# Patient Record
Sex: FEMALE | Race: WHITE | NOT HISPANIC OR LATINO | Employment: FULL TIME | URBAN - METROPOLITAN AREA
[De-identification: names, ages, dates, MRNs, and addresses within clinical notes are randomized per-mention and may not be internally consistent; named-entity substitution may affect disease eponyms.]

---

## 2022-01-06 ENCOUNTER — OFFICE VISIT (OUTPATIENT)
Dept: OBGYN CLINIC | Facility: MEDICAL CENTER | Age: 27
End: 2022-01-06
Payer: COMMERCIAL

## 2022-01-06 VITALS
HEIGHT: 65 IN | DIASTOLIC BLOOD PRESSURE: 62 MMHG | SYSTOLIC BLOOD PRESSURE: 108 MMHG | BODY MASS INDEX: 29.82 KG/M2 | WEIGHT: 179 LBS

## 2022-01-06 DIAGNOSIS — Z78.9 ATTEMPTING TO CONCEIVE: ICD-10-CM

## 2022-01-06 DIAGNOSIS — Z80.3 FAMILY HISTORY OF BREAST CANCER IN MOTHER: ICD-10-CM

## 2022-01-06 DIAGNOSIS — Z01.419 WELL WOMAN EXAM WITH ROUTINE GYNECOLOGICAL EXAM: Primary | ICD-10-CM

## 2022-01-06 PROBLEM — S03.00XA DISLOCATION OF TEMPOROMANDIBULAR JOINT: Status: ACTIVE | Noted: 2022-01-06

## 2022-01-06 PROBLEM — J45.909 ASTHMA: Status: ACTIVE | Noted: 2022-01-06

## 2022-01-06 PROBLEM — F98.8 ATTENTION DEFICIT DISORDER: Status: ACTIVE | Noted: 2022-01-06

## 2022-01-06 PROCEDURE — S0610 ANNUAL GYNECOLOGICAL EXAMINA: HCPCS | Performed by: OBSTETRICS & GYNECOLOGY

## 2022-01-06 RX ORDER — ALBUTEROL SULFATE 90 UG/1
1 AEROSOL, METERED RESPIRATORY (INHALATION)
COMMUNITY
Start: 2021-12-15 | End: 2022-02-16

## 2022-01-06 RX ORDER — IPRATROPIUM BROMIDE 42 UG/1
2 SPRAY, METERED NASAL
COMMUNITY
Start: 2021-12-24 | End: 2022-02-16

## 2022-01-06 RX ORDER — DEXAMETHASONE 4 MG/1
2 TABLET ORAL 2 TIMES DAILY
COMMUNITY
Start: 2021-12-24 | End: 2022-02-16

## 2022-01-06 RX ORDER — LEVOCETIRIZINE DIHYDROCHLORIDE 5 MG/1
TABLET, FILM COATED ORAL
COMMUNITY
Start: 2022-01-04 | End: 2022-04-22

## 2022-01-06 NOTE — PROGRESS NOTES
Assessment   31 y o  G0 with regular menses who is sexually active and currently not using contraception (TTC) presenting for annual exam      Plan   Diagnoses and all orders for this visit:    Well woman exam with routine gynecological exam  - Pap up to date  - s/p gardasil  - Clinicians breast exam done  - Return in 1yr for yearly    Family history of breast cancer in mother  - Discussed family hx and neg genetic testing in family  - Discussed dense breast tissue and how that can change risk  May benefit from adjunctive testing, which will be determined after baseline mammo  - Recommend starting mammograms at age 35 and yearly thereafter    Attempting to conceive  - Anticipatory guidance given  - Will follow next cycle with OPKs  Discussed expected vs abnormal results  - Start PNV  - Medical hx and medication review done      __________________________________________________________________      Subjective     Sisi Lopez is a 31 y o  G0 with regular menses who is sexually active and currently not using contraception (TTC) presenting for annual exam  She is without complaint  Recently stopped OCP due to desire to conceive  Hx ovarian cysts that resolved easily with birth control  GYN  Complaints: denies  Denies dysmenorrhea, dyspareunia, genital discharge, genital ulcers, irregular/heavy menses, pelvic pain and vulvar/vaginal symptoms  Periods are regular every 30 days, lasting 4-5 days  Dysmenorrhea:none     Cyclic symptoms include none  Sexually active: Yes - single partner - male  Hx STI: denies   Hx Abnormal pap: denies  Last pap: 2020 - NILM  S/p gardasil    OB  G0  TTC      Complaints: denies  Denies urinary frequency, hematuria, urinary incontinence and dysuria    BREAST  Complaints: denies  Denies: breast lump, breast tenderness, changed mole, dryness, nipple discharge, pruritus, rash, skin color change and skin lesion(s)  Last mammogram: n/a  Personal hx: denies  Family hx: denies fhx of uterine, ovarian, or colon cancers  Mom with breast ca (62) (BRCA neg)  Maternal grandmother and maternal great aunt with breast ca (later in life)  Patient does do regular self-exams    GENERAL  PMH reviewed/updated and is as below  Patient does not follow with a PCP  Works as a CT tech at [employer]  Denies domestic violence  Exercise: nothing specific, currently moving  Diet: nothing specific    SCREENING  Cervical Ca: pap up to date, s/p gardasil  Breast Ca: clinicians breast exam done, family hx discussed  Colon Ca: not indicated by age      No past medical history on file  Past Surgical History:   Procedure Laterality Date    SHOULDER SURGERY Right     2019         Current Outpatient Medications:     albuterol (PROVENTIL HFA,VENTOLIN HFA) 90 mcg/act inhaler, Inhale 1 puff, Disp: , Rfl:     fluticasone (Flovent HFA) 110 MCG/ACT inhaler, Inhale 2 puffs 2 (two) times a day, Disp: , Rfl:     ipratropium (ATROVENT) 0 06 % nasal spray, 2 sprays, Disp: , Rfl:     levocetirizine (XYZAL) 5 MG tablet, , Disp: , Rfl:     Advair Diskus 100-50 MCG/DOSE inhaler, , Disp: , Rfl:     Allergies   Allergen Reactions    Cephalosporins Hives    Lactose Intolerance (Gi) - Food Allergy Nausea Only    Clindamycin Rash     headache  headache      Tramadol GI Intolerance, Hives, Itching and Rash       Social History     Tobacco Use    Smoking status: Never Smoker    Smokeless tobacco: Never Used   Vaping Use    Vaping Use: Never used   Substance Use Topics    Alcohol use: Yes     Comment: social    Drug use: Never           Objective  /62   Ht 5' 5" (1 651 m)   Wt 81 2 kg (179 lb)   LMP 01/03/2022   Breastfeeding No   BMI 29 79 kg/m²      Physical Exam:  Physical Exam  Constitutional:       General: She is not in acute distress  Appearance: Normal appearance  She is not ill-appearing, toxic-appearing or diaphoretic  Eyes:      General: No scleral icterus  Right eye: No discharge           Left eye: No discharge  Conjunctiva/sclera: Conjunctivae normal    Cardiovascular:      Rate and Rhythm: Normal rate  Pulmonary:      Effort: Pulmonary effort is normal  No respiratory distress  Chest:   Breasts: Breasts are symmetrical       Right: No inverted nipple, mass, nipple discharge, skin change, tenderness or axillary adenopathy  Left: No inverted nipple, mass, nipple discharge, skin change, tenderness or axillary adenopathy  Genitourinary:     Comments: Declines pelvic secondary to menses  No reported concerns  Musculoskeletal:         General: No swelling  Lymphadenopathy:      Upper Body:      Right upper body: No axillary or pectoral adenopathy  Left upper body: No axillary or pectoral adenopathy  Skin:     General: Skin is warm and dry  Coloration: Skin is not jaundiced or pale  Findings: No bruising or erythema  Neurological:      Mental Status: She is alert  Psychiatric:         Mood and Affect: Mood normal          Behavior: Behavior normal          Thought Content:  Thought content normal          Judgment: Judgment normal

## 2022-02-15 ENCOUNTER — OFFICE VISIT (OUTPATIENT)
Dept: DERMATOLOGY | Facility: CLINIC | Age: 27
End: 2022-02-15
Payer: COMMERCIAL

## 2022-02-15 VITALS — TEMPERATURE: 98.2 F | HEIGHT: 65 IN | WEIGHT: 183.3 LBS | BODY MASS INDEX: 30.54 KG/M2

## 2022-02-15 DIAGNOSIS — D22.9 MULTIPLE BENIGN MELANOCYTIC NEVI: Primary | ICD-10-CM

## 2022-02-15 DIAGNOSIS — L91.8 ACROCHORDON: ICD-10-CM

## 2022-02-15 DIAGNOSIS — L70.0 ACNE VULGARIS: ICD-10-CM

## 2022-02-15 DIAGNOSIS — D48.5 NEOPLASM OF UNCERTAIN BEHAVIOR OF SKIN: ICD-10-CM

## 2022-02-15 PROCEDURE — 99214 OFFICE O/P EST MOD 30 MIN: CPT | Performed by: STUDENT IN AN ORGANIZED HEALTH CARE EDUCATION/TRAINING PROGRAM

## 2022-02-15 PROCEDURE — 11103 TANGNTL BX SKIN EA SEP/ADDL: CPT | Performed by: STUDENT IN AN ORGANIZED HEALTH CARE EDUCATION/TRAINING PROGRAM

## 2022-02-15 PROCEDURE — 88305 TISSUE EXAM BY PATHOLOGIST: CPT | Performed by: STUDENT IN AN ORGANIZED HEALTH CARE EDUCATION/TRAINING PROGRAM

## 2022-02-15 PROCEDURE — 11102 TANGNTL BX SKIN SINGLE LES: CPT | Performed by: STUDENT IN AN ORGANIZED HEALTH CARE EDUCATION/TRAINING PROGRAM

## 2022-02-15 NOTE — PATIENT INSTRUCTIONS
1  MELANOCYTIC NEVI ("Moles")    Assessment and Plan:  Based on a thorough discussion of this condition and the management approach to it (including a comprehensive discussion of the known risks, side effects and potential benefits of treatment), the patient (family) agrees to implement the following specific plan:   Reassured benign      Melanocytic Nevi  Melanocytic nevi ("moles") are caused by collections of the color producing skin cells, or melanocytes, in 1 area in the skin  They can range in color from pink to dark brown and be either raised or flat  Some moles are present at birth (I e , "congenital nevi"), while others come up later in life (i e , "acquired nevi")  Jenetta Reed exposure also stimulates the body to make more moles, ie the more sun you get the more moles you'll grow  Clinically distinguishing a healthy mole from melanoma may be difficult  The "ABCDE's" of moles have been suggested as a means of helping to alert a person to a suspicious mole and the possible increased risk of melanoma  Asymmetry: Healthy moles tend to be symmetric, while melanomas are often asymmetric  Asymmetry means if you draw a line through the mole, the two halves do not match in color, size, shape, or surface texture Any mole that starts to demonstrate "asymmetry" should be examined promptly by a board certified dermatologist      Border: Healthy moles tend to have discrete, even borders  The border of a melanoma often blends into the normal skin and does not sharply delineate the mole from normal skin  Any mole that starts to demonstrate "uneven borders" should be examined promptly     Color: Healthy moles tend to be one color throughout  Melanomas tend to be made up of different colors ranging from dark black, blue, white, or red    Any mole that demonstrates a color change should be examined promptly    Diameter: Healthy moles tend to be smaller than 0 6 cm in size; an exception are "congenital nevi" that can be larger  Melanomas tend to grow and can often be greater than 0 6 cm (1/4 of an inch, or the size of a pencil eraser)  This is only a guideline, and many normal moles may be larger than 0 6 cm without being unhealthy  Any mole that starts to change in size (small to bigger or bigger to smaller) should be examined promptly    Evolving: Healthy moles tend to "stay the same "  Melanomas may often show signs of change or evolution such as a change in size, shape, color, or elevation  Any mole that starts to itch, bleed, crust, burn, hurt, or ulcerate or demonstrate a change or evolution should be examined promptly by a board certified dermatologist       What are atypical moles or dysplastic nevi? Dysplastic moles are moles that have some of the ABCDE  changes listed above but  are not cancerous  Sometimes a biopsy and microscopic examination are needed to determine the difference  They may indicate an increased risk of melanoma in that person, especially if there is a family history of melanoma  What is a Melanoma? The main concern when looking at a new or changing mole it to evaluate whether it may be a melanoma  The appearance of a "new mole" remains one of the most reliable methods for identifying a malignant melanoma  A melanoma is a type of skin cancer that can be deadly if it spreads throughout the body  The prognosis of a Melanoma depends on how deep it has penetrated in the skin  If caught early, they generally will not have had time to grow into the deeper layers of the skin and they cure rate is then very high  Once the melanoma grows deeper into the skin, the cure rate drops dramatically  Therefore, early detection and removal of a malignant melanoma results in a much better chance of complete cure         2  ACROCHORDON ("SKIN TAG")    Assessment and Plan:  Based on a thorough discussion of this condition and the management approach to it (including a comprehensive discussion of the known risks, side effects and potential benefits of treatment), the patient (family) agrees to implement the following specific plan:   Reassured benign     Skin tags are common, soft, harmless skin lesions that are also called, in the appropriate settings, papillomas, fibroepithelial polyps, and soft fibromas  They are made up of loosely arranged collagen fibers and blood vessels surrounded by a thickened or thinned-out epidermis  Skin tags tend to develop in both men and women as we grow older  They are usually found on the skin folds (neck, armpits, groin)  It is not known what specifically causes skin tags  Certain factors, though, do appear to play a role:   Chaffing and irritation from skin rubbing together   High levels of growth factors (as seen, for example, in pregnancy or in acromegaly/gigantism)   Insulin resistance   Human papillomavirus (wart virus)    We discussed that most skin tags do not need to be treated unless they are specifically causing the patient physical distress or limitation or pose a risk for a larger problem such as an infection that forms secondary to excoriation or chronic irritation  We had a thorough discussion of treatment options and specific risks (including that any procedural treatment may not be covered by insurance and would then be the patient's responsibility) and benefits/alternatives including but not limited to the following:   Cryotherapy (freezing)   Shave removal   Surgical excision (snip excision with scissors)   Electrosurgery   Ligation (we do not do this procedure and counseled against it due to risk of tissue necrosis and infection)      3  NEOPLASM OF UNCERTAIN BEHAVIOR OF SKIN    Assessment and Plan:   I have discussed with the patient that a sample of skin via a "skin biopsy would be potentially helpful to further make a specific diagnosis under the microscope     Based on a thorough discussion of this condition and the management approach to it (including a comprehensive discussion of the known risks, side effects and potential benefits of treatment), the patient (family) agrees to implement the following specific plan:    o Procedure:  Skin Biopsy  After a thorough discussion of treatment options and risk/benefits/alternatives (including but not limited to local pain, scarring, dyspigmentation, blistering, possible superinfection, and inability to confirm a diagnosis via histopathology), verbal and written consent were obtained and portion of the rash was biopsied for tissue sample  See below for consent that was obtained from patient and subsequent Procedure Note  INFORMED CONSENT DISCUSSION AND POST-OPERATIVE INSTRUCTIONS FOR PATIENT    I   RATIONALE FOR PROCEDURE  I understand that a skin biopsy allows the Dermatologist to test a lesion or rash under the microscope to obtain a diagnosis  It usually involves numbing the area with numbing medication and removing a small piece of skin; sometimes the area will be closed with sutures  In this specific procedure, sutures are not usually needed  If any sutures are placed, then they are usually need to be removed in 2 weeks or less  I understand that my Dermatologist recommends that a skin "shave" biopsy be performed today  A local anesthetic, similar to the kind that a dentist uses when filling a cavity, will be injected with a very small needle into the skin area to be sampled  The injected skin and tissue underneath "will go to sleep and become numb so no pain should be felt afterwards  An instrument shaped like a tiny "razor blade" (shave biopsy instrument) will be used to cut a small piece of tissue and skin from the area so that a sample of tissue can be taken and examined more closely under the microscope  A slight amount of bleeding will occur, but it will be stopped with direct pressure and a pressure bandage and any other appropriate methods    I understands that a scar will form where the wound was created  Surgical ointment will be applied to help protect the wound  Sutures are not usually needed  II   RISKS AND POTENTIAL COMPLICATIONS   I understand the risks and potential complications of a skin biopsy include but are not limited to the following:   Bleeding   Infection   Pain   Scar/keloid   Skin discoloration   Incomplete Removal   Recurrence   Nerve Damage/Numbness/Loss of Function   Allergic Reaction to Anesthesia   Biopsies are diagnostic procedures and based on findings additional treatment or evaluation may be required   Loss or destruction of specimen resulting in no additional findings    My Dermatologist has explained to me the nature of the condition, the nature of the procedure, and the benefits to be reasonably expected compared with alternative approaches  My Dermatologist has discussed the likelihood of major risks or complications of this procedure including the specific risks listed above, such as bleeding, infection, and scarring/keloid  I understand that a scar is expected after this procedure  I understand that my physician cannot predict if the scar will form a "keloid," which extends beyond the borders of the wound that is created  A keloid is a thick, painful, and bumpy scar  A keloid can be difficult to treat, as it does not always respond well to therapy, which includes injecting cortisone directly into the keloid every few weeks  While this usually reduces the pain and size of the scar, it does not eliminate it  I understand that photographs may be taken before and after the procedure  These will be maintained as part of the medical providers confidential records and may not be made available to me  I further authorize the medical provider to use the photographs for teaching purposes or to illustrate scientific papers, books, or lectures if in his/her judgment, medical research, education, or science may benefit from its use      I have had an opportunity to fully inquire about the risks and benefits of this procedure and its alternatives  I have been given ample time and opportunity to ask questions and to seek a second opinion if I wished to do so  I acknowledge that there have specifically been no guarantees as to the cosmetic results from the procedure  I am aware that with any procedure there is always the possibility of an unexpected complication  III  POST-PROCEDURAL CARE (WHAT YOU WILL NEED TO DO "AFTER THE BIOPSY" TO OPTIMIZE HEALING)     Keep the area clean and dry  Try NOT to remove the bandage or get it wet for the first 24 hours   Gently clean the area and apply surgical ointment (such as Vaseline petrolatum ointment, which is available "over the counter" and not a prescription) to the biopsy site for up to 2 weeks straight  This acts to protect the wound from the outside world   Sutures are not usually placed in this procedure  If any sutures were placed, return for suture removal as instructed (generally 1 week for the face, 2 weeks for the body)   Take Acetaminophen (Tylenol) for discomfort, if no contraindications  Ibuprofen or aspirin could make bleeding worse   Call our office immediately for signs of infection: fever, chills, increased redness, warmth, tenderness, discomfort/pain, or pus or foul smell coming from the wound  WHAT TO DO IF THERE IS ANY BLEEDING? If a small amount of bleeding is noticed, place a clean cloth over the area and apply firm pressure for ten minutes  Check the wound after 10 minutes of direct pressure  If bleeding persists, try one more time for an additional 10 minutes of direct pressure on the area  If the bleeding becomes heavier or does not stop after the second attempt, or if you have any other questions about this procedure, then please call your [place]  [facility] by calling [phone] (SKIN)       I hereby acknowledge that I have reviewed and verified the site with my Dermatologist and have requested and authorized my Dermatologist to proceed with the procedure  4  ACNE VULGARIS ("COMMON ACNE")    Assessment and Plan:   We reviewed the causes of acne, the kinds of acne, and the expected clinical course   We discussed treatment options ranging from over-the-counter products, topical retinoids, antibiotics, BP, hormonal therapies (OCPs/spironolactone), and isotretinoin (Accutane)   We reviewed specific over-the-counter interventions and medications  Recommended typical hygiene measures washing regularly with mild cleanser, and refraining from picking and popping any pimples  Recommended non-comedogenic sunscreen use daily   Expectations of therapy discussed  Side effects, risks and benefits of medications discussed  A comprehensive handout with treatment plan provided  The phone number to call in case of questions or concerns (and instructions to stop medications in such a scenario) was provided   After lengthy discussion of etiology and treatment options, we decided to implement the following personalized treatment plan:      Mornin  Wash with over the counter Neutrogena Clear Pore Face Wash on face and back wash off after 30 seconds  Night:    1  Wash your face with a gentle face wash - like Cetaphil wash, Cerave Hydrating , LaRoche Hydrating Cleanser   -Differin 0 1% gel- Over the counter apply topically one hour before bedtime a pea sized amount  Start with every other night and then if tolerable please increase to every night  Make sure all products you use on face are labeled as "non-comedongenic" or "oil-free" or " does not clog pores"  Acne can be frustrating and difficult to treat  Most acne regimens take 2-3 months to see an improvement, so stick with them  Don't give up! As always, call your doctor if you have any concerns about your medications

## 2022-02-15 NOTE — PROGRESS NOTES
[facility] Dermatology Clinic Note     Patient Name: Roxanna Holliday  Encounter Date: 02/15/2022     Have you been cared for by a [facility] Dermatologist in the last 3 years and, if so, which one? No    · Have you traveled outside of the [address] [place] [address] in the past 3 months or outside of the [facility] area in the last 2 weeks? No     May we call your Preferred Phone number to discuss your specific medical information? Yes     May we leave a detailed message that includes your specific medical information? Yes      Today's Chief Concerns:   Concern #1:  Full Body Check       Past Medical History:  Have you personally ever had or currently have any of the following? · Skin cancer (such as Melanoma, Basal Cell Carcinoma, Squamous Cell Carcinoma? (If Yes, please provide more detail)- No  · Eczema: YES  · Psoriasis: No  · HIV/AIDS: No  · Hepatitis B or C: No  · Tuberculosis: No  · Systemic Immunosuppression such as Diabetes, Biologic or Immunotherapy, Chemotherapy, Organ Transplantation, Bone Marrow Transplantation (If YES, please provide more detail): No  · Radiation Treatment (If YES, please provide more detail): No  · Any other major medical conditions/concerns? 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 Be outdoors     Family History:  Have any of your "first degree relatives" (parent, brother, sister, or child) had any of the following       · Skin cancer such as Melanoma or Merkel Cell Carcinoma or Pancreatic Cancer? No  · Eczema, Asthma, Hay Fever or Seasonal Allergies: No  · Psoriasis or Psoriatic Arthritis: No  · Do any other medical conditions seem to run in your family? If Yes, what condition and which relatives?   No    Current Medications:         Current Outpatient Medications:     Advair Diskus 100-50 MCG/DOSE inhaler, , Disp: , Rfl:     albuterol (PROVENTIL HFA,VENTOLIN HFA) 90 mcg/act inhaler, Inhale 1 puff, Disp: , Rfl:     fluticasone (Flovent HFA) 110 MCG/ACT inhaler, Inhale 2 puffs 2 (two) times a day, Disp: , Rfl:     levocetirizine (XYZAL) 5 MG tablet, , Disp: , Rfl:     ipratropium (ATROVENT) 0 06 % nasal spray, 2 sprays (Patient not taking: Reported on 2/15/2022 ), Disp: , Rfl:       Review of Systems:  Have you recently had or currently have any of the following? If YES, what are you doing for the problem? · Fever, chills or unintended weight loss: No  · Sudden loss or change in your vision: No  · Nausea, vomiting or blood in your stool: No  · Painful or swollen joints: No  · Wheezing or cough: No  · Changing mole or non-healing wound: YES,   · Nosebleeds: No  · Excessive sweating: No  · Easy or prolonged bleeding? No  · Over the last 2 weeks, how often have you been bothered by the following problems? · Taking little interest or pleasure in doing things: 1 - Not at All  · Feeling down, depressed, or hopeless: 1 - Not at All  · Rapid heartbeat with epinephrine:  No    · FEMALES ONLY:    · Are you pregnant or planning to become pregnant? YES  · Are you currently or planning to be nursing or breast feeding? No    · Any known allergies? Allergies   Allergen Reactions    Cephalosporins Hives    Lactose Intolerance (Gi) - Food Allergy Nausea Only    Clindamycin Rash     headache  headache      Tramadol GI Intolerance, Hives, Itching and Rash   ·       Physical Exam:     Was a chaperone (Derm Clinical Assistant) present throughout the entire Physical Exam? Yes     Did the Dermatology Team specifically  the patient on the importance of a Full Skin Exam to be sure that nothing is missed clinically?  Yes}  o Did the patient ultimately request or accept a Full Skin Exam?  Yes  o Did the patient specifically refuse to have the areas "under-the-bra" examined by the Dermatologist? No  o Did the patient specifically refuse to have the areas "under-the-underwear" examined by the Dermatologist? No    CONSTITUTIONAL:   Vitals:    02/15/22 0935   Temp: 98 2 °F (36 8 °C)   TempSrc: Temporal   Weight: 83 1 kg (183 lb 4 8 oz)   Height: 5' 5" (1 651 m)       PSYCH: Normal mood and affect  EYES: Normal conjunctiva  ENT: Normal lips and oral mucosa  CARDIOVASCULAR: No edema  RESPIRATORY: Normal respirations  HEME/LYMPH/IMMUNO:  No ostensible subQ swelling except as noted below in "ASSESSMENT AND PLAN BY DIAGNOSIS"    SKIN:  FULL ORGAN SYSTEM EXAM   Hair, Scalp, Ears, Face Normal except as noted below in Assessment   Neck Normal except as noted below in Assessment   Right Arm/Hand/Fingers Normal except as noted below in Assessment   Left Arm/Hand/Fingers Normal except as noted below in Assessment   Axillae Viewed areas Normal except as noted below in Assessment (pt deferred breast, chest, abdomen exam)       Back/Spine Normal except as noted below in Assessment   Groin/Genitalia/Buttocks Pt deferred   Right Leg, Foot, Toes Normal except as noted below in Assessment   Left Leg, Foot, Toes Normal except as noted below in Assessment        Assessment and Plan by Diagnosis:        1  MELANOCYTIC NEVI ("Moles")    Physical Exam:   Anatomic Location Affected:   Mostly on sun-exposed areas of the Extremities and back    Morphological Description:  Scattered, 1-4mm round to ovoid, symmetrical-appearing, even bordered, skin colored to dark brown macules/papules, mostly in sun-exposed areas   Pertinent Positives:   Pertinent Negatives:     Additional History of Present Condition:  N/A    Assessment and Plan:  Based on a thorough discussion of this condition and the management approach to it (including a comprehensive discussion of the known risks, side effects and potential benefits of treatment), the patient (family) agrees to implement the following specific plan:   Monitor for changes     Melanocytic Nevi  Melanocytic nevi ("moles") are caused by collections of the color producing skin cells, or melanocytes, in 1 area in the skin  They can range in color from pink to dark brown and be either raised or flat  Some moles are present at birth (I e , "congenital nevi"), while others come up later in life (i e , "acquired nevi")  Quanah exposure also stimulates the body to make more moles, ie the more sun you get the more moles you'll grow  Clinically distinguishing a healthy mole from melanoma may be difficult  The "ABCDE's" of moles have been suggested as a means of helping to alert a person to a suspicious mole and the possible increased risk of melanoma  Asymmetry: Healthy moles tend to be symmetric, while melanomas are often asymmetric  Asymmetry means if you draw a line through the mole, the two halves do not match in color, size, shape, or surface texture Any mole that starts to demonstrate "asymmetry" should be examined promptly by a board certified dermatologist      Border: Healthy moles tend to have discrete, even borders  The border of a melanoma often blends into the normal skin and does not sharply delineate the mole from normal skin  Any mole that starts to demonstrate "uneven borders" should be examined promptly     Color: Healthy moles tend to be one color throughout  Melanomas tend to be made up of different colors ranging from dark black, blue, white, or red  Any mole that demonstrates a color change should be examined promptly    Diameter: Healthy moles tend to be smaller than 0 6 cm in size; an exception are "congenital nevi" that can be larger  Melanomas tend to grow and can often be greater than 0 6 cm (1/4 of an inch, or the size of a pencil eraser)  This is only a guideline, and many normal moles may be larger than 0 6 cm without being unhealthy    Any mole that starts to change in size (small to bigger or bigger to smaller) should be examined promptly    Evolving: Healthy moles tend to "stay the same "  Melanomas may often show signs of change or evolution such as a change in size, shape, color, or elevation  Any mole that starts to itch, bleed, crust, burn, hurt, or ulcerate or demonstrate a change or evolution should be examined promptly by a board certified dermatologist       What are atypical moles or dysplastic nevi? Dysplastic moles are moles that have some of the ABCDE  changes listed above but  are not cancerous  Sometimes a biopsy and microscopic examination are needed to determine the difference  They may indicate an increased risk of melanoma in that person, especially if there is a family history of melanoma  What is a Melanoma? The main concern when looking at a new or changing mole it to evaluate whether it may be a melanoma  The appearance of a "new mole" remains one of the most reliable methods for identifying a malignant melanoma  A melanoma is a type of skin cancer that can be deadly if it spreads throughout the body  The prognosis of a Melanoma depends on how deep it has penetrated in the skin  If caught early, they generally will not have had time to grow into the deeper layers of the skin and they cure rate is then very high  Once the melanoma grows deeper into the skin, the cure rate drops dramatically  Therefore, early detection and removal of a malignant melanoma results in a much better chance of complete cure  2  ACROCHORDON ("SKIN TAG")    Physical Exam:   Anatomic Location Affected:  Scattered    Morphological Description:  brown and skin colored papules   Pertinent Positives:   Pertinent Negatives:     Additional History of Present Condition:  N/A    Assessment and Plan:  Based on a thorough discussion of this condition and the management approach to it (including a comprehensive discussion of the known risks, side effects and potential benefits of treatment), the patient (family) agrees to implement the following specific plan:   Reassured benign     Skin tags are common, soft, harmless skin lesions that are also called, in the appropriate settings, papillomas, fibroepithelial polyps, and soft fibromas  They are made up of loosely arranged collagen fibers and blood vessels surrounded by a thickened or thinned-out epidermis  Skin tags tend to develop in both men and women as we grow older  They are usually found on the skin folds (neck, armpits, groin)  It is not known what specifically causes skin tags  Certain factors, though, do appear to play a role:   Chaffing and irritation from skin rubbing together   High levels of growth factors (as seen, for example, in pregnancy or in acromegaly/gigantism)   Insulin resistance   Human papillomavirus (wart virus)    We discussed that most skin tags do not need to be treated unless they are specifically causing the patient physical distress or limitation or pose a risk for a larger problem such as an infection that forms secondary to excoriation or chronic irritation  We had a thorough discussion of treatment options and specific risks (including that any procedural treatment may not be covered by insurance and would then be the patient's responsibility) and benefits/alternatives including but not limited to the following:   Cryotherapy (freezing)   Shave removal   Surgical excision (snip excision with scissors)   Electrosurgery   Ligation (we do not do this procedure and counseled against it due to risk of tissue necrosis and infection)      3  NEOPLASM OF UNCERTAIN BEHAVIOR OF SKIN    Physical Exam:   (Anatomic Location); (Size and Morphological Description); (Differential Diagnosis):  o Specimen A: Left Axilla; 0 2 x 0 2  cm; skin colored papule ;differential diagnosis; skin tag   o   o   o   o Specimen B; Right neck; 0 3 x 0 3 cm; brown papule; differential diagnosis; nevus vs skin tag   o   o   o    Pertinent Positives:   Pertinent Negatives:     Additional History of Present Condition:  N/A    Assessment and Plan:   I have discussed with the patient that a sample of skin via a "skin biopsy would be potentially helpful to further make a specific diagnosis under the microscope   Based on a thorough discussion of this condition and the management approach to it (including a comprehensive discussion of the known risks, side effects and potential benefits of treatment), the patient (family) agrees to implement the following specific plan:    o Procedure:  Skin Biopsy  After a thorough discussion of treatment options and risk/benefits/alternatives (including but not limited to local pain, scarring, dyspigmentation, blistering, possible superinfection, and inability to confirm a diagnosis via histopathology), verbal and written consent were obtained and portion of the rash was biopsied for tissue sample  See below for consent that was obtained from patient and subsequent Procedure Note  PROCEDURE SHAVE BIOPSY NOTE:     Performing Physician: Leroy Sood Anatomic Location; Clinical Description with size (cm); Pre-Op Diagnosis:   o Specimen A: Left Axilla; 0 2 x 0 2  cm; skin colored papule ;differential diagnosis; skin tag   o Specimen B; Right neck; 0 3 x 0 3 cm; brown papule; differential diagnosis; nevus vs skin tag    Post-op diagnosis: Same      Local anesthesia: 1% xylocaine with epi       Topical anesthesia: None     Hemostasis: Aluminum chloride       After obtaining informed consent  at which time there was a discussion about the purpose of biopsy  and low risks of infection and bleeding  The area was prepped and draped in the usual fashion  Anesthesia was obtained with 1% lidocaine with epinephrine  A shave biopsy to an appropriate sampling depth was obtained with a sterile blade (such as a 15-blade or DermaBlade)  The resulting wound was covered with surgical ointment and bandaged appropriately  The patient tolerated the procedure well without complications and was without signs of functional compromise        Specimen has been sent for review by Dermatopathology  Standard post-procedure care has been explained and has been included in written form within the patient's copy of Informed Consent  INFORMED CONSENT DISCUSSION AND POST-OPERATIVE INSTRUCTIONS FOR PATIENT    I   RATIONALE FOR PROCEDURE  I understand that a skin biopsy allows the Dermatologist to test a lesion or rash under the microscope to obtain a diagnosis  It usually involves numbing the area with numbing medication and removing a small piece of skin; sometimes the area will be closed with sutures  In this specific procedure, sutures are not usually needed  If any sutures are placed, then they are usually need to be removed in 2 weeks or less  I understand that my Dermatologist recommends that a skin "shave" biopsy be performed today  A local anesthetic, similar to the kind that a dentist uses when filling a cavity, will be injected with a very small needle into the skin area to be sampled  The injected skin and tissue underneath "will go to sleep and become numb so no pain should be felt afterwards  An instrument shaped like a tiny "razor blade" (shave biopsy instrument) will be used to cut a small piece of tissue and skin from the area so that a sample of tissue can be taken and examined more closely under the microscope  A slight amount of bleeding will occur, but it will be stopped with direct pressure and a pressure bandage and any other appropriate methods  I understands that a scar will form where the wound was created  Surgical ointment will be applied to help protect the wound  Sutures are not usually needed      II   RISKS AND POTENTIAL COMPLICATIONS   I understand the risks and potential complications of a skin biopsy include but are not limited to the following:   Bleeding   Infection   Pain   Scar/keloid   Skin discoloration   Incomplete Removal   Recurrence   Nerve Damage/Numbness/Loss of Function   Allergic Reaction to Anesthesia   Biopsies are diagnostic procedures and based on findings additional treatment or evaluation may be required   Loss or destruction of specimen resulting in no additional findings    My Dermatologist has explained to me the nature of the condition, the nature of the procedure, and the benefits to be reasonably expected compared with alternative approaches  My Dermatologist has discussed the likelihood of major risks or complications of this procedure including the specific risks listed above, such as bleeding, infection, and scarring/keloid  I understand that a scar is expected after this procedure  I understand that my physician cannot predict if the scar will form a "keloid," which extends beyond the borders of the wound that is created  A keloid is a thick, painful, and bumpy scar  A keloid can be difficult to treat, as it does not always respond well to therapy, which includes injecting cortisone directly into the keloid every few weeks  While this usually reduces the pain and size of the scar, it does not eliminate it  I understand that photographs may be taken before and after the procedure  These will be maintained as part of the medical providers confidential records and may not be made available to me  I further authorize the medical provider to use the photographs for teaching purposes or to illustrate scientific papers, books, or lectures if in his/her judgment, medical research, education, or science may benefit from its use  I have had an opportunity to fully inquire about the risks and benefits of this procedure and its alternatives  I have been given ample time and opportunity to ask questions and to seek a second opinion if I wished to do so  I acknowledge that there have specifically been no guarantees as to the cosmetic results from the procedure  I am aware that with any procedure there is always the possibility of an unexpected complication  III  POST-PROCEDURAL CARE (WHAT YOU WILL NEED TO DO "AFTER THE BIOPSY" TO OPTIMIZE HEALING)     Keep the area clean and dry  Try NOT to remove the bandage or get it wet for the first 24 hours   Gently clean the area and apply surgical ointment (such as Vaseline petrolatum ointment, which is available "over the counter" and not a prescription) to the biopsy site for up to 2 weeks straight  This acts to protect the wound from the outside world   Sutures are not usually placed in this procedure  If any sutures were placed, return for suture removal as instructed (generally 1 week for the face, 2 weeks for the body)   Take Acetaminophen (Tylenol) for discomfort, if no contraindications  Ibuprofen or aspirin could make bleeding worse   Call our office immediately for signs of infection: fever, chills, increased redness, warmth, tenderness, discomfort/pain, or pus or foul smell coming from the wound  WHAT TO DO IF THERE IS ANY BLEEDING? If a small amount of bleeding is noticed, place a clean cloth over the area and apply firm pressure for ten minutes  Check the wound after 10 minutes of direct pressure  If bleeding persists, try one more time for an additional 10 minutes of direct pressure on the area  If the bleeding becomes heavier or does not stop after the second attempt, or if you have any other questions about this procedure, then please call your [facility]'s Dermatologist by calling [phone] (SKIN)  I hereby acknowledge that I have reviewed and verified the site with my Dermatologist and have requested and authorized my Dermatologist to proceed with the procedure  4  ACNE VULGARIS ("COMMON ACNE")    Physical Exam:   Anatomic Location Affected: face  Morphological Description:clear now  Additional History of Present Condition:  N/A    Assessment and Plan:   We reviewed the causes of acne, the kinds of acne, and the expected clinical course     We discussed treatment options ranging from over-the-counter products, topical retinoids, antibiotics, BP, hormonal therapies (OCPs/spironolactone), and isotretinoin (Accutane)   We reviewed specific over-the-counter interventions and medications  Recommended typical hygiene measures washing regularly with mild cleanser, and refraining from picking and popping any pimples  Recommended non-comedogenic sunscreen use daily   Expectations of therapy discussed  Side effects, risks and benefits of medications discussed  A comprehensive handout with treatment plan provided  The phone number to call in case of questions or concerns (and instructions to stop medications in such a scenario) was provided   After lengthy discussion of etiology and treatment options, we decided to implement the following personalized treatment plan:      Mornin  Wash with over the counter Neutrogena Clear Pore Face Wash on face and back wash off after 30 seconds  Night:    1  Wash your face with a gentle face wash - like Cetaphil wash, Cerave Hydrating , LaRoche Hydrating Cleanser   -Differin 0 1% gel- Over the counter apply topically one hour before bedtime a pea sized amount  Start with every other night and then if tolerable please increase to every night  Make sure all products you use on face are labeled as "non-comedongenic" or "oil-free" or " does not clog pores"  Acne can be frustrating and difficult to treat  Most acne regimens take 2-3 months to see an improvement, so stick with them  Don't give up! As always, call your doctor if you have any concerns about your medications      Scribe Attestation    I,:  Grace Saldivar am acting as a scribe while in the presence of the attending physician :       I,:  Doron Sellers MD personally performed the services described in this documentation    as scribed in my presence :

## 2022-02-16 ENCOUNTER — OFFICE VISIT (OUTPATIENT)
Dept: OBGYN CLINIC | Facility: CLINIC | Age: 27
End: 2022-02-16
Payer: COMMERCIAL

## 2022-02-16 VITALS
HEIGHT: 65 IN | DIASTOLIC BLOOD PRESSURE: 76 MMHG | SYSTOLIC BLOOD PRESSURE: 114 MMHG | BODY MASS INDEX: 30.92 KG/M2 | WEIGHT: 185.6 LBS

## 2022-02-16 DIAGNOSIS — Z31.9 INFERTILITY MANAGEMENT: Primary | ICD-10-CM

## 2022-02-16 PROBLEM — N83.299 FUNCTIONAL OVARIAN CYSTS: Status: ACTIVE | Noted: 2022-02-16

## 2022-02-16 PROBLEM — S03.00XA DISLOCATION OF TEMPOROMANDIBULAR JOINT: Status: RESOLVED | Noted: 2022-01-06 | Resolved: 2022-02-16

## 2022-02-16 PROBLEM — F98.8 ATTENTION DEFICIT DISORDER: Status: RESOLVED | Noted: 2022-01-06 | Resolved: 2022-02-16

## 2022-02-16 PROCEDURE — 99215 OFFICE O/P EST HI 40 MIN: CPT | Performed by: PHYSICIAN ASSISTANT

## 2022-02-16 NOTE — PROGRESS NOTES
Assessment/Plan:    No problem-specific Assessment & Plan notes found for this encounter  Problem List Items Addressed This Visit     None      Visit Diagnoses     Infertility management    -  Primary          Slip written for partner, Shukri Paris, to have semen count and analysis  Pending results will further plan work up for pt at that point  We did discuss day 3 and day 21 testing as well as HSG if SA normal    Pt will also likely plan to make appt at [facility] locally to be seen for a consult as well  Pt and partner live in [facility]  We did also briefly discuss possible tx options including 3 cycles of oral medication here in the office if she desires prior to MASTER tx  Subjective:      Patient ID: Roxanna Holliday is a 31 y o  female  Pt presents for discussion of pregnancy  She does have appt to see MASTER at [facility] in March but it is relatively far for them and she is considering cancelling and making appt closer  Menarche 12/ no return x 12 mths then periods became more regular  Periods have been normal since  She was placed on OCPs age 14ish for control of ovarian cysts  Pt does have h/o menstrual migraines, would often skip placebo days and menses without issues  Pt has now been off pills since last January 2021  Periods have been normal off pills  Cycles typically q 31-34 last 4 days  Periods heavy and crampy the first 3 days-changes pad q 3-4 hours  Pt has been trying for pregnancy for: since stopping pills January  Over the last few mths has been using OPKs and apps  Found to have surge in cycle 12-13 days prior to menses based on OPKs  Has UPIC: at least 2-4 times per week, always around fertile days  Pregnancy history: none   Partner's pregnancy history: none   PMHX pt: none   PMHX partner: none   STD history: denies   Pelvic trauma: denies   H/o previous MASTER tx: never     Reviewed need for PNV     We reviewed pts current medication list      Pt states that she bought an at home test kit for her partner for sperm  She states that he did the kit which was found to be abnormal and indicated possible low or absent sperm count  Last pap: 1/23/20 WNL       The following portions of the patient's history were reviewed and updated as appropriate:   She  has a past medical history of Acne, ADHD, and Allergic asthma  She   Patient Active Problem List    Diagnosis Date Noted    Functional ovarian cysts 02/16/2022    Asthma 01/06/2022     She  has a past surgical history that includes Shoulder surgery (Right); Skin biopsy; and [place] tooth extraction  Her family history includes Breast cancer (age of onset: 62) in her mother; Breast cancer (age of onset: 76) in her maternal grandmother; Heart disease in her paternal grandmother  She  reports that she has never smoked  She has never used smokeless tobacco  She reports current alcohol use of about 1 0 standard drink of alcohol per week  She reports that she does not use drugs  Current Outpatient Medications   Medication Sig Dispense Refill    levocetirizine (XYZAL) 5 MG tablet       Prenat-Fe Bisgly-FA-w/o Vit A (COMPLETE PRENATAL PO) Take by mouth       No current facility-administered medications for this visit       Current Outpatient Medications on File Prior to Visit   Medication Sig    levocetirizine (XYZAL) 5 MG tablet     Prenat-Fe Bisgly-FA-w/o Vit A (COMPLETE PRENATAL PO) Take by mouth    [DISCONTINUED] Advair Diskus 100-50 MCG/DOSE inhaler  (Patient not taking: Reported on 2/16/2022 )    [DISCONTINUED] albuterol (PROVENTIL HFA,VENTOLIN HFA) 90 mcg/act inhaler Inhale 1 puff (Patient not taking: Reported on 2/16/2022 )    [DISCONTINUED] fluticasone (Flovent HFA) 110 MCG/ACT inhaler Inhale 2 puffs 2 (two) times a day (Patient not taking: Reported on 2/16/2022 )    [DISCONTINUED] ipratropium (ATROVENT) 0 06 % nasal spray 2 sprays (Patient not taking: Reported on 2/15/2022 )     No current facility-administered medications on file prior to visit  She is allergic to cephalosporins, lactose intolerance (gi) - food allergy, clindamycin, and tramadol       Review of Systems   Constitutional: Negative  Respiratory: Negative  Gastrointestinal: Negative  Endocrine: Negative  Genitourinary: Negative  Objective:      /76 (BP Location: Left arm, Patient Position: Sitting, Cuff Size: Large)   Ht 5' 5" (1 651 m)   Wt 84 2 kg (185 lb 9 6 oz)   LMP 02/06/2022 (Exact Date)   BMI 30 89 kg/m²          Physical Exam  Constitutional:       Appearance: She is normal weight  HENT:      Head: Normocephalic and atraumatic  Musculoskeletal:         General: Normal range of motion  Neurological:      Mental Status: She is alert  Psychiatric:         Mood and Affect: Mood normal          Behavior: Behavior normal          Thought Content:  Thought content normal          Judgment: Judgment normal

## 2022-02-25 ENCOUNTER — TELEPHONE (OUTPATIENT)
Dept: OBGYN CLINIC | Facility: CLINIC | Age: 27
End: 2022-02-25

## 2022-02-25 NOTE — TELEPHONE ENCOUNTER
Wants to discuss results semen analysis  Does have apt at [facility] Tuesday   Looking for a referral as well to take to her apt

## 2022-02-28 ENCOUNTER — TELEPHONE (OUTPATIENT)
Dept: DERMATOLOGY | Facility: CLINIC | Age: 27
End: 2022-02-28

## 2022-02-28 NOTE — TELEPHONE ENCOUNTER
----- Message from Miky Liriano MD sent at 2/25/2022  3:26 PM EST -----  [place]: Please inform patients that biopsies show dermal nevi (moles)  Thank you

## 2022-02-28 NOTE — TELEPHONE ENCOUNTER
Spoke with the patient and she is aware of her pathology results per Dr Marco Patino and the patient had no further questions or concerns at this time

## 2022-04-08 ENCOUNTER — TELEPHONE (OUTPATIENT)
Dept: OBGYN CLINIC | Facility: CLINIC | Age: 27
End: 2022-04-08

## 2022-04-08 NOTE — TELEPHONE ENCOUNTER
Blood work was done 4 weeks ago  I would recommend considering repeat labs due to being 4 weeks ago  If still over 2 5 then can start Synthroid 25mcg  Transposition Flap Text: The defect edges were debeveled with a #15 scalpel blade.  Given the location of the defect and the proximity to free margins a transposition flap was deemed most appropriate.  Using a sterile surgical marker, an appropriate transposition flap was drawn incorporating the defect.    The area thus outlined was incised deep to adipose tissue with a #15 scalpel blade.  The skin margins were undermined to an appropriate distance in all directions utilizing iris scissors.

## 2022-04-08 NOTE — TELEPHONE ENCOUNTER
Pt called and said she had blood work recently and has a concern regarding her thyroid level if it can affect her pregnancy  She would like a call back  If she does not answer it's okay to leave a detailed message

## 2022-04-11 ENCOUNTER — TELEPHONE (OUTPATIENT)
Dept: OBGYN CLINIC | Facility: CLINIC | Age: 27
End: 2022-04-11

## 2022-04-15 ENCOUNTER — PATIENT MESSAGE (OUTPATIENT)
Dept: OBGYN CLINIC | Facility: CLINIC | Age: 27
End: 2022-04-15

## 2022-04-20 ENCOUNTER — TELEPHONE (OUTPATIENT)
Dept: OBGYN CLINIC | Facility: CLINIC | Age: 27
End: 2022-04-20
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
## 2022-04-21 NOTE — TELEPHONE ENCOUNTER
Spoke to DR Whitman about patient as HCG dropped further  Cancelled NOB and schedule patient for ultrasound on 4/22/2022 based on Dr Shirley Lorenzo recommendation

## 2022-04-22 ENCOUNTER — ULTRASOUND (OUTPATIENT)
Dept: OBGYN CLINIC | Facility: CLINIC | Age: 27
End: 2022-04-22
Payer: COMMERCIAL

## 2022-04-22 VITALS
DIASTOLIC BLOOD PRESSURE: 80 MMHG | BODY MASS INDEX: 30.49 KG/M2 | HEIGHT: 65 IN | SYSTOLIC BLOOD PRESSURE: 118 MMHG | WEIGHT: 183 LBS

## 2022-04-22 DIAGNOSIS — O02.1 MISSED ABORTION: ICD-10-CM

## 2022-04-22 DIAGNOSIS — O36.80X0 PREGNANCY WITH INCONCLUSIVE FETAL VIABILITY, SINGLE OR UNSPECIFIED FETUS: Primary | ICD-10-CM

## 2022-04-22 PROCEDURE — 99213 OFFICE O/P EST LOW 20 MIN: CPT | Performed by: PHYSICIAN ASSISTANT

## 2022-04-22 RX ORDER — IPRATROPIUM BROMIDE 42 UG/1
SPRAY, METERED NASAL
COMMUNITY
Start: 2022-03-02 | End: 2022-08-03 | Stop reason: ALTCHOICE

## 2022-04-22 NOTE — PROGRESS NOTES
Assessment/Plan:    Pregnancy with inconclusive fetal viability  Reviewed with patient decreasing quants and dating off when compared to last menstrual period and positive pregnancy test which are both concerning for early pregnancy loss  In order to confirm pregnancy loss an ultrasound must be done 2 weeks after ultrasound with gestational sac but no yolk sac or fetal pole confirming no fetal pole with a heart beat  Patient has ultrasound scheduled next week closer to her job for follow up  Offered support, dispelled guilt  Reviewed what to expect as far as bleeding  Reviewed only pads no tampon, reviewed Tylenol for pain relief  If confirmed loss and not starting to bleed on own we can offer Cytotec to start the process  Reviewed Cytotec in length  All questions answered  Will plan to repeat quant on Monday and call if bleeding starts so we can follow down to nonpregnant values  Problem List Items Addressed This Visit        Other    Pregnancy with inconclusive fetal viability - Primary     Reviewed with patient decreasing quants and dating off when compared to last menstrual period and positive pregnancy test which are both concerning for early pregnancy loss  In order to confirm pregnancy loss an ultrasound must be done 2 weeks after ultrasound with gestational sac but no yolk sac or fetal pole confirming no fetal pole with a heart beat  Patient has ultrasound scheduled next week closer to her job for follow up  Offered support, dispelled guilt  Reviewed what to expect as far as bleeding  Reviewed only pads no tampon, reviewed Tylenol for pain relief  If confirmed loss and not starting to bleed on own we can offer Cytotec to start the process  Reviewed Cytotec in length  All questions answered  Will plan to repeat quant on Monday and call if bleeding starts so we can follow down to nonpregnant values              Other Visit Diagnoses     Missed         Relevant Orders    hCG, quantitative            Subjective:      Patient ID: Kelsey Dotson is a 31 y o  female  HPI  32 yo seen for suspected early pregnancy loss  Reports LMP: 3/6/2022  Positive pregnancy test 4/6/2022  Quants:   4/8/2022: 131  4/14/2022: 1008  4/19/2022: 921  4/21/2022: 678  Pelvic ultrasound done 4/19/2022 showed small intrauterine sac corresponding for 4w5d, no yolk sac or embryo  Patient denies any bleeding  Does reports some cramping  The following portions of the patient's history were reviewed and updated as appropriate:   She  has a past medical history of Acne, ADHD, and Allergic asthma  She   Patient Active Problem List    Diagnosis Date Noted    Pregnancy with inconclusive fetal viability 04/22/2022    Pregnancy of unknown anatomic location 04/19/2022    Functional ovarian cysts 02/16/2022    Asthma 01/06/2022     She  has a past surgical history that includes Shoulder surgery (Right); Skin biopsy; and [place] tooth extraction  Her family history includes Breast cancer (age of onset: 62) in her mother; Breast cancer (age of onset: 76) in her maternal grandmother; Heart disease in her paternal grandmother  She  reports that she has never smoked  She has never used smokeless tobacco  She reports current alcohol use of about 1 0 standard drink of alcohol per week  She reports that she does not use drugs  Current Outpatient Medications   Medication Sig Dispense Refill    Advair Diskus 100-50 MCG/DOSE inhaler       ipratropium (ATROVENT) 0 06 % nasal spray ADMINISTER 2 SPRAYS INTO BOTH NOSTRILS 2 TIMES A DAY AS NEEDED (POST NASAL DRIP)   levothyroxine (Synthroid) 25 mcg tablet Take 1 tablet (25 mcg total) by mouth daily 30 tablet 1    Loratadine (CLARITIN PO) Take by mouth      Prenat-Fe Bisgly-FA-w/o Vit A (COMPLETE PRENATAL PO) Take by mouth       No current facility-administered medications for this visit       She is allergic to cephalosporins, lactose intolerance (gi) - food allergy, clindamycin, and tramadol       Review of Systems   Constitutional: Negative for fatigue, fever and unexpected weight change  HENT: Negative for dental problem and sinus pressure  Eyes: Negative for visual disturbance  Respiratory: Negative for cough, shortness of breath and wheezing  Cardiovascular: Negative for chest pain  Gastrointestinal: Negative for abdominal pain, blood in stool, constipation, diarrhea, nausea and vomiting  Endocrine: Negative for polydipsia  Genitourinary: Negative for difficulty urinating, dyspareunia, dysuria, frequency, hematuria, pelvic pain and urgency  Musculoskeletal: Negative for arthralgias and back pain  Neurological: Negative for dizziness, seizures, light-headedness and headaches  Psychiatric/Behavioral: Negative for suicidal ideas  The patient is not nervous/anxious  Objective:      /80 (BP Location: Left arm, Cuff Size: Standard)   Ht 5' 5" (1 651 m)   Wt 83 kg (183 lb)   LMP 03/06/2022   BMI 30 45 kg/m²          Physical Exam  Constitutional:       Appearance: Normal appearance  She is well-developed  HENT:      Head: Normocephalic and atraumatic  Neck:      Thyroid: No thyromegaly  Pulmonary:      Effort: Pulmonary effort is normal    Skin:     General: Skin is warm and dry  Neurological:      Mental Status: She is alert and oriented to person, place, and time  Psychiatric:         Behavior: Behavior normal        TVUS: Intrauterine gestational sac measuring 0 36 x 0 47 x 0 43 cm  Too small to date  No fetal pole or yolk sac visualized

## 2022-04-22 NOTE — ASSESSMENT & PLAN NOTE
Reviewed with patient decreasing quants and dating off when compared to last menstrual period and positive pregnancy test which are both concerning for early pregnancy loss  In order to confirm pregnancy loss an ultrasound must be done 2 weeks after ultrasound with gestational sac but no yolk sac or fetal pole confirming no fetal pole with a heart beat  Patient has ultrasound scheduled next week closer to her job for follow up  Offered support, dispelled guilt  Reviewed what to expect as far as bleeding  Reviewed only pads no tampon, reviewed Tylenol for pain relief  If confirmed loss and not starting to bleed on own we can offer Cytotec to start the process  Reviewed Cytotec in length  All questions answered  Will plan to repeat quant on Monday and call if bleeding starts so we can follow down to nonpregnant values

## 2022-05-02 ENCOUNTER — TELEPHONE (OUTPATIENT)
Dept: OBGYN CLINIC | Facility: CLINIC | Age: 27
End: 2022-05-02

## 2022-05-02 NOTE — TELEPHONE ENCOUNTER
Can you confirm how long she has been on the 25 mcg? Her levels are mildly elevated but also just coming off a pregnancy

## 2022-05-02 NOTE — TELEPHONE ENCOUNTER
Pt lmom - using thyroid medication and is running out of pills  Needs to use mail order [facility]  Needs refill and mail order

## 2022-05-03 DIAGNOSIS — E07.9 THYROID DISEASE: ICD-10-CM

## 2022-05-03 RX ORDER — LEVOTHYROXINE SODIUM 0.05 MG/1
50 TABLET ORAL DAILY
Qty: 30 TABLET | Refills: 0 | Status: SHIPPED | OUTPATIENT
Start: 2022-05-03 | End: 2022-06-06

## 2022-05-03 RX ORDER — LEVOTHYROXINE SODIUM 0.05 MG/1
50 TABLET ORAL DAILY
Qty: 90 TABLET | Refills: 0 | Status: SHIPPED | OUTPATIENT
Start: 2022-05-03 | End: 2022-06-06

## 2022-05-03 RX ORDER — LEVOTHYROXINE SODIUM 0.05 MG/1
50 TABLET ORAL DAILY
Qty: 90 TABLET | Refills: 0 | Status: SHIPPED | OUTPATIENT
Start: 2022-05-03 | End: 2022-05-03 | Stop reason: SDUPTHER

## 2022-05-09 ENCOUNTER — TELEPHONE (OUTPATIENT)
Dept: OBGYN CLINIC | Facility: CLINIC | Age: 27
End: 2022-05-09

## 2022-05-09 ENCOUNTER — PATIENT MESSAGE (OUTPATIENT)
Dept: OBGYN CLINIC | Facility: CLINIC | Age: 27
End: 2022-05-09

## 2022-05-09 NOTE — TELEPHONE ENCOUNTER
Sent patient mychart message with provider recommendations, as pt stated could send instructions via [employer]  Orders for labs noted in chart  Instructed patient to confirm receipt of message and to inquire if any other questions or concerns

## 2022-05-09 NOTE — TELEPHONE ENCOUNTER
Pt lmom - pt states she is taking low dose thyroid medication as prescribed by bryon and she read on the bottle she should not take iron supplement at same time, should be 4 hours after, and pt wants to know if it's okay if she takes her prenatal vitamin at the same time as her thyroid medicine in the morning like she usually does  Pt states we can call her back or send a [employer] message with instructions

## 2022-05-09 NOTE — TELEPHONE ENCOUNTER
No  Thyroid medication should be taken on an empty stomach without any other meds or supplements x 4 hours  We had recently increased her dose to 50 mcg  If she was taking w PNV, she should go back to 25 mcg and recheck TFTs after 4-6 weeks  She may not need the higher dosage  Thanks!

## 2022-05-31 DIAGNOSIS — E07.9 THYROID DISORDER: Primary | ICD-10-CM

## 2022-05-31 LAB
EXTERNAL HEPATITIS B SURFACE ANTIGEN: NON REACTIVE
EXTERNAL HEPATITIS B SURFACE ANTIGEN: NONREACTIVE
EXTERNAL HIV-1/2 AB-AG: NORMAL
EXTERNAL HIV-1/2 AB-AG: NORMAL
EXTERNAL RUBELLA IGG QUANTITATION: NORMAL

## 2022-06-07 DIAGNOSIS — E07.9 THYROID DISORDER: Primary | ICD-10-CM

## 2022-06-07 DIAGNOSIS — E07.9 THYROID DISEASE: ICD-10-CM

## 2022-06-07 RX ORDER — LEVOTHYROXINE SODIUM 0.03 MG/1
25 TABLET ORAL DAILY
Qty: 90 TABLET | Refills: 1 | Status: SHIPPED | OUTPATIENT
Start: 2022-06-07 | End: 2022-08-03 | Stop reason: SDUPTHER

## 2022-06-08 RX ORDER — LEVOTHYROXINE SODIUM 0.03 MG/1
25 TABLET ORAL DAILY
Qty: 90 TABLET | Refills: 1 | Status: SHIPPED | OUTPATIENT
Start: 2022-06-08

## 2022-06-08 RX ORDER — LEVOTHYROXINE SODIUM 0.03 MG/1
25 TABLET ORAL DAILY
Qty: 28 TABLET | Refills: 0 | Status: SHIPPED | OUTPATIENT
Start: 2022-06-08 | End: 2022-08-03 | Stop reason: SDUPTHER

## 2022-06-27 DIAGNOSIS — Z3A.01 LESS THAN 8 WEEKS GESTATION OF PREGNANCY: Primary | ICD-10-CM

## 2022-06-27 NOTE — PROGRESS NOTES
Pt called in with positive upt requests labs   Labs ordered and aware to call once receives results to schedule apt

## 2022-06-28 ENCOUNTER — TELEPHONE (OUTPATIENT)
Dept: OBGYN CLINIC | Facility: CLINIC | Age: 27
End: 2022-06-28

## 2022-06-28 NOTE — TELEPHONE ENCOUNTER
Pt aware recommend repeating second hcg level 48 hours after initial hcg level obtained  Pt verbalizes understanding and has no further questions at this time

## 2022-06-29 ENCOUNTER — APPOINTMENT (OUTPATIENT)
Dept: LAB | Facility: CLINIC | Age: 27
End: 2022-06-29
Payer: COMMERCIAL

## 2022-06-29 DIAGNOSIS — Z3A.01 LESS THAN 8 WEEKS GESTATION OF PREGNANCY: ICD-10-CM

## 2022-06-29 LAB — B-HCG SERPL-ACNC: 77 MIU/ML

## 2022-06-29 PROCEDURE — 36415 COLL VENOUS BLD VENIPUNCTURE: CPT

## 2022-06-29 PROCEDURE — 84702 CHORIONIC GONADOTROPIN TEST: CPT

## 2022-06-30 ENCOUNTER — TELEPHONE (OUTPATIENT)
Dept: OBGYN CLINIC | Facility: CLINIC | Age: 27
End: 2022-06-30

## 2022-06-30 DIAGNOSIS — Z32.00 POSSIBLE PREGNANCY, NOT YET CONFIRMED: Primary | ICD-10-CM

## 2022-06-30 NOTE — TELEPHONE ENCOUNTER
Pt calling states yesterday noticed some blood spotting reports was very minimal and then became more brown and reports now only noticed a little bit when wiped  Pt denies any clots, pain, reports minimal occasional cramping  Pt aware if continued or increased bleeding or pain to call  Pt requests repeat hcg level per provider result notes and states will complete 48 hours from last at same lab as previous confirmed [facility] lab, order placed  Pt states may desire future labs moving forward through [place] as closer to her work but will make aware as needed  Pt aware to call with any questions or concerns

## 2022-07-01 ENCOUNTER — APPOINTMENT (OUTPATIENT)
Dept: LAB | Facility: CLINIC | Age: 27
End: 2022-07-01
Payer: COMMERCIAL

## 2022-07-01 DIAGNOSIS — Z32.00 POSSIBLE PREGNANCY, NOT YET CONFIRMED: ICD-10-CM

## 2022-07-01 DIAGNOSIS — Z32.00 POSSIBLE PREGNANCY, NOT CONFIRMED: Primary | ICD-10-CM

## 2022-07-01 LAB — B-HCG SERPL-ACNC: 187 MIU/ML

## 2022-07-01 PROCEDURE — 84702 CHORIONIC GONADOTROPIN TEST: CPT

## 2022-07-01 PROCEDURE — 36415 COLL VENOUS BLD VENIPUNCTURE: CPT

## 2022-07-06 ENCOUNTER — APPOINTMENT (OUTPATIENT)
Dept: LAB | Facility: CLINIC | Age: 27
End: 2022-07-06
Payer: COMMERCIAL

## 2022-07-06 DIAGNOSIS — Z32.00 POSSIBLE PREGNANCY, NOT CONFIRMED: ICD-10-CM

## 2022-07-06 LAB — B-HCG SERPL-ACNC: 1149 MIU/ML

## 2022-07-06 PROCEDURE — 36415 COLL VENOUS BLD VENIPUNCTURE: CPT

## 2022-07-06 PROCEDURE — 84702 CHORIONIC GONADOTROPIN TEST: CPT

## 2022-07-08 DIAGNOSIS — E34.9 ELEVATED SERUM HCG: Primary | ICD-10-CM

## 2022-07-13 ENCOUNTER — APPOINTMENT (OUTPATIENT)
Dept: LAB | Facility: CLINIC | Age: 27
End: 2022-07-13
Payer: COMMERCIAL

## 2022-07-13 DIAGNOSIS — E34.9 ELEVATED SERUM HCG: ICD-10-CM

## 2022-07-13 LAB — B-HCG SERPL-ACNC: ABNORMAL MIU/ML

## 2022-07-13 PROCEDURE — 36415 COLL VENOUS BLD VENIPUNCTURE: CPT

## 2022-07-13 PROCEDURE — 84702 CHORIONIC GONADOTROPIN TEST: CPT

## 2022-07-14 DIAGNOSIS — Z3A.01 LESS THAN 8 WEEKS GESTATION OF PREGNANCY: Primary | ICD-10-CM

## 2022-07-14 DIAGNOSIS — Z87.59 HISTORY OF MISCARRIAGE: ICD-10-CM

## 2022-07-16 ENCOUNTER — HOSPITAL ENCOUNTER (OUTPATIENT)
Dept: ULTRASOUND IMAGING | Facility: HOSPITAL | Age: 27
Discharge: HOME | End: 2022-07-16
Payer: COMMERCIAL

## 2022-07-16 DIAGNOSIS — Z3A.01 LESS THAN 8 WEEKS GESTATION OF PREGNANCY: ICD-10-CM

## 2022-07-16 DIAGNOSIS — Z87.59 HISTORY OF MISCARRIAGE: ICD-10-CM

## 2022-07-16 PROCEDURE — 76801 OB US < 14 WKS SINGLE FETUS: CPT

## 2022-07-18 ENCOUNTER — APPOINTMENT (OUTPATIENT)
Dept: LAB | Facility: MEDICAL CENTER | Age: 27
End: 2022-07-18
Payer: COMMERCIAL

## 2022-07-18 ENCOUNTER — TELEPHONE (OUTPATIENT)
Dept: OBGYN CLINIC | Facility: CLINIC | Age: 27
End: 2022-07-18

## 2022-07-18 DIAGNOSIS — R11.0 NAUSEA: Primary | ICD-10-CM

## 2022-07-18 DIAGNOSIS — Z34.90 EARLY STAGE OF PREGNANCY: ICD-10-CM

## 2022-07-18 DIAGNOSIS — Z34.90 EARLY STAGE OF PREGNANCY: Primary | ICD-10-CM

## 2022-07-18 LAB — B-HCG SERPL-ACNC: ABNORMAL MIU/ML

## 2022-07-18 PROCEDURE — 84702 CHORIONIC GONADOTROPIN TEST: CPT

## 2022-07-18 PROCEDURE — 36415 COLL VENOUS BLD VENIPUNCTURE: CPT

## 2022-07-18 RX ORDER — METOCLOPRAMIDE 5 MG/1
5 TABLET ORAL EVERY 8 HOURS
Qty: 90 TABLET | Refills: 0 | Status: SHIPPED | OUTPATIENT
Start: 2022-07-18 | End: 2022-07-26

## 2022-07-18 NOTE — TELEPHONE ENCOUNTER
Pt lmom - calling in regards to mychart message about reglan   Remembers she has taken it in the past from anesthesia and it didn't really help so just wanted to review

## 2022-07-18 NOTE — TELEPHONE ENCOUNTER
Pt states will try reglan and if no relief will contact office for zofran as previously recommended by provider

## 2022-07-19 DIAGNOSIS — O21.9 NAUSEA AND VOMITING IN PREGNANCY: Primary | ICD-10-CM

## 2022-07-19 RX ORDER — ONDANSETRON 4 MG/1
4 TABLET, ORALLY DISINTEGRATING ORAL EVERY 8 HOURS PRN
Qty: 30 TABLET | Refills: 1 | Status: SHIPPED | OUTPATIENT
Start: 2022-07-19 | End: 2022-07-26 | Stop reason: SDUPTHER

## 2022-07-20 ENCOUNTER — HOSPITAL ENCOUNTER (OUTPATIENT)
Dept: ULTRASOUND IMAGING | Facility: MEDICAL CENTER | Age: 27
Discharge: HOME | End: 2022-07-20
Payer: COMMERCIAL

## 2022-07-20 DIAGNOSIS — Z34.90 EARLY STAGE OF PREGNANCY: ICD-10-CM

## 2022-07-20 PROCEDURE — 76801 OB US < 14 WKS SINGLE FETUS: CPT

## 2022-07-26 ENCOUNTER — TELEPHONE (OUTPATIENT)
Dept: OBGYN CLINIC | Facility: CLINIC | Age: 27
End: 2022-07-26

## 2022-07-26 DIAGNOSIS — R11.0 NAUSEA: ICD-10-CM

## 2022-07-26 DIAGNOSIS — O21.9 NAUSEA AND VOMITING IN PREGNANCY: ICD-10-CM

## 2022-07-26 RX ORDER — METOCLOPRAMIDE 5 MG/1
TABLET ORAL
Qty: 90 TABLET | Refills: 0 | Status: SHIPPED | OUTPATIENT
Start: 2022-07-26 | End: 2022-08-03 | Stop reason: ALTCHOICE

## 2022-07-26 NOTE — TELEPHONE ENCOUNTER
Pt lmom - got rx for Zofran, been taking for a week, working well  Is having constipation  Taking miralax for 3 days and stopped after bowel movement  Still having stomach cramping from slowing of bowels    Ok to respond via MyChart as she is on her way to work

## 2022-07-27 RX ORDER — ONDANSETRON 4 MG/1
4 TABLET, ORALLY DISINTEGRATING ORAL EVERY 8 HOURS PRN
Qty: 90 TABLET | Refills: 0 | Status: SHIPPED | OUTPATIENT
Start: 2022-07-27 | End: 2022-08-22 | Stop reason: SDUPTHER

## 2022-08-03 ENCOUNTER — INITIAL PRENATAL (OUTPATIENT)
Dept: OBGYN CLINIC | Facility: CLINIC | Age: 27
End: 2022-08-03

## 2022-08-03 VITALS
BODY MASS INDEX: 31.09 KG/M2 | HEIGHT: 65 IN | WEIGHT: 186.6 LBS | SYSTOLIC BLOOD PRESSURE: 118 MMHG | DIASTOLIC BLOOD PRESSURE: 78 MMHG

## 2022-08-03 DIAGNOSIS — Z3A.08 8 WEEKS GESTATION OF PREGNANCY: ICD-10-CM

## 2022-08-03 DIAGNOSIS — Z13.71 TESTING OF FEMALE FOR GENETIC DISEASE CARRIER STATUS: ICD-10-CM

## 2022-08-03 DIAGNOSIS — Z34.01 ENCOUNTER FOR SUPERVISION OF NORMAL FIRST PREGNANCY IN FIRST TRIMESTER: Primary | ICD-10-CM

## 2022-08-03 PROBLEM — O36.80X0 PREGNANCY WITH INCONCLUSIVE FETAL VIABILITY: Status: RESOLVED | Noted: 2022-04-22 | Resolved: 2022-08-03

## 2022-08-03 PROBLEM — O36.80X0 PREGNANCY OF UNKNOWN ANATOMIC LOCATION: Status: RESOLVED | Noted: 2022-04-19 | Resolved: 2022-08-03

## 2022-08-03 PROCEDURE — PNV: Performed by: PHYSICIAN ASSISTANT

## 2022-08-03 RX ORDER — CETIRIZINE HYDROCHLORIDE 10 MG/1
10 TABLET ORAL DAILY
COMMUNITY

## 2022-08-03 RX ORDER — DOCUSATE SODIUM 100 MG/1
100 CAPSULE, LIQUID FILLED ORAL 2 TIMES DAILY
COMMUNITY

## 2022-08-03 NOTE — PATIENT INSTRUCTIONS
Vaccine Decision Making  Im pregnant  Should I get a COVID vaccine? The information here is about the [place] and Moderna COVID-19 vaccines  These are also called mRNA vaccines  Reference numbers written like this (#) correspond to the footnotes at the end of this section  For most people, getting the COVID vaccine as soon as possible is the safest choice  However, these vaccines have not been tested in pregnant and breastfeeding women yet  The information below will help you make an informed choice about whether to get an mRNA COVID vaccine while you are pregnant or trying to get pregnant  Your options:  Get a COVID vaccine as soon as it is available   Wait for more information about the vaccines in pregnancy    What are the benefits of getting an mRNA COVID Vaccine? 1  COVID is dangerous  It is more dangerous for pregnant women  COVID patients who are pregnant are 5 times more likely to end up in the intensive care unit (ICU) or on a ventilator than COVID patients who are not pregnant  (#1)    birth may be more common for pregnant women with severe COVID  (#2)   Pregnant women are more likely to die of COVID than non-pregnant women with COVID who are the same age  (#3,4)    2  The mRNA COVID vaccines prevent about 95% of COVID infections  As COVID infections go up in our communities, your risk of getting COVID goes up too  Getting a vaccine will prevent you from getting COVID and may help keep you from giving COVID to people around you, like your family  3  The mRNA COVID vaccines cannot give you COVID  These vaccines have no live virus  (#5)   These vaccines do NOT contain ingredients that are known to be harmful to pregnant women or to the fetus  Many vaccines are routinely given in pregnancy and are safe (for example: tetanus, diphtheria, and flu)  More details about how these vaccines work can be found below in the section "More information about mRNA COVID vaccines"      What are the risks of getting an mRNA COVID vaccine? 1  These COVID vaccines have not yet been tested in pregnant people  These vaccines were tested in over 40,000 people, and there were no serious side effects related to the vaccine  We do not know if the vaccines work as well in pregnant people as they did in non-pregnant people  We do not know whether there are unique downsides in pregnancy, like different side effects or an increased risk of miscarriage or fetal abnormalities  The Moderna vaccine was tested in female rats to look at its effects on pregnancy  No significant negative effects were found on female fertility or fetal development  Some women became pregnant during the vaccine studies  Eighteen of these women were in the vaccine group, and two months later none had miscarried  There were [de-identified] women in the placebo group who became pregnant, and two months later two of them had had miscarriages  (In general, 10-20% of pregnancies end in miscarriage)  Because these studies are still ongoing, we dont know how the rest of the pregnancy went for these women  2  People getting the vaccine will probably have some side effects  Many people had symptoms caused by their immune systems normal response to the vaccine  The most common side effects were:(#6)   injection site reactions like sore arm (~84%)   fatigue (~62%)   headache (~55%)   muscle pain (~38%)   chills (~32%)   joint pain (~24%)   fever (~14%)   Of 100 people who get the vaccine, 1 will get a high fever (over 102°F)  A persistent high fever during the first trimester might increase the risk of fetal abnormalities or miscarriage  The CDC recommends using Tylenol (acetaminophen) during pregnancy if you have a high fever  Another option is to delay your COVID vaccine until after the first trimester  What do the experts recommend?   Because COVID is dangerous and easily spread, the CDC says that the mRNA vaccines for COVID-19 are recommended for adults  (#7)  However, because there are no studies of pregnant women yet, there are no clear recommendations for pregnant women  This is standard for a new drug and is not due to any particular concern with this vaccine  The Society for Maternal-Fetal Medicine strongly recommends that pregnant individuals have access to COVID vaccines  They recommend that each person talk to their doctor or midwife about their own personal choice  (#8)    The [address] and Gynecologists recommends that the COVID vaccine should not be withheld from pregnant individuals  (#9)    What else should I think about to help me decide? Make sure you understand as much as you can about COVID and about the vaccine  Ask a trusted source, like your midwife or doctor  Continue reading below for more information about the vaccine  Think about your own personal risk  Look at the columns below and think about your risk of getting COVID (left)  Think about your safety - are you able to stay safe (right)? The risks of getting sick from [facility]  are higher if If you are not at higher risk for COVID  and   ?? You have contact with people  outside your home ? ? You are always able to wear a mask   ? ? You are 28years old or older ? ? You and the people you live with  can socially distance from others for  your whole pregnancy   ? ? You are overweight ? ? Your community does NOT have  high or increasing COVID cases   ? ? You have other medical problems  like diabetes, high blood pressure,  or heart disease ? ? You think the vaccine itself will make  you very nervous (you are more  worried about the unknown risks  than about getting COVID)   ? ? You are a smoker ? ? You have had a severe allergic  reaction to a vaccine   ? ? You are a racial or ethnic minority, or your community has a high rate of COVID infections    ? ?  You are a healthcare worker(#10)    If you are at a higher risk of getting  COVID, it probably makes sense to get  the vaccine   it might make sense for you to wait  for more information  What about breastfeeding? The Society for [address] [place] [address] of Breastfeeding Medicine report that there is no reason to believe that the vaccine affects the safety of breastmilk  8,11 The vaccine does not contain the virus, so there is no risk of infecting your baby  Because mRNA is fragile, it is very unlikely that any part of the vaccine gets into breastmilk  When we have an infection or get a vaccine, our bodies make antibodies to fight the infection  Antibodies can pass into the breastmilk and then to the baby - and may help prevent infections  Summary  1  COVID seems to cause more harm in pregnant women than in women of the same age who are not pregnant  2  The risks of getting an mRNA COVID vaccine during pregnancy are thought to be small but are not totally known  3  You should consider your own personal risk of getting COVID  If your personal risk is high, or there are many cases of COVID in your community, it probably makes sense for you to get a vaccine while pregnant  4  Whether to get a COVID vaccine during pregnancy is your choice  What do pregnant doctors think? 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, so for me the choice is clear, I intend to be first in line as soon as they will let me have one " (Pregnant Emergency Department Doctor)    "I am a little nervous about getting something that hasnt been tested in pregnant patients  Early pregnancy is a nerve-wracking time, even without the unknown of a new vaccine  So, I went over the risks and benefits of getting or not getting it as a front- - with myself, my partner, and my doctors   We ended up deciding I should get the vaccine " (Pregnant Emergency Department Doctor)    "Im 34 weeks and I'm going to try to get vaccinated after delivery, but during pregnancy I'm holding out  Pregnant women were excluded from the studies and, in the meantime, I don't see [place] patients at work so I feel like my exposure will be low during this second wave " (Pregnant physician)    "I am still breastfeeding my baby, and I think the risk of exposing my infant and other children and partner to [place] is far greater than any theoretical risk this novel vaccine may have  Ive decided to get vaccinated whenever it becomes available " (Breastfeeding OB/GYN Doctor)      Do you have more questions? Call your doctor or midwife to talk about your own personal decision  Thoughts about this tool? Was this decision aid helpful? Please take a moment to give us feedback about this decision aid at [email or web address] or by scanning the QR code below  We need your help! Tell the [facility] about your experience with the vaccine  If you decide to get the vaccine, you will get a V-safe information sheet with instructions about the V-safe website and caryn  Consider registering so we can better  women in the future  More information about mRNA COVID Vaccines  How do mRNA COVID vaccines work? The Pfizer and Moderna COVID vaccines are mRNA vaccines (messenger RNA)  mRNA is not new - our bodies are full of it  mRNA vaccines have been studied for the past two decades  mRNA vaccines mimic how viruses work  The mRNA is like a recipe card that goes into your body and makes one recipe for a brief time  The recipe is for a small part of the virus (the spike protein)  When this spike protein is released from cells, the body recognizes it as foreign and the immune system responds  This immune response causes the side effect symptoms (like aches and fever) but leads to improved immunity  mRNA breaks down quickly, so it only lasts a brief time     This is also how the other viruses like a cold virus work - viruses use our body and cells to make their proteins  Then our immune system attacks those proteins to keep us healthy  There is no way for the vaccine to give people COVID  (#5)    What did the research show? The Pfizer and Moderna mRNA vaccine trials each had over 30,000 people (including those who got placebo) and showed that the vaccine lowers a persons chance of getting COVID and severe COVID  In each study, over 15,000 people got the vaccine and over 15,000 people got a saline injection (placebo)  After one dose, the vaccine appears to be 50% effective  After 2 doses, both vaccines are about 95% effective  In other words, for every 100 people who got COVID in the placebo group, only 5 people got COVID in the mRNA vaccine groups  Severe cases of COVID were also reduced in both mRNA vaccine groups  There were no serious safety concerns  Intended Use   This decision aid is intended for use by pregnant women (and women planning on becoming pregnant) who are considering getting the COVID-19 vaccine, as well as their  healthcare providers, and their friends and family  It was created by the Shared Decision-Making: COVID Vaccination in Pregnancy working group at the [facility]  This group consists of experts in the fields of OB/GYN, Maternal-Fetal Medicine, Shared Decision-Making and risk communication, Emergency Medicine, and current COVID-19 research  Questions should be directed to Dr [email or web address]  Feedback regarding the utility of this decision aid can be directed through the survey (see link above)  This decision aid can be reproduced and distributed without additional permission  [place] and [place] versions available at [email or web address] org/COVIDvacPregnancy  Updated December 22, 2020  1   Debbi SMALL, et al  Pregnant women with severe or critical COVID-19 have increased composite morbidity compared to  non-pregnant matched controls  Am J Obstet 2020 doi: 10 1016/j ajog  11 022  2  Argenis GARCIA, et al  Pregnancy outcomes among women with and without severe acute respiratory syndrome coronavirus  2 infection  [facility] 2020 3(11):d2811189  3  rFancisco PLEITEZ [facility] working group on COVID-19  Maternal and  Outcomes of Pregnancy Women with SARScoV-  2 infection  Ultrasound Obstet Gynecol  2020  doi: 10 1002/uog  97238  4  Centers for Disease Control and Prevention  Update: Characteristics of Symptomatic Women of Reproductive Age with  Laboratory-Confirmed SARS-CoV-2 Infection by Pregnancy Status -- [place], -October 3, 2020  2020:1-7   5  Carlee BENSON  COVID-19 and mRNA Vaccines--First Large Test for a New Approach  BENJAMÍN  2020;324(12):1244-7117  doi:10 1001/benjamín  6800 97028  6  [employer]   7  [employer] com br ([address], )  8  [facility] statement on COVID vaccination in pregnancy: https://www  [facility] org/publications/339-society-for-maternal-fetalmedicine-  smfm-statement-sars-cov-2-vaccination-in-pregnancy  9  RelayThis com au-  Lactating-Patients-Against-COVID-19 (Accessed 2020)  10  Luther Herring et al  Occupation and risk of severe COVID-19: prospective cohort study of  [facility] participants  Occupational and Environmental Medicine Published Online First: 2020   doi: 10 1136/sosbe-4511-945882  11  [email or web address] memberclicks [email or web address]-[email or web address]-coyynbfflcbdek-for-covid-19-vaccination-in-lactation

## 2022-08-03 NOTE — PROGRESS NOTES
VISIT: (+) n - significant - failed unisom and vit B6, reglan; taking zofran 4 mg every 8 hours which helps minimize nausea - trying to take the lowest effective dose for shortest duration of time - reviewed possible risk of birth defects - weigh risk vs benefit - cannot function without at this time; suffering from constipation - taking colace as needed; (+) HA - tension like - tolerates with tylenol if needed; about 3-4 times woke up with HA and blurry vision of her right eye that lasted most of the day - this has been new in the pregnancy - encourage follow-up with PCP vs neuro; (+) cramping - menstrual like last week; Denies v/vb/lof/edema/dv/smoking; urine neg/neg  6/27/22 - prog 12; 7/20/22 - single live IUP 6w1d with an LUCIO 3/14/23 (+)  bpm  PNVs + DHA - tolerating daily; r/luc 1 mg folic acid and DHA daily  No FM yet  Infection History: Denies any history of MRSA; Denies any history of STIs, including genital herpes; varicella - vaccinated; cats - no  Travel history - no recent travel outside the country    TV us done - single viable IUP measuring 20 6 mm by CRL at [address]; (+) FHM of 175 bpm; LUCIO will be 3/14/23 based on earliest first trimester scan to measure a fetal pole and heart rate     Most Initial BW completed 5/31; slip given to complete CBC, TSH with reflex fT4, and UA/urine culture; Ordered CF/SMA (advised patient to check with insurance for coverage first and provided CPT codes)  Reviewed COVID and pregnancy - considered high risk for severe infection - encourage adequate social distancing and masking; [place] x 2 in 2021 and has had natural COVID infection 3x - most recent 6/29/22; Encourage COVID booster 3 months after infection - info provided  RTO in 4 weeks for routine ob check with physical exam (30 min) or sooner if needed    Ultrasound Probe Disinfection    A transvaginal ultrasound was performed     Probe identification: probe serial number [facility]: [id number]  Disinfection process: Disinfection was performed with High Level Disinfection Process (Trophon)    Madeline Woodard PA-C  08/03/22  11:12 AM

## 2022-08-12 ENCOUNTER — APPOINTMENT (OUTPATIENT)
Dept: LAB | Facility: CLINIC | Age: 27
End: 2022-08-12
Payer: COMMERCIAL

## 2022-08-12 ENCOUNTER — TELEPHONE (OUTPATIENT)
Dept: PERINATAL CARE | Facility: CLINIC | Age: 27
End: 2022-08-12

## 2022-08-12 DIAGNOSIS — Z3A.08 8 WEEKS GESTATION OF PREGNANCY: ICD-10-CM

## 2022-08-12 DIAGNOSIS — Z34.01 ENCOUNTER FOR SUPERVISION OF NORMAL FIRST PREGNANCY IN FIRST TRIMESTER: ICD-10-CM

## 2022-08-12 DIAGNOSIS — Z13.71 TESTING OF FEMALE FOR GENETIC DISEASE CARRIER STATUS: ICD-10-CM

## 2022-08-12 LAB
BASOPHILS # BLD AUTO: 0.03 THOUSANDS/ΜL (ref 0–0.1)
BASOPHILS NFR BLD AUTO: 0 % (ref 0–1)
BILIRUB UR QL STRIP: NEGATIVE
CLARITY UR: CLEAR
COLOR UR: NORMAL
EOSINOPHIL # BLD AUTO: 0.07 THOUSAND/ΜL (ref 0–0.61)
EOSINOPHIL NFR BLD AUTO: 1 % (ref 0–6)
ERYTHROCYTE [DISTWIDTH] IN BLOOD BY AUTOMATED COUNT: 12.3 % (ref 11.6–15.1)
GLUCOSE UR STRIP-MCNC: NEGATIVE MG/DL
HCT VFR BLD AUTO: 39.8 % (ref 34.8–46.1)
HGB BLD-MCNC: 13.1 G/DL (ref 11.5–15.4)
HGB UR QL STRIP.AUTO: NEGATIVE
IMM GRANULOCYTES # BLD AUTO: 0.02 THOUSAND/UL (ref 0–0.2)
IMM GRANULOCYTES NFR BLD AUTO: 0 % (ref 0–2)
KETONES UR STRIP-MCNC: NEGATIVE MG/DL
LEUKOCYTE ESTERASE UR QL STRIP: NEGATIVE
LYMPHOCYTES # BLD AUTO: 1.31 THOUSANDS/ΜL (ref 0.6–4.47)
LYMPHOCYTES NFR BLD AUTO: 16 % (ref 14–44)
MCH RBC QN AUTO: 29.8 PG (ref 26.8–34.3)
MCHC RBC AUTO-ENTMCNC: 32.9 G/DL (ref 31.4–37.4)
MCV RBC AUTO: 91 FL (ref 82–98)
MONOCYTES # BLD AUTO: 0.72 THOUSAND/ΜL (ref 0.17–1.22)
MONOCYTES NFR BLD AUTO: 9 % (ref 4–12)
NEUTROPHILS # BLD AUTO: 6.22 THOUSANDS/ΜL (ref 1.85–7.62)
NEUTS SEG NFR BLD AUTO: 74 % (ref 43–75)
NITRITE UR QL STRIP: NEGATIVE
NRBC BLD AUTO-RTO: 0 /100 WBCS
PH UR STRIP.AUTO: 6.5 [PH]
PLATELET # BLD AUTO: 218 THOUSANDS/UL (ref 149–390)
PMV BLD AUTO: 10.7 FL (ref 8.9–12.7)
PROT UR STRIP-MCNC: NEGATIVE MG/DL
RBC # BLD AUTO: 4.4 MILLION/UL (ref 3.81–5.12)
SP GR UR STRIP.AUTO: 1.01 (ref 1–1.03)
TSH SERPL DL<=0.05 MIU/L-ACNC: 2.8 UIU/ML (ref 0.45–4.5)
UROBILINOGEN UR STRIP-ACNC: <2 MG/DL
WBC # BLD AUTO: 8.37 THOUSAND/UL (ref 4.31–10.16)

## 2022-08-12 PROCEDURE — 84443 ASSAY THYROID STIM HORMONE: CPT

## 2022-08-12 PROCEDURE — 87086 URINE CULTURE/COLONY COUNT: CPT

## 2022-08-12 PROCEDURE — 36415 COLL VENOUS BLD VENIPUNCTURE: CPT

## 2022-08-12 PROCEDURE — 83020 HEMOGLOBIN ELECTROPHORESIS: CPT

## 2022-08-12 PROCEDURE — 85025 COMPLETE CBC W/AUTO DIFF WBC: CPT

## 2022-08-12 PROCEDURE — 81003 URINALYSIS AUTO W/O SCOPE: CPT | Performed by: PHYSICIAN ASSISTANT

## 2022-08-12 PROCEDURE — 81329 SMN1 GENE DOS/DELETION ALYS: CPT

## 2022-08-12 PROCEDURE — 81220 CFTR GENE COM VARIANTS: CPT

## 2022-08-12 NOTE — TELEPHONE ENCOUNTER
Mara called the nurse line with questions regarding her upcoming appt in our office  She questioned if the cell free DNA testing included SMA and CF testing  I explained to her that it does not and reviewed what is included in the cfDNA  She also inquired about her insurance coverage for this testing  I explained the [facility] process for billing and the "patient pay" option if her insurance has an out of pocket of greater than $100  She verbalized understanding and denies any further questions

## 2022-08-13 LAB — BACTERIA UR CULT: NORMAL

## 2022-08-16 LAB
HGB A MFR BLD: 2.5 % (ref 1.8–3.2)
HGB A MFR BLD: 97.5 % (ref 96.4–98.8)
HGB F MFR BLD: 0 % (ref 0–2)
HGB FRACT BLD-IMP: NORMAL
HGB S MFR BLD: 0 %

## 2022-08-17 DIAGNOSIS — Z34.91 ENCOUNTER FOR SUPERVISION OF NORMAL PREGNANCY IN FIRST TRIMESTER, UNSPECIFIED GRAVIDITY: Primary | ICD-10-CM

## 2022-08-19 LAB
CLINICAL INFO: NORMAL
ETHNIC BACKGROUND STATED: NORMAL
GENE MUT TESTED BLD/T: NORMAL
GENERAL COMMENTS:: NORMAL
LAB DIRECTOR NAME PROVIDER: NORMAL
REASON FOR REFERRAL (NARRATIVE): NORMAL
REF LAB TEST METHOD: NORMAL
SL AMB DISCLAIMER: NORMAL
SL AMB GENETIC COUNSELOR: NORMAL
SMN1 GENE MUT ANL BLD/T: NORMAL
SPECIMEN SOURCE: NORMAL

## 2022-08-22 DIAGNOSIS — O21.9 NAUSEA AND VOMITING IN PREGNANCY: ICD-10-CM

## 2022-08-22 RX ORDER — ONDANSETRON 4 MG/1
4 TABLET, ORALLY DISINTEGRATING ORAL EVERY 8 HOURS PRN
Qty: 90 TABLET | Refills: 1 | Status: SHIPPED | OUTPATIENT
Start: 2022-08-22 | End: 2022-09-21

## 2022-08-23 LAB
CF COMMENT: NORMAL
CFTR MUT ANL BLD/T: NORMAL

## 2022-08-24 ENCOUNTER — TELEPHONE (OUTPATIENT)
Dept: OBGYN CLINIC | Facility: CLINIC | Age: 27
End: 2022-08-24

## 2022-08-24 NOTE — TELEPHONE ENCOUNTER
Pt calling states has been having issues with n/v throughout pregnancy and has been able to control it with taking the zofran as prescribed 2-3 times a day but she tried not taking it today and has been vomiting since this morning  Pt states vomited 3 times today, tried taking the zofran and then vomited 15 min after and pt states brought the zofran back up  Pt states she has not been able to keep food/fluids down  Pt states she has been urinating throughout the morning  Pt aware if unable to keep food/fluids down would recommend to ED for eval, pt verbalizes understanding and states she is going to try and take another zofran and if unable to keep that one down and unable to keep any food/fluids down after taking medication will go to ED for evaluation  Pt aware if continues or any other questions/concerns to call back

## 2022-08-25 ENCOUNTER — TELEPHONE (OUTPATIENT)
Dept: DERMATOLOGY | Facility: CLINIC | Age: 27
End: 2022-08-25

## 2022-08-25 ENCOUNTER — TELEPHONE (OUTPATIENT)
Dept: OBGYN CLINIC | Facility: CLINIC | Age: 27
End: 2022-08-25

## 2022-08-25 NOTE — TELEPHONE ENCOUNTER
Pt lmom - was able to keep the zofran down yesterday and was able to keep food and fluids down and is feeling better  Did not take multivitamin yesterday because when she did take it she vomited and has been so nauseas it's been difficult so wondering if could take a pnv gummy instead of the regular vitamin

## 2022-08-26 DIAGNOSIS — O21.9 NAUSEA/VOMITING IN PREGNANCY: Primary | ICD-10-CM

## 2022-08-26 RX ORDER — DOXYLAMINE SUCCINATE AND PYRIDOXINE HYDROCHLORIDE, DELAYED RELEASE TABLETS 10 MG/10 MG 10; 10 MG/1; MG/1
TABLET, DELAYED RELEASE ORAL
Qty: 60 TABLET | Refills: 1 | Status: SHIPPED | OUTPATIENT
Start: 2022-08-26 | End: 2022-10-21 | Stop reason: SDUPTHER

## 2022-08-31 ENCOUNTER — INITIAL PRENATAL (OUTPATIENT)
Dept: OBGYN CLINIC | Facility: CLINIC | Age: 27
End: 2022-08-31

## 2022-08-31 VITALS — WEIGHT: 186 LBS | DIASTOLIC BLOOD PRESSURE: 80 MMHG | SYSTOLIC BLOOD PRESSURE: 122 MMHG | BODY MASS INDEX: 30.95 KG/M2

## 2022-08-31 DIAGNOSIS — Z34.91 FIRST TRIMESTER PREGNANCY: Primary | ICD-10-CM

## 2022-08-31 PROCEDURE — 87591 N.GONORRHOEAE DNA AMP PROB: CPT | Performed by: PHYSICIAN ASSISTANT

## 2022-08-31 PROCEDURE — G0145 SCR C/V CYTO,THINLAYER,RESCR: HCPCS | Performed by: PHYSICIAN ASSISTANT

## 2022-08-31 PROCEDURE — 87491 CHLMYD TRACH DNA AMP PROBE: CPT | Performed by: PHYSICIAN ASSISTANT

## 2022-08-31 PROCEDURE — PNV: Performed by: PHYSICIAN ASSISTANT

## 2022-08-31 NOTE — PROGRESS NOTES
Pt still dealing with nausea  She did have 2 days of significant n/v, but that has lessened a bit  She is using the Unisom and B6 as well as the Zofran typically BID-the Zofran does increase her constipation so she will counteract it with Colace  Pt has MFM appt planned on Friday  We briefly discussed AFP, will order at next visit and plan to repeat TFTs with those labs as well  No FM yet  TAUS CRL c/w 12w5d +  bpm, + FM noted  Urine neg/neg  Pap/PE and cx's done today

## 2022-09-01 PROBLEM — O99.210 OBESITY IN PREGNANCY, ANTEPARTUM: Status: ACTIVE | Noted: 2022-09-01

## 2022-09-02 ENCOUNTER — PATIENT MESSAGE (OUTPATIENT)
Dept: PERINATAL CARE | Facility: CLINIC | Age: 27
End: 2022-09-02

## 2022-09-02 ENCOUNTER — ROUTINE PRENATAL (OUTPATIENT)
Dept: PERINATAL CARE | Facility: CLINIC | Age: 27
End: 2022-09-02
Payer: COMMERCIAL

## 2022-09-02 VITALS
BODY MASS INDEX: 31.22 KG/M2 | HEIGHT: 65 IN | HEART RATE: 94 BPM | WEIGHT: 187.4 LBS | DIASTOLIC BLOOD PRESSURE: 75 MMHG | SYSTOLIC BLOOD PRESSURE: 130 MMHG

## 2022-09-02 DIAGNOSIS — Z36.82 ENCOUNTER FOR (NT) NUCHAL TRANSLUCENCY SCAN: ICD-10-CM

## 2022-09-02 DIAGNOSIS — Z34.01 ENCOUNTER FOR SUPERVISION OF NORMAL FIRST PREGNANCY IN FIRST TRIMESTER: ICD-10-CM

## 2022-09-02 DIAGNOSIS — Z3A.12 12 WEEKS GESTATION OF PREGNANCY: ICD-10-CM

## 2022-09-02 DIAGNOSIS — O99.210 OBESITY IN PREGNANCY, ANTEPARTUM: Primary | ICD-10-CM

## 2022-09-02 DIAGNOSIS — Z3A.08 8 WEEKS GESTATION OF PREGNANCY: ICD-10-CM

## 2022-09-02 LAB
C TRACH DNA SPEC QL NAA+PROBE: NEGATIVE
N GONORRHOEA DNA SPEC QL NAA+PROBE: NEGATIVE

## 2022-09-02 PROCEDURE — 99203 OFFICE O/P NEW LOW 30 MIN: CPT | Performed by: NURSE PRACTITIONER

## 2022-09-02 PROCEDURE — 36415 COLL VENOUS BLD VENIPUNCTURE: CPT | Performed by: NURSE PRACTITIONER

## 2022-09-02 PROCEDURE — 76813 OB US NUCHAL MEAS 1 GEST: CPT | Performed by: OBSTETRICS & GYNECOLOGY

## 2022-09-02 PROCEDURE — 76813 OB US NUCHAL MEAS 1 GEST: CPT | Performed by: NURSE PRACTITIONER

## 2022-09-02 RX ORDER — ASPIRIN 81 MG/1
162 TABLET, CHEWABLE ORAL DAILY
Qty: 180 TABLET | Refills: 3 | Status: SHIPPED | OUTPATIENT
Start: 2022-09-02

## 2022-09-02 NOTE — PROGRESS NOTES
[address]: Ms Yue Nair was seen today @ 12w3d for nuchal translucency ultrasound  See ultrasound report under "OB Procedures" tab  Review of Systems   Constitutional: Negative for chills, fever and unexpected weight change  HENT: Negative for congestion, dental problem, facial swelling and sore throat  Eyes: Negative for visual disturbance  Respiratory: Negative for cough and shortness of breath  Cardiovascular: Negative for chest pain and palpitations  Gastrointestinal: Negative for diarrhea and vomiting  Endocrine: Negative for polydipsia  Genitourinary: Negative for dysuria and vaginal bleeding  Musculoskeletal: Negative for back pain and joint swelling  Skin: Negative for rash and wound  Allergic/Immunologic: Negative for immunocompromised state  Neurological: Negative for seizures and headaches  Hematological: Does not bruise/bleed easily  Psychiatric/Behavioral: Negative for hallucinations and suicidal ideas  Physical Exam  Constitutional:       General: She is not in acute distress  Appearance: Normal appearance  She is not ill-appearing, toxic-appearing or diaphoretic  HENT:      Head: Normocephalic and atraumatic  Nose: No congestion or rhinorrhea  Eyes:      General: No scleral icterus  Right eye: No discharge  Left eye: No discharge  Conjunctiva/sclera: Conjunctivae normal    Pulmonary:      Effort: Pulmonary effort is normal  No respiratory distress  Abdominal:      Palpations: Abdomen is soft  Tenderness: There is no abdominal tenderness  There is no right CVA tenderness, left CVA tenderness, guarding or rebound  Hernia: No hernia is present  Musculoskeletal:         General: No tenderness or signs of injury  Cervical back: Normal range of motion  Right lower leg: No edema  Left lower leg: No edema  Skin:     Coloration: Skin is not jaundiced or pale        Findings: No erythema, lesion or rash  Neurological:      General: No focal deficit present  Mental Status: She is alert and oriented to person, place, and time     Psychiatric:         Mood and Affect: Mood normal          Behavior: Behavior normal          Please don't hesitate to contact our office with any concerns or questions   -EVA Scanlon    Medical decision making low (diagnosis low, complexity low and risk low)

## 2022-09-02 NOTE — LETTER
2022    Shoshana Lo PA-C  [address] [facility] [address]    Patient: Richard Carballo   YOB: 1995   Date of Visit: 2022   Gestational age Lisa Diamond of this communication: Routine       Dear Ms Kee,    This patient was seen recently in our  office  The content of my evaluation today is in the ultrasound report under "OB Procedures" tab  Please don't hesitate to contact our office with any concerns or questions       Sincerely,      Shakira Mcginnis MD  Attending Physician, Carlos

## 2022-09-02 NOTE — PROGRESS NOTES
Patient chose to have [facility] Non-invasive Prenatal Screen  Patient given brochure and is aware [facility] will contact patients insurance and coordinate coverage  Patient made aware she will need to respond to text message or e-mail from [employer] within 2 business days or testing will be run through insurance  Patient informed text message will come from area code  "415"  Provided [employer] # [phone] and web site : [email or web address]     2 vials of blood drawn from Right arm, patient tolerated blood draw without difficulty  Specimens labeled with patient identifiers (name, date of birth, specimen collection date), order and specimen was verified with patient, packed and sent via garbs 122  Copy of lab order scanned to Epic media  Maternal Fetal Medicine will have results in approximately 7-10 business days and will call patient or notify via [address]  Patient aware viewing lab result online will reveal fetal sex If ordered  Patient verbalized understanding of all instructions and no questions at this time

## 2022-09-06 DIAGNOSIS — O99.282 THYROID DISEASE DURING PREGNANCY IN SECOND TRIMESTER: Primary | ICD-10-CM

## 2022-09-06 DIAGNOSIS — E07.9 THYROID DISEASE DURING PREGNANCY IN SECOND TRIMESTER: Primary | ICD-10-CM

## 2022-09-06 LAB
LAB AP GYN PRIMARY INTERPRETATION: NORMAL
Lab: NORMAL

## 2022-09-06 RX ORDER — LEVOTHYROXINE SODIUM 0.05 MG/1
50 TABLET ORAL DAILY
Qty: 30 TABLET | Refills: 3 | Status: SHIPPED | OUTPATIENT
Start: 2022-09-06 | End: 2022-10-21 | Stop reason: SDUPTHER

## 2022-09-06 RX ORDER — LEVOTHYROXINE SODIUM 0.05 MG/1
50 TABLET ORAL DAILY
COMMUNITY
End: 2022-09-06 | Stop reason: SDUPTHER

## 2022-09-06 NOTE — TELEPHONE ENCOUNTER
Pt called and states she is running low on her synthroid 25mcg as she is taking double since being advised to now take 50mcg  Wanted to know if we can send in for the 50mcg as the insurance won't allow a refill of the 25mcg as it would be to soon    Order pended

## 2022-09-19 ENCOUNTER — TELEPHONE (OUTPATIENT)
Dept: PERINATAL CARE | Facility: CLINIC | Age: 27
End: 2022-09-19

## 2022-09-19 NOTE — TELEPHONE ENCOUNTER
Mara called the nurse line inquiring about her ASA prescription  Patient stated she is taking chewable ASA and nausea medications that is bothering her stomach   She wanted to know if she can take ASA that she can swallow or take another medication that doesn't her make her nauseated    This message will be route to Flor

## 2022-09-20 ENCOUNTER — TELEPHONE (OUTPATIENT)
Dept: PERINATAL CARE | Facility: CLINIC | Age: 27
End: 2022-09-20

## 2022-09-23 ENCOUNTER — PATIENT MESSAGE (OUTPATIENT)
Dept: OBGYN CLINIC | Facility: CLINIC | Age: 27
End: 2022-09-23

## 2022-09-23 ENCOUNTER — TELEPHONE (OUTPATIENT)
Dept: OBGYN CLINIC | Facility: CLINIC | Age: 27
End: 2022-09-23

## 2022-09-23 NOTE — TELEPHONE ENCOUNTER
Reviewed with pt and pt verbalizes symptoms as previously documented and stated on vm  Pt states she has tried "almost all medications for n/v and it's not getting worse but just feels like survivable" and pt states she is exhausted from dealing with feeling like this and feels weak  Pt states she is at work today and is able to keep fluids down and tolerating some food  Pt aware if vomiting and unable to keep food/fluids down, persistent dizzy/lightheadedness, would recommend to ED for evaluation  Pt verbalizes understanding and reviewed on call provider after office hours as pt can always call to review if any changes with weekend approaching  Pt aware will review with on call provider if any other recommendations for pt prior to scheduled apt in office next week  Pt aware can send office FMLA forms but may need upcoming appointment assessment and notes to accurately complete forms as appropriate  Pt verbalizes understanding and to call if any other questions or concerns prior to apt

## 2022-09-23 NOTE — TELEPHONE ENCOUNTER
Pt lmom - sent a message as well but wanted to let us know that she is still feeling really sick with the nausea and vomiting  Taking zofran and the other pill the unisom and vitamin b6 at night but walking around feeling like going to vomit all day, every day  Has been getting dizzy spells and feeling light headed  Took bp at work 120/60 and has been staying hydrated  Not eating a lot but forcing what can tolerate  Lost 6 pounds in 1 month and is concerned about that  Also has had to call out of work a couple days when feeling really sick, the other day was constipated so took a laxative then had uncontrollable diarrhea so took imodium to make it stop now back to constipated  Wanted to get our opinion on all of this  Also job is requesting her to fill out FMLA because has been calling out of work for being sick and wanted to speak about that and send over the paperwork

## 2022-09-23 NOTE — TELEPHONE ENCOUNTER
Pt called and stated she called earlier and left triage a message  She is very sick "vomiting and lots of nausea" and would like someone to call her back

## 2022-09-26 ENCOUNTER — TELEPHONE (OUTPATIENT)
Dept: OBGYN CLINIC | Facility: CLINIC | Age: 27
End: 2022-09-26

## 2022-09-26 NOTE — TELEPHONE ENCOUNTER
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

## 2022-09-26 NOTE — TELEPHONE ENCOUNTER
Reviewed precautions with pt and pt verbalizes understanding and states will further discuss with provider at upcoming apt as scheduled this week

## 2022-09-26 NOTE — TELEPHONE ENCOUNTER
TT sent to on call provider, pt to practice pelvic rest and not to lift greater than 25 lbs without assistance per provider

## 2022-09-28 ENCOUNTER — ROUTINE PRENATAL (OUTPATIENT)
Dept: OBGYN CLINIC | Facility: CLINIC | Age: 27
End: 2022-09-28

## 2022-09-28 VITALS — SYSTOLIC BLOOD PRESSURE: 112 MMHG | DIASTOLIC BLOOD PRESSURE: 78 MMHG | BODY MASS INDEX: 30.45 KG/M2 | WEIGHT: 183 LBS

## 2022-09-28 DIAGNOSIS — O21.9 NAUSEA AND VOMITING IN PREGNANCY: ICD-10-CM

## 2022-09-28 DIAGNOSIS — Z34.02 ENCOUNTER FOR SUPERVISION OF NORMAL FIRST PREGNANCY IN SECOND TRIMESTER: Primary | ICD-10-CM

## 2022-09-28 PROCEDURE — PNV: Performed by: NURSE PRACTITIONER

## 2022-09-28 NOTE — PROGRESS NOTES
OFFICE VISIT: Denies any HA, Cramping, VB, LOF, Edema, Domestic Violence, Smoking  + FM  Tolerating PNV  Pt continuing to have nausea and vomiting  Taking Zofran 2-3 x a day, taking Diclegis at night  All day nausea, but vomiting is becoming less often  She is staying hydrated  Seems to be better with managing  Having difficulty with working due to severe nausea  Has had to call out of work on several occasions and is now getting in trouble for missing so much work  Was advised to look into FMLA due to nausea and vomiting  She has paperwork from employer  Recommend she look into benefits with her company and speak with HR on leave policy and disability  Discussed FMLA leave  If would like leave for nausea and vomiting will start with 4 weeks of leave and reevaluate at next appt  Her and  are also concerned about placenta previa  I asked where did they get diagnosed with placenta previa this early in pregnancy  She explained that she was feeling lightheaded one day as she was headed into work  She made it to work fine  [employer] over 1 5hr to [employer] where she works in [employer]  She was seen in ED at [facility] and had ultrasound where she was told Grade 1 placenta Previa by radiologist   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  She was advised light duty, but her job does not allow light duty  I discussed with Saul Tee and partner that commonly if placenta is low lying in early pregnancy it can move and resolve as pregnancy progresses  I would not be concerned at this point unless any vaginal bleeding or other concerns arise  She has anatomy scan scheduled for end of October  Rx for referral to [facility] as she is very concerned about risk with placenta previa and continuing her normal work duties  Reviewed often if low lying in early pregnancy evaluation will occur in third trimester to check for resolution   I tried to reassure her and partner as she can continue working at this point, if would like can give note for light duty  RTO 4 weeks or sooner as needed

## 2022-10-05 ENCOUNTER — ROUTINE PRENATAL (OUTPATIENT)
Dept: PERINATAL CARE | Facility: CLINIC | Age: 27
End: 2022-10-05
Payer: COMMERCIAL

## 2022-10-05 VITALS
HEIGHT: 65 IN | BODY MASS INDEX: 30.92 KG/M2 | HEART RATE: 89 BPM | WEIGHT: 185.6 LBS | SYSTOLIC BLOOD PRESSURE: 112 MMHG | DIASTOLIC BLOOD PRESSURE: 68 MMHG

## 2022-10-05 DIAGNOSIS — Z36.3 ENCOUNTER FOR ANTENATAL SCREENING FOR MALFORMATION USING ULTRASOUND: Primary | ICD-10-CM

## 2022-10-05 DIAGNOSIS — Z3A.17 17 WEEKS GESTATION OF PREGNANCY: ICD-10-CM

## 2022-10-05 DIAGNOSIS — Z34.02 ENCOUNTER FOR SUPERVISION OF NORMAL FIRST PREGNANCY IN SECOND TRIMESTER: ICD-10-CM

## 2022-10-05 PROCEDURE — 76805 OB US >/= 14 WKS SNGL FETUS: CPT | Performed by: OBSTETRICS & GYNECOLOGY

## 2022-10-05 PROCEDURE — 99212 OFFICE O/P EST SF 10 MIN: CPT | Performed by: OBSTETRICS & GYNECOLOGY

## 2022-10-05 NOTE — PROGRESS NOTES
The patient was seen today for an ultrasound  Please see ultrasound report (located under Ob Procedures) for additional details  Thank you very much for allowing us to participate in the care of this very nice patient  Should you have any questions, please do not hesitate to contact me  Jermain Mccain MD [id number] [facility]  Attending Physician, Carlos

## 2022-10-21 DIAGNOSIS — O99.282 THYROID DISEASE DURING PREGNANCY IN SECOND TRIMESTER: ICD-10-CM

## 2022-10-21 DIAGNOSIS — E07.9 THYROID DISEASE DURING PREGNANCY IN SECOND TRIMESTER: ICD-10-CM

## 2022-10-21 DIAGNOSIS — O21.9 NAUSEA/VOMITING IN PREGNANCY: ICD-10-CM

## 2022-10-21 RX ORDER — LEVOTHYROXINE SODIUM 0.05 MG/1
50 TABLET ORAL DAILY
Qty: 30 TABLET | Refills: 0 | Status: SHIPPED | OUTPATIENT
Start: 2022-10-21 | End: 2022-10-24 | Stop reason: SDUPTHER

## 2022-10-24 DIAGNOSIS — O99.282 THYROID DISEASE DURING PREGNANCY IN SECOND TRIMESTER: ICD-10-CM

## 2022-10-24 DIAGNOSIS — E07.9 THYROID DISEASE DURING PREGNANCY IN SECOND TRIMESTER: ICD-10-CM

## 2022-10-24 RX ORDER — LEVOTHYROXINE SODIUM 0.05 MG/1
50 TABLET ORAL DAILY
Qty: 30 TABLET | Refills: 0 | Status: SHIPPED | OUTPATIENT
Start: 2022-10-24

## 2022-10-24 RX ORDER — DOXYLAMINE SUCCINATE AND PYRIDOXINE HYDROCHLORIDE, DELAYED RELEASE TABLETS 10 MG/10 MG 10; 10 MG/1; MG/1
TABLET, DELAYED RELEASE ORAL
Qty: 60 TABLET | Refills: 0 | Status: SHIPPED | OUTPATIENT
Start: 2022-10-24

## 2022-10-24 NOTE — TELEPHONE ENCOUNTER
Pt called and states her thyroid medication needs to be sent to mail pharmacy and not local   Order pended for medication to go to [facility]

## 2022-10-25 ENCOUNTER — ROUTINE PRENATAL (OUTPATIENT)
Dept: OBGYN CLINIC | Facility: CLINIC | Age: 27
End: 2022-10-25

## 2022-10-25 VITALS — WEIGHT: 190 LBS | BODY MASS INDEX: 31.62 KG/M2 | SYSTOLIC BLOOD PRESSURE: 110 MMHG | DIASTOLIC BLOOD PRESSURE: 74 MMHG

## 2022-10-25 DIAGNOSIS — E03.8 OTHER SPECIFIED HYPOTHYROIDISM: ICD-10-CM

## 2022-10-25 DIAGNOSIS — Z3A.20 PREGNANCY WITH 20 COMPLETED WEEKS GESTATION: Primary | ICD-10-CM

## 2022-10-25 PROCEDURE — PNV: Performed by: OBSTETRICS & GYNECOLOGY

## 2022-10-25 NOTE — PROGRESS NOTES
Patient reports good fm, no  cramping, bleeding, loss of fluid, edema, dom violence, or smoking  johann pnv still requires zofran for n/v occasional reflux and constipation, reviewed, has pnc tomorrow, given slip for afp with tsh and free t4, aware to get done within a week if able, return in 4 weeks or sooner as needed

## 2022-10-26 ENCOUNTER — ROUTINE PRENATAL (OUTPATIENT)
Dept: PERINATAL CARE | Facility: OTHER | Age: 27
End: 2022-10-26
Payer: COMMERCIAL

## 2022-10-26 ENCOUNTER — APPOINTMENT (OUTPATIENT)
Dept: LAB | Facility: CLINIC | Age: 27
End: 2022-10-26

## 2022-10-26 ENCOUNTER — TELEPHONE (OUTPATIENT)
Dept: PERINATAL CARE | Facility: OTHER | Age: 27
End: 2022-10-26

## 2022-10-26 VITALS
WEIGHT: 185.85 LBS | DIASTOLIC BLOOD PRESSURE: 62 MMHG | HEIGHT: 65 IN | HEART RATE: 88 BPM | SYSTOLIC BLOOD PRESSURE: 120 MMHG | BODY MASS INDEX: 30.96 KG/M2

## 2022-10-26 DIAGNOSIS — Z3A.20 PREGNANCY WITH 20 COMPLETED WEEKS GESTATION: ICD-10-CM

## 2022-10-26 DIAGNOSIS — Z36.86 ENCOUNTER FOR ANTENATAL SCREENING FOR CERVICAL LENGTH: ICD-10-CM

## 2022-10-26 DIAGNOSIS — Z3A.20 20 WEEKS GESTATION OF PREGNANCY: ICD-10-CM

## 2022-10-26 DIAGNOSIS — E03.8 OTHER SPECIFIED HYPOTHYROIDISM: ICD-10-CM

## 2022-10-26 DIAGNOSIS — O99.210 OBESITY IN PREGNANCY, ANTEPARTUM: Primary | ICD-10-CM

## 2022-10-26 LAB
T4 FREE SERPL-MCNC: 1.27 NG/DL (ref 0.76–1.46)
TSH SERPL DL<=0.05 MIU/L-ACNC: 1.55 UIU/ML (ref 0.45–4.5)

## 2022-10-26 PROCEDURE — 76817 TRANSVAGINAL US OBSTETRIC: CPT | Performed by: OBSTETRICS & GYNECOLOGY

## 2022-10-26 PROCEDURE — 76811 OB US DETAILED SNGL FETUS: CPT | Performed by: OBSTETRICS & GYNECOLOGY

## 2022-10-26 NOTE — PROGRESS NOTES
Ultrasound Probe Disinfection    A transvaginal ultrasound was performed  Prior to use, disinfection was performed with High Level Disinfection Process (Trophon)  Probe serial number A2: [id number] was used        Linh Neville  10/26/22  9:28 AM

## 2022-10-26 NOTE — TELEPHONE ENCOUNTER
Tried calling patient to schedule a 12 week follow up ultrasound  Unable to leave voicemail due to mailbox being full

## 2022-10-26 NOTE — PROGRESS NOTES
The patient was seen today for an ultrasound  Please see ultrasound report (located under Ob Procedures) for additional details  Thank you very much for allowing us to participate in the care of this very nice patient  Should you have any questions, please do not hesitate to contact me  Jermain Llamas MD [address] [facility]  Attending Physician, Carlos

## 2022-10-28 LAB
2ND TRIMESTER 4 SCREEN SERPL-IMP: NORMAL
AFP ADJ MOM SERPL: 1.28
AFP INTERP AMN-IMP: NORMAL
AFP INTERP SERPL-IMP: NORMAL
AFP INTERP SERPL-IMP: NORMAL
AFP SERPL-MCNC: 66.1 NG/ML
AGE AT DELIVERY: 28.6 YR
GA METHOD: NORMAL
GA: 20.1 WEEKS
IDDM PATIENT QL: NO
MULTIPLE PREGNANCY: NO
NEURAL TUBE DEFECT RISK FETUS: 5096 %

## 2022-11-07 ENCOUNTER — TELEPHONE (OUTPATIENT)
Dept: OBGYN CLINIC | Facility: CLINIC | Age: 27
End: 2022-11-07

## 2022-11-07 NOTE — TELEPHONE ENCOUNTER
Pt calling states has been experiencing pain right lower side, sharp with walking, since last Thursday  Pt states pain is intermittent and improves with sitting  Pt denies any bleeding, spotting, and reports FM at this time normal  Reviewed with pt tylenol, 1000mg every 8 hours as needed not to exceed 3000mg in 24 hours, heating pad and belly support band  Pt verbalizes understanding and if no improvement or worsening or any other symptoms to call back

## 2022-11-08 DIAGNOSIS — O21.9 NAUSEA AND VOMITING IN PREGNANCY: ICD-10-CM

## 2022-11-08 RX ORDER — ONDANSETRON 4 MG/1
4 TABLET, ORALLY DISINTEGRATING ORAL EVERY 8 HOURS PRN
Qty: 90 TABLET | Refills: 0 | Status: SHIPPED | OUTPATIENT
Start: 2022-11-08 | End: 2022-12-08

## 2022-11-21 DIAGNOSIS — O21.9 NAUSEA/VOMITING IN PREGNANCY: ICD-10-CM

## 2022-11-21 DIAGNOSIS — O99.282 THYROID DISEASE DURING PREGNANCY IN SECOND TRIMESTER: ICD-10-CM

## 2022-11-21 DIAGNOSIS — E07.9 THYROID DISEASE DURING PREGNANCY IN SECOND TRIMESTER: ICD-10-CM

## 2022-11-21 RX ORDER — LEVOTHYROXINE SODIUM 0.05 MG/1
50 TABLET ORAL DAILY
Qty: 30 TABLET | Refills: 0 | Status: SHIPPED | OUTPATIENT
Start: 2022-11-21

## 2022-11-21 RX ORDER — DOXYLAMINE SUCCINATE AND PYRIDOXINE HYDROCHLORIDE, DELAYED RELEASE TABLETS 10 MG/10 MG 10; 10 MG/1; MG/1
TABLET, DELAYED RELEASE ORAL
Qty: 60 TABLET | Refills: 0 | Status: SHIPPED | OUTPATIENT
Start: 2022-11-21 | End: 2022-11-22 | Stop reason: SDUPTHER

## 2022-11-22 DIAGNOSIS — O21.9 NAUSEA/VOMITING IN PREGNANCY: ICD-10-CM

## 2022-11-22 DIAGNOSIS — O21.9 NAUSEA AND VOMITING IN PREGNANCY: ICD-10-CM

## 2022-11-22 RX ORDER — DOXYLAMINE SUCCINATE AND PYRIDOXINE HYDROCHLORIDE, DELAYED RELEASE TABLETS 10 MG/10 MG 10; 10 MG/1; MG/1
TABLET, DELAYED RELEASE ORAL
Qty: 60 TABLET | Refills: 0 | Status: SHIPPED | OUTPATIENT
Start: 2022-11-22

## 2022-11-23 ENCOUNTER — ROUTINE PRENATAL (OUTPATIENT)
Dept: OBGYN CLINIC | Facility: CLINIC | Age: 27
End: 2022-11-23

## 2022-11-23 VITALS
BODY MASS INDEX: 33.05 KG/M2 | DIASTOLIC BLOOD PRESSURE: 80 MMHG | SYSTOLIC BLOOD PRESSURE: 130 MMHG | WEIGHT: 198.4 LBS | HEIGHT: 65 IN

## 2022-11-23 DIAGNOSIS — Z3A.24 24 WEEKS GESTATION OF PREGNANCY: ICD-10-CM

## 2022-11-23 DIAGNOSIS — J45.20 MILD INTERMITTENT ASTHMA WITHOUT COMPLICATION: ICD-10-CM

## 2022-11-23 DIAGNOSIS — O21.9 NAUSEA AND VOMITING OF PREGNANCY, ANTEPARTUM: Primary | ICD-10-CM

## 2022-11-23 DIAGNOSIS — E03.8 SUBCLINICAL HYPOTHYROIDISM: ICD-10-CM

## 2022-11-23 RX ORDER — PROMETHAZINE HYDROCHLORIDE 12.5 MG/1
12.5 TABLET ORAL EVERY 6 HOURS PRN
Qty: 30 TABLET | Refills: 0 | Status: SHIPPED | OUTPATIENT
Start: 2022-11-23

## 2022-11-23 NOTE — ASSESSMENT & PLAN NOTE
- Continue PNV & ASA  - Labs: UTD  - Genetics: MSAFP wnl; NIPT neg  - Ultrasounds: Most recent US wnl on 10/26/22  - Tdap:  Will offer after 27 weeks gestation  - Flu Shot: Already recieved  - COVID: Pfizer x2; COVID infection 2022  - Delivery:  with epidural  - Contraception: TBD  - Breastfeeding: TBD  - RTO in 4 weeks

## 2022-11-23 NOTE — PROGRESS NOTES
OB/GYN  PN Visit  Chelly Xavier  [id number]  2022  4:27 PM  Dr Luz Akbar MD    S: 28 y o  [id number] 24w1d here for PN visit  She denies contractions  She denies leakage of fluid and vaginal bleeding  She reports good fetal movement  She reports continued nausea and vomiting despite Zofran and Diclegis  She also reports occasional headaches  She denies cramping, edema, domestic violence, and smoking  Her pregnancy is complicated by hypothyroidism and history of asthma  O:  Vitals:    22 1300   BP: 130/80     Physical Exam  Vitals reviewed  Constitutional:       General: She is not in acute distress  Appearance: Normal appearance  She is well-developed  She is not ill-appearing, toxic-appearing or diaphoretic  Cardiovascular:      Rate and Rhythm: Normal rate  Pulmonary:      Effort: Pulmonary effort is normal  No respiratory distress  Abdominal:      General: There is no distension  Palpations: Abdomen is soft  There is no mass  Tenderness: There is no abdominal tenderness  There is no guarding or rebound  Genitourinary:     Comments: Gravid, nontender  Skin:     General: Skin is warm and dry  Neurological:      Mental Status: She is alert and oriented to person, place, and time  Psychiatric:         Mood and Affect: Mood normal          Behavior: Behavior normal        FHT: 132bpm by ultrasound  Transverse by ultrasound     A/P:    Problem List        Unprioritized    Asthma    Current Assessment & Plan     Continue Advair  Have not needed it   Allergy related           Functional ovarian cysts    24 weeks gestation of pregnancy    Current Assessment & Plan     - Continue PNV & ASA  - Labs: UTD  - Genetics: MSAFP wnl; NIPT neg  - Ultrasounds: Most recent US wnl on 10/26/22  - Tdap:  Will offer after 27 weeks gestation  - Flu Shot: Already recieved  - COVID: Pfizer x2; COVID infection 2022  - Delivery:  with epidural  - Contraception: TBD  - Breastfeeding: TBD  - RTO in 4 weeks             Obesity in pregnancy, antepartum    Subclinical hypothyroidism    Current Assessment & Plan     Continue Levothyroxine  Most recent testing 10/26/22 wnl        Other Visit Diagnoses     Nausea and vomiting of pregnancy, antepartum    -  Primary            Future Appointments   Date Time Provider Alexander Jackson   12/22/2022  9:00 AM Rafael Suero MD [address]   1/4/2023  1:30 PM Deb Washburn PA-C [facility]   1/18/2023  1:15 PM Mireille Cherry MD [facility]   2/1/2023  1:15 PM Kiko Martin MD [facility]   2/15/2023  1:30 PM Mireille Cherry MD [facility]   2/22/2023  1:30 PM Scott Meneses MD [address]   3/1/2023  1:00 PM Kiko Martin MD [address]   3/8/2023  1:00 PM Rafael Suero, [address] [facility], MD  11/23/2022  4:27 PM

## 2022-12-14 ENCOUNTER — TELEPHONE (OUTPATIENT)
Dept: OBGYN CLINIC | Facility: CLINIC | Age: 27
End: 2022-12-14

## 2022-12-14 NOTE — TELEPHONE ENCOUNTER
Pt tested Covid + 27 wks pregnant  Has concerns symptoms are headache and nasal congestion  She would like a call back is nervous has asthma and wants to make sure she is of what she can take

## 2022-12-14 NOTE — TELEPHONE ENCOUNTER
Called pt and states she started to experience symptoms over a week ago, which started with a sore throat and just thought it was her allergies  She tested + for Covid today  She is fully vaccinated and advised as long as feeling well and asymptomatic can keep her appt for 12/22/2022  Advised can take tylenol for headaches or fevers and simply saline for nasal congestion  She does have an inhaler that she uses as needed  Asked if she is able to check her pulse ox and is able to  Advised pt to keep an eye on her pulse ox levels and monitor symptoms  If symptoms worsens or having shortness of breath then she needs to go to the ER for further evaluation and management  Advised to call the office if any further questions or concerns

## 2022-12-20 ENCOUNTER — ROUTINE PRENATAL (OUTPATIENT)
Dept: OBGYN CLINIC | Facility: CLINIC | Age: 27
End: 2022-12-20

## 2022-12-20 VITALS
OXYGEN SATURATION: 97 % | DIASTOLIC BLOOD PRESSURE: 78 MMHG | HEIGHT: 65 IN | HEART RATE: 104 BPM | WEIGHT: 205.2 LBS | BODY MASS INDEX: 34.19 KG/M2 | SYSTOLIC BLOOD PRESSURE: 120 MMHG

## 2022-12-20 DIAGNOSIS — Z34.92 SECOND TRIMESTER PREGNANCY: Primary | ICD-10-CM

## 2022-12-20 DIAGNOSIS — O21.9 NAUSEA AND VOMITING OF PREGNANCY, ANTEPARTUM: ICD-10-CM

## 2022-12-20 DIAGNOSIS — E03.8 SUBCLINICAL HYPOTHYROIDISM: ICD-10-CM

## 2022-12-20 DIAGNOSIS — O21.9 NAUSEA/VOMITING IN PREGNANCY: ICD-10-CM

## 2022-12-20 RX ORDER — PROMETHAZINE HYDROCHLORIDE 12.5 MG/1
12.5 TABLET ORAL EVERY 6 HOURS PRN
Qty: 30 TABLET | Refills: 0 | Status: SHIPPED | OUTPATIENT
Start: 2022-12-20

## 2022-12-20 RX ORDER — DOXYLAMINE SUCCINATE AND PYRIDOXINE HYDROCHLORIDE, DELAYED RELEASE TABLETS 10 MG/10 MG 10; 10 MG/1; MG/1
TABLET, DELAYED RELEASE ORAL
Qty: 60 TABLET | Refills: 0 | Status: SHIPPED | OUTPATIENT
Start: 2022-12-20

## 2022-12-20 NOTE — PROGRESS NOTES
Pt recovering from Covid  This is the second time she has had it in this pregnancy  It was a mild case and she is asx currently  She was experiencing some tachycardia and SOB, which has improved  We reviewed acute s/x to watch for and when to call  We reviewed FKCs  Pt feels like baby was moving less last week during her illness but has noted an increase in FM since  30 week packet given  Will plan tdap at a future appt, likely next visit  Slip written for glucola, TFTs, CBC and RPR  She is still dealing with nausea most days, no vb/lof, no edema, no smoking  Will plan MFM f/u growth at 32 weeks  F/u appt here in 2 weeks

## 2022-12-21 ENCOUNTER — APPOINTMENT (OUTPATIENT)
Dept: LAB | Facility: CLINIC | Age: 27
End: 2022-12-21

## 2022-12-21 DIAGNOSIS — Z34.92 SECOND TRIMESTER PREGNANCY: ICD-10-CM

## 2022-12-21 LAB
BASOPHILS # BLD AUTO: 0.04 THOUSANDS/ÂΜL (ref 0–0.1)
BASOPHILS NFR BLD AUTO: 1 % (ref 0–1)
EOSINOPHIL # BLD AUTO: 0.09 THOUSAND/ÂΜL (ref 0–0.61)
EOSINOPHIL NFR BLD AUTO: 1 % (ref 0–6)
ERYTHROCYTE [DISTWIDTH] IN BLOOD BY AUTOMATED COUNT: 12.5 % (ref 11.6–15.1)
GLUCOSE 1H P 50 G GLC PO SERPL-MCNC: 92 MG/DL (ref 40–134)
HCT VFR BLD AUTO: 34.9 % (ref 34.8–46.1)
HGB BLD-MCNC: 11.3 G/DL (ref 11.5–15.4)
IMM GRANULOCYTES # BLD AUTO: 0.05 THOUSAND/UL (ref 0–0.2)
IMM GRANULOCYTES NFR BLD AUTO: 1 % (ref 0–2)
LYMPHOCYTES # BLD AUTO: 1.16 THOUSANDS/ÂΜL (ref 0.6–4.47)
LYMPHOCYTES NFR BLD AUTO: 14 % (ref 14–44)
MCH RBC QN AUTO: 30.2 PG (ref 26.8–34.3)
MCHC RBC AUTO-ENTMCNC: 32.4 G/DL (ref 31.4–37.4)
MCV RBC AUTO: 93 FL (ref 82–98)
MONOCYTES # BLD AUTO: 0.54 THOUSAND/ÂΜL (ref 0.17–1.22)
MONOCYTES NFR BLD AUTO: 7 % (ref 4–12)
NEUTROPHILS # BLD AUTO: 6.28 THOUSANDS/ÂΜL (ref 1.85–7.62)
NEUTS SEG NFR BLD AUTO: 76 % (ref 43–75)
NRBC BLD AUTO-RTO: 0 /100 WBCS
PLATELET # BLD AUTO: 199 THOUSANDS/UL (ref 149–390)
PMV BLD AUTO: 10.4 FL (ref 8.9–12.7)
RBC # BLD AUTO: 3.74 MILLION/UL (ref 3.81–5.12)
T4 FREE SERPL-MCNC: 1.13 NG/DL (ref 0.76–1.46)
TSH SERPL DL<=0.05 MIU/L-ACNC: 1.78 UIU/ML (ref 0.45–4.5)
WBC # BLD AUTO: 8.16 THOUSAND/UL (ref 4.31–10.16)

## 2022-12-22 LAB — RPR SER QL: NORMAL

## 2023-01-03 DIAGNOSIS — O21.9 NAUSEA AND VOMITING IN PREGNANCY: ICD-10-CM

## 2023-01-03 RX ORDER — ONDANSETRON 4 MG/1
4 TABLET, ORALLY DISINTEGRATING ORAL EVERY 8 HOURS PRN
Qty: 90 TABLET | Refills: 0 | Status: SHIPPED | OUTPATIENT
Start: 2023-01-03 | End: 2023-02-02

## 2023-01-04 ENCOUNTER — ROUTINE PRENATAL (OUTPATIENT)
Dept: OBGYN CLINIC | Facility: CLINIC | Age: 28
End: 2023-01-04

## 2023-01-04 VITALS
HEIGHT: 65 IN | SYSTOLIC BLOOD PRESSURE: 120 MMHG | BODY MASS INDEX: 34.49 KG/M2 | DIASTOLIC BLOOD PRESSURE: 78 MMHG | WEIGHT: 207 LBS

## 2023-01-04 DIAGNOSIS — O99.282 THYROID DISEASE DURING PREGNANCY IN SECOND TRIMESTER: ICD-10-CM

## 2023-01-04 DIAGNOSIS — E07.9 THYROID DISEASE DURING PREGNANCY IN SECOND TRIMESTER: ICD-10-CM

## 2023-01-04 DIAGNOSIS — Z3A.30 30 WEEKS GESTATION OF PREGNANCY: ICD-10-CM

## 2023-01-04 DIAGNOSIS — Z3A.24 24 WEEKS GESTATION OF PREGNANCY: Primary | ICD-10-CM

## 2023-01-04 DIAGNOSIS — E03.8 SUBCLINICAL HYPOTHYROIDISM: ICD-10-CM

## 2023-01-04 LAB
DME PARACHUTE DELIVERY DATE REQUESTED: NORMAL
DME PARACHUTE ITEM DESCRIPTION: NORMAL
DME PARACHUTE ORDER STATUS: NORMAL
DME PARACHUTE SUPPLIER NAME: NORMAL
DME PARACHUTE SUPPLIER PHONE: NORMAL

## 2023-01-04 RX ORDER — LEVOTHYROXINE SODIUM 0.05 MG/1
50 TABLET ORAL DAILY
Qty: 30 TABLET | Refills: 2 | Status: SHIPPED | OUTPATIENT
Start: 2023-01-04

## 2023-01-04 NOTE — PROGRESS NOTES
Pt feeling well overall  Having GERD sx, advised OTC meds to trial as well as ACV  Good FM, doing FKCs  No vb/lof  Breast pump ordered  Pt plans to exclusively pump  Tdap given today     Urine neg/neg

## 2023-01-16 ENCOUNTER — ULTRASOUND (OUTPATIENT)
Dept: PERINATAL CARE | Facility: CLINIC | Age: 28
End: 2023-01-16

## 2023-01-16 VITALS
HEART RATE: 103 BPM | WEIGHT: 209.22 LBS | BODY MASS INDEX: 34.86 KG/M2 | SYSTOLIC BLOOD PRESSURE: 104 MMHG | DIASTOLIC BLOOD PRESSURE: 68 MMHG | HEIGHT: 65 IN

## 2023-01-16 DIAGNOSIS — U07.1 COVID-19 AFFECTING PREGNANCY IN FIRST TRIMESTER: ICD-10-CM

## 2023-01-16 DIAGNOSIS — O99.210 OBESITY IN PREGNANCY, ANTEPARTUM: ICD-10-CM

## 2023-01-16 DIAGNOSIS — Z3A.31 31 WEEKS GESTATION OF PREGNANCY: Primary | ICD-10-CM

## 2023-01-16 DIAGNOSIS — Z36.89 ENCOUNTER FOR ULTRASOUND TO CHECK FETAL GROWTH: ICD-10-CM

## 2023-01-16 DIAGNOSIS — O98.511 COVID-19 AFFECTING PREGNANCY IN FIRST TRIMESTER: ICD-10-CM

## 2023-01-16 NOTE — PATIENT INSTRUCTIONS
Thank you for choosing us for your  care today  If you have any questions about your ultrasound or care, please do not hesitate to contact us or your primary obstetrician  Some general instructions for your pregnancy are:    Protect against coronavirus: get vaccinated - pregnant women are increased risk of severe COVID  Notify your primary care doctor if you have any symptoms  Exercise: Aim for 22 minutes per day (150 minutes per week) of regular exercise  Walking is great! Nutrition: aim for calcium-rich and iron-rich foods as well as healthy sources of protein  Learn about Preeclampsia: preeclampsia is a common, serious high blood pressure complication in pregnancy  A blood pressure of 750PMQF (systolic or top number) or 71YKMG (diastolic or bottom number) is not normal and needs evaluation by your doctor  Aspirin is sometimes prescribed in early pregnancy to prevent preeclampsia in women with risk factors - ask your obstetrician if you should be on this medication  If you smoke, try to reduce how many cigarettes you smoke or try to quit completely  Do not vape  Other warning signs to watch out for in pregnancy or postpartum: chest pain, obstructed breathing or shortness of breath, seizures, thoughts of hurting yourself or your baby, bleeding, a painful or swollen leg, fever, or headache (see AWHONN POST-BIRTH Warning Signs campaign)  If these happen call 911  Itching is also not normal in pregnancy and if you experience this, especially over your hands and feet, potentially worse at night, notify your doctors

## 2023-01-18 ENCOUNTER — ROUTINE PRENATAL (OUTPATIENT)
Dept: OBGYN CLINIC | Facility: CLINIC | Age: 28
End: 2023-01-18

## 2023-01-18 VITALS — DIASTOLIC BLOOD PRESSURE: 72 MMHG | BODY MASS INDEX: 34.78 KG/M2 | SYSTOLIC BLOOD PRESSURE: 108 MMHG | WEIGHT: 209 LBS

## 2023-01-18 DIAGNOSIS — U07.1 COVID-19 AFFECTING PREGNANCY IN FIRST TRIMESTER: ICD-10-CM

## 2023-01-18 DIAGNOSIS — Z3A.32 32 WEEKS GESTATION OF PREGNANCY: ICD-10-CM

## 2023-01-18 DIAGNOSIS — E03.8 SUBCLINICAL HYPOTHYROIDISM: Primary | ICD-10-CM

## 2023-01-18 DIAGNOSIS — Z34.93 PRENATAL CARE IN THIRD TRIMESTER: ICD-10-CM

## 2023-01-18 DIAGNOSIS — O98.511 COVID-19 AFFECTING PREGNANCY IN FIRST TRIMESTER: ICD-10-CM

## 2023-01-18 NOTE — PATIENT INSTRUCTIONS
Pregnancy at 31 to 34 Weeks   AMBULATORY CARE:   Changes happening with your body: You may continue to have symptoms such as shortness of breath, heartburn, contractions, or swelling of your ankles and feet  You may be gaining about 1 pound a week now  Seek care immediately if:   You develop a severe headache that does not go away  You have new or increased vision changes, such as blurred or spotted vision  You have new or increased swelling in your face or hands  You have vaginal spotting or bleeding  Your water broke or you feel warm water gushing or trickling from your vagina  Call your obstetrician if:   You have more than 5 contractions in 1 hour  You notice any changes in your baby's movements  You have abdominal cramps, pressure, or tightening  You have a change in vaginal discharge  You have chills or a fever  You have vaginal itching, burning, or pain  You have yellow, green, white, or foul-smelling vaginal discharge  You have pain or burning when you urinate, less urine than usual, or pink or bloody urine  You have questions or concerns about your condition or care  How to care for yourself at this stage of your pregnancy:       Eat a variety of healthy foods  Healthy foods include fruits, vegetables, whole-grain breads, low-fat dairy foods, beans, lean meats, and fish  Drink liquids as directed  Ask how much liquid to drink each day and which liquids are best for you  Limit caffeine to less than 200 milligrams each day  Limit your intake of fish to 2 servings each week  Choose fish low in mercury such as canned light tuna, shrimp, salmon, cod, or tilapia  Do not  eat fish high in mercury such as swordfish, tilefish, sundar mackerel, and shark  Manage heartburn  by eating 4 or 5 small meals each day instead of large meals  Avoid spicy food  Manage swelling  by lying down and putting your feet up  Take prenatal vitamins as directed    Your need for certain vitamins and minerals, such as folic acid, increases during pregnancy  Prenatal vitamins provide some of the extra vitamins and minerals you need  Prenatal vitamins may also help to decrease the risk of certain birth defects  Talk to your healthcare provider about exercise  Moderate exercise can help you stay fit  Your healthcare provider will help you plan an exercise program that is safe for you during pregnancy  Do not smoke  Smoking increases your risk of a miscarriage and other health problems during your pregnancy  Smoking can cause your baby to be born too early or weigh less at birth  Ask your healthcare provider for information if you need help quitting  Do not drink alcohol  Alcohol passes from your body to your baby through the placenta  It can affect your baby's brain development and cause fetal alcohol syndrome (FAS)  FAS is a group of conditions that causes mental, behavior, and growth problems  Talk to your healthcare provider before you take any medicines  Many medicines may harm your baby if you take them when you are pregnant  Do not take any medicines, vitamins, herbs, or supplements without first talking to your healthcare provider  Never use illegal or street drugs (such as marijuana or cocaine) while you are pregnant  Safety tips during pregnancy:   Avoid hot tubs and saunas  Do not use a hot tub or sauna while you are pregnant, especially during your first trimester  Hot tubs and saunas may raise your baby's temperature and increase the risk of birth defects  Avoid toxoplasmosis  This is an infection caused by eating raw meat or being around infected cat feces  It can cause birth defects, miscarriages, and other problems  Wash your hands after you touch raw meat  Make sure any meat is well-cooked before you eat it  Avoid raw eggs and unpasteurized milk  Use gloves or ask someone else to clean your cat's litter box while you are pregnant  Changes happening with your baby:  By 34 weeks, your baby may weigh more than 5 pounds  Your baby will be about 12 ½ inches long from the top of the head to the rump (baby's bottom)  Your baby is gaining about ½ pound a week  Your baby's eyes open and close now  Your baby's kicks and movements are more forceful at this time  What you need to know about prenatal care: Your healthcare provider will check your blood pressure and weight  You may also need the following:  A urine test  may also be done to check for sugar and protein  These can be signs of gestational diabetes or infection  Protein in your urine may also be a sign of preeclampsia  Preeclampsia is a condition that can develop during week 20 or later of your pregnancy  It causes high blood pressure, and it can cause problems with your kidneys and other organs  A gestational diabetes screen  may be done  Your healthcare provider may order either a 1-step or 2-step oral glucose tolerance test (OGTT)  1-step OGTT:  Your blood sugar level will be tested after you have not eaten for 8 hours (fasting)  You will then be given a glucose drink  Your level will be tested again 1 hour and 2 hours after you finish the drink  2-step OGTT:  You do not have to fast for the first part of the test  You will have the glucose drink at any time of day  Your blood sugar level will be checked 1 hour later  If your blood sugar is higher than a certain level, another test will be ordered  You will fast and your blood sugar level will be tested  You will have the glucose drink  Your blood will be tested again 1 hour, 2 hours, and 3 hours after you finish the glucose drink  A Tdap vaccine  may be recommended by your healthcare provider  Fundal height  is a measurement of your uterus to check your baby's growth  This number is usually the same as the number of weeks that you have been pregnant   Your healthcare provider may also check your baby's position  Your baby's heart rate  will be checked  Follow up with your obstetrician as directed:  Write down your questions so you remember to ask them during your visits  © Copyright [address] 2022 Information is for End User's use only and may not be sold, redistributed or otherwise used for commercial purposes  All illustrations and images included in CareNotes® are the copyrighted property of A D A M , Inc  or [facility] Rachael Lovett   The above information is an  only  It is not intended as medical advice for individual conditions or treatments  Talk to your doctor, nurse or pharmacist before following any medical regimen to see if it is safe and effective for you

## 2023-01-18 NOTE — PROGRESS NOTES
Harriet Wills is a 27 yo  at [address] [place] [address]  Presenting for routine PNC- denies any CTX, LOF or VB  Endorses regular FM  Urine neg/neg  Has had occasional HA- denies any preeclamptic sx- BP within normal limits and no proteinuria  Has follow up growth scheduled for HC of > 97%ile- will follow up  RTO in 2 weeks or sooner if needed

## 2023-01-19 ENCOUNTER — APPOINTMENT (EMERGENCY)
Dept: VASCULAR ULTRASOUND | Facility: HOSPITAL | Age: 28
End: 2023-01-19

## 2023-01-19 ENCOUNTER — HOSPITAL ENCOUNTER (EMERGENCY)
Facility: HOSPITAL | Age: 28
Discharge: HOME | End: 2023-01-19
Attending: EMERGENCY MEDICINE | Admitting: STUDENT IN AN ORGANIZED HEALTH CARE EDUCATION/TRAINING PROGRAM

## 2023-01-19 DIAGNOSIS — O21.9 NAUSEA/VOMITING IN PREGNANCY: ICD-10-CM

## 2023-01-19 DIAGNOSIS — R42 LIGHTHEADEDNESS: ICD-10-CM

## 2023-01-19 DIAGNOSIS — R06.09 EXERTIONAL DYSPNEA: Primary | ICD-10-CM

## 2023-01-19 DIAGNOSIS — R42 DIZZY SPELLS: Primary | ICD-10-CM

## 2023-01-19 DIAGNOSIS — R00.2 PALPITATIONS: ICD-10-CM

## 2023-01-19 DIAGNOSIS — O21.9 NAUSEA AND VOMITING IN PREGNANCY: ICD-10-CM

## 2023-01-19 DIAGNOSIS — O26.813 PREGNANCY RELATED FATIGUE IN THIRD TRIMESTER: ICD-10-CM

## 2023-01-19 LAB
ALBUMIN SERPL BCP-MCNC: 3.3 G/DL (ref 3.5–5)
ALP SERPL-CCNC: 105 U/L (ref 34–104)
ALT SERPL W P-5'-P-CCNC: 9 U/L (ref 7–52)
ANION GAP SERPL CALCULATED.3IONS-SCNC: 6 MMOL/L (ref 4–13)
AST SERPL W P-5'-P-CCNC: 13 U/L (ref 13–39)
BACTERIA UR QL AUTO: ABNORMAL /HPF
BASOPHILS # BLD AUTO: 0.04 THOUSANDS/ÂΜL (ref 0–0.1)
BASOPHILS NFR BLD AUTO: 0 % (ref 0–1)
BILIRUB SERPL-MCNC: 0.37 MG/DL (ref 0.2–1)
BILIRUB UR QL STRIP: NEGATIVE
BNP SERPL-MCNC: 30 PG/ML (ref 0–100)
BUN SERPL-MCNC: 7 MG/DL (ref 5–25)
CALCIUM ALBUM COR SERPL-MCNC: 9.3 MG/DL (ref 8.3–10.1)
CALCIUM SERPL-MCNC: 8.7 MG/DL (ref 8.4–10.2)
CARDIAC TROPONIN I PNL SERPL HS: <2 NG/L
CHLORIDE SERPL-SCNC: 106 MMOL/L (ref 96–108)
CLARITY UR: ABNORMAL
CO2 SERPL-SCNC: 24 MMOL/L (ref 21–32)
COLOR UR: ABNORMAL
CREAT SERPL-MCNC: 1.02 MG/DL (ref 0.6–1.3)
D DIMER PPP FEU-MCNC: 0.84 UG/ML FEU
EOSINOPHIL # BLD AUTO: 0.07 THOUSAND/ÂΜL (ref 0–0.61)
EOSINOPHIL NFR BLD AUTO: 1 % (ref 0–6)
ERYTHROCYTE [DISTWIDTH] IN BLOOD BY AUTOMATED COUNT: 12.5 % (ref 11.6–15.1)
GFR SERPL CREATININE-BSD FRML MDRD: 75 ML/MIN/1.73SQ M
GLUCOSE SERPL-MCNC: 74 MG/DL (ref 65–140)
GLUCOSE UR STRIP-MCNC: NEGATIVE MG/DL
HCT VFR BLD AUTO: 36.3 % (ref 34.8–46.1)
HGB BLD-MCNC: 12.2 G/DL (ref 11.5–15.4)
HGB UR QL STRIP.AUTO: NEGATIVE
HYALINE CASTS #/AREA URNS LPF: ABNORMAL /LPF
IMM GRANULOCYTES # BLD AUTO: 0.04 THOUSAND/UL (ref 0–0.2)
IMM GRANULOCYTES NFR BLD AUTO: 0 % (ref 0–2)
KETONES UR STRIP-MCNC: NEGATIVE MG/DL
LEUKOCYTE ESTERASE UR QL STRIP: ABNORMAL
LYMPHOCYTES # BLD AUTO: 1.49 THOUSANDS/ÂΜL (ref 0.6–4.47)
LYMPHOCYTES NFR BLD AUTO: 14 % (ref 14–44)
MAGNESIUM SERPL-MCNC: 1.8 MG/DL (ref 1.9–2.7)
MCH RBC QN AUTO: 30.7 PG (ref 26.8–34.3)
MCHC RBC AUTO-ENTMCNC: 33.6 G/DL (ref 31.4–37.4)
MCV RBC AUTO: 91 FL (ref 82–98)
MONOCYTES # BLD AUTO: 0.82 THOUSAND/ÂΜL (ref 0.17–1.22)
MONOCYTES NFR BLD AUTO: 8 % (ref 4–12)
MUCOUS THREADS UR QL AUTO: ABNORMAL
NEUTROPHILS # BLD AUTO: 7.92 THOUSANDS/ÂΜL (ref 1.85–7.62)
NEUTS SEG NFR BLD AUTO: 77 % (ref 43–75)
NITRITE UR QL STRIP: NEGATIVE
NON-SQ EPI CELLS URNS QL MICRO: ABNORMAL /HPF
NRBC BLD AUTO-RTO: 0 /100 WBCS
PH UR STRIP.AUTO: 7 [PH]
PHOSPHATE SERPL-MCNC: 3.6 MG/DL (ref 2.7–4.5)
PLATELET # BLD AUTO: 178 THOUSANDS/UL (ref 149–390)
PMV BLD AUTO: 10.3 FL (ref 8.9–12.7)
POTASSIUM SERPL-SCNC: 3.8 MMOL/L (ref 3.5–5.3)
PROT SERPL-MCNC: 6.2 G/DL (ref 6.4–8.4)
PROT UR STRIP-MCNC: ABNORMAL MG/DL
RBC # BLD AUTO: 3.98 MILLION/UL (ref 3.81–5.12)
RBC #/AREA URNS AUTO: ABNORMAL /HPF
SODIUM SERPL-SCNC: 136 MMOL/L (ref 135–147)
SP GR UR STRIP.AUTO: 1.01 (ref 1–1.03)
TSH SERPL DL<=0.05 MIU/L-ACNC: 1.22 UIU/ML (ref 0.45–4.5)
UROBILINOGEN UR STRIP-ACNC: 2 MG/DL
WBC # BLD AUTO: 10.38 THOUSAND/UL (ref 4.31–10.16)
WBC #/AREA URNS AUTO: ABNORMAL /HPF

## 2023-01-19 RX ORDER — ONDANSETRON 4 MG/1
4 TABLET, ORALLY DISINTEGRATING ORAL EVERY 8 HOURS PRN
Qty: 90 TABLET | Refills: 0 | Status: SHIPPED | OUTPATIENT
Start: 2023-01-19 | End: 2023-02-18

## 2023-01-19 NOTE — TELEPHONE ENCOUNTER
Pt called and states since yesterday she has still been feeling dizzy, light headed, and very fatigued  Also states she feels as though her heart has been racing  TT with Dr Cardenas Board and pt advised to go to [facility] ER for further evaluation  Pt aware and will head there

## 2023-01-19 NOTE — DISCHARGE INSTRUCTIONS
Please follow up with both your OB and your family doctor for the symptoms that you are experiencing  If your condition worsens, please return to the ER

## 2023-01-19 NOTE — ED PROVIDER NOTES
History  Chief Complaint   Patient presents with   • Dizziness     Pt arrives c/o feeling lightheaded and dizzy since yesterday evening as well as feeling palpitations  32 wks pregnant  Denies fever, cough, abd pain, N/V/D  Denies vaginal discharge     Mrs Angela Bethea is a 29 yo G1, currently 32 weeks pregnant, who presents with intermittent lightheadedness, shortness of breath, and palpitations  First noticed symptoms while watching TV last evening  Symptoms come and go without warning, however they are also provoked by prolonged ambulation or climbing stairs  Also notes bilateral calf muscle cramping since 1/14/2023, however attributes this to wearing high heels for her baby shower on that date  Right calf is no longer cramping, however left calf continues to be mildly sore, worse when the area is touched  Denies peripheral edema, skin color changes, or calf swelling  Has hypothyroidism with this pregnancy, however is taking synthroid as directed  Denies headache, blurred vision, numbness, tingling, URI symptoms, cough, chest pain, abdominal pain, nausea, vomiting, diarrhea, constipation, dysuria, hematuria, vaginal bleeding, loss of fluids, decreased fetal movement, contractions, recent illness or sick contacts, or any other symptoms  Denies personal or family history of clotting disorders or cardiovascular disease  Is not currently symptomatic  Prior to Admission Medications   Prescriptions Last Dose Informant Patient Reported? Taking? Advair Diskus 100-50 MCG/DOSE inhaler   Yes No   Patient not taking: Reported on 8/3/2022   Doxylamine-Pyridoxine 10-10 MG TBEC 1/18/2023  No Yes   Sig: Start with two tablets at night and add one in the morning and one at midday as needed     Prenat w/o A-FeCbGl-DSS-FA-DHA (PNV OB+DHA PO) 1/18/2023  Yes Yes   Sig: Take by mouth   aspirin 81 mg chewable tablet 1/18/2023  No Yes   Sig: Chew 2 tablets (162 mg total) daily   cetirizine (ZyrTEC) 10 mg tablet Past Month  Yes Yes   Sig: Take 10 mg by mouth daily   docusate sodium (COLACE) 100 mg capsule Past Month  Yes Yes   Sig: Take 100 mg by mouth 2 (two) times a day   levothyroxine 50 mcg tablet 1/19/2023  No Yes   Sig: Take 1 tablet (50 mcg total) by mouth daily   ondansetron (Zofran ODT) 4 mg disintegrating tablet 1/19/2023  No Yes   Sig: Take 1 tablet (4 mg total) by mouth every 8 (eight) hours as needed for nausea or vomiting   promethazine (PHENERGAN) 12 5 MG tablet Past Month  No Yes   Sig: Take 1 tablet (12 5 mg total) by mouth every 6 (six) hours as needed for nausea or vomiting      Facility-Administered Medications: None       Past Medical History:   Diagnosis Date   • Acne    • ADHD    • Allergic asthma    • Subclinical hypothyroidism        Past Surgical History:   Procedure Laterality Date   • FINGER SURGERY      pin placed   • SHOULDER SURGERY Right     2019   • SKIN BIOPSY     • WISDOM TOOTH EXTRACTION         Family History   Problem Relation Age of Onset   • Breast cancer Mother 62        negative genetic testing   • No Known Problems Father    • Breast cancer Maternal Grandmother 76   • Heart disease Paternal Grandmother      I have reviewed and agree with the history as documented  E-Cigarette/Vaping   • E-Cigarette Use Never User      E-Cigarette/Vaping Substances   • Nicotine No    • THC No    • CBD No    • Flavoring No    • Other No    • Unknown No      Social History     Tobacco Use   • Smoking status: Never   • Smokeless tobacco: Never   Vaping Use   • Vaping Use: Never used   Substance Use Topics   • Alcohol use: Not Currently     Alcohol/week: 1 0 standard drink     Types: 1 Glasses of wine per week     Comment: Socially- before pregnancy   • Drug use: Never        Review of Systems   Constitutional: Negative  Negative for activity change, appetite change, chills, diaphoresis, fatigue, fever and unexpected weight change  HENT: Negative    Negative for congestion, ear pain, facial swelling, postnasal drip, rhinorrhea, sinus pressure, sinus pain, sneezing, sore throat, trouble swallowing and voice change  Eyes: Negative  Negative for photophobia and visual disturbance  Respiratory: Positive for shortness of breath  Negative for cough, chest tightness and wheezing  Cardiovascular: Positive for palpitations  Negative for chest pain and leg swelling  Gastrointestinal: Negative  Negative for abdominal pain, blood in stool, constipation, diarrhea, nausea and vomiting  Endocrine: Negative  Negative for cold intolerance, heat intolerance, polydipsia, polyphagia and polyuria  Genitourinary: Negative  Negative for decreased urine volume, difficulty urinating, dysuria, flank pain, frequency, hematuria, pelvic pain, urgency, vaginal bleeding, vaginal discharge and vaginal pain  Musculoskeletal: Positive for myalgias  Negative for arthralgias, back pain, gait problem, joint swelling, neck pain and neck stiffness  Bilateral calf cramping  Skin: Negative for color change, pallor, rash and wound  Allergic/Immunologic: Negative  Negative for immunocompromised state  Neurological: Positive for light-headedness  Negative for dizziness, syncope, facial asymmetry, speech difficulty, weakness, numbness and headaches  Hematological: Negative  Psychiatric/Behavioral: Negative  Negative for confusion  The patient is not nervous/anxious  All other systems reviewed and are negative        Physical Exam  ED Triage Vitals [01/19/23 1444]   Temperature Pulse Respirations Blood Pressure SpO2   98 1 °F (36 7 °C) (!) 106 20 135/77 97 %      Temp Source Heart Rate Source Patient Position - Orthostatic VS BP Location FiO2 (%)   Oral Monitor Sitting Right arm --      Pain Score       --             Orthostatic Vital Signs  Vitals:    01/19/23 1839 01/19/23 1903 01/19/23 2000 01/19/23 2005   BP: 127/78 119/74     Pulse: 81 (!) 108 92 92   Patient Position - Orthostatic VS: Sitting Physical Exam  Vitals and nursing note reviewed  Exam conducted with a chaperone present  Constitutional:       General: She is not in acute distress  Appearance: Normal appearance  She is not ill-appearing or diaphoretic  HENT:      Head: Normocephalic and atraumatic  Right Ear: External ear normal       Left Ear: External ear normal       Nose: Nose normal  No congestion or rhinorrhea  Mouth/Throat:      Mouth: Mucous membranes are moist       Pharynx: Oropharynx is clear  No oropharyngeal exudate or posterior oropharyngeal erythema  Eyes:      General: No scleral icterus  Right eye: No discharge  Left eye: No discharge  Extraocular Movements: Extraocular movements intact  Conjunctiva/sclera: Conjunctivae normal       Pupils: Pupils are equal, round, and reactive to light  Cardiovascular:      Rate and Rhythm: Regular rhythm  Tachycardia present  Pulses: Normal pulses  Heart sounds: Normal heart sounds  No murmur heard  No friction rub  No gallop  Pulmonary:      Effort: Pulmonary effort is normal  No respiratory distress  Breath sounds: Normal breath sounds  No stridor  No wheezing, rhonchi or rales  Chest:      Chest wall: No tenderness  Abdominal:      General: Abdomen is flat  Bowel sounds are normal       Palpations: Abdomen is soft  Tenderness: There is no abdominal tenderness  There is no right CVA tenderness, left CVA tenderness, guarding or rebound  Comments: Gravid abdomen  Musculoskeletal:         General: Tenderness present  No swelling  Cervical back: Normal range of motion and neck supple  Right lower leg: No edema  Left lower leg: No edema  Comments: Mild left calf tenderness to palpation without erythema, warmth, color changes, or calf swelling  Lymphadenopathy:      Cervical: No cervical adenopathy  Skin:     General: Skin is warm and dry        Capillary Refill: Capillary refill takes less than 2 seconds  Coloration: Skin is not pale  Findings: No bruising, erythema or rash  Neurological:      General: No focal deficit present  Mental Status: She is alert and oriented to person, place, and time  Sensory: No sensory deficit  Motor: No weakness  Gait: Gait normal    Psychiatric:         Mood and Affect: Mood normal          Behavior: Behavior normal          ED Medications  Medications - No data to display    Diagnostic Studies  Results Reviewed     Procedure Component Value Units Date/Time    Urine Microscopic [[id number]]  (Abnormal) Collected: 01/19/23 1629    Lab Status: Final result Specimen: Urine, Clean Catch Updated: 01/19/23 1734     RBC, UA 1-2 /hpf      WBC, UA 10-20 /hpf      Epithelial Cells Innumerable /hpf      Bacteria, UA Occasional /hpf      MUCUS THREADS Occasional     Hyaline Casts, UA 0-3 /lpf     Magnesium [[id number]]  (Abnormal) Collected: 01/19/23 1629    Lab Status: Final result Specimen: Blood from Arm, Right Updated: 01/19/23 1728     Magnesium 1 8 mg/dL     Phosphorus [[id number]]  (Normal) Collected: 01/19/23 1629    Lab Status: Final result Specimen: Blood from Arm, Right Updated: 01/19/23 1728     Phosphorus 3 6 mg/dL     TSH, 3rd generation with Free T4 reflex [[id number]]  (Normal) Collected: 01/19/23 1629    Lab Status: Final result Specimen: Blood from Arm, Right Updated: 01/19/23 1719     TSH 3RD GENERATON 1 215 uIU/mL     Narrative:      Patients undergoing fluorescein dye angiography may retain small amounts of fluorescein in the body for 48-72 hours post procedure  Samples containing fluorescein can produce falsely depressed TSH values  If the patient had this procedure,a specimen should be resubmitted post fluorescein clearance        Comprehensive metabolic panel [[id number]]  (Abnormal) Collected: 01/19/23 1629    Lab Status: Final result Specimen: Blood from Arm, Right Updated: 01/19/23 1717     Sodium 136 mmol/L      Potassium 3 8 mmol/L      Chloride 106 mmol/L      CO2 24 mmol/L      ANION GAP 6 mmol/L      BUN 7 mg/dL      Creatinine 1 02 mg/dL      Glucose 74 mg/dL      Calcium 8 7 mg/dL      Corrected Calcium 9 3 mg/dL      AST 13 U/L      ALT 9 U/L      Alkaline Phosphatase 105 U/L      Total Protein 6 2 g/dL      Albumin 3 3 g/dL      Total Bilirubin 0 37 mg/dL      eGFR 75 ml/min/1 73sq m     Narrative:      National Kidney Disease Foundation guidelines for Chronic Kidney Disease (CKD):   •  Stage 1 with normal or high GFR (GFR > 90 mL/min/1 73 square meters)  •  Stage 2 Mild CKD (GFR = 60-89 mL/min/1 73 square meters)  •  Stage 3A Moderate CKD (GFR = 45-59 mL/min/1 73 square meters)  •  Stage 3B Moderate CKD (GFR = 30-44 mL/min/1 73 square meters)  •  Stage 4 Severe CKD (GFR = 15-29 mL/min/1 73 square meters)  •  Stage 5 End Stage CKD (GFR <15 mL/min/1 73 square meters)  Note: GFR calculation is accurate only with a steady state creatinine    B-Type Natriuretic Peptide(BNP) AN, CA,  Campuses Only [[id number]]  (Normal) Collected: 01/19/23 1629    Lab Status: Final result Specimen: Blood from Arm, Right Updated: 01/19/23 1713     BNP 30 pg/mL     HS Troponin 0hr (reflex protocol) [[id number]]  (Normal) Collected: 01/19/23 1629    Lab Status: Final result Specimen: Blood from Arm, Right Updated: 01/19/23 1711     hs TnI 0hr <2 ng/L     D-Dimer [[id number]]  (Abnormal) Collected: 01/19/23 1629    Lab Status: Final result Specimen: Blood from Arm, Right Updated: 01/19/23 1704     D-Dimer, Quant 0 84 ug/ml FEU     UA (URINE) with reflex to Scope [[id number]]  (Abnormal) Collected: 01/19/23 1629    Lab Status: Final result Specimen: Urine, Clean Catch Updated: 01/19/23 1640     Color, UA Light Yellow     Clarity, UA Turbid     Specific [place], UA 1 011     pH, UA 7 0     Leukocytes, UA Large     Nitrite, UA Negative     Protein, UA Trace mg/dl      Glucose, UA Negative mg/dl      Ketones, UA Negative mg/dl      Urobilinogen, UA 2 0 mg/dl      Bilirubin, UA Negative     Occult Blood, UA Negative    CBC and differential [[id number]]  (Abnormal) Collected: 01/19/23 1629    Lab Status: Final result Specimen: Blood from Arm, Right Updated: 01/19/23 1640     WBC 10 38 Thousand/uL      RBC 3 98 Million/uL      Hemoglobin 12 2 g/dL      Hematocrit 36 3 %      MCV 91 fL      MCH 30 7 pg      MCHC 33 6 g/dL      RDW 12 5 %      MPV 10 3 fL      Platelets 002 Thousands/uL      nRBC 0 /100 WBCs      Neutrophils Relative 77 %      Immat GRANS % 0 %      Lymphocytes Relative 14 %      Monocytes Relative 8 %      Eosinophils Relative 1 %      Basophils Relative 0 %      Neutrophils Absolute 7 92 Thousands/µL      Immature Grans Absolute 0 04 Thousand/uL      Lymphocytes Absolute 1 49 Thousands/µL      Monocytes Absolute 0 82 Thousand/µL      Eosinophils Absolute 0 07 Thousand/µL      Basophils Absolute 0 04 Thousands/µL                  VAS lower limb venous duplex study, complete bilateral   Final Result by Carla Thompson MD (01/19 2239)            Procedures  ECG 12 Lead Documentation Only    Date/Time: 1/19/2023 4:35 PM  Performed by: Indira Billy DO  Authorized by: Indira Billy DO     Indications / Diagnosis:  Palpitations and shortness of breath  ECG reviewed by me, the ED Provider: yes    Patient location:  ED  Previous ECG:     Previous ECG:  Unavailable    Comparison to cardiac monitor: Yes    Interpretation:     Interpretation: normal    Rate:     ECG rate:  90    ECG rate assessment: normal    Rhythm:     Rhythm: sinus rhythm    Ectopy:     Ectopy: none    QRS:     QRS axis:  Normal    QRS intervals:  Normal  Conduction:     Conduction: normal    ST segments:     ST segments:  Normal  T waves:     T waves: normal      POC Pelvic US    Date/Time: 1/19/2023 5:45 PM  Performed by: Indira Billy DO  Authorized by: Indira Billy DO     Patient location:  ED  Other Assisting Provider: No    Procedure details: Exam Type:  Diagnostic    Indications comment:  Eval FHR and fetal movement    Technique:  Transabdominal obstetric (HCG+) exam    Views obtained: uterus (transverse and sagittal)      Image quality: limited diagnostic      Image availability:  Images available in PACS  Uterine findings:     Intrauterine pregnancy: identified      Single gestation: identified      Fetal heart rate: identified      Fetal heart rate (bpm):  150  Interpretation:     Ultrasound impressions: normal      Pregnancy findings: intrauterine pregnancy (IUP)    POC Cardiac US    Date/Time: 1/19/2023 5:35 PM  Performed by: Jalyn Saravia DO  Authorized by: Jalyn Saravia DO     Patient location:  ED  Other Assisting Provider: No    Procedure details:     Exam Type:  Diagnostic    Indications: suspected volume depletion and respiratory distress      Assessment / Evaluation for: cardiac function, pericardial effusion, intravascular volume status and right heart strain (suspected pulmonary embolism)      Exam Type: initial exam      Image quality: diagnostic      Image availability:  Images available in PACS  Patient Details:     Cardiac Rhythm:  Regular  Cardiac findings:     Echo technique: limited 2D      Views obtained: parasternal long axis and parasternal short axis      Pericardial effusion: absent      Tamponade physiology: absent      Wall motion comment:  Mildly hyperdynamic    LV systolic function: normal      RV dilation: none            ED Course  ED Course as of 01/21/23 1152   Thu Jan 19, 2023   1514 Pt is 26 yo G1 presenting at 32 weeks with sudden onset shortness of breath, palpitations, fatigue, and intermittent lightheadedness since last evening  Endorses bilateral calf pain since 1/14/23, L>R  No chest pain, abdominal pain, vaginal bleeding, loss of fluid, or decreased fetal movement  Physical exam unremarkable with exception to mild tachycardia, left calf tenderness to palpation   Differential includes but is not limited to anemia, DVT, PE, cardiomyopathy, thyroid dysfunction, electrolyte abnormalities, dehydration, physiologic changes related to pregnancy  Will obtain CBC, CMP, mag, phos, dimer, BNP, Trop, TSH, ECG, bilateral LE venous duplex studies, and POCUS of heart and fetus  1613 VAS lower limb venous duplex study, complete bilateral  Negative for DVT bilaterally  1640 WBC(!): 10 38   1641 CBC and differential(!)  H/H WNL  1705 D-Dimer, Quant(!): 0 84  WNL for third trimester  Doubt PE    1713 hs TnI 0hr: <2  Doubt ACS given onset of symptoms about 24 hours ago  1718 BNP: 30  WNL  1719 TSH 3RD GENERATON: 1 215  WNL  1748 POCUS:  No evidence of cardiomegaly, EF appears WNL      1857 Urine contaminated, no evidence of infection  Given negative workup, will call OB to see if she needs NST prior to D/C  SBIRT 22yo+    Flowsheet Row Most Recent Value   SBIRT (25 yo +)    In order to provide better care to our patients, we are screening all of our patients for alcohol and drug use  Would it be okay to ask you these screening questions? Yes Filed at: 01/19/2023 1455   Initial Alcohol Screen: US AUDIT-C     1  How often do you have a drink containing alcohol? 0 Filed at: 01/19/2023 1455   2  How many drinks containing alcohol do you have on a typical day you are drinking? 0 Filed at: 01/19/2023 1455   3b  FEMALE Any Age, or MALE 65+: How often do you have 4 or more drinks on one occassion? 0 Filed at: 01/19/2023 1455   Audit-C Score 0 Filed at: 01/19/2023 1455   RAFAELA: How many times in the past year have you    Used an illegal drug or used a prescription medication for non-medical reasons?  Never Filed at: 01/19/2023 1455          Wells' Criteria for PE    Flowsheet Row Most Recent Value   Wells' Criteria for PE    Clinical signs and symptoms of DVT 3 Filed at: 01/19/2023 1518   PE is primary diagnosis or equally likely 0 Filed at: 01/19/2023 1518   HR >100 1 5 Filed at: 01/19/2023 1518   Immobilization at least 3 days or Surgery in the previous 4 weeks 0 Filed at: 01/19/2023 1518   Previous, objectively diagnosed PE or DVT 0 Filed at: 01/19/2023 1518   Hemoptysis 0 Filed at: 01/19/2023 1518   Malignancy with treatment within 6 months or palliative 0 Filed at: 01/19/2023 1518   Wells' Criteria Total 4 5 Filed at: 01/19/2023 [address]   See ED course for MDM  Will send to L&D for NST  To follow up outpatient with PCP for palpitations  Exertional dyspnea: acute illness or injury  Lightheadedness: acute illness or injury  Palpitations: acute illness or injury  Amount and/or Complexity of Data Reviewed  Labs: ordered  Decision-making details documented in ED Course  Radiology:  Decision-making details documented in ED Course  ECG/medicine tests: ordered              Disposition  Final diagnoses:   Lightheadedness   Palpitations   Exertional dyspnea     Time reflects when diagnosis was documented in both MDM as applicable and the Disposition within this note     Time User Action Codes Description Comment    1/19/2023  6:18 PM Michaelene Baas [R42] Lightheadedness     1/19/2023  6:18 PM Michaelene Baas [R00 2] Palpitations     1/19/2023  6:18 PM Brain Ismael Add [R06 09] Exertional dyspnea     1/19/2023  6:18 PM Katjorge Shamlalitha [R42] Lightheadedness     1/19/2023  6:18 PM Brain Ismael Modify [R06 09] Exertional dyspnea       ED Disposition     ED Disposition   Send to L&D After Provider Eval    Condition   --    Date/Time   Thu Jan 19, 2023  6:20 PM    Comment   --         Follow-up Information     Follow up With Specialties Details Why Contact Info    [facility] Go to Next prenatal appointment [id number] [address]  [phone]            Discharge Medication List as of 1/19/2023  8:56 PM      CONTINUE these medications which have NOT CHANGED    Details   aspirin 81 mg chewable tablet Chew 2 tablets (162 mg total) daily, Starting Fri 9/2/2022, Normal      cetirizine (ZyrTEC) 10 mg tablet Take 10 mg by mouth daily, Historical Med      docusate sodium (COLACE) 100 mg capsule Take 100 mg by mouth 2 (two) times a day, Historical Med      Doxylamine-Pyridoxine 10-10 MG TBEC Start with two tablets at night and add one in the morning and one at midday as needed , Normal      levothyroxine 50 mcg tablet Take 1 tablet (50 mcg total) by mouth daily, Starting Wed 1/4/2023, Normal      ondansetron (Zofran ODT) 4 mg disintegrating tablet Take 1 tablet (4 mg total) by mouth every 8 (eight) hours as needed for nausea or vomiting, Starting Thu 1/19/2023, Until Sat 2/18/2023 at 2359, Normal      Prenat w/o A-FeCbGl-DSS-FA-DHA (PNV OB+DHA PO) Take by mouth, Historical Med      promethazine (PHENERGAN) 12 5 MG tablet Take 1 tablet (12 5 mg total) by mouth every 6 (six) hours as needed for nausea or vomiting, Starting Tue 12/20/2022, Normal         STOP taking these medications       Advair Diskus 100-50 MCG/DOSE inhaler Comments:   Reason for Stopping:             No discharge procedures on file  PDMP Review     None           ED Provider  Attending physically available and evaluated Queta Alvaradomarkel DACOSTA managed the patient along with the ED Attending      Electronically Signed by         Jalyn Saravia DO  01/21/23 2352

## 2023-01-20 VITALS
BODY MASS INDEX: 34.82 KG/M2 | RESPIRATION RATE: 18 BRPM | OXYGEN SATURATION: 95 % | HEIGHT: 65 IN | TEMPERATURE: 98.5 F | WEIGHT: 209 LBS | HEART RATE: 92 BPM | DIASTOLIC BLOOD PRESSURE: 74 MMHG | SYSTOLIC BLOOD PRESSURE: 119 MMHG

## 2023-01-20 LAB
ATRIAL RATE: 90 BPM
P AXIS: 48 DEGREES
PR INTERVAL: 138 MS
QRS AXIS: 61 DEGREES
QRSD INTERVAL: 74 MS
QT INTERVAL: 344 MS
QTC INTERVAL: 420 MS
T WAVE AXIS: 46 DEGREES
VENTRICULAR RATE: 90 BPM

## 2023-01-20 NOTE — PROGRESS NOTES
L&D Triage Note - OB/GYN  Mara Duarte 28 y o  female MRN: [id number]  Unit/Bed#: LD TRIAGE 2- Encounter: [id number]      ASSESSMENT:    Michael Sorensen is a 28 y o   at [place] [address] presenting for NST after ED evaluation for headache, dizziness, palpitations and dyspnea on exertion  PLAN:    1) NST- reactive and reassuring  2) Continue routine prenatal care  3) Discharge from [facility] triage with  labor precautions    - Reviewed rupture of membranes, false vs true labor, decreased fetal movement, and vaginal bleeding   - Pt to call provider with any concerns and follow up at her next scheduled prenatal appointment 23   - Case discussed with Dr Little Ros:    Michael Sorensen 28 y o  Vladimir Dietz at [place] [address] with an Estimated Date of Delivery: 3/14/23 presented to the ED for headache, dizziness, palpitations and exertional dyspnea  Symptoms started last night when pt got a headache, palpitations and dizzy after which she fell asleep for 15 hrs and woke up this morning with improved symptoms  Pt contacted her Dr and was told to go to ED  Pt also endorses increased dyspnea on exertion that started about a week ago where she will be out of breath after walking up the stairs or if she is moving around a lot  Currently Pt endorse slight HA and LH but denies CP, N/V, fever, chills, URI symptoms, abd pain, dysuria, hematuria, vaginal discharge/bleeding, contractions    Fetal movement is normal     Her current obstetrical history is significant for hypothyroid of pregnancy, recent covid infections 22 and 12/15/22    Her past obstetrical history     Contractions: No  Leakage of fluid: No  Vaginal Bleeding: No  Fetal movement: present    OBJECTIVE:    Vitals:    23   BP:    Pulse: 92   Resp:    Temp:    SpO2: 95%       ROS:  Constitutional: Headache, LH  Respiratory: Dyspnea on exertion  Cardiovascular: Palpitations- resolved  Gastrointestinal: Negative    General Physical Exam:  General: in no apparent distress, well developed and well nourished, in no respiratory distress and acyanotic, alert, oriented times 3 and afebrile  Cardiovascular: Cor Tachy and No murmurs  Lungs: non-labored breathing  Abdomen: abdomen is soft without significant tenderness, masses, organomegaly or guarding  Lower extremeties: nontender    Cervical Exam  Speculum: Cervical exam denied by pt           FHT:  Baseline Rate: 150 bpm  Variability: Moderate 6-25 bpm  Accelerations: 15 x 15 or greater  Decelerations: None  FHR Category: Category I    TOCO:   Contraction Frequency (minutes): 1 ctx  Contraction Duration (seconds): 60  Contraction Quality: Mild    Lab Results   Component Value Date    WBC 10 38 (H) 01/19/2023    HGB 12 2 01/19/2023    HCT 36 3 01/19/2023     01/19/2023     Lab Results   Component Value Date    K 3 8 01/19/2023     01/19/2023    CO2 24 01/19/2023    BUN 7 01/19/2023    CREATININE 1 02 01/19/2023    AST 13 01/19/2023    ALT 9 01/19/2023           Amy Anne MD,  OBGYN PGY-1  1/20/2023 8:39 AM

## 2023-01-22 RX ORDER — DOXYLAMINE SUCCINATE AND PYRIDOXINE HYDROCHLORIDE, DELAYED RELEASE TABLETS 10 MG/10 MG 10; 10 MG/1; MG/1
TABLET, DELAYED RELEASE ORAL
Qty: 60 TABLET | Refills: 0 | Status: SHIPPED | OUTPATIENT
Start: 2023-01-22

## 2023-01-26 DIAGNOSIS — O99.282 THYROID DISEASE DURING PREGNANCY IN SECOND TRIMESTER: ICD-10-CM

## 2023-01-26 DIAGNOSIS — E07.9 THYROID DISEASE DURING PREGNANCY IN SECOND TRIMESTER: ICD-10-CM

## 2023-01-27 RX ORDER — LEVOTHYROXINE SODIUM 0.05 MG/1
50 TABLET ORAL DAILY
Qty: 30 TABLET | Refills: 2 | Status: SHIPPED | OUTPATIENT
Start: 2023-01-27

## 2023-02-01 ENCOUNTER — ROUTINE PRENATAL (OUTPATIENT)
Dept: OBGYN CLINIC | Facility: CLINIC | Age: 28
End: 2023-02-01

## 2023-02-01 VITALS
SYSTOLIC BLOOD PRESSURE: 120 MMHG | WEIGHT: 217 LBS | OXYGEN SATURATION: 99 % | DIASTOLIC BLOOD PRESSURE: 78 MMHG | BODY MASS INDEX: 36.11 KG/M2

## 2023-02-01 DIAGNOSIS — U07.1 COVID-19 AFFECTING PREGNANCY IN FIRST TRIMESTER: Primary | ICD-10-CM

## 2023-02-01 DIAGNOSIS — O98.511 COVID-19 AFFECTING PREGNANCY IN FIRST TRIMESTER: Primary | ICD-10-CM

## 2023-02-01 DIAGNOSIS — J45.20 MILD INTERMITTENT ASTHMA WITHOUT COMPLICATION: ICD-10-CM

## 2023-02-01 DIAGNOSIS — E03.8 SUBCLINICAL HYPOTHYROIDISM: ICD-10-CM

## 2023-02-01 DIAGNOSIS — Z3A.34 34 WEEKS GESTATION OF PREGNANCY: ICD-10-CM

## 2023-02-01 NOTE — ASSESSMENT & PLAN NOTE
- Continue PNV & ASA  - Labor precautions reviewed  - Fetal kick counts reviewed  - Labs: UTD  - Genetics: MSAFP wnl; NIPT neg  - Ultrasounds: Most recent US on 23 showed BPD 97% with overall EFW 74%; Growth scan scheduled for 23  - Tdap: Administered 23  - Flu Shot: Already recieved  - COVID: Pfizer x2; COVID infection 2022  - Delivery:  with epidural  - Contraception: Unsure;  Discussed options  - Breastfeeding: Pump  - RTO in 2 weeks

## 2023-02-05 NOTE — PATIENT INSTRUCTIONS
Thank you for choosing us for your  care today  If you have any questions about your ultrasound or care, please do not hesitate to contact us or your primary obstetrician  Some general instructions for your pregnancy are:    Exercise: Aim for 22 minutes per day (150 minutes per week) of regular exercise  Walking is great! Nutrition: Choose healthy sources of calcium, iron, and protein  Learn about Preeclampsia: preeclampsia is a common, serious high blood pressure complication in pregnancy  A blood pressure of 052GGWF (systolic or top number) or 10WFZD (diastolic or bottom number) is not normal and needs evaluation by your doctor  Aspirin is sometimes prescribed in early pregnancy to prevent preeclampsia in women with risk factors - ask your obstetrician if you should be on this medication  For more resources, visit:  https://mothertobaby org/fact-sheets/low-dose-aspirin/  [employer]  https://www highriskpregnancyinfo org/preeclampsia  If you smoke, try to reduce how many cigarettes you smoke or try to quit completely  Do not vape  Other warning signs to watch out for in pregnancy or postpartum: chest pain, obstructed breathing or shortness of breath, seizures, thoughts of hurting yourself or your baby, bleeding, a painful or swollen leg, fever, or headache (see [employer]WHON[facility] POST-BIRTH Warning Signs campaign)  If these happen call 911  Itching is also not normal in pregnancy and if you experience this, especially over your hands and feet, potentially worse at night, notify your doctors

## 2023-02-08 ENCOUNTER — ULTRASOUND (OUTPATIENT)
Facility: HOSPITAL | Age: 28
End: 2023-02-08

## 2023-02-08 VITALS
SYSTOLIC BLOOD PRESSURE: 108 MMHG | BODY MASS INDEX: 36.35 KG/M2 | DIASTOLIC BLOOD PRESSURE: 76 MMHG | HEART RATE: 123 BPM | WEIGHT: 218.2 LBS | HEIGHT: 65 IN

## 2023-02-08 DIAGNOSIS — Z36.89 ENCOUNTER FOR ULTRASOUND TO CHECK FETAL GROWTH: Primary | ICD-10-CM

## 2023-02-08 NOTE — LETTER
Date: 2023    Feli Brasher MD  [facility] [id number]    Patient: Fly Bray   YOB: 1995   Date of Visit: 2023   Gestational age Rico Valenzuela of this communication: Routine       Dear Jo Ann Sutherland,    This patient was seen recently in our  office  Please see ultrasound report under "OB Procedures" tab  Please don't hesitate to contact our office with any concerns or questions        Sincerely,      Majel Lesches, MD  Attending Physician, Carlos

## 2023-02-09 NOTE — PROGRESS NOTES
[address]: Ms Paulette White was seen today for fetal growth assessment ultrasound  See ultrasound report under "OB Procedures" tab  The time spent on this established patient on the encounter date included 5 minutes previsit service time reviewing records and precharting, 10 minutes face-to-face service time counseling regarding results and coordinating care, and  5 minutes charting, totalling 20 minutes  Please don't hesitate to contact our office with any concerns or questions    Vick Koyanagi, MD

## 2023-02-13 ENCOUNTER — TELEPHONE (OUTPATIENT)
Dept: OBGYN CLINIC | Facility: CLINIC | Age: 28
End: 2023-02-13

## 2023-02-13 NOTE — TELEPHONE ENCOUNTER
T/c from patient c/o light brownish discharge only one episode on her panty liner , some cramping not severe , denies vaginal bleeding or loss of fluids , denies and trauma to the cervics in the past week   Denies foul odor of discharge and reports good fetal movement advised patient to continue to monitor the discharge any change in color or smell to call office and vaginal bleeding or gush of fluid any constant cramping monitor duration and interval of episodes if consistent patient to seek [facility] for eval  Patient expressed understanding    MM CMA

## 2023-02-15 ENCOUNTER — ROUTINE PRENATAL (OUTPATIENT)
Dept: OBGYN CLINIC | Facility: CLINIC | Age: 28
End: 2023-02-15

## 2023-02-15 VITALS — WEIGHT: 222 LBS | SYSTOLIC BLOOD PRESSURE: 116 MMHG | DIASTOLIC BLOOD PRESSURE: 78 MMHG | BODY MASS INDEX: 36.94 KG/M2

## 2023-02-15 DIAGNOSIS — Z34.93 PRENATAL CARE IN THIRD TRIMESTER: Primary | ICD-10-CM

## 2023-02-15 DIAGNOSIS — U07.1 COVID-19 AFFECTING PREGNANCY IN FIRST TRIMESTER: ICD-10-CM

## 2023-02-15 DIAGNOSIS — O98.511 COVID-19 AFFECTING PREGNANCY IN FIRST TRIMESTER: ICD-10-CM

## 2023-02-15 DIAGNOSIS — E03.8 SUBCLINICAL HYPOTHYROIDISM: ICD-10-CM

## 2023-02-15 DIAGNOSIS — Z3A.36 36 WEEKS GESTATION OF PREGNANCY: ICD-10-CM

## 2023-02-15 NOTE — PATIENT INSTRUCTIONS
Pregnancy at 28 to 38 Weeks   AMBULATORY CARE:   Changes happening with your body: You are considered full term at the beginning of 37 weeks  Your breathing may be easier if your baby has moved down into a head-down position  You may need to urinate more often because the baby may be pressing on your bladder  You may also feel more discomfort and get tired easily  Seek care immediately if:   You develop a severe headache that does not go away  You have new or increased vision changes, such as blurred or spotted vision  You have new or increased swelling in your face or hands  You have vaginal spotting or bleeding  Your water broke or you feel warm water gushing or trickling from your vagina  Call your obstetrician if:   You have more than 5 contractions in 1 hour  You notice any changes in your baby's movements  You have abdominal cramps, pressure, or tightening  You have a change in vaginal discharge  You have chills or a fever  You have vaginal itching, burning, or pain  You have yellow, green, white, or foul-smelling vaginal discharge  You have pain or burning when you urinate, less urine than usual, or pink or bloody urine  You have questions or concerns about your condition or care  How to care for yourself at this stage of your pregnancy:       Eat a variety of healthy foods  Healthy foods include fruits, vegetables, whole-grain breads, low-fat dairy foods, beans, lean meats, and fish  Drink liquids as directed  Ask how much liquid to drink each day and which liquids are best for you  Limit caffeine to less than 200 milligrams each day  Limit your intake of fish to 2 servings each week  Choose fish low in mercury such as canned light tuna, shrimp, salmon, cod, or tilapia  Do not  eat fish high in mercury such as swordfish, tilefish, sundar mackerel, and shark  Take prenatal vitamins as directed    Your need for certain vitamins and minerals, such as folic acid, increases during pregnancy  Prenatal vitamins provide some of the extra vitamins and minerals you need  Prenatal vitamins may also help to decrease the risk of certain birth defects  Rest as needed  Put your feet up if you have swelling in your ankles and feet  Talk to your healthcare provider about exercise  Moderate exercise can help you stay fit  Your healthcare provider will help you plan an exercise program that is safe for you during pregnancy  Do not smoke  Smoking increases your risk of a miscarriage and other health problems during your pregnancy  Smoking can cause your baby to be born early or weigh less at birth  Ask your healthcare provider for information if you need help quitting  Do not drink alcohol  Alcohol passes from your body to your baby through the placenta  It can affect your baby's brain development and cause fetal alcohol syndrome (FAS)  FAS is a group of conditions that causes mental, behavior, and growth problems  Talk to your healthcare provider before you take any medicines  Many medicines may harm your baby if you take them when you are pregnant  Do not take any medicines, vitamins, herbs, or supplements without first talking to your healthcare provider  Never use illegal or street drugs (such as marijuana or cocaine) while you are pregnant  Safety tips during pregnancy:   Avoid hot tubs and saunas  Do not use a hot tub or sauna while you are pregnant, especially during your first trimester  Hot tubs and saunas may raise your baby's temperature and increase the risk of birth defects  Avoid toxoplasmosis  This is an infection caused by eating raw meat or being around infected cat feces  It can cause birth defects, miscarriages, and other problems  Wash your hands after you touch raw meat  Make sure any meat is well-cooked before you eat it  Avoid raw eggs and unpasteurized milk   Use gloves or ask someone else to clean your cat's litter box while you are pregnant  Ask your healthcare provider about travel  The most comfortable time to travel is during the second trimester  Ask your provider if you can travel after 36 weeks  You may not be able to travel in an airplane after 36 weeks  He or she may also recommend you avoid long road trips  Changes happening with your baby:  By 38 weeks, your baby may weigh between 6 and 9 pounds  Your baby may be about 14 inches long from the top of the head to the rump (baby's bottom)  Your baby hears well enough to know your voice  As your baby gets larger, you may feel fewer kicks and more stretching and rolling  Your baby may move into a head-down position  Your baby will also rest lower in your abdomen  What you need to know about prenatal care: Your healthcare provider will check your blood pressure and weight  You may also need the following:  A urine test  may also be done to check for sugar and protein  These can be signs of gestational diabetes or infection  Protein in your urine may also be a sign of preeclampsia  Preeclampsia is a condition that can develop during week 20 or later of your pregnancy  It causes high blood pressure, and it can cause problems with your kidneys and other organs  A gestational diabetes screen  may be done  Your healthcare provider may order either a 1-step or 2-step oral glucose tolerance test (OGTT)  1-step OGTT:  Your blood sugar level will be tested after you have not eaten for 8 hours (fasting)  You will then be given a glucose drink  Your level will be tested again 1 hour and 2 hours after you finish the drink  2-step OGTT:  You do not have to fast for the first part of the test  You will have the glucose drink at any time of day  Your blood sugar level will be checked 1 hour later  If your blood sugar is higher than a certain level, another test will be ordered  You will fast and your blood sugar level will be tested  You will have the glucose drink  Your blood will be tested again 1 hour, 2 hours, and 3 hours after you finish the glucose drink  A blood test  may be done to check for anemia (low iron level)  A Tdap vaccine  may be recommended by your healthcare provider  A group B strep test  is a test that is done to check for group B strep infection  Group B strep is a type of bacteria that may be found in the vagina or rectum  It can be passed to your baby during delivery if you have it  Your healthcare provider will take swab your vagina or rectum and send the sample to the lab for tests  Fundal height  is a measurement of your uterus to check your baby's growth  This number is usually the same as the number of weeks that you have been pregnant  Your healthcare provider may also check your baby's position  Your baby's heart rate  will be checked  Follow up with your obstetrician as directed:  Write down your questions so you remember to ask them during your visits  © [employer] 2022 Information is for End User's use only and may not be sold, redistributed or otherwise used for commercial purposes  All illustrations and images included in CareNotes® are the copyrighted property of [employer] M [employer] Inc  or [employer]   The above information is an  only  It is not intended as medical advice for individual conditions or treatments  Talk to your doctor, nurse or pharmacist before following any medical regimen to see if it is safe and effective for you  Kick Counts in Pregnancy   WHAT YOU NEED TO KNOW:   Kick counts measure how much your baby is moving in your womb  A kick from your baby can be felt as a twist, turn, swish, roll, or jab  It is common to feel your baby kicking at 26 to 28 weeks of pregnancy  You may feel your baby kick as early as 20 weeks of pregnancy  You may want to start counting at 28 weeks     DISCHARGE INSTRUCTIONS:   Contact your doctor immediately if:   You feel a change in the number of kicks or movements of your baby  You feel fewer than 10 kicks within 2 hours  You have questions or concerns about your baby's movements  Why measure kick counts:  Your baby's movement may provide information about your baby's health  He or she may move less, or not at all, if there are problems  Your baby may move less if he or she is not getting enough oxygen or nutrition from the placenta  Do not smoke while you are pregnant  Smoking decreases the amount of oxygen that gets to your baby  Talk to your healthcare provider if you need help to quit smoking  Tell your healthcare provider as soon as you feel a change in your baby's movements  When to measure kick counts:   Measure kick counts at the same time every day  Measure kick counts when your baby is awake and most active  Your baby may be most active in the evening  How to measure kick counts:  Check that your baby is awake before you measure kick counts  You can wake up your baby by lightly pushing on your belly, walking, or drinking something cold  Your healthcare provider may tell you different ways to measure kick counts  You may be told to do the following:  Use a chart or clock to keep track of the time you start and finish counting  Sit in a chair or lie on your left side  Place your hands on the largest part of your belly  Count until you reach 10 kicks  Write down how much time it takes to count 10 kicks  It may take 30 minutes to 2 hours to count 10 kicks  It should not take more than 2 hours to count 10 kicks  Follow up with your doctor as directed:  Write down your questions so you remember to ask them during your visits  © Copyright [employer] 2022 Information is for End User's use only and may not be sold, redistributed or otherwise used for commercial purposes   All illustrations and images included in CareNotes® are the copyrighted property of [employer]  or [employer]atum Sal[employer]  The above information is an  only  It is not intended as medical advice for individual conditions or treatments  Talk to your doctor, nurse or pharmacist before following any medical regimen to see if it is safe and effective for you

## 2023-02-15 NOTE — PROGRESS NOTES
Abeba Booker is a 27 yo  at 36w1d presenting for routine PNC- does report irregular cramping  No LOF or VB  Endorses regular FM  Urine neg/neg  Continues to have N/V and increasing GERD- taking zofran which does help- Reviewed medications for GERD and lifestyle changes  SVE /-3, mid position, soft  GBS collected today and reviewed    RTO in 1 week for labor talk

## 2023-02-17 LAB — GP B STREP DNA SPEC QL NAA+PROBE: NEGATIVE

## 2023-02-22 ENCOUNTER — ROUTINE PRENATAL (OUTPATIENT)
Dept: OBGYN CLINIC | Facility: CLINIC | Age: 28
End: 2023-02-22

## 2023-02-22 VITALS — DIASTOLIC BLOOD PRESSURE: 80 MMHG | WEIGHT: 225 LBS | BODY MASS INDEX: 37.44 KG/M2 | SYSTOLIC BLOOD PRESSURE: 122 MMHG

## 2023-02-22 DIAGNOSIS — Z3A.36 36 WEEKS GESTATION OF PREGNANCY: ICD-10-CM

## 2023-02-22 DIAGNOSIS — Z34.93 PRENATAL CARE IN THIRD TRIMESTER: ICD-10-CM

## 2023-02-22 DIAGNOSIS — O98.511 COVID-19 AFFECTING PREGNANCY IN FIRST TRIMESTER: ICD-10-CM

## 2023-02-22 DIAGNOSIS — E03.8 SUBCLINICAL HYPOTHYROIDISM: Primary | ICD-10-CM

## 2023-02-22 DIAGNOSIS — U07.1 COVID-19 AFFECTING PREGNANCY IN FIRST TRIMESTER: ICD-10-CM

## 2023-02-22 RX ORDER — OMEPRAZOLE 20 MG/1
20 CAPSULE, DELAYED RELEASE ORAL DAILY
Status: ON HOLD | COMMUNITY

## 2023-02-22 NOTE — PROGRESS NOTES
Labor Talk done desire epidural, reviewed signs of labor, how to call, has car seat  Considering elective IOL at 39 weeks reviewed and discussed  Reflux somewhat better with Prilosec still with nausea vomiting occasional headaches and some cramping and mild edema  Patient reports good fm, no  bleeding, loss of fluid, dom violence, or smoking    johann pnv urine negative negative return in 1 week or sooner as needed patient aware that that would be the time to request scheduling for elective IOL  ft/70/-2 soft post

## 2023-02-27 ENCOUNTER — TELEPHONE (OUTPATIENT)
Dept: OBGYN CLINIC | Facility: CLINIC | Age: 28
End: 2023-02-27

## 2023-02-27 NOTE — TELEPHONE ENCOUNTER
T/c from patient c/o vomiting once at 3 am this morning took Zofran which help c/o feeling very nauseous , cramping for past c/o days and sharp pain her lower abdomen and bad back pain   No fluid , no vaginal bleeding , no headaches , good fetal movement   Also c/o swelling in her legs , no dizziness   C/o feeling uncomfortable  Patient is  37 week 6 days    MM CMA

## 2023-02-27 NOTE — TELEPHONE ENCOUNTER
Discussed with Dr Tj Tillman advised Alena Litten would recommend zofran Q6 hours rest and hydration if abdominal pain worsens return call to office    Has follow up scheduled this Wednesday coming 03/01/23

## 2023-02-28 NOTE — PROGRESS NOTES
OB/GYN  PN Visit  Nasima Martinez  [id number]  3/1/2023  1:42 PM  Dr Tyree Harry MD    S: 28 y o  Antonia Mays 38w0d here for PN visit  She denies contractions, but has some cramping  She denies leakage of fluid and vaginal bleeding  She reports good fetal movement  She reports nausea and vomiting but it is stable  She reports headaches but no more than usual  She reports edema of her feet  She denies domestic violence and smoking  Her pregnancy is complicated by COVID in pregnancy, hypothyroidism and history of asthma  O:  Pre- Vitals    Flowsheet Row Most Recent Value   Prenatal Assessment    Fetal Heart Rate 158   Fundal Height (cm) 38 cm   Movement Present   Presentation Vertex   Prenatal Vitals    Blood Pressure 134/84   Weight - Scale 103 kg (228 lb)   Urine Albumin/Glucose    Dilation/Effacement/Station    Cervical Dilation 1   Cervical Effacement 50   Fetal Station -3   Vaginal Drainage    Draining Fluid No   Edema    LLE Edema None   RLE Edema None        Physical Exam  Vitals reviewed  Constitutional:       General: She is not in acute distress  Appearance: Normal appearance  She is well-developed  She is not ill-appearing, toxic-appearing or diaphoretic  Cardiovascular:      Rate and Rhythm: Normal rate  Pulmonary:      Effort: Pulmonary effort is normal  No respiratory distress  Abdominal:      General: There is no distension  Palpations: Abdomen is soft  There is no mass  Tenderness: There is no abdominal tenderness  There is no guarding or rebound  Genitourinary:     Comments: Gravid, nontender  Skin:     General: Skin is warm and dry  Neurological:      Mental Status: She is alert and oriented to person, place, and time     Psychiatric:         Mood and Affect: Mood normal          Behavior: Behavior normal          A/P:    Problem List        Unprioritized    Asthma    Functional ovarian cysts    38 weeks gestation of pregnancy    Current Assessment & Plan     - Continue PNV; s/p ASA  - Labor precautions reviewed  - Fetal kick counts reviewed  - Labs: UTD  - Genetics: MSAFP wnl; NIPT neg  - Ultrasounds: Most recent US on 23 showed BPD 97% with overall EFW 74%; Growth scan wnl on 23  - Tdap: Administered 23  - Flu Shot: Already recieved  - COVID: Pfizer x2; COVID infection 2022  - Delivery:  with epidural  - Contraception: Unsure;  Discussed options  - Breastfeeding: Pump  - GBS negative  - IOL: Elective IOL requested; Process reviewed   - RTO in 1 week         Obesity in pregnancy, antepartum    Subclinical hypothyroidism    Current Assessment & Plan     Continue Levothyroxine  Most recent testing 23 wnl         COVID-19 affecting pregnancy in first trimester         Future Appointments   Date Time Provider Alexander Jackson   3/8/2023  1:00 PM Augustina Rivas, [address] MD  3/1/2023  1:42 PM

## 2023-03-01 ENCOUNTER — ROUTINE PRENATAL (OUTPATIENT)
Dept: OBGYN CLINIC | Facility: CLINIC | Age: 28
End: 2023-03-01

## 2023-03-01 VITALS
SYSTOLIC BLOOD PRESSURE: 134 MMHG | DIASTOLIC BLOOD PRESSURE: 84 MMHG | BODY MASS INDEX: 37.99 KG/M2 | WEIGHT: 228 LBS | HEIGHT: 65 IN

## 2023-03-01 DIAGNOSIS — O98.511 COVID-19 AFFECTING PREGNANCY IN FIRST TRIMESTER: ICD-10-CM

## 2023-03-01 DIAGNOSIS — U07.1 COVID-19 AFFECTING PREGNANCY IN FIRST TRIMESTER: ICD-10-CM

## 2023-03-01 DIAGNOSIS — E03.8 SUBCLINICAL HYPOTHYROIDISM: Primary | ICD-10-CM

## 2023-03-01 DIAGNOSIS — Z3A.38 38 WEEKS GESTATION OF PREGNANCY: ICD-10-CM

## 2023-03-01 DIAGNOSIS — O99.210 OBESITY IN PREGNANCY, ANTEPARTUM: ICD-10-CM

## 2023-03-01 NOTE — ASSESSMENT & PLAN NOTE
- Continue PNV; s/p ASA  - Labor precautions reviewed  - Fetal kick counts reviewed  - Labs: UTD  - Genetics: MSAFP wnl; NIPT neg  - Ultrasounds: Most recent US on 23 showed BPD 97% with overall EFW 74%; Growth scan wnl on 23  - Tdap: Administered 23  - Flu Shot: Already recieved  - COVID: Pfizer x2; COVID infection 2022  - Delivery:  with epidural  - Contraception: Unsure;  Discussed options  - Breastfeeding: Pump  - GBS negative  - IOL: Elective IOL requested; Process reviewed   - RTO in 1 week

## 2023-03-05 ENCOUNTER — NURSE TRIAGE (OUTPATIENT)
Dept: OTHER | Facility: OTHER | Age: 28
End: 2023-03-05

## 2023-03-05 ENCOUNTER — HOSPITAL ENCOUNTER (INPATIENT)
Facility: HOSPITAL | Age: 28
LOS: 4 days | Discharge: HOME | End: 2023-03-10
Attending: OBSTETRICS & GYNECOLOGY | Admitting: OBSTETRICS & GYNECOLOGY

## 2023-03-05 DIAGNOSIS — Z98.891 S/P CESAREAN SECTION: ICD-10-CM

## 2023-03-05 DIAGNOSIS — Z3A.38 38 WEEKS GESTATION OF PREGNANCY: Primary | ICD-10-CM

## 2023-03-05 LAB
ALBUMIN SERPL BCP-MCNC: 3.1 G/DL (ref 3.5–5)
ALP SERPL-CCNC: 140 U/L (ref 34–104)
ALT SERPL W P-5'-P-CCNC: 12 U/L (ref 7–52)
ANION GAP SERPL CALCULATED.3IONS-SCNC: 9 MMOL/L (ref 4–13)
AST SERPL W P-5'-P-CCNC: 26 U/L (ref 13–39)
BASOPHILS # BLD AUTO: 0.04 THOUSANDS/ÂΜL (ref 0–0.1)
BASOPHILS NFR BLD AUTO: 0 % (ref 0–1)
BILIRUB SERPL-MCNC: 0.27 MG/DL (ref 0.2–1)
BILIRUB UR QL STRIP: NEGATIVE
BUN SERPL-MCNC: 11 MG/DL (ref 5–25)
CALCIUM ALBUM COR SERPL-MCNC: 8.9 MG/DL (ref 8.3–10.1)
CALCIUM SERPL-MCNC: 8.2 MG/DL (ref 8.4–10.2)
CHLORIDE SERPL-SCNC: 107 MMOL/L (ref 96–108)
CLARITY UR: CLEAR
CO2 SERPL-SCNC: 19 MMOL/L (ref 21–32)
COLOR UR: COLORLESS
CREAT SERPL-MCNC: 0.67 MG/DL (ref 0.6–1.3)
CREAT UR-MCNC: 37.9 MG/DL
EOSINOPHIL # BLD AUTO: 0.08 THOUSAND/ÂΜL (ref 0–0.61)
EOSINOPHIL NFR BLD AUTO: 1 % (ref 0–6)
ERYTHROCYTE [DISTWIDTH] IN BLOOD BY AUTOMATED COUNT: 12.4 % (ref 11.6–15.1)
GFR SERPL CREATININE-BSD FRML MDRD: 120 ML/MIN/1.73SQ M
GLUCOSE SERPL-MCNC: 82 MG/DL (ref 65–140)
GLUCOSE UR STRIP-MCNC: NEGATIVE MG/DL
HCT VFR BLD AUTO: 35 % (ref 34.8–46.1)
HGB BLD-MCNC: 11.5 G/DL (ref 11.5–15.4)
HGB UR QL STRIP.AUTO: NEGATIVE
IMM GRANULOCYTES # BLD AUTO: 0.06 THOUSAND/UL (ref 0–0.2)
IMM GRANULOCYTES NFR BLD AUTO: 1 % (ref 0–2)
KETONES UR STRIP-MCNC: NEGATIVE MG/DL
LEUKOCYTE ESTERASE UR QL STRIP: NEGATIVE
LYMPHOCYTES # BLD AUTO: 1.78 THOUSANDS/ÂΜL (ref 0.6–4.47)
LYMPHOCYTES NFR BLD AUTO: 15 % (ref 14–44)
MCH RBC QN AUTO: 28.7 PG (ref 26.8–34.3)
MCHC RBC AUTO-ENTMCNC: 32.9 G/DL (ref 31.4–37.4)
MCV RBC AUTO: 87 FL (ref 82–98)
MONOCYTES # BLD AUTO: 0.77 THOUSAND/ÂΜL (ref 0.17–1.22)
MONOCYTES NFR BLD AUTO: 7 % (ref 4–12)
NEUTROPHILS # BLD AUTO: 8.94 THOUSANDS/ÂΜL (ref 1.85–7.62)
NEUTS SEG NFR BLD AUTO: 76 % (ref 43–75)
NITRITE UR QL STRIP: NEGATIVE
NRBC BLD AUTO-RTO: 0 /100 WBCS
PH UR STRIP.AUTO: 5.5 [PH]
PLATELET # BLD AUTO: 202 THOUSANDS/UL (ref 149–390)
PMV BLD AUTO: 11.3 FL (ref 8.9–12.7)
POTASSIUM SERPL-SCNC: 4.3 MMOL/L (ref 3.5–5.3)
PROT SERPL-MCNC: 6 G/DL (ref 6.4–8.4)
PROT UR STRIP-MCNC: NEGATIVE MG/DL
PROT UR-MCNC: 4 MG/DL
PROT/CREAT UR: 0.11 MG/G{CREAT} (ref 0–0.1)
RBC # BLD AUTO: 4.01 MILLION/UL (ref 3.81–5.12)
SODIUM SERPL-SCNC: 135 MMOL/L (ref 135–147)
SP GR UR STRIP.AUTO: 1.01 (ref 1–1.03)
UROBILINOGEN UR STRIP-ACNC: <2 MG/DL
WBC # BLD AUTO: 11.67 THOUSAND/UL (ref 4.31–10.16)

## 2023-03-05 PROCEDURE — 4A1HXCZ MONITORING OF PRODUCTS OF CONCEPTION, CARDIAC RATE, EXTERNAL APPROACH: ICD-10-PCS | Performed by: OBSTETRICS & GYNECOLOGY

## 2023-03-05 RX ORDER — UREA 10 %
1 LOTION (ML) TOPICAL DAILY
COMMUNITY

## 2023-03-05 RX ORDER — ACETAMINOPHEN 325 MG/1
650 TABLET ORAL EVERY 6 HOURS PRN
Status: DISCONTINUED | OUTPATIENT
Start: 2023-03-05 | End: 2023-03-07

## 2023-03-05 RX ADMIN — ACETAMINOPHEN 650 MG: 325 TABLET ORAL at 23:18

## 2023-03-06 ENCOUNTER — ANESTHESIA EVENT (INPATIENT)
Dept: LABOR AND DELIVERY | Facility: HOSPITAL | Age: 28
End: 2023-03-06

## 2023-03-06 ENCOUNTER — ANESTHESIA (INPATIENT)
Dept: LABOR AND DELIVERY | Facility: HOSPITAL | Age: 28
End: 2023-03-06

## 2023-03-06 LAB
ABO GROUP BLD: NORMAL
ABO GROUP BLD: NORMAL
ATRIAL RATE: 97 BPM
BLD GP AB SCN SERPL QL: NEGATIVE
HOLD SPECIMEN: NORMAL
NT-PROBNP SERPL-MCNC: 21 PG/ML
P AXIS: 21 DEGREES
PR INTERVAL: 136 MS
QRS AXIS: 38 DEGREES
QRSD INTERVAL: 66 MS
QT INTERVAL: 342 MS
QTC INTERVAL: 434 MS
RH BLD: POSITIVE
RH BLD: POSITIVE
SPECIMEN EXPIRATION DATE: NORMAL
T WAVE AXIS: 23 DEGREES
TREPONEMA PALLIDUM IGG+IGM AB [PRESENCE] IN SERUM OR PLASMA BY IMMUNOASSAY: NORMAL
VENTRICULAR RATE: 97 BPM

## 2023-03-06 RX ORDER — CETIRIZINE HYDROCHLORIDE 10 MG/1
10 TABLET ORAL DAILY
Status: DISCONTINUED | OUTPATIENT
Start: 2023-03-06 | End: 2023-03-06

## 2023-03-06 RX ORDER — LIDOCAINE HYDROCHLORIDE AND EPINEPHRINE 20; 5 MG/ML; UG/ML
INJECTION, SOLUTION EPIDURAL; INFILTRATION; INTRACAUDAL; PERINEURAL AS NEEDED
Status: DISCONTINUED | OUTPATIENT
Start: 2023-03-06 | End: 2023-03-06

## 2023-03-06 RX ORDER — ALBUTEROL SULFATE 90 UG/1
2 AEROSOL, METERED RESPIRATORY (INHALATION) EVERY 4 HOURS PRN
Status: DISCONTINUED | OUTPATIENT
Start: 2023-03-06 | End: 2023-03-07

## 2023-03-06 RX ORDER — FENTANYL CITRATE 50 UG/ML
INJECTION, SOLUTION INTRAMUSCULAR; INTRAVENOUS AS NEEDED
Status: DISCONTINUED | OUTPATIENT
Start: 2023-03-06 | End: 2023-03-07

## 2023-03-06 RX ORDER — LIDOCAINE HYDROCHLORIDE AND EPINEPHRINE 15; 5 MG/ML; UG/ML
INJECTION, SOLUTION EPIDURAL AS NEEDED
Status: DISCONTINUED | OUTPATIENT
Start: 2023-03-06 | End: 2023-03-07

## 2023-03-06 RX ORDER — DOCUSATE SODIUM 100 MG/1
100 CAPSULE, LIQUID FILLED ORAL 2 TIMES DAILY
Status: DISCONTINUED | OUTPATIENT
Start: 2023-03-06 | End: 2023-03-07

## 2023-03-06 RX ORDER — ACETAMINOPHEN 325 MG/1
650 TABLET ORAL EVERY 6 HOURS PRN
Status: DISCONTINUED | OUTPATIENT
Start: 2023-03-06 | End: 2023-03-07

## 2023-03-06 RX ORDER — ONDANSETRON 2 MG/ML
4 INJECTION INTRAMUSCULAR; INTRAVENOUS EVERY 6 HOURS PRN
Status: DISCONTINUED | OUTPATIENT
Start: 2023-03-06 | End: 2023-03-07

## 2023-03-06 RX ORDER — EPHEDRINE SULFATE 50 MG/ML
INJECTION INTRAVENOUS AS NEEDED
Status: DISCONTINUED | OUTPATIENT
Start: 2023-03-06 | End: 2023-03-07

## 2023-03-06 RX ORDER — LIDOCAINE HYDROCHLORIDE AND EPINEPHRINE 20; 5 MG/ML; UG/ML
INJECTION, SOLUTION EPIDURAL; INFILTRATION; INTRACAUDAL; PERINEURAL AS NEEDED
Status: DISCONTINUED | OUTPATIENT
Start: 2023-03-06 | End: 2023-03-07

## 2023-03-06 RX ORDER — OMEPRAZOLE 20 MG/1
20 CAPSULE, DELAYED RELEASE ORAL DAILY
Status: DISCONTINUED | OUTPATIENT
Start: 2023-03-06 | End: 2023-03-06

## 2023-03-06 RX ORDER — BUPIVACAINE HYDROCHLORIDE 2.5 MG/ML
INJECTION, SOLUTION EPIDURAL; INFILTRATION; INTRACAUDAL AS NEEDED
Status: DISCONTINUED | OUTPATIENT
Start: 2023-03-06 | End: 2023-03-07

## 2023-03-06 RX ORDER — SODIUM CHLORIDE, SODIUM LACTATE, POTASSIUM CHLORIDE, CALCIUM CHLORIDE 600; 310; 30; 20 MG/100ML; MG/100ML; MG/100ML; MG/100ML
125 INJECTION, SOLUTION INTRAVENOUS CONTINUOUS
Status: DISCONTINUED | OUTPATIENT
Start: 2023-03-06 | End: 2023-03-07

## 2023-03-06 RX ORDER — LEVOTHYROXINE SODIUM 0.05 MG/1
50 TABLET ORAL
Status: DISCONTINUED | OUTPATIENT
Start: 2023-03-06 | End: 2023-03-10 | Stop reason: HOSPADM

## 2023-03-06 RX ORDER — CALCIUM CARBONATE 200(500)MG
1000 TABLET,CHEWABLE ORAL 2 TIMES DAILY PRN
Status: DISCONTINUED | OUTPATIENT
Start: 2023-03-06 | End: 2023-03-07

## 2023-03-06 RX ORDER — BUTORPHANOL TARTRATE 1 MG/ML
1 INJECTION, SOLUTION INTRAMUSCULAR; INTRAVENOUS
Status: DISCONTINUED | OUTPATIENT
Start: 2023-03-06 | End: 2023-03-07

## 2023-03-06 RX ORDER — METOCLOPRAMIDE HYDROCHLORIDE 5 MG/ML
10 INJECTION INTRAMUSCULAR; INTRAVENOUS EVERY 6 HOURS PRN
Status: DISCONTINUED | OUTPATIENT
Start: 2023-03-06 | End: 2023-03-07

## 2023-03-06 RX ORDER — CLONIDINE 100 UG/ML
INJECTION, SOLUTION EPIDURAL AS NEEDED
Status: DISCONTINUED | OUTPATIENT
Start: 2023-03-06 | End: 2023-03-07

## 2023-03-06 RX ORDER — OXYTOCIN/RINGER'S LACTATE 30/500 ML
1-30 PLASTIC BAG, INJECTION (ML) INTRAVENOUS
Status: DISCONTINUED | OUTPATIENT
Start: 2023-03-06 | End: 2023-03-07

## 2023-03-06 RX ADMIN — ONDANSETRON 4 MG: 2 INJECTION INTRAMUSCULAR; INTRAVENOUS at 12:47

## 2023-03-06 RX ADMIN — LIDOCAINE HYDROCHLORIDE,EPINEPHRINE BITARTRATE 5 ML: 20; .005 INJECTION, SOLUTION EPIDURAL; INFILTRATION; INTRACAUDAL; PERINEURAL at 17:48

## 2023-03-06 RX ADMIN — Medication 2 MILLI-UNITS/MIN: at 06:40

## 2023-03-06 RX ADMIN — Medication 25 MCG: at 01:29

## 2023-03-06 RX ADMIN — LEVOTHYROXINE SODIUM 50 MCG: 50 TABLET ORAL at 06:41

## 2023-03-06 RX ADMIN — LIDOCAINE HYDROCHLORIDE AND EPINEPHRINE 2 ML: 15; 5 INJECTION, SOLUTION EPIDURAL at 05:36

## 2023-03-06 RX ADMIN — SODIUM CHLORIDE, SODIUM LACTATE, POTASSIUM CHLORIDE, AND CALCIUM CHLORIDE 125 ML/HR: .6; .31; .03; .02 INJECTION, SOLUTION INTRAVENOUS at 15:32

## 2023-03-06 RX ADMIN — ONDANSETRON 4 MG: 2 INJECTION INTRAMUSCULAR; INTRAVENOUS at 20:45

## 2023-03-06 RX ADMIN — LIDOCAINE HYDROCHLORIDE,EPINEPHRINE BITARTRATE 3 ML: 20; .005 INJECTION, SOLUTION EPIDURAL; INFILTRATION; INTRACAUDAL; PERINEURAL at 17:51

## 2023-03-06 RX ADMIN — BUTORPHANOL TARTRATE 1 MG: 1 INJECTION, SOLUTION INTRAMUSCULAR; INTRAVENOUS at 04:05

## 2023-03-06 RX ADMIN — ROPIVACAINE HYDROCHLORIDE: 2 INJECTION, SOLUTION EPIDURAL; INFILTRATION at 11:15

## 2023-03-06 RX ADMIN — LIDOCAINE HYDROCHLORIDE AND EPINEPHRINE 10 ML: 15; 5 INJECTION, SOLUTION EPIDURAL at 22:09

## 2023-03-06 RX ADMIN — LIDOCAINE HYDROCHLORIDE,EPINEPHRINE BITARTRATE 5 ML: 20; .005 INJECTION, SOLUTION EPIDURAL; INFILTRATION; INTRACAUDAL; PERINEURAL at 20:06

## 2023-03-06 RX ADMIN — FENTANYL CITRATE 100 MCG: 50 INJECTION INTRAMUSCULAR; INTRAVENOUS at 08:16

## 2023-03-06 RX ADMIN — LIDOCAINE HYDROCHLORIDE,EPINEPHRINE BITARTRATE 3 ML: 20; .005 INJECTION, SOLUTION EPIDURAL; INFILTRATION; INTRACAUDAL; PERINEURAL at 20:09

## 2023-03-06 RX ADMIN — ROPIVACAINE HYDROCHLORIDE: 2 INJECTION, SOLUTION EPIDURAL; INFILTRATION at 15:14

## 2023-03-06 RX ADMIN — BUPIVACAINE HYDROCHLORIDE 7 ML: 2.5 INJECTION, SOLUTION EPIDURAL; INFILTRATION; INTRACAUDAL at 16:32

## 2023-03-06 RX ADMIN — ACETAMINOPHEN 650 MG: 325 TABLET ORAL at 21:35

## 2023-03-06 RX ADMIN — CLONIDINE 100 MCG: 100 INJECTION, SOLUTION EPIDURAL at 17:51

## 2023-03-06 RX ADMIN — SODIUM CHLORIDE, SODIUM LACTATE, POTASSIUM CHLORIDE, AND CALCIUM CHLORIDE 125 ML/HR: .6; .31; .03; .02 INJECTION, SOLUTION INTRAVENOUS at 19:31

## 2023-03-06 RX ADMIN — ROPIVACAINE HYDROCHLORIDE: 2 INJECTION, SOLUTION EPIDURAL; INFILTRATION at 05:44

## 2023-03-06 RX ADMIN — FENTANYL CITRATE: 50 INJECTION INTRAMUSCULAR; INTRAVENOUS at 16:30

## 2023-03-06 RX ADMIN — SODIUM CHLORIDE, SODIUM LACTATE, POTASSIUM CHLORIDE, AND CALCIUM CHLORIDE 125 ML/HR: .6; .31; .03; .02 INJECTION, SOLUTION INTRAVENOUS at 10:35

## 2023-03-06 RX ADMIN — METOCLOPRAMIDE 10 MG: 5 INJECTION, SOLUTION INTRAMUSCULAR; INTRAVENOUS at 22:26

## 2023-03-06 RX ADMIN — BUPIVACAINE HYDROCHLORIDE 8 ML: 2.5 INJECTION, SOLUTION EPIDURAL; INFILTRATION; INTRACAUDAL at 08:16

## 2023-03-06 RX ADMIN — FENTANYL CITRATE 100 MCG: 50 INJECTION INTRAMUSCULAR; INTRAVENOUS at 16:31

## 2023-03-06 RX ADMIN — METOCLOPRAMIDE 10 MG: 5 INJECTION, SOLUTION INTRAMUSCULAR; INTRAVENOUS at 09:23

## 2023-03-06 RX ADMIN — SODIUM CHLORIDE, SODIUM LACTATE, POTASSIUM CHLORIDE, AND CALCIUM CHLORIDE 125 ML/HR: .6; .31; .03; .02 INJECTION, SOLUTION INTRAVENOUS at 01:30

## 2023-03-06 RX ADMIN — EPHEDRINE SULFATE 5 MG: 50 INJECTION, SOLUTION INTRAVENOUS at 14:00

## 2023-03-06 RX ADMIN — FENTANYL CITRATE 100 MCG: 50 INJECTION INTRAMUSCULAR; INTRAVENOUS at 22:09

## 2023-03-06 RX ADMIN — ONDANSETRON 4 MG: 2 INJECTION INTRAMUSCULAR; INTRAVENOUS at 06:47

## 2023-03-06 RX ADMIN — METOCLOPRAMIDE 10 MG: 5 INJECTION, SOLUTION INTRAMUSCULAR; INTRAVENOUS at 16:48

## 2023-03-06 RX ADMIN — LIDOCAINE HYDROCHLORIDE AND EPINEPHRINE 3 ML: 15; 5 INJECTION, SOLUTION EPIDURAL at 05:32

## 2023-03-06 RX ADMIN — LIDOCAINE HYDROCHLORIDE AND EPINEPHRINE 3 ML: 15; 5 INJECTION, SOLUTION EPIDURAL at 13:46

## 2023-03-06 NOTE — OB LABOR/OXYTOCIN SAFETY PROGRESS
Oxytocin Safety Progress Check Note - Mara Duarte 28 y o  female MRN: [id number]    Unit/Bed#: -01 Encounter: [id number]    Dose (allison-units/min) Oxytocin: 14 allison-units/min  Contraction Frequency (minutes): 1 5-3  Contraction Quality: Moderate  Tachysystole: No   Cervical Dilation: 8        Cervical Effacement: 90  Fetal Station: 0  Baseline Rate: 155 bpm  Fetal Heart Rate: 150 BPM  FHR Category: Category II   FHR 120s moderate variability with accelerations, occasional variable declerations      Vital Signs:   Vitals:    03/06/23 1724   BP: 125/85   Pulse: 101   Resp:    Temp:    SpO2:        Notes/comments:   Patient was comfortable, now feeling cervical/vaginal pain  Anesthesia will evaluate continue to monitor and manage      Celestina Downs MD 3/6/2023 5:32 PM

## 2023-03-06 NOTE — ANESTHESIA PROCEDURE NOTES
Epidural Block    Patient location during procedure: OB  Start time: 3/6/2023 5:30 AM  Reason for block: procedure for pain and at surgeon's request  Staffing  Performed: Anesthesiologist   Anesthesiologist: Chance Toth MD  Preanesthetic Checklist  Completed: patient identified, IV checked, site marked, risks and benefits discussed, surgical consent, monitors and equipment checked, pre-op evaluation and timeout performed  Epidural  Patient position: sitting  Prep: ChloraPrep  Patient monitoring: cardiac monitor and frequent blood pressure checks  Approach: midline  Location: lumbar  Injection technique: ARNULFO air  Needle  Needle type: Tuohy   Needle gauge: 18 G  Catheter type: side hole  Catheter size: 20 G  Catheter at skin depth: 10 cm  Catheter securement method: stabilization device  Test dose: negative  Assessment  Number of attempts: 3 or morenegative aspiration for CSF, negative aspiration for heme and no paresthesia on injection  patient tolerated the procedure well with no immediate complications  Additional Notes  Os at first attempts after changing levels successful

## 2023-03-06 NOTE — PLAN OF CARE
Problem: PAIN - ADULT  Goal: Verbalizes/displays adequate comfort level or baseline comfort level  Description: Interventions:  - Encourage patient to monitor pain and request assistance  - Assess pain using appropriate pain scale  - Administer analgesics based on type and severity of pain and evaluate response  - Implement non-pharmacological measures as appropriate and evaluate response  - Consider cultural and social influences on pain and pain management  - Notify physician/advanced practitioner if interventions unsuccessful or patient reports new pain  Outcome: Progressing     Problem: INFECTION - ADULT  Goal: Absence or prevention of progression during hospitalization  Description: INTERVENTIONS:  - Assess and monitor for signs and symptoms of infection  - Monitor lab/diagnostic results  - Monitor all insertion sites, i e  indwelling lines, tubes, and drains  - Monitor endotracheal if appropriate and nasal secretions for changes in amount and color  - [place] appropriate cooling/warming therapies per order  - Administer medications as ordered  - Instruct and encourage patient and family to use good hand hygiene technique  - Identify and instruct in appropriate isolation precautions for identified infection/condition  Outcome: Progressing  Goal: Absence of fever/infection during neutropenic period  Description: INTERVENTIONS:  - Monitor WBC    Outcome: Progressing     Problem: SAFETY ADULT  Goal: Patient will remain free of falls  Description: INTERVENTIONS:  - Educate patient/family on patient safety including physical limitations  - Instruct patient to call for assistance with activity   - Consult OT/PT to assist with strengthening/mobility   - Keep Call bell within reach  - Keep bed low and locked with side rails adjusted as appropriate  - Keep care items and personal belongings within reach  - Initiate and maintain comfort rounds  - Make Fall Risk Sign visible to staff  - Offer Toileting every  Hours, in advance of need  - Initiate/Maintain alarm  - Obtain necessary fall risk management equipment:   - Apply yellow socks and bracelet for high fall risk patients  - Consider moving patient to room near nurses station  Outcome: Progressing  Goal: Maintain or return to baseline ADL function  Description: INTERVENTIONS:  -  Assess patient's ability to carry out ADLs; assess patient's baseline for ADL function and identify physical deficits which impact ability to perform ADLs (bathing, care of mouth/teeth, toileting, grooming, dressing, etc )  - Assess/evaluate cause of self-care deficits   - Assess range of motion  - Assess patient's mobility; develop plan if impaired  - Assess patient's need for assistive devices and provide as appropriate  - Encourage maximum independence but intervene and supervise when necessary  - Involve family in performance of ADLs  - Assess for home care needs following discharge   - Consider OT consult to assist with ADL evaluation and planning for discharge  - Provide patient education as appropriate  Outcome: Progressing  Goal: Maintains/Returns to pre admission functional level  Description: INTERVENTIONS:  - Perform BMAT or MOVE assessment daily    - Set and communicate daily mobility goal to care team and patient/family/caregiver  - Collaborate with rehabilitation services on mobility goals if consulted  - Perform Range of Motion  times a day  - Reposition patient every  hours    - Dangle patient  times a day  - Stand patient  times a day  - Ambulate patient  times a day  - Out of bed to chair  times a day   - Out of bed for meals  times a day  - Out of bed for toileting  - Record patient progress and toleration of activity level   Outcome: Progressing     Problem: Knowledge Deficit  Goal: Patient/family/caregiver demonstrates understanding of disease process, treatment plan, medications, and discharge instructions  Description: Complete learning assessment and assess knowledge base   Interventions:  - Provide teaching at level of understanding  - Provide teaching via preferred learning methods  Outcome: Progressing  Goal: Verbalizes understanding of labor plan  Description: Assess patient/family/caregiver's baseline knowledge level and ability to understand information  Provide education via patient/family/caregiver's preferred learning method at appropriate level of understanding  1  Provide teaching at level of understanding  2  Provide teaching via preferred learning method(s)  Outcome: Progressing     Problem: DISCHARGE PLANNING  Goal: Discharge to home or other facility with appropriate resources  Description: INTERVENTIONS:  - Identify barriers to discharge w/patient and caregiver  - Arrange for needed discharge resources and transportation as appropriate  - Identify discharge learning needs (meds, wound care, etc )  - Arrange for interpretive services to assist at discharge as needed  - Refer to Case Management Department for coordinating discharge planning if the patient needs post-hospital services based on physician/advanced practitioner order or complex needs related to functional status, cognitive ability, or social support system  Outcome: Progressing     Problem: Labor & Delivery  Goal: Manages discomfort  Description: Assess and monitor for signs and symptoms of discomfort  Assess patient's pain level regularly and per hospital policy  Administer medications as ordered  Support use of nonpharmacological methods to help control pain such as distraction, imagery, relaxation, and application of heat and cold  Collaborate with interdisciplinary team and patient to determine appropriate pain management plan  1  Include patient in decisions related to comfort  2  Offer non-pharmacological pain management interventions  3  Report ineffective pain management to physician  Outcome: Progressing  Goal: Patient vital signs are stable  Description: 1   Assess vital signs - vaginal delivery    Outcome: Progressing     Problem: BIRTH - VAGINAL/ SECTION  Goal: Fetal and maternal status remain reassuring during the birth process  Description: INTERVENTIONS:  - Monitor vital signs  - Monitor fetal heart rate  - Monitor uterine activity  - Monitor labor progression (vaginal delivery)  - DVT prophylaxis  - Antibiotic prophylaxis  Outcome: Progressing  Goal: Emotionally satisfying birthing experience for mother/fetus  Description: Interventions:  - Assess, plan, implement and evaluate the nursing care given to the patient in labor  - Advocate the philosophy that each childbirth experience is a unique experience and support the family's chosen level of involvement and control during the labor process   - Actively participate in both the patient's and family's teaching of the birth process  - Consider cultural, Catholic and age-specific factors and plan care for the patient in labor  Outcome: Progressing

## 2023-03-06 NOTE — TELEPHONE ENCOUNTER
38w5d, LUCIO 3/14  Patient reports a headache yesterday of 8/10, throbbing pain, that she took tylenol for  Notes today she has another headache- rates it a 4/10, also reports blood pressure this evening to be 144/98, and 140/100  Denies blurry vision, CP or SOB  Also denies any bleeding or leaking of fluid  Some mild cramping noted with increase in nausea  Normal fetal movement  On call provider notified     Reason for Disposition  • [8] Systolic BP  >= 085 OR Diastolic >= 80 AND [0] pregnant    Answer Assessment - Initial Assessment Questions  1  BLOOD PRESSURE: "What is the blood pressure?" "Did you take at least two measurements 5 minutes apart?"      144/98, 140/98  2  ONSET: "When did you take your blood pressure?"      today  3  HOW: "How did you obtain the blood pressure?" (e g , visiting nurse, automatic home BP monitor)      Manual cuff  6  OTHER SYMPTOMS: "Do you have any symptoms?" (e g , headache, chest pain, blurred vision, difficulty breathing, weakness)      headache  7   PREGNANCY: "Is there any chance you are pregnant?" "When was your last menstrual period?"      38w5d    Protocols used: HIGH BLOOD PRESSURE-ADULT-AH no

## 2023-03-06 NOTE — OB LABOR/OXYTOCIN SAFETY PROGRESS
Oxytocin Safety Progress Check Note - Mara Duarte 28 y o  female MRN: [id number]    Unit/Bed#: -01 Encounter: [id number]    Dose (allison-units/min) Oxytocin: 12 allison-units/min  Contraction Frequency (minutes): 1 5-3 5  Contraction Quality: Moderate  Tachysystole: No   Cervical Dilation: 5        Cervical Effacement: 90  Fetal Station: -2  Baseline Rate: 145 bpm  Fetal Heart Rate: 148 BPM  FHR Category: Category II               Vital Signs:   Vitals:    03/06/23 1416   BP: 127/69   Pulse: 96   Resp:    Temp:    SpO2:        Notes/comments:     Cervix unchanged, patient now comfortable with second epidural  FHT with two prior late decelerations around 1350, however has since been category I  Will continue pitocin titration and if no change at next check, place IUPC to better titrate pitocin           Daisy Merchant MD 3/6/2023 2:26 PM

## 2023-03-06 NOTE — TELEPHONE ENCOUNTER
Regarding: high blood pressure, headache - pregnant  ----- Message from Debbie Guerra sent at 3/5/2023  8:12 PM EST -----  "im 39 weeks pregnant, yesterday i had a bad headache  my blood pressure is a little high, i took it a couple times today its 140/100, i have a mild headache today   Earlier it was 144/98 "

## 2023-03-06 NOTE — ANESTHESIA PROCEDURE NOTES
Epidural Block    Start time: 3/6/2023 1:46 PM  Reason for block: procedure for pain and at surgeon's request  Staffing  Performed: Anesthesiologist   Anesthesiologist: Lexi Jones MD  Preanesthetic Checklist  Completed: patient identified, IV checked, site marked, risks and benefits discussed, surgical consent, monitors and equipment checked, pre-op evaluation and timeout performed  Epidural  Patient position: sitting  Prep: ChloraPrep  Patient monitoring: cardiac monitor and frequent blood pressure checks  Approach: midline  Location: lumbar  Injection technique: ARNULFO saline  Needle  Needle type: Tuohy   Needle gauge: 18 G  Catheter type: side hole  Catheter size: 20 G  Catheter at skin depth: 12 cm  Catheter securement method: stabilization device  Test dose: negative  Assessment  Number of attempts: 1negative aspiration for CSF, negative aspiration for heme and no paresthesia on injection  patient tolerated the procedure well with no immediate complications  Additional Notes  Approximately 2 levels below previous insertion site

## 2023-03-06 NOTE — H&P
H & P- Obstetrics   Mara Duarte 28 y o  female MRN: [id number]  Unit/Bed#: -01 Encounter: [id number]    Assessment: 28 y o   at 38w6d admitted for induction of labor secondary to gestational hypertension SVE: /-3  FHT: Baseline Rate: 140 bpm, Moderate Variability 6-25 bpm  15 x 15 Accels, No Decelerations  Clinical EFW: 74 % ile  ; Vertex confirmed by US  GBS status: negative   Postpartum contraception plan: undecided   Induction: Boyd balloon and cytotec     Plan:   * 38 weeks gestation of pregnancy  Assessment & Plan  Admit  IV Fluids  Clear liquid diet  CBC, RPR, T &S  Analgesia: epidural at maternal request       Subclinical hypothyroidism  Assessment & Plan  On home dose of Levothyroxine ( 50mcg)    Asthma  Assessment & Plan  monitor symptoms        Discussed case and plan w/ Dr Nevin Delgado       Chief Complaint: induction of labor secondary to gestational hypertension     HPI: Gera Vigil is a 28 y o   with an LUCIO of 3/14/2023, by Ultrasound at 38w6d who is being admitted for induction of labor   She reports irregularuterine contractions, has no LOF, and reports no VB  She states she has felt good FM  Phylicia Pettibone Patient Active Problem List   Diagnosis   • Asthma   • Functional ovarian cysts   • 38 weeks gestation of pregnancy   • Obesity in pregnancy, antepartum   • Subclinical hypothyroidism   • COVID-19 affecting pregnancy in first trimester       Baby complications/comments: none    Review of Systems   Constitutional: Negative for chills and fever  Eyes: Negative for photophobia  Respiratory: Positive for shortness of breath  Negative for cough  Cardiovascular: Negative for chest pain  Gastrointestinal: Negative for diarrhea, nausea and vomiting  Genitourinary: Negative for dysuria  Neurological: Positive for headaches         OB Hx:  OB History    Para Term  AB Living   2 0 0 0 1 0   SAB IAB Ectopic Multiple Live Births   1 0 0 0 0      # Outcome Date GA Lbr Jacob/2nd Weight Sex Delivery Anes PTL Lv   2 Current            1 SAB 2022              Obstetric Comments   : 2022 SAB naturally    Hgb electrophoresis WNL       Past Medical Hx:  Past Medical History:   Diagnosis Date   • Acne    • ADHD    • Allergic asthma    • Subclinical hypothyroidism        Past Surgical hx:  Past Surgical History:   Procedure Laterality Date   • FINGER SURGERY      pin placed   • SHOULDER SURGERY Right     2019   • SKIN BIOPSY     • WISDOM TOOTH EXTRACTION         Social Hx:  Alcohol use: no  Tobacco use: no  Other substance use: no    Other: none    Allergies   Allergen Reactions   • Lactose Intolerance (Gi) - Food Allergy Nausea Only   • Clindamycin Rash     headache  headache     • Tramadol GI Intolerance, Hives, Itching and Rash       Medications Prior to Admission   Medication   • calcium carbonate (OS-CINDY) 1250 (500 Ca) MG chewable tablet   • docusate sodium (COLACE) 100 mg capsule   • Doxylamine-Pyridoxine 10-10 MG TBEC   • levothyroxine 50 mcg tablet   • omeprazole (PriLOSEC) 20 mg delayed release capsule   • Prenat w/o A-FeCbGl-DSS-FA-DHA (PNV OB+DHA PO)   • aspirin 81 mg chewable tablet   • Calcium Carbonate Antacid (TUMS PO)   • cetirizine (ZyrTEC) 10 mg tablet   • ondansetron (Zofran ODT) 4 mg disintegrating tablet   • promethazine (PHENERGAN) 12 5 MG tablet       Objective:  Temp:  [97 3 °F (36 3 °C)] 97 3 °F (36 3 °C)  HR:  [] 104  Resp:  [18] 18  BP: (121-158)/() 138/86  Body mass index is 37 94 kg/m²  Physical Exam:  Physical Exam  Constitutional:       Appearance: Normal appearance  Genitourinary:      Vulva and rectum normal    HENT:      Head: Normocephalic and atraumatic  Eyes:      Extraocular Movements: Extraocular movements intact  Cardiovascular:      Rate and Rhythm: Regular rhythm  Tachycardia present  Pulses: Normal pulses  Heart sounds: Normal heart sounds     Pulmonary:      Effort: Pulmonary effort is normal  Breath sounds: Normal breath sounds  Abdominal:      General: There is distension (gravid, non-tender on palpation)  Palpations: Abdomen is soft  Tenderness: There is no abdominal tenderness  Musculoskeletal:         General: Normal range of motion  Cervical back: Normal range of motion and neck supple  Neurological:      General: No focal deficit present  Mental Status: She is alert  Skin:     General: Skin is warm and dry     Psychiatric:         Mood and Affect: Mood normal          Behavior: Behavior normal             FHT:  Baseline Rate: 140 bpm  Variability: Moderate 6-25 bpm  Accelerations: 15 x 15 or greater, At variable times  Decelerations: None  FHR Category: Category I    TOCO:   Contraction Frequency (minutes): irregular  Contraction Duration (seconds): 40-60  Contraction Quality: Not applicable    Lab Results   Component Value Date    WBC 11 67 (H) 03/05/2023    HGB 11 5 03/05/2023    HCT 35 0 03/05/2023     03/05/2023     Lab Results   Component Value Date    K 4 3 03/05/2023     03/05/2023    CO2 19 (L) 03/05/2023    BUN 11 03/05/2023    CREATININE 0 67 03/05/2023    AST 26 03/05/2023    ALT 12 03/05/2023     Prenatal Labs: Reviewed      Blood type: O pos  Antibody: negative   GBS: negative   HIV: negative  Rubella: Immune  VDRL/RPR: Non reactive  HBsAg: Negative  Chlamydia: Negative  Gonorrhea: Negative  Diabetes 1 hour screen: 92  3 hour glucose: NA  Platelets: 155  Hgb: 11 3  >2 Midnights  INPATIENT     Signature/Title: Amy Calvin MD  Date: 3/6/2023  Time: 4:16 AM

## 2023-03-06 NOTE — ANESTHESIA PREPROCEDURE EVALUATION
Procedure:  LABOR ANALGESIA     - denies any chest pain, palpitations, shortness of breath, syncope, lightheadedness, seizures   - denies any recent infectious symptoms such as fevers, chills, cough   - denies taking any anticoagulation medications or any issues with bleeding, bruising, clotting    Relevant Problems   ANESTHESIA (within normal limits)      CARDIO (within normal limits)      ENDO   (+) Subclinical hypothyroidism      GI/HEPATIC (within normal limits)      /RENAL (within normal limits)      GYN   (+) 38 weeks gestation of pregnancy      HEMATOLOGY (within normal limits)      MUSCULOSKELETAL (within normal limits)      NEURO/PSYCH (within normal limits)      PULMONARY   (+) Asthma      Lab Results   Component Value Date    WBC 11 67 (H) 03/05/2023    HGB 11 5 03/05/2023    HCT 35 0 03/05/2023    MCV 87 03/05/2023     03/05/2023     Lab Results   Component Value Date    SODIUM 135 03/05/2023    K 4 3 03/05/2023     03/05/2023    CO2 19 (L) 03/05/2023    AGAP 9 03/05/2023    BUN 11 03/05/2023    CREATININE 0 67 03/05/2023    GLUC 82 03/05/2023    CALCIUM 8 2 (L) 03/05/2023    AST 26 03/05/2023    ALT 12 03/05/2023    ALKPHOS 140 (H) 03/05/2023    TP 6 0 (L) 03/05/2023    TBILI 0 27 03/05/2023    EGFR 120 03/05/2023     No results found for: PTT  No results found for: INR, PROTIME    Physical Exam    Airway    Mallampati score: II  TM Distance: >3 FB  Neck ROM: full     Dental   No notable dental hx     Cardiovascular  Rhythm: regular, Rate: normal, Cardiovascular exam normal    Pulmonary  Breath sounds clear to auscultation,     Other Findings        Anesthesia Plan  ASA Score- 3     Anesthesia Type- epidural with ASA Monitors  Additional Monitors:   Airway Plan:           Plan Factors-Exercise tolerance (METS): >4 METS  Chart reviewed  EKG reviewed  Imaging results reviewed  Existing labs reviewed  Patient summary reviewed  Patient is not a current smoker   Patient did not smoke on day of surgery  Obstructive sleep apnea risk education given perioperatively  Induction-     Postoperative Plan-     Informed Consent- Anesthetic plan and risks discussed with patient

## 2023-03-06 NOTE — OB LABOR/OXYTOCIN SAFETY PROGRESS
Oxytocin Safety Progress Check Note - Mara Duarte 28 y o  female MRN: [id number]    Unit/Bed#: -01 Encounter: [id number]    Dose (allison-units/min) Oxytocin: 8 allison-units/min  Contraction Frequency (minutes): 1-4  Contraction Quality: Mild  Tachysystole: No   Cervical Dilation: 5        Cervical Effacement: 90  Fetal Station: -2  Baseline Rate: 140 bpm  Fetal Heart Rate: 141 BPM  FHR Category: Category I               Vital Signs:   Vitals:    03/06/23 1041   BP: 101/60   Pulse: 87   Resp:    Temp:    SpO2:        Notes/comments: SVE as above, amniotomy performed at this time for clear fluid  FHT Cat I  Continue to monitor and continue pit titration           Savannah Coats MD 3/6/2023 10:46 AM

## 2023-03-06 NOTE — PLAN OF CARE
Problem: PAIN - ADULT  Goal: Verbalizes/displays adequate comfort level or baseline comfort level  Description: Interventions:  - Encourage patient to monitor pain and request assistance  - Assess pain using appropriate pain scale  - Administer analgesics based on type and severity of pain and evaluate response  - Implement non-pharmacological measures as appropriate and evaluate response  - Consider cultural and social influences on pain and pain management  - Notify physician/advanced practitioner if interventions unsuccessful or patient reports new pain  Outcome: Progressing     Problem: INFECTION - ADULT  Goal: Absence or prevention of progression during hospitalization  Description: INTERVENTIONS:  - Assess and monitor for signs and symptoms of infection  - Monitor lab/diagnostic results  - Monitor all insertion sites, i e  indwelling lines, tubes, and drains  - Monitor endotracheal if appropriate and nasal secretions for changes in amount and color  - [place] appropriate cooling/warming therapies per order  - Administer medications as ordered  - Instruct and encourage patient and family to use good hand hygiene technique  - Identify and instruct in appropriate isolation precautions for identified infection/condition  Outcome: Progressing  Goal: Absence of fever/infection during neutropenic period  Description: INTERVENTIONS:  - Monitor WBC    Outcome: Progressing     Problem: SAFETY ADULT  Goal: Patient will remain free of falls  Description: INTERVENTIONS:  - Educate patient/family on patient safety including physical limitations  - Instruct patient to call for assistance with activity   - Consult OT/PT to assist with strengthening/mobility   - Keep Call bell within reach  - Keep bed low and locked with side rails adjusted as appropriate  - Keep care items and personal belongings within reach  - Initiate and maintain comfort rounds  - Make Fall Risk Sign visible to staff  - Apply yellow socks and bracelet for high fall risk patients  - Consider moving patient to room near nurses station  Outcome: Progressing  Goal: Maintain or return to baseline ADL function  Description: INTERVENTIONS:  -  Assess patient's ability to carry out ADLs; assess patient's baseline for ADL function and identify physical deficits which impact ability to perform ADLs (bathing, care of mouth/teeth, toileting, grooming, dressing, etc )  - Assess/evaluate cause of self-care deficits   - Assess range of motion  - Assess patient's mobility; develop plan if impaired  - Assess patient's need for assistive devices and provide as appropriate  - Encourage maximum independence but intervene and supervise when necessary  - Involve family in performance of ADLs  - Assess for home care needs following discharge   - Consider OT consult to assist with ADL evaluation and planning for discharge  - Provide patient education as appropriate  Outcome: Progressing  Goal: Maintains/Returns to pre admission functional level  Description: INTERVENTIONS:  - Perform BMAT or MOVE assessment daily    - Set and communicate daily mobility goal to care team and patient/family/caregiver  - Collaborate with rehabilitation services on mobility goals if consulted  - Out of bed for toileting  - Record patient progress and toleration of activity level   Outcome: Progressing     Problem: Knowledge Deficit  Goal: Patient/family/caregiver demonstrates understanding of disease process, treatment plan, medications, and discharge instructions  Description: Complete learning assessment and assess knowledge base  Interventions:  - Provide teaching at level of understanding  - Provide teaching via preferred learning methods  Outcome: Progressing  Goal: Verbalizes understanding of labor plan  Description: Assess patient/family/caregiver's baseline knowledge level and ability to understand information    Provide education via patient/family/caregiver's preferred learning method at appropriate level of understanding  1  Provide teaching at level of understanding  2  Provide teaching via preferred learning method(s)  Outcome: Progressing     Problem: DISCHARGE PLANNING  Goal: Discharge to home or other facility with appropriate resources  Description: INTERVENTIONS:  - Identify barriers to discharge w/patient and caregiver  - Arrange for needed discharge resources and transportation as appropriate  - Identify discharge learning needs (meds, wound care, etc )  - Arrange for interpretive services to assist at discharge as needed  - Refer to Case Management Department for coordinating discharge planning if the patient needs post-hospital services based on physician/advanced practitioner order or complex needs related to functional status, cognitive ability, or social support system  Outcome: Progressing     Problem: Labor & Delivery  Goal: Manages discomfort  Description: Assess and monitor for signs and symptoms of discomfort  Assess patient's pain level regularly and per hospital policy  Administer medications as ordered  Support use of nonpharmacological methods to help control pain such as distraction, imagery, relaxation, and application of heat and cold  Collaborate with interdisciplinary team and patient to determine appropriate pain management plan  1  Include patient in decisions related to comfort  2  Offer non-pharmacological pain management interventions  3  Report ineffective pain management to physician  Outcome: Progressing  Goal: Patient vital signs are stable  Description: 1  Assess vital signs - vaginal delivery    Outcome: Progressing     Problem: BIRTH - VAGINAL/ SECTION  Goal: Fetal and maternal status remain reassuring during the birth process  Description: INTERVENTIONS:  - Monitor vital signs  - Monitor fetal heart rate  - Monitor uterine activity  - Monitor labor progression (vaginal delivery)  - DVT prophylaxis  - Antibiotic prophylaxis  Outcome: Progressing  Goal: Emotionally satisfying birthing experience for mother/fetus  Description: Interventions:  - Assess, plan, implement and evaluate the nursing care given to the patient in labor  - Advocate the philosophy that each childbirth experience is a unique experience and support the family's chosen level of involvement and control during the labor process   - Actively participate in both the patient's and family's teaching of the birth process  - Consider cultural, Catholic and age-specific factors and plan care for the patient in labor  Outcome: Progressing

## 2023-03-06 NOTE — OB LABOR/OXYTOCIN SAFETY PROGRESS
Oxytocin Safety Progress Check Note - Mara Duarte 28 y o  female MRN: [id number]    Unit/Bed#: -01 Encounter: [id number]                 Cervical Dilation: 1        Cervical Effacement: 48  Fetal Station: -3                        Vital Signs:   Vitals:    03/05/23 2337   BP: 132/85   Pulse: (!) 169   Resp:    Temp:    SpO2:        Notes/comments: SVE is 1/50/-3  FHT is CAT 1  Contractions irregular and mild  Montalvo balloon and vaginal cytotec placed   PROCEDURE:  MOTNALVO BALLOON PLACEMENT    A 24F montalvo with a 30cc balloon was selected, a SVE was performed and the cervix was located  A montalvo balloon was introduced over sterile gloved hands  Balloon advanced through cervix digitally beyond the internal cervical os  A small amount amount of sterile saline solution was instilled in the balloon to confirm placement  Placement was confirmed to be beyond the internal cervical os  A total of 60cc of sterile saline solution was placed into the balloon  Pt tolerated well  Instructions left with RN to place montalvo to gravity with a 1L bag of IV fluid  Notify DO/MD when montalvo dislodged      MD Amy Condon MD 3/6/2023 1:30 AM

## 2023-03-06 NOTE — OB LABOR/OXYTOCIN SAFETY PROGRESS
Oxytocin Safety Progress Check Note - Mara Duarte 28 y o  female MRN: [id number]    Unit/Bed#: -01 Encounter: [id number]    Dose (allison-units/min) Oxytocin: 2 allison-units/min  Contraction Frequency (minutes): 2-4  Contraction Quality: Mild  Tachysystole: No   Cervical Dilation: 4-5        Cervical Effacement: 80  Fetal Station: -2  Baseline Rate: 140 bpm  Fetal Heart Rate: 140 BPM  FHR Category: Category I        Vital Signs:   Vitals:    03/06/23 0737   BP: 127/60   Pulse: (!) 107   Resp: 16   Temp: 97 9 °F (36 6 °C)   SpO2: 98%       Notes/comments:   Patient feeling pressure/pelvic discomfort  If not resolved with dose may ask anesthesia to evaluate      Caron Kennedy MD 3/6/2023 7:57 AM

## 2023-03-07 PROBLEM — Z98.891 S/P CESAREAN SECTION: Status: ACTIVE | Noted: 2023-03-07

## 2023-03-07 PROBLEM — O13.3 GESTATIONAL HYPERTENSION, THIRD TRIMESTER: Status: ACTIVE | Noted: 2023-03-07

## 2023-03-07 LAB
BASOPHILS # BLD MANUAL: 0.28 THOUSAND/UL (ref 0–0.1)
BASOPHILS NFR MAR MANUAL: 1 % (ref 0–1)
EOSINOPHIL # BLD MANUAL: 0 THOUSAND/UL (ref 0–0.4)
EOSINOPHIL NFR BLD MANUAL: 0 % (ref 0–6)
ERYTHROCYTE [DISTWIDTH] IN BLOOD BY AUTOMATED COUNT: 12.7 % (ref 11.6–15.1)
HCT VFR BLD AUTO: 30.9 % (ref 34.8–46.1)
HGB BLD-MCNC: 10.1 G/DL (ref 11.5–15.4)
LYMPHOCYTES # BLD AUTO: 0.84 THOUSAND/UL (ref 0.6–4.47)
LYMPHOCYTES # BLD AUTO: 3 % (ref 14–44)
MCH RBC QN AUTO: 28.8 PG (ref 26.8–34.3)
MCHC RBC AUTO-ENTMCNC: 32.7 G/DL (ref 31.4–37.4)
MCV RBC AUTO: 88 FL (ref 82–98)
MONOCYTES # BLD AUTO: 0 THOUSAND/UL (ref 0–1.22)
MONOCYTES NFR BLD: 0 % (ref 4–12)
NEUTROPHILS # BLD MANUAL: 26.76 THOUSAND/UL (ref 1.85–7.62)
NEUTS BAND NFR BLD MANUAL: 25 % (ref 0–8)
NEUTS SEG NFR BLD AUTO: 71 % (ref 43–75)
PLATELET # BLD AUTO: 198 THOUSANDS/UL (ref 149–390)
PLATELET BLD QL SMEAR: ADEQUATE
PMV BLD AUTO: 10.9 FL (ref 8.9–12.7)
POLYCHROMASIA BLD QL SMEAR: PRESENT
RBC # BLD AUTO: 3.51 MILLION/UL (ref 3.81–5.12)
WBC # BLD AUTO: 27.88 THOUSAND/UL (ref 4.31–10.16)

## 2023-03-07 RX ORDER — DEXAMETHASONE SODIUM PHOSPHATE 10 MG/ML
INJECTION, SOLUTION INTRAMUSCULAR; INTRAVENOUS AS NEEDED
Status: DISCONTINUED | OUTPATIENT
Start: 2023-03-07 | End: 2023-03-07

## 2023-03-07 RX ORDER — HYDROMORPHONE HCL/PF 1 MG/ML
0.5 SYRINGE (ML) INJECTION
Status: DISCONTINUED | OUTPATIENT
Start: 2023-03-07 | End: 2023-03-08

## 2023-03-07 RX ORDER — IBUPROFEN 600 MG/1
600 TABLET ORAL EVERY 6 HOURS SCHEDULED
Status: DISCONTINUED | OUTPATIENT
Start: 2023-03-08 | End: 2023-03-10 | Stop reason: HOSPADM

## 2023-03-07 RX ORDER — DEXAMETHASONE SODIUM PHOSPHATE 4 MG/ML
10 INJECTION, SOLUTION INTRA-ARTICULAR; INTRALESIONAL; INTRAMUSCULAR; INTRAVENOUS; SOFT TISSUE EVERY 8 HOURS SCHEDULED
Status: DISCONTINUED | OUTPATIENT
Start: 2023-03-07 | End: 2023-03-07

## 2023-03-07 RX ORDER — HYDROMORPHONE HCL/PF 1 MG/ML
0.5 SYRINGE (ML) INJECTION EVERY 2 HOUR PRN
Status: DISCONTINUED | OUTPATIENT
Start: 2023-03-07 | End: 2023-03-08

## 2023-03-07 RX ORDER — SIMETHICONE 80 MG
80 TABLET,CHEWABLE ORAL 4 TIMES DAILY PRN
Status: DISCONTINUED | OUTPATIENT
Start: 2023-03-07 | End: 2023-03-10 | Stop reason: HOSPADM

## 2023-03-07 RX ORDER — MORPHINE SULFATE 0.5 MG/ML
INJECTION, SOLUTION EPIDURAL; INTRATHECAL; INTRAVENOUS AS NEEDED
Status: DISCONTINUED | OUTPATIENT
Start: 2023-03-07 | End: 2023-03-07

## 2023-03-07 RX ORDER — KETOROLAC TROMETHAMINE 30 MG/ML
15 INJECTION, SOLUTION INTRAMUSCULAR; INTRAVENOUS EVERY 6 HOURS SCHEDULED
Status: COMPLETED | OUTPATIENT
Start: 2023-03-07 | End: 2023-03-08

## 2023-03-07 RX ORDER — NALOXONE HYDROCHLORIDE 0.4 MG/ML
0.1 INJECTION, SOLUTION INTRAMUSCULAR; INTRAVENOUS; SUBCUTANEOUS
Status: DISCONTINUED | OUTPATIENT
Start: 2023-03-07 | End: 2023-03-08

## 2023-03-07 RX ORDER — OXYTOCIN/RINGER'S LACTATE 30/500 ML
62.5 PLASTIC BAG, INJECTION (ML) INTRAVENOUS ONCE
Status: COMPLETED | OUTPATIENT
Start: 2023-03-07 | End: 2023-03-07

## 2023-03-07 RX ORDER — KETOROLAC TROMETHAMINE 30 MG/ML
INJECTION, SOLUTION INTRAMUSCULAR; INTRAVENOUS AS NEEDED
Status: DISCONTINUED | OUTPATIENT
Start: 2023-03-07 | End: 2023-03-07

## 2023-03-07 RX ORDER — ONDANSETRON 2 MG/ML
4 INJECTION INTRAMUSCULAR; INTRAVENOUS ONCE AS NEEDED
Status: DISCONTINUED | OUTPATIENT
Start: 2023-03-07 | End: 2023-03-07

## 2023-03-07 RX ORDER — DEXAMETHASONE SODIUM PHOSPHATE 4 MG/ML
8 INJECTION, SOLUTION INTRA-ARTICULAR; INTRALESIONAL; INTRAMUSCULAR; INTRAVENOUS; SOFT TISSUE ONCE
Status: COMPLETED | OUTPATIENT
Start: 2023-03-07 | End: 2023-03-07

## 2023-03-07 RX ORDER — DIPHENHYDRAMINE HYDROCHLORIDE 50 MG/ML
12.5 INJECTION INTRAMUSCULAR; INTRAVENOUS ONCE AS NEEDED
Status: DISCONTINUED | OUTPATIENT
Start: 2023-03-07 | End: 2023-03-08

## 2023-03-07 RX ORDER — DIAPER,BRIEF,INFANT-TODD,DISP
1 EACH MISCELLANEOUS DAILY PRN
Status: DISCONTINUED | OUTPATIENT
Start: 2023-03-07 | End: 2023-03-10 | Stop reason: HOSPADM

## 2023-03-07 RX ORDER — DIPHENHYDRAMINE HYDROCHLORIDE 50 MG/ML
25 INJECTION INTRAMUSCULAR; INTRAVENOUS EVERY 6 HOURS PRN
Status: DISCONTINUED | OUTPATIENT
Start: 2023-03-07 | End: 2023-03-10 | Stop reason: HOSPADM

## 2023-03-07 RX ORDER — PROPOFOL 10 MG/ML
INJECTION, EMULSION INTRAVENOUS AS NEEDED
Status: DISCONTINUED | OUTPATIENT
Start: 2023-03-07 | End: 2023-03-07

## 2023-03-07 RX ORDER — ONDANSETRON 2 MG/ML
INJECTION INTRAMUSCULAR; INTRAVENOUS AS NEEDED
Status: DISCONTINUED | OUTPATIENT
Start: 2023-03-07 | End: 2023-03-07

## 2023-03-07 RX ORDER — ACETAMINOPHEN 325 MG/1
650 TABLET ORAL EVERY 6 HOURS SCHEDULED
Status: DISCONTINUED | OUTPATIENT
Start: 2023-03-07 | End: 2023-03-08

## 2023-03-07 RX ORDER — DOCUSATE SODIUM 100 MG/1
100 CAPSULE, LIQUID FILLED ORAL 2 TIMES DAILY
Status: DISCONTINUED | OUTPATIENT
Start: 2023-03-07 | End: 2023-03-10 | Stop reason: HOSPADM

## 2023-03-07 RX ORDER — CEFAZOLIN SODIUM 2 G/50ML
2000 SOLUTION INTRAVENOUS ONCE
Status: COMPLETED | OUTPATIENT
Start: 2023-03-07 | End: 2023-03-07

## 2023-03-07 RX ORDER — FENTANYL CITRATE 50 UG/ML
INJECTION, SOLUTION INTRAMUSCULAR; INTRAVENOUS AS NEEDED
Status: DISCONTINUED | OUTPATIENT
Start: 2023-03-07 | End: 2023-03-07

## 2023-03-07 RX ORDER — ONDANSETRON 2 MG/ML
4 INJECTION INTRAMUSCULAR; INTRAVENOUS EVERY 8 HOURS PRN
Status: DISCONTINUED | OUTPATIENT
Start: 2023-03-07 | End: 2023-03-08

## 2023-03-07 RX ORDER — TRANEXAMIC ACID 100 MG/ML
INJECTION, SOLUTION INTRAVENOUS AS NEEDED
Status: DISCONTINUED | OUTPATIENT
Start: 2023-03-07 | End: 2023-03-07

## 2023-03-07 RX ORDER — MIDAZOLAM HYDROCHLORIDE 2 MG/2ML
INJECTION, SOLUTION INTRAMUSCULAR; INTRAVENOUS AS NEEDED
Status: DISCONTINUED | OUTPATIENT
Start: 2023-03-07 | End: 2023-03-07

## 2023-03-07 RX ORDER — SODIUM CHLORIDE, SODIUM LACTATE, POTASSIUM CHLORIDE, CALCIUM CHLORIDE 600; 310; 30; 20 MG/100ML; MG/100ML; MG/100ML; MG/100ML
125 INJECTION, SOLUTION INTRAVENOUS CONTINUOUS
Status: DISCONTINUED | OUTPATIENT
Start: 2023-03-07 | End: 2023-03-10 | Stop reason: HOSPADM

## 2023-03-07 RX ORDER — ONDANSETRON 2 MG/ML
4 INJECTION INTRAMUSCULAR; INTRAVENOUS EVERY 6 HOURS PRN
Status: DISCONTINUED | OUTPATIENT
Start: 2023-03-07 | End: 2023-03-07

## 2023-03-07 RX ORDER — HYDROMORPHONE HCL/PF 1 MG/ML
SYRINGE (ML) INJECTION AS NEEDED
Status: DISCONTINUED | OUTPATIENT
Start: 2023-03-07 | End: 2023-03-07

## 2023-03-07 RX ORDER — DIPHENHYDRAMINE HYDROCHLORIDE 50 MG/ML
25 INJECTION INTRAMUSCULAR; INTRAVENOUS EVERY 6 HOURS PRN
Status: DISCONTINUED | OUTPATIENT
Start: 2023-03-07 | End: 2023-03-07 | Stop reason: SDUPTHER

## 2023-03-07 RX ORDER — SUCCINYLCHOLINE/SOD CL,ISO/PF 100 MG/5ML
SYRINGE (ML) INTRAVENOUS AS NEEDED
Status: DISCONTINUED | OUTPATIENT
Start: 2023-03-07 | End: 2023-03-07

## 2023-03-07 RX ORDER — METOCLOPRAMIDE HYDROCHLORIDE 5 MG/ML
5 INJECTION INTRAMUSCULAR; INTRAVENOUS ONCE
Status: DISCONTINUED | OUTPATIENT
Start: 2023-03-07 | End: 2023-03-07

## 2023-03-07 RX ORDER — FENTANYL CITRATE/PF 50 MCG/ML
50 SYRINGE (ML) INJECTION
Status: DISCONTINUED | OUTPATIENT
Start: 2023-03-07 | End: 2023-03-08

## 2023-03-07 RX ORDER — METOCLOPRAMIDE HYDROCHLORIDE 5 MG/ML
10 INJECTION INTRAMUSCULAR; INTRAVENOUS EVERY 6 HOURS PRN
Status: DISCONTINUED | OUTPATIENT
Start: 2023-03-07 | End: 2023-03-10 | Stop reason: HOSPADM

## 2023-03-07 RX ORDER — ENOXAPARIN SODIUM 100 MG/ML
40 INJECTION SUBCUTANEOUS DAILY
Status: DISCONTINUED | OUTPATIENT
Start: 2023-03-07 | End: 2023-03-10 | Stop reason: HOSPADM

## 2023-03-07 RX ORDER — CALCIUM CARBONATE 200(500)MG
1000 TABLET,CHEWABLE ORAL 3 TIMES DAILY PRN
Status: DISCONTINUED | OUTPATIENT
Start: 2023-03-07 | End: 2023-03-10 | Stop reason: HOSPADM

## 2023-03-07 RX ADMIN — LEVOTHYROXINE SODIUM 50 MCG: 50 TABLET ORAL at 05:48

## 2023-03-07 RX ADMIN — FENTANYL CITRATE 25 MCG: 50 INJECTION, SOLUTION INTRAMUSCULAR; INTRAVENOUS at 00:47

## 2023-03-07 RX ADMIN — SIMETHICONE 80 MG: 80 TABLET, CHEWABLE ORAL at 23:30

## 2023-03-07 RX ADMIN — SODIUM CHLORIDE, SODIUM LACTATE, POTASSIUM CHLORIDE, AND CALCIUM CHLORIDE 125 ML/HR: .6; .31; .03; .02 INJECTION, SOLUTION INTRAVENOUS at 08:35

## 2023-03-07 RX ADMIN — DEXAMETHASONE SODIUM PHOSPHATE 10 MG: 10 INJECTION, SOLUTION INTRAMUSCULAR; INTRAVENOUS at 01:01

## 2023-03-07 RX ADMIN — HYDROMORPHONE HYDROCHLORIDE 0.5 MG: 1 INJECTION, SOLUTION INTRAMUSCULAR; INTRAVENOUS; SUBCUTANEOUS at 00:59

## 2023-03-07 RX ADMIN — KETOROLAC TROMETHAMINE 15 MG: 30 INJECTION, SOLUTION INTRAMUSCULAR at 20:55

## 2023-03-07 RX ADMIN — Medication 100 MG: at 00:50

## 2023-03-07 RX ADMIN — Medication 62.5 MILLI-UNITS/MIN: at 03:00

## 2023-03-07 RX ADMIN — ONDANSETRON 4 MG: 2 INJECTION INTRAMUSCULAR; INTRAVENOUS at 05:45

## 2023-03-07 RX ADMIN — FENTANYL CITRATE 100 MCG: 50 INJECTION INTRAMUSCULAR; INTRAVENOUS at 01:25

## 2023-03-07 RX ADMIN — Medication: at 02:09

## 2023-03-07 RX ADMIN — ACETAMINOPHEN 650 MG: 325 TABLET ORAL at 19:25

## 2023-03-07 RX ADMIN — MORPHINE SULFATE 1.5 MG: 0.5 INJECTION, SOLUTION EPIDURAL; INTRATHECAL; INTRAVENOUS at 01:15

## 2023-03-07 RX ADMIN — DEXAMETHASONE SODIUM PHOSPHATE 8 MG: 4 INJECTION, SOLUTION INTRAMUSCULAR; INTRAVENOUS at 08:21

## 2023-03-07 RX ADMIN — KETOROLAC TROMETHAMINE 15 MG: 30 INJECTION, SOLUTION INTRAMUSCULAR at 14:34

## 2023-03-07 RX ADMIN — TRANEXAMIC ACID 1 G: 1 INJECTION, SOLUTION INTRAVENOUS at 00:56

## 2023-03-07 RX ADMIN — FENTANYL CITRATE 50 MCG: 50 INJECTION INTRAMUSCULAR; INTRAVENOUS at 02:34

## 2023-03-07 RX ADMIN — CEFAZOLIN SODIUM 2000 MG: 2 SOLUTION INTRAVENOUS at 00:37

## 2023-03-07 RX ADMIN — ONDANSETRON 4 MG: 2 INJECTION INTRAMUSCULAR; INTRAVENOUS at 00:43

## 2023-03-07 RX ADMIN — ACETAMINOPHEN 650 MG: 325 TABLET ORAL at 13:16

## 2023-03-07 RX ADMIN — ACETAMINOPHEN 650 MG: 325 TABLET ORAL at 05:48

## 2023-03-07 RX ADMIN — METOCLOPRAMIDE 10 MG: 5 INJECTION, SOLUTION INTRAMUSCULAR; INTRAVENOUS at 10:16

## 2023-03-07 RX ADMIN — DOCUSATE SODIUM 100 MG: 100 CAPSULE, LIQUID FILLED ORAL at 18:36

## 2023-03-07 RX ADMIN — AZITHROMYCIN 500 MG: 500 INJECTION, POWDER, LYOPHILIZED, FOR SOLUTION INTRAVENOUS at 00:33

## 2023-03-07 RX ADMIN — DIPHENHYDRAMINE HYDROCHLORIDE 25 MG: 50 INJECTION, SOLUTION INTRAMUSCULAR; INTRAVENOUS at 20:55

## 2023-03-07 RX ADMIN — MORPHINE SULFATE 3.5 MG: 0.5 INJECTION, SOLUTION EPIDURAL; INTRATHECAL; INTRAVENOUS at 01:09

## 2023-03-07 RX ADMIN — SODIUM CHLORIDE, SODIUM LACTATE, POTASSIUM CHLORIDE, AND CALCIUM CHLORIDE 125 ML/HR: .6; .31; .03; .02 INJECTION, SOLUTION INTRAVENOUS at 23:30

## 2023-03-07 RX ADMIN — PROPOFOL 200 MG: 10 INJECTION, EMULSION INTRAVENOUS at 00:50

## 2023-03-07 RX ADMIN — ENOXAPARIN SODIUM 40 MG: 40 INJECTION SUBCUTANEOUS at 20:55

## 2023-03-07 RX ADMIN — KETOROLAC TROMETHAMINE 30 MG: 30 INJECTION, SOLUTION INTRAMUSCULAR; INTRAVENOUS at 01:12

## 2023-03-07 RX ADMIN — KETOROLAC TROMETHAMINE 15 MG: 30 INJECTION, SOLUTION INTRAMUSCULAR at 07:32

## 2023-03-07 RX ADMIN — MIDAZOLAM 2 MG: 1 INJECTION INTRAMUSCULAR; INTRAVENOUS at 00:54

## 2023-03-07 RX ADMIN — ONDANSETRON 4 MG: 2 INJECTION INTRAMUSCULAR; INTRAVENOUS at 19:25

## 2023-03-07 RX ADMIN — SODIUM CHLORIDE, SODIUM LACTATE, POTASSIUM CHLORIDE, AND CALCIUM CHLORIDE 125 ML/HR: .6; .31; .03; .02 INJECTION, SOLUTION INTRAVENOUS at 14:34

## 2023-03-07 NOTE — OB LABOR/OXYTOCIN SAFETY PROGRESS
Oxytocin Safety Progress Check Note - Mara Duarte 28 y o  female MRN: [id number]    Unit/Bed#: -01 Encounter: [id number]    Dose (allison-units/min) Oxytocin: 14 allison-units/min  Contraction Frequency (minutes): 2-3  Contraction Quality: Moderate  Tachysystole: No   Cervical Dilation: 8-9        Cervical Effacement: 80  Fetal Station: 0  Baseline Rate: 150 bpm  Fetal Heart Rate: 148 BPM  FHR Category: Category II   FHR: 130's moderate variability with accelerations, rare variable     Vital Signs:   Vitals:    03/06/23 1915   BP: 126/63   Pulse: (!) 109   Resp: 18   Temp: 97 8 °F (36 6 °C)   SpO2:        Notes/comments:   Patient still feeling painful pressure at cervix, feels better when being examined  IUPC placed to evaluate MVU  Continue to monitor and manage      Madolyn Kussmaul, MD 3/6/2023 8:04 PM

## 2023-03-07 NOTE — OB LABOR/OXYTOCIN SAFETY PROGRESS
Oxytocin Safety Progress Check Note - Maar Duarte 28 y o  female MRN: [id number]    Unit/Bed#: -01 Encounter: [id number]    Dose (allison-units/min) Oxytocin: 14 allison-units/min  Contraction Frequency (minutes): 2-3  Contraction Quality: Moderate  Tachysystole: No   Cervical Dilation: 8        Cervical Effacement: 90  Fetal Station: 0  Baseline Rate: 155 bpm  Fetal Heart Rate: 152 BPM  FHR Category: Category II               Vital Signs:   Vitals:    03/06/23 1859   BP: 106/55   Pulse: 101   Resp:    Temp:    SpO2:        Notes/comments: SVE is unchanged as above  FHT is CAT 2 due to recurrent variables  Patient repositioned and given fluid bolus               Eleno Gilbert MD 3/6/2023 7:06 PM

## 2023-03-07 NOTE — ANESTHESIA POSTPROCEDURE EVALUATION
Post-Op Assessment Note    CV Status:  Stable  Pain Score: 0    Pain management: adequate     Mental Status:  Alert and awake   Hydration Status:  Euvolemic   PONV Controlled:  Controlled   Airway Patency:  Patent      Post Op Vitals Reviewed: Yes      Staff: CRNA         No notable events documented      /76 (03/07/23 0145)    Temp   97 7   Pulse (!) 122 (03/07/23 0145)   Resp   18   SpO2   98

## 2023-03-07 NOTE — ADDENDUM NOTE
Addendum  created 03/07/23 0750 by Cori Jordan CRNA    Order list changed, Pharmacy for encounter modified

## 2023-03-07 NOTE — PROGRESS NOTES
Post-op Note - OB/GYN   Mara Duarte 28 y o  female MRN: [id number]  Unit/Bed#: -01 Encounter: [id number]    Assessment:  28 y o   POD #0 form 1LTCS for maternal request-doing well from postoperative status  Plan:  Continue pain management as needed  Reviewed void trial-she understands that De La Paz will need to be replaced if she is unable to void  SCDs and ambulation for VTE prophylaxis  Follow-up a m  CBC      Subjective/Objective     Subjective:     Pain: Appropriately managed with pain medications  Tolerating PO: yes  Voiding: no, not yet- de la paz removed around 5 pm  Flatus: no  BM: no  Ambulating: yes  Chest pain: no  Shortness of breath: no  Leg pain: no    Objective:     Vitals: Blood pressure 147/90, pulse 104, temperature 98 2 °F (36 8 °C), temperature source Oral, resp  rate 18, height 5' 5" (1 651 m), weight 103 kg (228 lb), last menstrual period 2022, SpO2 97 %, currently breastfeeding  Physical Exam:     Physical Exam  Vitals reviewed  Constitutional:       General: She is not in acute distress  Appearance: Normal appearance  She is not ill-appearing or diaphoretic  HENT:      Head: Normocephalic and atraumatic  Pulmonary:      Effort: Pulmonary effort is normal  No respiratory distress  Abdominal:      General: There is no distension  Palpations: Abdomen is soft  Tenderness: There is abdominal tenderness  There is no guarding or rebound  Comments: Appropriate postoperative tenderness  Pfannenstiel is clean/dry/intact   Musculoskeletal:      Right lower leg: No edema  Left lower leg: No edema  Skin:     General: Skin is warm and dry  Neurological:      Mental Status: She is alert and oriented to person, place, and time  Psychiatric:         Mood and Affect: Mood normal          Behavior: Behavior normal            Lab, Imaging and other studies: I have personally reviewed pertinent reports        Lab Results   Component Value Date    WBC 27 88 (H) 03/07/2023    HGB 10 1 (L) 03/07/2023    HCT 30 9 (L) 03/07/2023    MCV 88 03/07/2023     03/07/2023               Joe Blanco MD  03/07/23

## 2023-03-07 NOTE — OP NOTE
OPERATIVE REPORT  PATIENT NAME: Shon Carpenter    :  1995  MRN: [id number]  Pt Location: [facility] L[facility] OR ROOM 02    SURGERY DATE: 3/7/2023    Surgeon(s) and Role:     * Pili Marks MD - Primary     * Zuly Randall MD - Assisting    Preop Diagnosis:  Maternal exhaustion complicating labor and delivery [O75 81]    Post-Op Diagnosis Codes:     * Maternal exhaustion complicating labor and delivery [O75 81]    Procedure(s) (LRB):   SECTION () (N/A)    Specimen(s):  ID Type Source Tests Collected by Time Destination   A :  Cord Blood Cord BLOOD GAS, VENOUS, CORD, BLOOD GAS, ARTERIAL, CORD Pili Marks MD 3/7/2023 0052    B :  Tissue (Placenta on Hold) OB Only Placenta PLACENTA IN STORAGE Pili Marks MD 3/7/2023 0056        Surgical QBL:  Surgical QBL (mL): 832 mL      Drains:  Urethral Catheter Asept;Non-latex;Straight-tip;Double-lumen 16 Fr  (Active)   Goal for Removal Voiding trial when ambulation improves 23   Site Assessment Clean;Skin intact 23   Collection Container Standard drainage bag 23   Securement Method Securing device (Describe) 23   Output (mL) 450 mL 23   Number of days: 1       Anesthesia Type:   general    Operative Indications:  Maternal exhaustion complicating labor and delivery [O75 81]  Maternal discomfort unable to be resolved     [facility] Classification System:  No Multiple pregnancy, No Transverse or oblique lie, No Breech lie, Gestational age is > or =37 weeks, Nulliparous, Labor induced +  is JOSE GROUP 2a    Operative Findings:  Viable female infant weight 7 pounds 14 ounces Apgars of 8 and 9, vigorous at birth    Complications:   None    Procedure and Technique:  The patient was taken to the operating room where a time out was performed to confirm correct patient and correct procedure  A  2gm Ancef and 500 mg Azithromycin was given for preoperative prophylaxis    The patient was then placed in the dorsal supine position with a left tilt of the hips  Pressure points were padded and a Adan hugger was placed to maintain control of core body temperature  Vaginal area was prepped and fetal pillow was placed  the patient was then prepped and draped in the usual sterile fashion for a pfannenstiel skin incision  General anesthesia was then instituted and when confirmed by anesthesia readiness to proceed with procedure  An incision was made in the skin with a surgical scalpel and sharp dissection was carried out over subsequent layers of tissue including the fascia  The fascia was incised at the midline and the fascial incision was extended bilaterally using the gloved fingers of the surgeons  The superior edge of the fascial incision was tented up and the underlying rectus muscles were dissected off bluntly  The rectus muscles were then divided at midline and the peritoneum was identified, tented up at its upper margin taking care to avoid the bladder, and then entered  The peritoneal incision was extended superiorly and inferiorly  The Андрей O ring was placed for tissue retraction  A transverse incision was made in the lower uterine segment using a new surgical blade  The uterine incision was extended cephalad and caudal using blunt dissection  The amniotic sac was entered and the amniotic fluid was noted to be Clear  The surgeon's hand was placed into the uterine cavity  The fetus was noted to be in cephalic presentation, and the presenting part was grasped and delivered through the uterine incision with the assistance of fundal pressure  No nuchal cord was identified  The infant's oral and nasal passages were bulb suctioned  On delivery after appropriate delay the cord was doubly clamped and cut closer to infant side to preserve cord for banking  The infant was then passed off the table to the awaiting  staff  Cord blood for banking was obtained    arterial blood gas was obtained however venous gas and cord blood were not able to be obtained  The placenta delivery was then manual removal  Placenta was noted to be intact with a centrally inserted three-vessel cord  Oxytocin was administered by IV infusion to enhance uterine contraction as well as prophylactic TXA  The uterus gently curetted with wet sponged and cleared of all clots and remaining products of conception  The uterine incision was re approximated using a 0 Monocryl in a running locked fashion  A second vertical embrocating stitch with 0 Monocryl was applied  The uterine incision was examined and noted to be hemostatic  The pericolic gutters were irrigated and cleared of all clots and products of conception  The uterine incision was once again reexamined and noted to be hemostatic  The fascia was re approximated using 0 Vicryl in a running nonlocked fashion, 2 sutures that met to the right of the midline  The subcutaneous tissue was irrigated and cleared of all clots and debris  Good hemostasis was noted with Bovie electrocautery  The subcutaneous  tissue was reapproximated with 2-0 Plain suture  The skin incision was closed using running absorbable suture with  4-0 Vicryl  Exofin was placed on top of the skin incision  Good hemostasis was noted  Patient tolerated the procedure well  All needle, sponge, and instrument counts were noted to be correct x 2 at the end of the procedure  The fetal pillow was deflated and removed uterus was gently expressed of blood and clot  The patient was awakened and patient was transferred to the recovery room in stable condition  Dr Lisa Dumont was present for the entire procedure         I was present for the entire procedure    Patient Disposition:  PACU  and extubated and stable        SIGNATURE: Willis García MD  DATE: March 7, 2023  TIME: 1:49 AM

## 2023-03-07 NOTE — ADDENDUM NOTE
Addendum  created 03/07/23 0732 by Lynnette Villafana CRNA    Order list changed, Order sets accessed

## 2023-03-07 NOTE — OB LABOR/OXYTOCIN SAFETY PROGRESS
Oxytocin Safety Progress Check Note - Mara Duarte 28 y o  female MRN: [id number]    Unit/Bed#: -01 Encounter: [id number]    Dose (allison-units/min) Oxytocin: 18 allison-units/min  Contraction Frequency (minutes): 2-3  Contraction Quality: Moderate  Tachysystole: No   Cervical Dilation: 9        Cervical Effacement: 90  Fetal Station: 0  Baseline Rate: 145 bpm  Fetal Heart Rate: 151 BPM  FHR Category: Category I        Vital Signs:   Vitals:    03/06/23 2144   BP: 135/65   Pulse: 92   Resp:    Temp:    SpO2:        Notes/comments:   Patient still with cervix pain, anesthesia has seen her multiple times, they are generally at limit of medication and epidural is functional except for cervix  Patient MVU about 130 but progressing  Will titrate oxytocin as needed and continue to monitor and manage      Alessia Donald MD 3/6/2023 10:10 PM

## 2023-03-07 NOTE — DISCHARGE SUMMARY
Discharge Summary - OB/GYN   Mara Duarte 28 y o  female MRN: [id number]  Unit/Bed#: -01 Encounter: [id number]      Admission Date: 3/5/2023     Discharge Date: 3/10/2023    Admitting Diagnosis:   1  Pregnancy at 39w0d  2  Gestational hypertension  2  Hypothyroidism  3  Asthma    Discharge Diagnosis:   Same, delivered    Procedures: primary  section, low transverse incision    Delivery Attending: Taye Thompson MD  Discharge Attending: Taye Thompson MD    Hospital Course:     Mayco Kelley is now a 28 y o  [id number] who was initially admitted for induction of labor secondary to gestational hypertension  She received cytotec and a de la paz balloon was placed for cervical ripening  She was started on pitocin and received an epidural for analgesia  An amniotomy was performed for clear fluid  She progressed to complete and began pushing  She requested a  section due to discomfort and pain  She [facility] viable female  [facility]  Weight 7lbs 14 1oz via primary  section, low transverse incision  Apgars were 8 (1 min) and 9 (5 min)   was transferred to  nursery  Patient tolerated the procedure well and was transferred to recovery in stable condition  Her post-operative course was uncomplicated  Preoperative hemoglobin was 11 5, postoperative was 10 1 followed by 8 4  She received a dose of venofer  Her postoperative pain was well controlled with a dilaudid PCA followed by oral analgesics  On day of discharge, she was ambulating and able to reasonably perform all ADLs  She was voiding and had appropriate bowel function  Pain was well controlled  She was discharged home on post-operative day #3 without complications  Patient was instructed to follow up with her OB as an outpatient and was given appropriate warnings to call provider if she develops signs of infection or uncontrolled pain      Complications: none apparent    Condition at discharge: good Discharge instructions/Information to patient and family:   See after visit summary for information provided to patient and family  Provisions for Follow-Up Care:  See after visit summary for information related to follow-up care and any pertinent home health orders  Disposition: Home    Planned Readmission: No    Discharge Medications: For a complete list of the patient's medications, please refer to her med rec  Case and note reviewed agree  Instructions given d/c to home

## 2023-03-07 NOTE — OB LABOR/OXYTOCIN SAFETY PROGRESS
Oxytocin Safety Progress Check Note - Mara Duarte 28 y o  female MRN: [id number]  HUDDLE  Unit/Bed#: -01 Encounter: [id number]    Dose (allison-units/min) Oxytocin: 0 allison-units/min (per Dr Diamond Purchase)  Contraction Frequency (minutes): 2-3  Contraction Quality: Moderate  Tachysystole: No   Cervical Dilation: 9        Cervical Effacement: 90  Fetal Station: 0  Baseline Rate: 150 bpm  Fetal Heart Rate: 152 BPM  FHR Category: Category I        Vital Signs:   Vitals:    23 2301   BP: 134/80   Pulse: 91   Resp:    Temp:    SpO2:        Notes/comments:   Patient still having severe vaginal pain some abdominal pain, tried pushing cervix reduced, 0 station  Reviewed risks and benefits as patient requesting c/s  Patient reports anesthesia told her would have to be under general   Discussed that she may be exchanging pain now for pain later, reviewed option to try redose and to try pushing  Patient was unable to tell if her pain was improved when pushing  Patient request  delivery  Counseled  Oxytocin off    Will proceed with  for maternal discomfort and request     Duane Gant MD 3/7/2023 12:10 AM

## 2023-03-07 NOTE — PLAN OF CARE
Problem: PAIN - ADULT  Goal: Verbalizes/displays adequate comfort level or baseline comfort level  Description: Interventions:  - Encourage patient to monitor pain and request assistance  - Assess pain using appropriate pain scale  - Administer analgesics based on type and severity of pain and evaluate response  - Implement non-pharmacological measures as appropriate and evaluate response  - Consider cultural and social influences on pain and pain management  - Notify physician/advanced practitioner if interventions unsuccessful or patient reports new pain  Outcome: Progressing     Problem: INFECTION - ADULT  Goal: Absence or prevention of progression during hospitalization  Description: INTERVENTIONS:  - Assess and monitor for signs and symptoms of infection  - Monitor lab/diagnostic results  - Monitor all insertion sites, i e  indwelling lines, tubes, and drains  - Monitor endotracheal if appropriate and nasal secretions for changes in amount and color  - [place] appropriate cooling/warming therapies per order  - Administer medications as ordered  - Instruct and encourage patient and family to use good hand hygiene technique  - Identify and instruct in appropriate isolation precautions for identified infection/condition  Outcome: Progressing  Goal: Absence of fever/infection during neutropenic period  Description: INTERVENTIONS:  - Monitor WBC    Outcome: Progressing     Problem: SAFETY ADULT  Goal: Patient will remain free of falls  Description: INTERVENTIONS:  - Educate patient/family on patient safety including physical limitations  - Instruct patient to call for assistance with activity   - Consult OT/PT to assist with strengthening/mobility   - Keep Call bell within reach  - Keep bed low and locked with side rails adjusted as appropriate  - Keep care items and personal belongings within reach  - Initiate and maintain comfort rounds  - Make Fall Risk Sign visible to staff  - Offer Toileting every  Hours, in advance of need  - Initiate/Maintain alarm  - Obtain necessary fall risk management equipment:   - Apply yellow socks and bracelet for high fall risk patients  - Consider moving patient to room near nurses station  Outcome: Progressing  Goal: Maintain or return to baseline ADL function  Description: INTERVENTIONS:  -  Assess patient's ability to carry out ADLs; assess patient's baseline for ADL function and identify physical deficits which impact ability to perform ADLs (bathing, care of mouth/teeth, toileting, grooming, dressing, etc )  - Assess/evaluate cause of self-care deficits   - Assess range of motion  - Assess patient's mobility; develop plan if impaired  - Assess patient's need for assistive devices and provide as appropriate  - Encourage maximum independence but intervene and supervise when necessary  - Involve family in performance of ADLs  - Assess for home care needs following discharge   - Consider OT consult to assist with ADL evaluation and planning for discharge  - Provide patient education as appropriate  Outcome: Progressing  Goal: Maintains/Returns to pre admission functional level  Description: INTERVENTIONS:  - Perform BMAT or MOVE assessment daily    - Set and communicate daily mobility goal to care team and patient/family/caregiver  - Collaborate with rehabilitation services on mobility goals if consulted  - Perform Range of Motion  times a day  - Reposition patient every  hours    - Dangle patient  times a day  - Stand patient  times a day  - Ambulate patient  times a day  - Out of bed to chair  times a day   - Out of bed for meals  times a day  - Out of bed for toileting  - Record patient progress and toleration of activity level   Outcome: Progressing     Problem: DISCHARGE PLANNING  Goal: Discharge to home or other facility with appropriate resources  Description: INTERVENTIONS:  - Identify barriers to discharge w/patient and caregiver  - Arrange for needed discharge resources and transportation as appropriate  - Identify discharge learning needs (meds, wound care, etc )  - Arrange for interpretive services to assist at discharge as needed  - Refer to Case Management Department for coordinating discharge planning if the patient needs post-hospital services based on physician/advanced practitioner order or complex needs related to functional status, cognitive ability, or social support system  Outcome: Progressing     Problem: POSTPARTUM  Goal: Experiences normal postpartum course  Description: INTERVENTIONS:  - Monitor maternal vital signs  - Assess uterine involution and lochia  Outcome: Progressing  Goal: Appropriate maternal -  bonding  Description: INTERVENTIONS:  - Identify family support  - Assess for appropriate maternal/infant bonding   -Encourage maternal/infant bonding opportunities  - Referral to  or  as needed  Outcome: Progressing  Goal: Establishment of infant feeding pattern  Description: INTERVENTIONS:  - Assess breast/bottle feeding  - Refer to lactation as needed  Outcome: Progressing  Goal: Incision(s), wounds(s) or drain site(s) healing without S/S of infection  Description: INTERVENTIONS  - Assess and document dressing, incision, wound bed, drain sites and surrounding tissue  - Provide patient and family education  - Perform skin care/dressing changes every   Outcome: Progressing

## 2023-03-07 NOTE — LACTATION NOTE
This note was copied from a baby's chart  CONSULT - LACTATION  Baby Girl (Sreekanth Duarte 0 days female MRN: [id number]    [address] Room / Bed: (N)/ 321(N) Encounter: [id number]    Maternal Information     MOTHER:  Linn Saldivar  Maternal Age: 28 y o    OB History: # 1 - Date: 2022, Sex: None, Weight: None, GA: None, Delivery: None, Apgar1: None, Apgar5: None, Living: None, Birth Comments: None    # 2 - Date: 23, Sex: Female, Weight: 3575 g (7 lb 14 1 oz), GA: 39w0d, Delivery: , Low Transverse, Apgar1: 8, Apgar5: 9, Living: Living, Birth Comments: None   Previouse breast reduction surgery? No    Lactation history:   Has patient previously breast fed: No   How long had patient previously breast fed:     Previous breast feeding complications:       Past Surgical History:   Procedure Laterality Date   • FINGER SURGERY      pin placed   • SHOULDER SURGERY Right     2019   • SKIN BIOPSY     • WISDOM TOOTH EXTRACTION          Birth information:  YOB: 2024   Time of birth: 12:52 AM   Sex: female   Delivery type: , Low Transverse   Birth Weight: 3575 g (7 lb 14 1 oz)   Percent of Weight Change: 0%     Gestational Age: 39w0d   [unfilled]    Assessment     Breast and nipple assessment: normal assessment     Assessment: normal assessment      Feeding recommendations:  pump every 2-3 hours     Met with mother  Provided mother with Ready, Set, Baby booklet  Discussed Skin to Skin contact an benefits to mom and baby  Talked about the delay of the first bath until baby has adjusted  Spoke about the benefits of rooming in  Feeding on cue and what that means for recognizing infant's hunger  Avoidance of pacifiers for the first month discussed  Talked about exclusive breastfeeding for the first 6 months  Positioning and latch reviewed as well as showing images of other feeding positions    Discussed the properties of a good latch in any position  Reviewed hand/manual expression  Discussed s/s that baby is getting enough milk and some s/s that breastfeeding dyad may need further help  Gave information on common concerns, what to expect the first few weeks after delivery, preparing for other caregivers, and how partners can help  Resources for support also provided  Information on hand expression given  Discussed benefits of knowing how to manually express breast including stimulating milk supply, softening nipple for latch and evacuating breast in the event of engorgement  Discussed 2nd night syndrome and ways to calm infant  Hand out given  Provided DC booklet at this time, enc family to review and prepare questions for day of DC  Reviewed pumping log and expectations for pumping output in the first week  Reviewed cycle pumping and appropriate pump settings, as well as pumping for 10-15 min 8-12 times per day  Initiated pumping at this time per Mom's request  Reviewed hand pump and hand expression of colostrum for early breast stimulation and collection, provided plastic cup and syringes  Mom has a [employer] pump at home       Zane Patel RN 3/7/2023 5:03 PM

## 2023-03-08 LAB
ERYTHROCYTE [DISTWIDTH] IN BLOOD BY AUTOMATED COUNT: 12.7 % (ref 11.6–15.1)
HCT VFR BLD AUTO: 25.4 % (ref 34.8–46.1)
HGB BLD-MCNC: 8.4 G/DL (ref 11.5–15.4)
MCH RBC QN AUTO: 28.9 PG (ref 26.8–34.3)
MCHC RBC AUTO-ENTMCNC: 33.1 G/DL (ref 31.4–37.4)
MCV RBC AUTO: 87 FL (ref 82–98)
PLATELET # BLD AUTO: 208 THOUSANDS/UL (ref 149–390)
PMV BLD AUTO: 10.5 FL (ref 8.9–12.7)
RBC # BLD AUTO: 2.91 MILLION/UL (ref 3.81–5.12)
WBC # BLD AUTO: 22.82 THOUSAND/UL (ref 4.31–10.16)

## 2023-03-08 RX ORDER — ONDANSETRON 4 MG/1
4 TABLET, ORALLY DISINTEGRATING ORAL EVERY 6 HOURS PRN
Status: DISCONTINUED | OUTPATIENT
Start: 2023-03-08 | End: 2023-03-10 | Stop reason: HOSPADM

## 2023-03-08 RX ORDER — ONDANSETRON 2 MG/ML
4 INJECTION INTRAMUSCULAR; INTRAVENOUS EVERY 4 HOURS PRN
Status: DISCONTINUED | OUTPATIENT
Start: 2023-03-08 | End: 2023-03-10 | Stop reason: HOSPADM

## 2023-03-08 RX ORDER — OXYCODONE HYDROCHLORIDE 10 MG/1
10 TABLET ORAL EVERY 4 HOURS PRN
Status: DISCONTINUED | OUTPATIENT
Start: 2023-03-08 | End: 2023-03-10 | Stop reason: HOSPADM

## 2023-03-08 RX ORDER — OXYCODONE HYDROCHLORIDE 5 MG/1
5 TABLET ORAL EVERY 4 HOURS PRN
Status: DISCONTINUED | OUTPATIENT
Start: 2023-03-08 | End: 2023-03-10 | Stop reason: HOSPADM

## 2023-03-08 RX ORDER — ACETAMINOPHEN 325 MG/1
975 TABLET ORAL EVERY 6 HOURS SCHEDULED
Status: DISCONTINUED | OUTPATIENT
Start: 2023-03-08 | End: 2023-03-09

## 2023-03-08 RX ADMIN — IRON SUCROSE 200 MG: 20 INJECTION, SOLUTION INTRAVENOUS at 12:47

## 2023-03-08 RX ADMIN — OXYCODONE HYDROCHLORIDE 5 MG: 5 TABLET ORAL at 14:39

## 2023-03-08 RX ADMIN — LEVOTHYROXINE SODIUM 50 MCG: 50 TABLET ORAL at 05:00

## 2023-03-08 RX ADMIN — IBUPROFEN 600 MG: 600 TABLET, FILM COATED ORAL at 07:07

## 2023-03-08 RX ADMIN — KETOROLAC TROMETHAMINE 15 MG: 30 INJECTION, SOLUTION INTRAMUSCULAR at 03:20

## 2023-03-08 RX ADMIN — DOCUSATE SODIUM 100 MG: 100 CAPSULE, LIQUID FILLED ORAL at 18:39

## 2023-03-08 RX ADMIN — ACETAMINOPHEN 650 MG: 325 TABLET ORAL at 10:09

## 2023-03-08 RX ADMIN — OXYCODONE HYDROCHLORIDE 10 MG: 10 TABLET ORAL at 20:27

## 2023-03-08 RX ADMIN — ACETAMINOPHEN 975 MG: 325 TABLET, FILM COATED ORAL at 21:15

## 2023-03-08 RX ADMIN — ACETAMINOPHEN 650 MG: 325 TABLET ORAL at 03:20

## 2023-03-08 RX ADMIN — IBUPROFEN 600 MG: 600 TABLET, FILM COATED ORAL at 18:39

## 2023-03-08 RX ADMIN — IBUPROFEN 600 MG: 600 TABLET, FILM COATED ORAL at 12:50

## 2023-03-08 RX ADMIN — ENOXAPARIN SODIUM 40 MG: 40 INJECTION SUBCUTANEOUS at 20:29

## 2023-03-08 RX ADMIN — ACETAMINOPHEN 975 MG: 325 TABLET, FILM COATED ORAL at 16:05

## 2023-03-08 RX ADMIN — IBUPROFEN 600 MG: 600 TABLET, FILM COATED ORAL at 23:39

## 2023-03-08 RX ADMIN — DOCUSATE SODIUM 100 MG: 100 CAPSULE, LIQUID FILLED ORAL at 08:13

## 2023-03-08 NOTE — ASSESSMENT & PLAN NOTE
, Hgb 11 5 --> 10 1 > 8 4 venofer given  Lines: voiding spontaneously   Pain: Tylenol, motrin scheduled, harjeet 5/10 PRN  FEN: Tolerating regular diet  DVT ppx: SCDs and Lovenox 40mg qD  Passing flatus   Incision C/D/I

## 2023-03-08 NOTE — PLAN OF CARE
Problem: PAIN - ADULT  Goal: Verbalizes/displays adequate comfort level or baseline comfort level  Description: Interventions:  - Encourage patient to monitor pain and request assistance  - Assess pain using appropriate pain scale  - Administer analgesics based on type and severity of pain and evaluate response  - Implement non-pharmacological measures as appropriate and evaluate response  - Consider cultural and social influences on pain and pain management  - Notify physician/advanced practitioner if interventions unsuccessful or patient reports new pain  Outcome: Progressing     Problem: INFECTION - ADULT  Goal: Absence or prevention of progression during hospitalization  Description: INTERVENTIONS:  - Assess and monitor for signs and symptoms of infection  - Monitor lab/diagnostic results  - Monitor all insertion sites, i e  indwelling lines, tubes, and drains  - Monitor endotracheal if appropriate and nasal secretions for changes in amount and color  - [place] appropriate cooling/warming therapies per order  - Administer medications as ordered  - Instruct and encourage patient and family to use good hand hygiene technique  - Identify and instruct in appropriate isolation precautions for identified infection/condition  Outcome: Progressing  Goal: Absence of fever/infection during neutropenic period  Description: INTERVENTIONS:  - Monitor WBC    Outcome: Progressing     Problem: SAFETY ADULT  Goal: Patient will remain free of falls  Description: INTERVENTIONS:  - Educate patient/family on patient safety including physical limitations  - Instruct patient to call for assistance with activity   - Consult OT/PT to assist with strengthening/mobility   - Keep Call bell within reach  - Keep bed low and locked with side rails adjusted as appropriate  - Keep care items and personal belongings within reach  - Initiate and maintain comfort rounds  - Make Fall Risk Sign visible to staff  - Offer Toileting every Hours, in advance of need  - Initiate/Maintain alarm  - Obtain necessary fall risk management equipment:   - Apply yellow socks and bracelet for high fall risk patients  - Consider moving patient to room near nurses station  Outcome: Progressing  Goal: Maintain or return to baseline ADL function  Description: INTERVENTIONS:  -  Assess patient's ability to carry out ADLs; assess patient's baseline for ADL function and identify physical deficits which impact ability to perform ADLs (bathing, care of mouth/teeth, toileting, grooming, dressing, etc )  - Assess/evaluate cause of self-care deficits   - Assess range of motion  - Assess patient's mobility; develop plan if impaired  - Assess patient's need for assistive devices and provide as appropriate  - Encourage maximum independence but intervene and supervise when necessary  - Involve family in performance of ADLs  - Assess for home care needs following discharge   - Consider OT consult to assist with ADL evaluation and planning for discharge  - Provide patient education as appropriate  Outcome: Progressing  Goal: Maintains/Returns to pre admission functional level  Description: INTERVENTIONS:  - Perform BMAT or MOVE assessment daily    - Set and communicate daily mobility goal to care team and patient/family/caregiver     - Collaborate with rehabilitation services on mobility goals if consulted  - Out of bed for toileting  - Record patient progress and toleration of activity level   Outcome: Progressing     Problem: DISCHARGE PLANNING  Goal: Discharge to home or other facility with appropriate resources  Description: INTERVENTIONS:  - Identify barriers to discharge w/patient and caregiver  - Arrange for needed discharge resources and transportation as appropriate  - Identify discharge learning needs (meds, wound care, etc )  - Arrange for interpretive services to assist at discharge as needed  - Refer to Case Management Department for coordinating discharge planning if the patient needs post-hospital services based on physician/advanced practitioner order or complex needs related to functional status, cognitive ability, or social support system  Outcome: Progressing     Problem: POSTPARTUM  Goal: Experiences normal postpartum course  Description: INTERVENTIONS:  - Monitor maternal vital signs  - Assess uterine involution and lochia  Outcome: Progressing  Goal: Appropriate maternal -  bonding  Description: INTERVENTIONS:  - Identify family support  - Assess for appropriate maternal/infant bonding   -Encourage maternal/infant bonding opportunities  - Referral to  or  as needed  Outcome: Progressing  Goal: Establishment of infant feeding pattern  Description: INTERVENTIONS:  - Assess breast/bottle feeding  - Refer to lactation as needed  Outcome: Progressing  Goal: Incision(s), wounds(s) or drain site(s) healing without S/S of infection  Description: INTERVENTIONS  - Assess and document dressing, incision, wound bed, drain sites and surrounding tissue  - Provide patient and family education  - Perform skin care/dressing changes every   Outcome: Progressing

## 2023-03-08 NOTE — PROGRESS NOTES
Progress Note - OB/GYN  Mara Duarte 28 y o  female MRN: [id number]  Unit/Bed#:  321-01 Encounter: [id number]    Assessment and Plan     Ag Coronado is a patient of: [facility]   She is POD# 1 s/p 1LTCS (maternal request)  Recovering well and is stable       Subclinical hypothyroidism  Assessment & Plan  On home dose of Levothyroxine ( 50mcg)    Asthma  Assessment & Plan  monitor symptoms  Albuterol PRN    * S/P  section  Assessment & Plan  , Hgb 11 5 --> 10 1   Lines: voiding spontaneously   Pain: Tylenol, dilaudid PCA - transition to PO meds  FEN: Tolerating regular diet  DVT ppx: SCDs and Lovenox 40mg qD  Passing flatus   Incision C/D/I           Disposition    - Anticipate discharge home on POD# 2 vs 3      Subjective/Objective     Chief Complaint: Postoperative State     Subjective:    Ag Coronado is s/p 1LTCS  She is POD# 1  She has no current complaints  Pain is well controlled  Patient is currently voiding  She is ambulating  Patient is not currently passing flatus and has had no bowel movement  She is tolerating PO, and denies nausea or vomitting  Patient denies fever, chills, chest pain, shortness of breath, or calf tenderness  Lochia is normal  She is  Breastfeeding  Her incision is C/D/I  She is recovering well and is stable         Vitals:   /71 (BP Location: Right arm)   Pulse 101   Temp 97 7 °F (36 5 °C) (Oral)   Resp 18   Ht 5' 5" (1 651 m)   Wt 103 kg (228 lb)   LMP 2022 (Approximate)   SpO2 99%   Breastfeeding Yes   BMI 37 94 kg/m²       Intake/Output Summary (Last 24 hours) at 3/8/2023 0602  Last data filed at 3/8/2023 0300  Gross per 24 hour   Intake 1000 ml   Output 875 ml   Net 125 ml       Invasive Devices     Peripheral Intravenous Line  Duration           Peripheral IV 23 Right;Ventral (anterior) Hand 2 days    Peripheral IV 23 Proximal;Right;Ventral (anterior) Forearm 1 day                Physical Exam:   GEN: Nithya Gold Felicia appears well, alert, pleasant and cooperative   CARDIO: RRR  RESP:  Normal respiratory effort  ABDOMEN: soft, no tenderness, no distention, fundus firm, Incision C/D/I  EXTREMITIES: SCDs on, Negative Fabrizio's sign bilaterally      Labs:     Hemoglobin   Date Value Ref Range Status   03/08/2023 8 4 (L) 11 5 - 15 4 g/dL Final   03/07/2023 10 1 (L) 11 5 - 15 4 g/dL Final     WBC   Date Value Ref Range Status   03/08/2023 22 82 (H) 4 31 - 10 16 Thousand/uL Final   03/07/2023 27 88 (H) 4 31 - 10 16 Thousand/uL Final     Platelets   Date Value Ref Range Status   03/08/2023 208 149 - 390 Thousands/uL Final   03/07/2023 198 149 - 390 Thousands/uL Final     Creatinine   Date Value Ref Range Status   03/05/2023 0 67 0 60 - 1 30 mg/dL Final     Comment:     Standardized to IDMS reference method   01/19/2023 1 02 0 60 - 1 30 mg/dL Final     Comment:     Standardized to IDMS reference method     AST   Date Value Ref Range Status   03/05/2023 26 13 - 39 U/L Final     Comment:     Specimen collection should occur prior to Sulfasalazine administration due to the potential for falsely depressed results  01/19/2023 13 13 - 39 U/L Final     Comment:     Specimen collection should occur prior to Sulfasalazine administration due to the potential for falsely depressed results  ALT   Date Value Ref Range Status   03/05/2023 12 7 - 52 U/L Final     Comment:     Specimen collection should occur prior to Sulfasalazine administration due to the potential for falsely depressed results  01/19/2023 9 7 - 52 U/L Final     Comment:     Specimen collection should occur prior to Sulfasalazine administration due to the potential for falsely depressed results             Jefry Brenner MD  OBGYN PGY1  3/8/2023  6:02 AM

## 2023-03-08 NOTE — PLAN OF CARE
Problem: PAIN - ADULT  Goal: Verbalizes/displays adequate comfort level or baseline comfort level  Description: Interventions:  - Encourage patient to monitor pain and request assistance  - Assess pain using appropriate pain scale  - Administer analgesics based on type and severity of pain and evaluate response  - Implement non-pharmacological measures as appropriate and evaluate response  - Consider cultural and social influences on pain and pain management  - Notify physician/advanced practitioner if interventions unsuccessful or patient reports new pain  Outcome: Progressing     Problem: INFECTION - ADULT  Goal: Absence or prevention of progression during hospitalization  Description: INTERVENTIONS:  - Assess and monitor for signs and symptoms of infection  - Monitor lab/diagnostic results  - Monitor all insertion sites, i e  indwelling lines, tubes, and drains  - Monitor endotracheal if appropriate and nasal secretions for changes in amount and color  - [place] appropriate cooling/warming therapies per order  - Administer medications as ordered  - Instruct and encourage patient and family to use good hand hygiene technique  - Identify and instruct in appropriate isolation precautions for identified infection/condition  Outcome: Progressing  Goal: Absence of fever/infection during neutropenic period  Description: INTERVENTIONS:  - Monitor WBC    Outcome: Progressing     Problem: SAFETY ADULT  Goal: Patient will remain free of falls  Description: INTERVENTIONS:  - Educate patient/family on patient safety including physical limitations  - Instruct patient to call for assistance with activity   - Consult OT/PT to assist with strengthening/mobility   - Keep Call bell within reach  - Keep bed low and locked with side rails adjusted as appropriate  - Keep care items and personal belongings within reach  - Initiate and maintain comfort rounds  - Make Fall Risk Sign visible to staff  - Offer Toileting every  Hours, in advance of need  - Initiate/Maintain alarm  - Obtain necessary fall risk management equipment:   - Apply yellow socks and bracelet for high fall risk patients  - Consider moving patient to room near nurses station  Outcome: Progressing  Goal: Maintain or return to baseline ADL function  Description: INTERVENTIONS:  -  Assess patient's ability to carry out ADLs; assess patient's baseline for ADL function and identify physical deficits which impact ability to perform ADLs (bathing, care of mouth/teeth, toileting, grooming, dressing, etc )  - Assess/evaluate cause of self-care deficits   - Assess range of motion  - Assess patient's mobility; develop plan if impaired  - Assess patient's need for assistive devices and provide as appropriate  - Encourage maximum independence but intervene and supervise when necessary  - Involve family in performance of ADLs  - Assess for home care needs following discharge   - Consider OT consult to assist with ADL evaluation and planning for discharge  - Provide patient education as appropriate  Outcome: Progressing  Goal: Maintains/Returns to pre admission functional level  Description: INTERVENTIONS:  - Perform BMAT or MOVE assessment daily    - Set and communicate daily mobility goal to care team and patient/family/caregiver  - Collaborate with rehabilitation services on mobility goals if consulted  - Perform Range of Motion  times a day  - Reposition patient every  hours    - Dangle patient  times a day  - Stand patient  times a day  - Ambulate patient  times a day  - Out of bed to chair  times a day   - Out of bed for meals  times a day  - Out of bed for toileting  - Record patient progress and toleration of activity level   Outcome: Progressing     Problem: DISCHARGE PLANNING  Goal: Discharge to home or other facility with appropriate resources  Description: INTERVENTIONS:  - Identify barriers to discharge w/patient and caregiver  - Arrange for needed discharge resources and transportation as appropriate  - Identify discharge learning needs (meds, wound care, etc )  - Arrange for interpretive services to assist at discharge as needed  - Refer to Case Management Department for coordinating discharge planning if the patient needs post-hospital services based on physician/advanced practitioner order or complex needs related to functional status, cognitive ability, or social support system  Outcome: Progressing     Problem: POSTPARTUM  Goal: Experiences normal postpartum course  Description: INTERVENTIONS:  - Monitor maternal vital signs  - Assess uterine involution and lochia  Outcome: Progressing  Goal: Appropriate maternal -  bonding  Description: INTERVENTIONS:  - Identify family support  - Assess for appropriate maternal/infant bonding   -Encourage maternal/infant bonding opportunities  - Referral to  or  as needed  Outcome: Progressing  Goal: Establishment of infant feeding pattern  Description: INTERVENTIONS:  - Assess breast/bottle feeding  - Refer to lactation as needed  Outcome: Progressing  Goal: Incision(s), wounds(s) or drain site(s) healing without S/S of infection  Description: INTERVENTIONS  - Assess and document dressing, incision, wound bed, drain sites and surrounding tissue  - Provide patient and family education  - Perform skin care/dressing changes every   Outcome: Progressing

## 2023-03-09 RX ORDER — ACETAMINOPHEN 325 MG/1
975 TABLET ORAL EVERY 8 HOURS SCHEDULED
Status: DISCONTINUED | OUTPATIENT
Start: 2023-03-09 | End: 2023-03-10 | Stop reason: HOSPADM

## 2023-03-09 RX ADMIN — OXYCODONE HYDROCHLORIDE 10 MG: 10 TABLET ORAL at 10:07

## 2023-03-09 RX ADMIN — ACETAMINOPHEN 975 MG: 325 TABLET ORAL at 17:16

## 2023-03-09 RX ADMIN — ONDANSETRON 4 MG: 2 INJECTION INTRAMUSCULAR; INTRAVENOUS at 04:25

## 2023-03-09 RX ADMIN — LEVOTHYROXINE SODIUM 50 MCG: 50 TABLET ORAL at 05:32

## 2023-03-09 RX ADMIN — DOCUSATE SODIUM 100 MG: 100 CAPSULE, LIQUID FILLED ORAL at 17:16

## 2023-03-09 RX ADMIN — IBUPROFEN 600 MG: 600 TABLET, FILM COATED ORAL at 12:42

## 2023-03-09 RX ADMIN — ACETAMINOPHEN 975 MG: 325 TABLET, FILM COATED ORAL at 03:45

## 2023-03-09 RX ADMIN — ENOXAPARIN SODIUM 40 MG: 40 INJECTION SUBCUTANEOUS at 20:10

## 2023-03-09 RX ADMIN — OXYCODONE HYDROCHLORIDE 10 MG: 10 TABLET ORAL at 04:36

## 2023-03-09 RX ADMIN — OXYCODONE HYDROCHLORIDE 10 MG: 10 TABLET ORAL at 23:32

## 2023-03-09 RX ADMIN — DOCUSATE SODIUM 100 MG: 100 CAPSULE, LIQUID FILLED ORAL at 10:06

## 2023-03-09 RX ADMIN — IBUPROFEN 600 MG: 600 TABLET, FILM COATED ORAL at 20:10

## 2023-03-09 NOTE — PROGRESS NOTES
Progress Note - OB/GYN  Mara Duarte 28 y o  female MRN: [id number]  Unit/Bed#: -01 Encounter: [id number]    Assessment and Plan     Cheri Molina is a patient of: [facility]   She is POD# 2 s/p 1LTCS  Recovering well and is stable       Gestational hypertension, third trimester  Assessment & Plan  Systolic (20HGK), XNE:443 , Min:126 , OAX:143   Diastolic (49LHI), GSP:90, Min:74, Max:79    Cbc/cmp wnl, PC: 0 11    Subclinical hypothyroidism  Assessment & Plan  On home dose of Levothyroxine ( 50mcg)    Asthma  Assessment & Plan  monitor symptoms  Albuterol PRN    * S/P  section  Assessment & Plan  , Hgb 11 5 --> 10 1 > 8 4 venofer given  Lines: voiding spontaneously   Pain: Tylenol, motrin scheduled, harjeet 5/10 PRN  FEN: Tolerating regular diet  DVT ppx: SCDs and Lovenox 40mg qD  Passing flatus   Incision C/D/I           Disposition    - Anticipate discharge home POD#3 vs 4      Subjective/Objective     Chief Complaint: Postoperative State     Subjective:    Cheri Molina is s/p 1LTCS  She is POD# 2  She has no current complaints  Pain is controlled with roxicodone  Patient is currently voiding  She is ambulating  Patient is currently passing flatus and has had no bowel movement  She is tolerating PO, and denies nausea or vomitting  Patient denies fever, chills, chest pain, shortness of breath, or calf tenderness  Lochia is normal  She is  Breastfeeding  Her incision is C/D/I  She is recovering well and is stable         Vitals:   /74 (BP Location: Left arm)   Pulse 98   Temp 97 6 °F (36 4 °C) (Oral)   Resp 20   Ht 5' 5" (1 651 m)   Wt 103 kg (228 lb)   LMP 2022 (Approximate)   SpO2 98%   Breastfeeding Yes   BMI 37 94 kg/m²     No intake or output data in the 24 hours ending 23 0600    Invasive Devices     Peripheral Intravenous Line  Duration           Peripheral IV 23 Right;Ventral (anterior) Hand 3 days    Peripheral IV 23 Proximal;Right;Ventral (anterior) Forearm 2 days                Physical Exam:   GEN: Janeen Cleveland appears well, alert, pleasant and cooperative   CARDIO: RRR  RESP:  Normal respiratory effort  ABDOMEN: soft, no tenderness, no distention, fundus firm, Incision C/D/I  EXTREMITIES: SCDs on, Negative Fabrizio's sign bilaterally      Labs:     Hemoglobin   Date Value Ref Range Status   03/08/2023 8 4 (L) 11 5 - 15 4 g/dL Final   03/07/2023 10 1 (L) 11 5 - 15 4 g/dL Final     WBC   Date Value Ref Range Status   03/08/2023 22 82 (H) 4 31 - 10 16 Thousand/uL Final   03/07/2023 27 88 (H) 4 31 - 10 16 Thousand/uL Final     Platelets   Date Value Ref Range Status   03/08/2023 208 149 - 390 Thousands/uL Final   03/07/2023 198 149 - 390 Thousands/uL Final     Creatinine   Date Value Ref Range Status   03/05/2023 0 67 0 60 - 1 30 mg/dL Final     Comment:     Standardized to IDMS reference method   01/19/2023 1 02 0 60 - 1 30 mg/dL Final     Comment:     Standardized to IDMS reference method     AST   Date Value Ref Range Status   03/05/2023 26 13 - 39 U/L Final     Comment:     Specimen collection should occur prior to Sulfasalazine administration due to the potential for falsely depressed results  01/19/2023 13 13 - 39 U/L Final     Comment:     Specimen collection should occur prior to Sulfasalazine administration due to the potential for falsely depressed results  ALT   Date Value Ref Range Status   03/05/2023 12 7 - 52 U/L Final     Comment:     Specimen collection should occur prior to Sulfasalazine administration due to the potential for falsely depressed results  01/19/2023 9 7 - 52 U/L Final     Comment:     Specimen collection should occur prior to Sulfasalazine administration due to the potential for falsely depressed results             Shanon Mccain MD  OBGYN PGY1  3/9/2023  6:00 AM

## 2023-03-09 NOTE — PLAN OF CARE
Problem: PAIN - ADULT  Goal: Verbalizes/displays adequate comfort level or baseline comfort level  Description: Interventions:  - Encourage patient to monitor pain and request assistance  - Assess pain using appropriate pain scale  - Administer analgesics based on type and severity of pain and evaluate response  - Implement non-pharmacological measures as appropriate and evaluate response  - Consider cultural and social influences on pain and pain management  - Notify physician/advanced practitioner if interventions unsuccessful or patient reports new pain  Outcome: Progressing     Problem: INFECTION - ADULT  Goal: Absence or prevention of progression during hospitalization  Description: INTERVENTIONS:  - Assess and monitor for signs and symptoms of infection  - Monitor lab/diagnostic results  - Monitor all insertion sites, i e  indwelling lines, tubes, and drains  - Monitor endotracheal if appropriate and nasal secretions for changes in amount and color  - [place] appropriate cooling/warming therapies per order  - Administer medications as ordered  - Instruct and encourage patient and family to use good hand hygiene technique  - Identify and instruct in appropriate isolation precautions for identified infection/condition  Outcome: Progressing  Goal: Absence of fever/infection during neutropenic period  Description: INTERVENTIONS:  - Monitor WBC    Outcome: Progressing     Problem: SAFETY ADULT  Goal: Patient will remain free of falls  Description: INTERVENTIONS:  - Educate patient/family on patient safety including physical limitations  - Instruct patient to call for assistance with activity   - Consult OT/PT to assist with strengthening/mobility   - Keep Call bell within reach  - Keep bed low and locked with side rails adjusted as appropriate  - Keep care items and personal belongings within reach  - Initiate and maintain comfort rounds  - Make Fall Risk Sign visible to staff  - Offer Toileting every  Hours, in advance of need  - Initiate/Maintain alarm  - Obtain necessary fall risk management equipment:   - Apply yellow socks and bracelet for high fall risk patients  - Consider moving patient to room near nurses station  Outcome: Progressing  Goal: Maintain or return to baseline ADL function  Description: INTERVENTIONS:  -  Assess patient's ability to carry out ADLs; assess patient's baseline for ADL function and identify physical deficits which impact ability to perform ADLs (bathing, care of mouth/teeth, toileting, grooming, dressing, etc )  - Assess/evaluate cause of self-care deficits   - Assess range of motion  - Assess patient's mobility; develop plan if impaired  - Assess patient's need for assistive devices and provide as appropriate  - Encourage maximum independence but intervene and supervise when necessary  - Involve family in performance of ADLs  - Assess for home care needs following discharge   - Consider OT consult to assist with ADL evaluation and planning for discharge  - Provide patient education as appropriate  Outcome: Progressing  Goal: Maintains/Returns to pre admission functional level  Description: INTERVENTIONS:  - Perform BMAT or MOVE assessment daily    - Set and communicate daily mobility goal to care team and patient/family/caregiver  - Collaborate with rehabilitation services on mobility goals if consulted  - Perform Range of Motion  times a day  - Reposition patient every hours    - Dangle patient  times a day  - Stand patient  times a day  - Ambulate patient times a day  - Out of bed to chair  times a day   - Out of bed for meals  times a day  - Out of bed for toileting  - Record patient progress and toleration of activity level   Outcome: Progressing     Problem: DISCHARGE PLANNING  Goal: Discharge to home or other facility with appropriate resources  Description: INTERVENTIONS:  - Identify barriers to discharge w/patient and caregiver  - Arrange for needed discharge resources and transportation as appropriate  - Identify discharge learning needs (meds, wound care, etc )  - Arrange for interpretive services to assist at discharge as needed  - Refer to Case Management Department for coordinating discharge planning if the patient needs post-hospital services based on physician/advanced practitioner order or complex needs related to functional status, cognitive ability, or social support system  Outcome: Progressing     Problem: POSTPARTUM  Goal: Experiences normal postpartum course  Description: INTERVENTIONS:  - Monitor maternal vital signs  - Assess uterine involution and lochia  Outcome: Progressing  Goal: Appropriate maternal -  bonding  Description: INTERVENTIONS:  - Identify family support  - Assess for appropriate maternal/infant bonding   -Encourage maternal/infant bonding opportunities  - Referral to  or  as needed  Outcome: Progressing  Goal: Establishment of infant feeding pattern  Description: INTERVENTIONS:  - Assess breast/bottle feeding  - Refer to lactation as needed  Outcome: Progressing  Goal: Incision(s), wounds(s) or drain site(s) healing without S/S of infection  Description: INTERVENTIONS  - Assess and document dressing, incision, wound bed, drain sites and surrounding tissue  - Provide patient and family education  - Perform skin care/dressing changes every   Outcome: Progressing

## 2023-03-09 NOTE — ASSESSMENT & PLAN NOTE
Systolic (37LJE), FERNANDO:774 , Min:136 , ZJP:063   Diastolic (99ZWC), EJB:87, Min:89, Max:89    Cbc/cmp wnl, PC: 0 11

## 2023-03-10 VITALS
SYSTOLIC BLOOD PRESSURE: 139 MMHG | WEIGHT: 228 LBS | HEIGHT: 65 IN | OXYGEN SATURATION: 97 % | TEMPERATURE: 98.5 F | DIASTOLIC BLOOD PRESSURE: 89 MMHG | BODY MASS INDEX: 37.99 KG/M2 | RESPIRATION RATE: 20 BRPM | HEART RATE: 102 BPM

## 2023-03-10 RX ORDER — ACETAMINOPHEN 325 MG/1
975 TABLET ORAL EVERY 8 HOURS SCHEDULED
Refills: 0
Start: 2023-03-10

## 2023-03-10 RX ORDER — OXYCODONE HYDROCHLORIDE 5 MG/1
5 TABLET ORAL EVERY 6 HOURS PRN
Qty: 20 TABLET | Refills: 0 | Status: SHIPPED | OUTPATIENT
Start: 2023-03-10 | End: 2023-03-20

## 2023-03-10 RX ORDER — IBUPROFEN 600 MG/1
600 TABLET ORAL EVERY 6 HOURS SCHEDULED
Qty: 45 TABLET | Refills: 0 | Status: SHIPPED | OUTPATIENT
Start: 2023-03-10 | End: 2023-04-09

## 2023-03-10 RX ADMIN — IBUPROFEN 600 MG: 600 TABLET, FILM COATED ORAL at 12:58

## 2023-03-10 RX ADMIN — ACETAMINOPHEN 975 MG: 325 TABLET ORAL at 08:01

## 2023-03-10 RX ADMIN — OXYCODONE HYDROCHLORIDE 10 MG: 10 TABLET ORAL at 13:38

## 2023-03-10 RX ADMIN — IBUPROFEN 600 MG: 600 TABLET, FILM COATED ORAL at 04:40

## 2023-03-10 RX ADMIN — ONDANSETRON 4 MG: 4 TABLET, ORALLY DISINTEGRATING ORAL at 08:20

## 2023-03-10 RX ADMIN — OXYCODONE HYDROCHLORIDE 10 MG: 10 TABLET ORAL at 09:27

## 2023-03-10 RX ADMIN — ACETAMINOPHEN 975 MG: 325 TABLET ORAL at 01:10

## 2023-03-10 RX ADMIN — LEVOTHYROXINE SODIUM 50 MCG: 50 TABLET ORAL at 05:58

## 2023-03-10 RX ADMIN — DOCUSATE SODIUM 100 MG: 100 CAPSULE, LIQUID FILLED ORAL at 08:01

## 2023-03-10 NOTE — DISCHARGE INSTR - ACTIVITY
Milk Supply:   - Allow for non-nutritive suck at the breast to stimulate supply   - Allow for skin to skin during and after each breastfeeding session   - Use massage, heat, and hand expression prior to feedings to assist with deep latch   - Increase pumping sessions and pump after every feeding   - Educate self on galactagogues likes [employer] from [place], [employer], [address] from [employer] and [employer] from [place]  Educational information can be found at [facility] [employer] [place]    Pumping:   - When pumping, begin in stimulation mode (high cycle, low vacuum) until milk begins to express  Change pump to expression mode (low cycle, high vacuum)  Use hands on pumping techniques to assist with milk transfer  When milk stops expressing, change back to stimulation mode  When milk begins to flow, change to expression mode  You make cycle pump up to three times in a pumping session  Mom is encouraged to place baby skin to skin for feedings  Skin to skin education provided for baby placement on mother's chest, baby only in diaper, blankets below shoulders on baby's back  Skin to skin is encouraged to continue at home for feedings and between feedings  Information on hand expression given  Discussed benefits of knowing how to manually express breast including stimulating milk supply, softening nipple for latch and evacuating breast in the event of engorgement  Check out [employer]

## 2023-03-10 NOTE — PLAN OF CARE
Problem: PAIN - ADULT  Goal: Verbalizes/displays adequate comfort level or baseline comfort level  Description: Interventions:  - Encourage patient to monitor pain and request assistance  - Assess pain using appropriate pain scale  - Administer analgesics based on type and severity of pain and evaluate response  - Implement non-pharmacological measures as appropriate and evaluate response  - Consider cultural and social influences on pain and pain management  - Notify physician/advanced practitioner if interventions unsuccessful or patient reports new pain  Outcome: Progressing     Problem: INFECTION - ADULT  Goal: Absence or prevention of progression during hospitalization  Description: INTERVENTIONS:  - Assess and monitor for signs and symptoms of infection  - Monitor lab/diagnostic results  - Monitor all insertion sites, i e  indwelling lines, tubes, and drains  - Monitor endotracheal if appropriate and nasal secretions for changes in amount and color  - [place] appropriate cooling/warming therapies per order  - Administer medications as ordered  - Instruct and encourage patient and family to use good hand hygiene technique  - Identify and instruct in appropriate isolation precautions for identified infection/condition  Outcome: Progressing  Goal: Absence of fever/infection during neutropenic period  Description: INTERVENTIONS:  - Monitor WBC    Outcome: Progressing     Problem: SAFETY ADULT  Goal: Patient will remain free of falls  Description: INTERVENTIONS:  - Educate patient/family on patient safety including physical limitations  - Instruct patient to call for assistance with activity   - Consult OT/PT to assist with strengthening/mobility   - Keep Call bell within reach  - Keep bed low and locked with side rails adjusted as appropriate  - Keep care items and personal belongings within reach  - Initiate and maintain comfort rounds  - Make Fall Risk Sign visible to staff  - Offer Toileting every 2 Hours, in advance of need  - Initiate/Maintain alarm  - Obtain necessary fall risk management equipment:   - Apply yellow socks and bracelet for high fall risk patients  - Consider moving patient to room near nurses station  Outcome: Progressing  Goal: Maintain or return to baseline ADL function  Description: INTERVENTIONS:  -  Assess patient's ability to carry out ADLs; assess patient's baseline for ADL function and identify physical deficits which impact ability to perform ADLs (bathing, care of mouth/teeth, toileting, grooming, dressing, etc )  - Assess/evaluate cause of self-care deficits   - Assess range of motion  - Assess patient's mobility; develop plan if impaired  - Assess patient's need for assistive devices and provide as appropriate  - Encourage maximum independence but intervene and supervise when necessary  - Involve family in performance of ADLs  - Assess for home care needs following discharge   - Consider OT consult to assist with ADL evaluation and planning for discharge  - Provide patient education as appropriate  Outcome: Progressing  Goal: Maintains/Returns to pre admission functional level  Description: INTERVENTIONS:  - Perform BMAT or MOVE assessment daily    - Set and communicate daily mobility goal to care team and patient/family/caregiver  - Collaborate with rehabilitation services on mobility goals if consulted  - Perform Range of Motion  times a day  - Reposition patient every  hours    - Dangle patient  times a day  - Stand patient  times a day  - Ambulate patient  times a day  - Out of bed to chair  times a day   - Out of bed for meals  times a day  - Out of bed for toileting  - Record patient progress and toleration of activity level   Outcome: Progressing     Problem: DISCHARGE PLANNING  Goal: Discharge to home or other facility with appropriate resources  Description: INTERVENTIONS:  - Identify barriers to discharge w/patient and caregiver  - Arrange for needed discharge resources and transportation as appropriate  - Identify discharge learning needs (meds, wound care, etc )  - Arrange for interpretive services to assist at discharge as needed  - Refer to Case Management Department for coordinating discharge planning if the patient needs post-hospital services based on physician/advanced practitioner order or complex needs related to functional status, cognitive ability, or social support system  Outcome: Progressing     Problem: POSTPARTUM  Goal: Experiences normal postpartum course  Description: INTERVENTIONS:  - Monitor maternal vital signs  - Assess uterine involution and lochia  Outcome: Progressing  Goal: Appropriate maternal -  bonding  Description: INTERVENTIONS:  - Identify family support  - Assess for appropriate maternal/infant bonding   -Encourage maternal/infant bonding opportunities  - Referral to  or  as needed  Outcome: Progressing  Goal: Establishment of infant feeding pattern  Description: INTERVENTIONS:  - Assess breast/bottle feeding  - Refer to lactation as needed  Outcome: Progressing  Goal: Incision(s), wounds(s) or drain site(s) healing without S/S of infection  Description: INTERVENTIONS  - Assess and document dressing, incision, wound bed, drain sites and surrounding tissue  - Provide patient and family education  - Perform skin care/dressing changes every   Outcome: Progressing

## 2023-03-10 NOTE — PROGRESS NOTES
Progress Note - OB/GYN  Mara Duarte 28 y o  female MRN: [id number]  Unit/Bed#:  321-01 Encounter: [id number]    Assessment and Plan     Jamie Hong is a patient of: [facility]   She is POD# 3 s/p 1LTCS  Recovering well and is stable       Gestational hypertension, third trimester  Assessment & Plan  Systolic (68YXS), CXD:827 , Min:136 , VAD:952   Diastolic (81ELU), ERU:60, Min:89, Max:89    Cbc/cmp wnl, PC: 0 11    Subclinical hypothyroidism  Assessment & Plan  On home dose of Levothyroxine ( 50mcg)    Asthma  Assessment & Plan  monitor symptoms  Albuterol PRN    * S/P  section  Assessment & Plan  , Hgb 11 5 --> 10 1 > 8 4 venofer given  Lines: voiding spontaneously   Pain: Tylenol, motrin scheduled, harjeet 5/10 PRN  FEN: Tolerating regular diet  DVT ppx: SCDs and Lovenox 40mg qD  Passing flatus   Incision C/D/I           Disposition    - Anticipate discharge home today vs tomorrow      Subjective/Objective     Chief Complaint: Postoperative State     Subjective:    Jamie Hong is s/p 1LTCS  She is POD# 3  She has no current complaints  Pain is better controlled today  Patient is currently voiding  She is ambulating  Patient is currently passing flatus and has had no bowel movement  She is tolerating PO, and denies nausea or vomitting  Patient denies fever, chills, chest pain, shortness of breath, or calf tenderness  Lochia is normal  She is  Breastfeeding  Her incision is C/D/I  She is recovering well and is stable         Vitals:   /89 (BP Location: Left arm)   Pulse (!) 107   Temp 98 1 °F (36 7 °C) (Oral)   Resp 18   Ht 5' 5" (1 651 m)   Wt 103 kg (228 lb)   LMP 2022 (Approximate)   SpO2 97%   Breastfeeding Yes   BMI 37 94 kg/m²     No intake or output data in the 24 hours ending 03/10/23 0616    Invasive Devices     None                 Physical Exam:   GEN: Jamie Hong appears well, alert, pleasant and cooperative   CARDIO: RRR  RESP:  Normal respiratory effort  ABDOMEN: soft, no tenderness, no distention, fundus firm, Incision C/D/I  EXTREMITIES: SCDs on, Negative Fabrizio's sign bilaterally      Labs:     Hemoglobin   Date Value Ref Range Status   03/08/2023 8 4 (L) 11 5 - 15 4 g/dL Final   03/07/2023 10 1 (L) 11 5 - 15 4 g/dL Final     WBC   Date Value Ref Range Status   03/08/2023 22 82 (H) 4 31 - 10 16 Thousand/uL Final   03/07/2023 27 88 (H) 4 31 - 10 16 Thousand/uL Final     Platelets   Date Value Ref Range Status   03/08/2023 208 149 - 390 Thousands/uL Final   03/07/2023 198 149 - 390 Thousands/uL Final     Creatinine   Date Value Ref Range Status   03/05/2023 0 67 0 60 - 1 30 mg/dL Final     Comment:     Standardized to IDMS reference method   01/19/2023 1 02 0 60 - 1 30 mg/dL Final     Comment:     Standardized to IDMS reference method     AST   Date Value Ref Range Status   03/05/2023 26 13 - 39 U/L Final     Comment:     Specimen collection should occur prior to Sulfasalazine administration due to the potential for falsely depressed results  01/19/2023 13 13 - 39 U/L Final     Comment:     Specimen collection should occur prior to Sulfasalazine administration due to the potential for falsely depressed results  ALT   Date Value Ref Range Status   03/05/2023 12 7 - 52 U/L Final     Comment:     Specimen collection should occur prior to Sulfasalazine administration due to the potential for falsely depressed results  01/19/2023 9 7 - 52 U/L Final     Comment:     Specimen collection should occur prior to Sulfasalazine administration due to the potential for falsely depressed results             Kirby Galindo MD  OBGYN PGY1  3/10/2023  6:16 AM

## 2023-03-10 NOTE — PLAN OF CARE
Problem: PAIN - ADULT  Goal: Verbalizes/displays adequate comfort level or baseline comfort level  Description: Interventions:  - Encourage patient to monitor pain and request assistance  - Assess pain using appropriate pain scale  - Administer analgesics based on type and severity of pain and evaluate response  - Implement non-pharmacological measures as appropriate and evaluate response  - Consider cultural and social influences on pain and pain management  - Notify physician/advanced practitioner if interventions unsuccessful or patient reports new pain  Outcome: Progressing     Problem: INFECTION - ADULT  Goal: Absence or prevention of progression during hospitalization  Description: INTERVENTIONS:  - Assess and monitor for signs and symptoms of infection  - Monitor lab/diagnostic results  - Monitor all insertion sites, i e  indwelling lines, tubes, and drains  - Monitor endotracheal if appropriate and nasal secretions for changes in amount and color  - [place] appropriate cooling/warming therapies per order  - Administer medications as ordered  - Instruct and encourage patient and family to use good hand hygiene technique  - Identify and instruct in appropriate isolation precautions for identified infection/condition  Outcome: Progressing  Goal: Absence of fever/infection during neutropenic period  Description: INTERVENTIONS:  - Monitor WBC    Outcome: Progressing     Problem: SAFETY ADULT  Goal: Patient will remain free of falls  Description: INTERVENTIONS:  - Educate patient/family on patient safety including physical limitations  - Instruct patient to call for assistance with activity   - Consult OT/PT to assist with strengthening/mobility   - Keep Call bell within reach  - Keep bed low and locked with side rails adjusted as appropriate  - Keep care items and personal belongings within reach  - Initiate and maintain comfort rounds  - Make Fall Risk Sign visible to staff  - Offer Toileting every Hours, in advance of need  - Initiate/Maintain alarm  - Obtain necessary fall risk management equipment:   - Apply yellow socks and bracelet for high fall risk patients  - Consider moving patient to room near nurses station  Outcome: Progressing  Goal: Maintain or return to baseline ADL function  Description: INTERVENTIONS:  -  Assess patient's ability to carry out ADLs; assess patient's baseline for ADL function and identify physical deficits which impact ability to perform ADLs (bathing, care of mouth/teeth, toileting, grooming, dressing, etc )  - Assess/evaluate cause of self-care deficits   - Assess range of motion  - Assess patient's mobility; develop plan if impaired  - Assess patient's need for assistive devices and provide as appropriate  - Encourage maximum independence but intervene and supervise when necessary  - Involve family in performance of ADLs  - Assess for home care needs following discharge   - Consider OT consult to assist with ADL evaluation and planning for discharge  - Provide patient education as appropriate  Outcome: Progressing  Goal: Maintains/Returns to pre admission functional level  Description: INTERVENTIONS:  - Perform BMAT or MOVE assessment daily    - Set and communicate daily mobility goal to care team and patient/family/caregiver  - Collaborate with rehabilitation services on mobility goals if consulted  - Perform Range of Motion  times a day  - Reposition patient every  hours    - Dangle patient times a day  - Stand patient  times a day  - Ambulate patient  times a day  - Out of bed to chair  times a day   - Out of bed for meals  times a day  - Out of bed for toileting  - Record patient progress and toleration of activity level   Outcome: Progressing     Problem: DISCHARGE PLANNING  Goal: Discharge to home or other facility with appropriate resources  Description: INTERVENTIONS:  - Identify barriers to discharge w/patient and caregiver  - Arrange for needed discharge resources and transportation as appropriate  - Identify discharge learning needs (meds, wound care, etc )  - Arrange for interpretive services to assist at discharge as needed  - Refer to Case Management Department for coordinating discharge planning if the patient needs post-hospital services based on physician/advanced practitioner order or complex needs related to functional status, cognitive ability, or social support system  Outcome: Progressing     Problem: POSTPARTUM  Goal: Experiences normal postpartum course  Description: INTERVENTIONS:  - Monitor maternal vital signs  - Assess uterine involution and lochia  Outcome: Progressing  Goal: Appropriate maternal -  bonding  Description: INTERVENTIONS:  - Identify family support  - Assess for appropriate maternal/infant bonding   -Encourage maternal/infant bonding opportunities  - Referral to  or  as needed  Outcome: Progressing  Goal: Establishment of infant feeding pattern  Description: INTERVENTIONS:  - Assess breast/bottle feeding  - Refer to lactation as needed  Outcome: Progressing  Goal: Incision(s), wounds(s) or drain site(s) healing without S/S of infection  Description: INTERVENTIONS  - Assess and document dressing, incision, wound bed, drain sites and surrounding tissue  - Provide patient and family education  - Perform skin care/dressing changes every   Outcome: Progressing

## 2023-03-13 LAB — PLACENTA IN STORAGE: NORMAL

## 2023-03-16 ENCOUNTER — APPOINTMENT (EMERGENCY)
Dept: CT IMAGING | Facility: HOSPITAL | Age: 28
End: 2023-03-16

## 2023-03-16 ENCOUNTER — POSTPARTUM VISIT (OUTPATIENT)
Dept: OBGYN CLINIC | Facility: CLINIC | Age: 28
End: 2023-03-16

## 2023-03-16 ENCOUNTER — HOSPITAL ENCOUNTER (INPATIENT)
Facility: HOSPITAL | Age: 28
LOS: 2 days | Discharge: HOME | End: 2023-03-18
Attending: EMERGENCY MEDICINE | Admitting: STUDENT IN AN ORGANIZED HEALTH CARE EDUCATION/TRAINING PROGRAM

## 2023-03-16 VITALS — SYSTOLIC BLOOD PRESSURE: 160 MMHG | WEIGHT: 210 LBS | BODY MASS INDEX: 34.95 KG/M2 | DIASTOLIC BLOOD PRESSURE: 100 MMHG

## 2023-03-16 DIAGNOSIS — O99.282 THYROID DISEASE DURING PREGNANCY IN SECOND TRIMESTER: ICD-10-CM

## 2023-03-16 DIAGNOSIS — I10 HYPERTENSION: ICD-10-CM

## 2023-03-16 DIAGNOSIS — R51.9 HEADACHE: Primary | ICD-10-CM

## 2023-03-16 DIAGNOSIS — E07.9 THYROID DISEASE DURING PREGNANCY IN SECOND TRIMESTER: ICD-10-CM

## 2023-03-16 DIAGNOSIS — R51.9 POSTPARTUM HEADACHE: ICD-10-CM

## 2023-03-16 DIAGNOSIS — Z98.891 S/P CESAREAN SECTION: Primary | ICD-10-CM

## 2023-03-16 LAB
ALBUMIN SERPL BCP-MCNC: 3.7 G/DL (ref 3.5–5)
ALP SERPL-CCNC: 97 U/L (ref 34–104)
ALT SERPL W P-5'-P-CCNC: 24 U/L (ref 7–52)
ANION GAP SERPL CALCULATED.3IONS-SCNC: 7 MMOL/L (ref 4–13)
AST SERPL W P-5'-P-CCNC: 17 U/L (ref 13–39)
BASOPHILS # BLD AUTO: 0.03 THOUSANDS/ÂΜL (ref 0–0.1)
BASOPHILS NFR BLD AUTO: 0 % (ref 0–1)
BILIRUB SERPL-MCNC: 0.31 MG/DL (ref 0.2–1)
BUN SERPL-MCNC: 14 MG/DL (ref 5–25)
CALCIUM SERPL-MCNC: 8.9 MG/DL (ref 8.4–10.2)
CHLORIDE SERPL-SCNC: 108 MMOL/L (ref 96–108)
CO2 SERPL-SCNC: 24 MMOL/L (ref 21–32)
CREAT SERPL-MCNC: 0.79 MG/DL (ref 0.6–1.3)
CREAT UR-MCNC: 18.7 MG/DL
CREAT UR-MCNC: 52.1 MG/DL
EOSINOPHIL # BLD AUTO: 0.09 THOUSAND/ÂΜL (ref 0–0.61)
EOSINOPHIL NFR BLD AUTO: 1 % (ref 0–6)
ERYTHROCYTE [DISTWIDTH] IN BLOOD BY AUTOMATED COUNT: 14.2 % (ref 11.6–15.1)
ERYTHROCYTE [DISTWIDTH] IN BLOOD BY AUTOMATED COUNT: 14.4 % (ref 11.6–15.1)
GFR SERPL CREATININE-BSD FRML MDRD: 102 ML/MIN/1.73SQ M
GLUCOSE SERPL-MCNC: 66 MG/DL (ref 65–140)
HCT VFR BLD AUTO: 31.3 % (ref 34.8–46.1)
HCT VFR BLD AUTO: 32.5 % (ref 34.8–46.1)
HGB BLD-MCNC: 10.2 G/DL (ref 11.5–15.4)
HGB BLD-MCNC: 9.8 G/DL (ref 11.5–15.4)
IMM GRANULOCYTES # BLD AUTO: 0.04 THOUSAND/UL (ref 0–0.2)
IMM GRANULOCYTES NFR BLD AUTO: 1 % (ref 0–2)
LYMPHOCYTES # BLD AUTO: 1.62 THOUSANDS/ÂΜL (ref 0.6–4.47)
LYMPHOCYTES NFR BLD AUTO: 21 % (ref 14–44)
MAGNESIUM SERPL-MCNC: 2.6 MG/DL (ref 1.9–2.7)
MCH RBC QN AUTO: 28.4 PG (ref 26.8–34.3)
MCH RBC QN AUTO: 28.9 PG (ref 26.8–34.3)
MCHC RBC AUTO-ENTMCNC: 31.3 G/DL (ref 31.4–37.4)
MCHC RBC AUTO-ENTMCNC: 31.4 G/DL (ref 31.4–37.4)
MCV RBC AUTO: 91 FL (ref 82–98)
MCV RBC AUTO: 92 FL (ref 82–98)
MONOCYTES # BLD AUTO: 0.45 THOUSAND/ÂΜL (ref 0.17–1.22)
MONOCYTES NFR BLD AUTO: 6 % (ref 4–12)
NEUTROPHILS # BLD AUTO: 5.47 THOUSANDS/ÂΜL (ref 1.85–7.62)
NEUTS SEG NFR BLD AUTO: 71 % (ref 43–75)
NRBC BLD AUTO-RTO: 0 /100 WBCS
PLATELET # BLD AUTO: 290 THOUSANDS/UL (ref 149–390)
PLATELET # BLD AUTO: 309 THOUSANDS/UL (ref 149–390)
PMV BLD AUTO: 8.9 FL (ref 8.9–12.7)
PMV BLD AUTO: 9.2 FL (ref 8.9–12.7)
POTASSIUM SERPL-SCNC: 4.3 MMOL/L (ref 3.5–5.3)
PROT SERPL-MCNC: 6.9 G/DL (ref 6.4–8.4)
PROT UR-MCNC: 21 MG/DL
PROT UR-MCNC: <4 MG/DL
PROT/CREAT UR: 0.4 MG/G{CREAT} (ref 0–0.1)
RBC # BLD AUTO: 3.45 MILLION/UL (ref 3.81–5.12)
RBC # BLD AUTO: 3.53 MILLION/UL (ref 3.81–5.12)
SODIUM SERPL-SCNC: 139 MMOL/L (ref 135–147)
WBC # BLD AUTO: 7.26 THOUSAND/UL (ref 4.31–10.16)
WBC # BLD AUTO: 7.7 THOUSAND/UL (ref 4.31–10.16)

## 2023-03-16 RX ORDER — SODIUM CHLORIDE, SODIUM LACTATE, POTASSIUM CHLORIDE, CALCIUM CHLORIDE 600; 310; 30; 20 MG/100ML; MG/100ML; MG/100ML; MG/100ML
50 INJECTION, SOLUTION INTRAVENOUS CONTINUOUS
Status: DISCONTINUED | OUTPATIENT
Start: 2023-03-16 | End: 2023-03-18 | Stop reason: HOSPADM

## 2023-03-16 RX ORDER — MAGNESIUM SULFATE HEPTAHYDRATE 40 MG/ML
2 INJECTION, SOLUTION INTRAVENOUS ONCE
Status: DISCONTINUED | OUTPATIENT
Start: 2023-03-16 | End: 2023-03-16

## 2023-03-16 RX ORDER — IBUPROFEN 600 MG/1
600 TABLET ORAL EVERY 6 HOURS SCHEDULED
Status: DISCONTINUED | OUTPATIENT
Start: 2023-03-17 | End: 2023-03-18 | Stop reason: HOSPADM

## 2023-03-16 RX ORDER — CALCIUM GLUCONATE 94 MG/ML
1 INJECTION, SOLUTION INTRAVENOUS ONCE AS NEEDED
Status: DISCONTINUED | OUTPATIENT
Start: 2023-03-16 | End: 2023-03-18 | Stop reason: HOSPADM

## 2023-03-16 RX ORDER — CALCIUM CARBONATE 200(500)MG
1000 TABLET,CHEWABLE ORAL 3 TIMES DAILY PRN
Status: DISCONTINUED | OUTPATIENT
Start: 2023-03-16 | End: 2023-03-18 | Stop reason: HOSPADM

## 2023-03-16 RX ORDER — KETOROLAC TROMETHAMINE 30 MG/ML
15 INJECTION, SOLUTION INTRAMUSCULAR; INTRAVENOUS ONCE
Status: COMPLETED | OUTPATIENT
Start: 2023-03-16 | End: 2023-03-16

## 2023-03-16 RX ORDER — MAGNESIUM SULFATE HEPTAHYDRATE 40 MG/ML
2 INJECTION, SOLUTION INTRAVENOUS ONCE
Status: COMPLETED | OUTPATIENT
Start: 2023-03-16 | End: 2023-03-16

## 2023-03-16 RX ORDER — MECLIZINE HCL 12.5 MG/1
25 TABLET ORAL ONCE
Status: COMPLETED | OUTPATIENT
Start: 2023-03-16 | End: 2023-03-16

## 2023-03-16 RX ORDER — DIPHENHYDRAMINE HYDROCHLORIDE 50 MG/ML
25 INJECTION INTRAMUSCULAR; INTRAVENOUS ONCE
Status: COMPLETED | OUTPATIENT
Start: 2023-03-16 | End: 2023-03-16

## 2023-03-16 RX ORDER — ACETAMINOPHEN 325 MG/1
650 TABLET ORAL EVERY 6 HOURS SCHEDULED
Status: DISCONTINUED | OUTPATIENT
Start: 2023-03-17 | End: 2023-03-17

## 2023-03-16 RX ORDER — MAGNESIUM SULFATE HEPTAHYDRATE 40 MG/ML
2 INJECTION, SOLUTION INTRAVENOUS CONTINUOUS
Status: DISCONTINUED | OUTPATIENT
Start: 2023-03-16 | End: 2023-03-18 | Stop reason: HOSPADM

## 2023-03-16 RX ORDER — DIPHENHYDRAMINE HCL 25 MG
25 TABLET ORAL ONCE
Status: DISCONTINUED | OUTPATIENT
Start: 2023-03-16 | End: 2023-03-16

## 2023-03-16 RX ORDER — LEVOTHYROXINE SODIUM 0.05 MG/1
50 TABLET ORAL
Status: DISCONTINUED | OUTPATIENT
Start: 2023-03-17 | End: 2023-03-18 | Stop reason: HOSPADM

## 2023-03-16 RX ORDER — ACETAMINOPHEN 325 MG/1
975 TABLET ORAL ONCE
Status: COMPLETED | OUTPATIENT
Start: 2023-03-16 | End: 2023-03-16

## 2023-03-16 RX ORDER — MAGNESIUM HYDROXIDE/ALUMINUM HYDROXICE/SIMETHICONE 120; 1200; 1200 MG/30ML; MG/30ML; MG/30ML
15 SUSPENSION ORAL EVERY 6 HOURS PRN
Status: DISCONTINUED | OUTPATIENT
Start: 2023-03-16 | End: 2023-03-18 | Stop reason: HOSPADM

## 2023-03-16 RX ORDER — SIMETHICONE 80 MG
80 TABLET,CHEWABLE ORAL 4 TIMES DAILY PRN
Status: DISCONTINUED | OUTPATIENT
Start: 2023-03-16 | End: 2023-03-18 | Stop reason: HOSPADM

## 2023-03-16 RX ORDER — MAGNESIUM SULFATE HEPTAHYDRATE 40 MG/ML
4 INJECTION, SOLUTION INTRAVENOUS ONCE
Status: COMPLETED | OUTPATIENT
Start: 2023-03-16 | End: 2023-03-17

## 2023-03-16 RX ORDER — METOCLOPRAMIDE HYDROCHLORIDE 5 MG/ML
10 INJECTION INTRAMUSCULAR; INTRAVENOUS ONCE
Status: COMPLETED | OUTPATIENT
Start: 2023-03-16 | End: 2023-03-16

## 2023-03-16 RX ORDER — DOCUSATE SODIUM 100 MG/1
100 CAPSULE, LIQUID FILLED ORAL 2 TIMES DAILY
Status: DISCONTINUED | OUTPATIENT
Start: 2023-03-17 | End: 2023-03-18 | Stop reason: HOSPADM

## 2023-03-16 RX ORDER — OXYCODONE HYDROCHLORIDE 5 MG/1
5 TABLET ORAL EVERY 4 HOURS PRN
Status: DISCONTINUED | OUTPATIENT
Start: 2023-03-16 | End: 2023-03-18 | Stop reason: HOSPADM

## 2023-03-16 RX ORDER — ONDANSETRON 2 MG/ML
4 INJECTION INTRAMUSCULAR; INTRAVENOUS EVERY 8 HOURS PRN
Status: DISCONTINUED | OUTPATIENT
Start: 2023-03-16 | End: 2023-03-18 | Stop reason: HOSPADM

## 2023-03-16 RX ORDER — OXYCODONE HYDROCHLORIDE 5 MG/1
5 TABLET ORAL ONCE
Status: COMPLETED | OUTPATIENT
Start: 2023-03-16 | End: 2023-03-16

## 2023-03-16 RX ADMIN — DIPHENHYDRAMINE HYDROCHLORIDE 25 MG: 50 INJECTION, SOLUTION INTRAMUSCULAR; INTRAVENOUS at 19:31

## 2023-03-16 RX ADMIN — MAGNESIUM SULFATE HEPTAHYDRATE 2 G: 40 INJECTION, SOLUTION INTRAVENOUS at 19:41

## 2023-03-16 RX ADMIN — SODIUM CHLORIDE 500 ML: 0.9 INJECTION, SOLUTION INTRAVENOUS at 19:28

## 2023-03-16 RX ADMIN — MECLIZINE 25 MG: 12.5 TABLET ORAL at 16:51

## 2023-03-16 RX ADMIN — OXYCODONE HYDROCHLORIDE 5 MG: 5 TABLET ORAL at 17:09

## 2023-03-16 RX ADMIN — METOCLOPRAMIDE 10 MG: 5 INJECTION, SOLUTION INTRAMUSCULAR; INTRAVENOUS at 19:31

## 2023-03-16 RX ADMIN — KETOROLAC TROMETHAMINE 15 MG: 30 INJECTION, SOLUTION INTRAMUSCULAR at 20:50

## 2023-03-16 RX ADMIN — ACETAMINOPHEN 975 MG: 325 TABLET ORAL at 16:52

## 2023-03-16 NOTE — PROGRESS NOTES
Patient is here for a postpartum incision check  She delivered on  and is currently 11 days postop  I have fully reviewed the prenatal and intrapartum course  She states she is experiencing a lot of pain at her incision site and around her periumbilical area  She states she has a HA since giving birth but it was tolerate  Today the HA has worsen and it is located in the frontal/temporal region  The HA is described as throbbing and squeezing  She treated this morning with Tylenol 975 mg and took Motrin 600 mg about an hour ago and still no relief  She has BLE edema and has a blister in her left foot  History of gestational HTN  Denies baby blues  Bleeding is light  She is pumping  ROS:  As indicated in HPI  All other ROS negative  Visit Vitals  /100 (BP Location: Right arm, Patient Position: Supine)   Wt 95 3 kg (210 lb)   LMP 2022 (Approximate)   Breastfeeding Yes   BMI 34 95 kg/m²   OB Status Recent pregnancy   Smoking Status Never   BSA 2 02 m²       Physical Exam  Constitutional:       Appearance: Normal appearance  HENT:      Head: Normocephalic and atraumatic  Abdominal:      Palpations: Abdomen is soft  Tenderness: There is abdominal tenderness in the periumbilical area  Musculoskeletal:      Cervical back: Neck supple  Right lower le+ Pitting Edema present  Left lower le+ Pitting Edema present  Feet:      Right foot:      Skin integrity: No blister  Left foot:      Skin integrity: Blister present  Neurological:      Mental Status: She is alert  Skin:     General: Skin is warm  Vitals and nursing note reviewed  Mara was seen today for postpartum care      Diagnoses and all orders for this visit:    S/P  section    Postpartum hypertension    Postpartum headache      I explained to the patient that due to her elevated BP, HA and abdominal pain she we need her to go to the [facility] for further evaluation to r/o postpartum preeclampsia  I spoke with Dr Jayden King regarding patient's current state  - she will see patient in the ER  All questions and concerns addressed and patient verbalized understanding

## 2023-03-16 NOTE — ED ATTENDING ATTESTATION
3/16/2023  INaheed MD, saw and evaluated the patient  I have discussed the patient with the resident/non-physician practitioner and agree with the resident's/non-physician practitioner's findings, Plan of Care, and MDM as documented in the resident's/non-physician practitioner's note, except where noted  All available labs and Radiology studies were reviewed  I was present for key portions of any procedure(s) performed by the resident/non-physician practitioner and I was immediately available to provide assistance  At this point I agree with the current assessment done in the Emergency Department  I have conducted an independent evaluation of this patient a history and physical is as follows:    58-year-old female 9 days postpartum via  with history of gestational hypertension referred from her  appointment for evaluation of elevated blood pressure and worsening headache as well as bilateral pitting edema  Office note reviewed  Blood pressure 160/100 in the office  Per delivery record she was initially induced with Pitocin and had an epidural for analgesia and requested  due to ongoing pain  Blood pressure improved to 138/94 on arrival here  ED Course  ED Course as of 23 2147   Thu Mar 16, 2023   1724 Hemoglobin(!): 10 2  Increased from prior value  1815 Prot/Creat Ratio, Ur(!): 0 40   1858 Patient reports ongoing headache  Minimal improvement with medication so far  We will give additional meds  Waiting on OB/GYN evaluation   Patient evaluated by [facility] team   Still having headache  Will reassess after Toradol and plan admission to [facility] service if headache persists   CTV unremarkable:    1  No evidence of intracranial hemorrhage, vasogenic edema or mass effect  2   Patent dural venous sinuses   Repeat urine protein/creatinine ratio normalized           Critical Care Time  Procedures

## 2023-03-16 NOTE — ED PROVIDER NOTES
History  Chief Complaint   Patient presents with   • Medical Problem     PT presents to ED with c/o "blurred vision, dizziness, headache, high blood pressure, leg/foot swelling, and pain at surgical site"     24-year-old female who is 9 days postpartum and having elevated BP  She saw her OB earlier today and had a reading of 160/100 at the office  Upon arrival to ED she was better, but still 138/94  She is complaining of vertigo, headache, blurry vision  Also has increased swelling in bilateral legs, and pain at  site  Patient states pregnancy was mostly uncomplicated, she did have a high blood pressure reading which was reason for induction  She ended up electing for  section  No other complaints at this time including chest pain/shortness of breath, fever/chills, nausea/vomiting  Prior to Admission Medications   Prescriptions Last Dose Informant Patient Reported? Taking? Calcium Carbonate Antacid (TUMS PO)   Yes No   Sig: Take by mouth   Patient not taking: Reported on 3/16/2023   Doxylamine-Pyridoxine 10-10 MG TBEC   No No   Sig: Start with two tablets at night and add one in the morning and one at midday as needed     Patient not taking: Reported on 3/16/2023   Prenat w/o A-FeCbGl-DSS-FA-DHA (PNV OB+DHA PO)   Yes No   Sig: Take by mouth   acetaminophen (TYLENOL) 325 mg tablet   No No   Sig: Take 3 tablets (975 mg total) by mouth every 8 (eight) hours   calcium carbonate (OS-CINDY) 1250 (500 Ca) MG chewable tablet   Yes No   Sig: Chew 1 tablet daily   Patient not taking: Reported on 3/16/2023   cetirizine (ZyrTEC) 10 mg tablet   Yes No   Sig: Take 10 mg by mouth daily   Patient not taking: Reported on 3/1/2023   docusate sodium (COLACE) 100 mg capsule   Yes No   Sig: Take 100 mg by mouth 2 (two) times a day   ibuprofen (MOTRIN) 600 mg tablet   No No   Sig: Take 1 tablet (600 mg total) by mouth every 6 (six) hours   levothyroxine 50 mcg tablet   No No   Sig: Take 1 tablet (50 mcg total) by mouth daily   omeprazole (PriLOSEC) 20 mg delayed release capsule   Yes No   Sig: Take 20 mg by mouth daily   Patient not taking: Reported on 3/16/2023   ondansetron (Zofran ODT) 4 mg disintegrating tablet   No No   Sig: Take 1 tablet (4 mg total) by mouth every 8 (eight) hours as needed for nausea or vomiting   oxyCODONE (ROXICODONE) 5 immediate release tablet   No No   Sig: Take 1 tablet (5 mg total) by mouth every 6 (six) hours as needed for severe pain for up to 10 days Max Daily Amount: 20 mg   witch hazel-glycerin (TUCKS) topical pad   No No   Sig: Apply 1 pad  topically every 4 (four) hours as needed for irritation   Patient not taking: Reported on 3/16/2023      Facility-Administered Medications: None       Past Medical History:   Diagnosis Date   • Acne    • ADHD    • Allergic asthma    • Subclinical hypothyroidism        Past Surgical History:   Procedure Laterality Date   • FINGER SURGERY      pin placed   • AR  DELIVERY ONLY N/A 3/7/2023    Procedure:  SECTION (); Surgeon: Eduardo Lopez MD;  Location: [facility] ;  Service: Obstetrics   • SHOULDER SURGERY Right        • SKIN BIOPSY     • WISDOM TOOTH EXTRACTION         Family History   Problem Relation Age of Onset   • Breast cancer Mother 62        negative genetic testing   • No Known Problems Father    • Breast cancer Maternal Grandmother 76   • Heart disease Paternal Grandmother      I have reviewed and agree with the history as documented      E-Cigarette/Vaping   • E-Cigarette Use Never User      E-Cigarette/Vaping Substances   • Nicotine No    • THC No    • CBD No    • Flavoring No    • Other No    • Unknown No      Social History     Tobacco Use   • Smoking status: Never   • Smokeless tobacco: Never   Vaping Use   • Vaping Use: Never used   Substance Use Topics   • Alcohol use: Not Currently     Alcohol/week: 1 0 standard drink     Types: 1 Glasses of wine per week     Comment: Socially- before pregnancy   • Drug use: Never        Review of Systems   Constitutional: Negative for chills and fever  HENT: Negative for congestion, rhinorrhea and sneezing  Eyes: Positive for visual disturbance  Negative for pain  Respiratory: Negative for cough and shortness of breath  Cardiovascular: Negative for chest pain and palpitations  Gastrointestinal: Positive for abdominal pain  Negative for constipation, diarrhea, nausea and vomiting  Genitourinary: Negative for dysuria and hematuria  Musculoskeletal: Negative for arthralgias and back pain  Skin: Negative for color change and rash  Neurological: Positive for dizziness, light-headedness and headaches  Negative for syncope, weakness and numbness  All other systems reviewed and are negative  Physical Exam  ED Triage Vitals [03/16/23 1606]   Temperature Pulse Respirations Blood Pressure SpO2   98 5 °F (36 9 °C) 92 16 138/94 97 %      Temp Source Heart Rate Source Patient Position - Orthostatic VS BP Location FiO2 (%)   Oral Monitor Lying Right arm --      Pain Score       8             Orthostatic Vital Signs  Vitals:    03/16/23 1830 03/16/23 2100 03/16/23 2215 03/16/23 2230   BP: 134/78 141/89 139/83 138/83   Pulse: 85 86 99 92   Patient Position - Orthostatic VS: Sitting          Physical Exam  Vitals and nursing note reviewed  Constitutional:       General: She is not in acute distress  Appearance: She is not ill-appearing  HENT:      Head: Normocephalic and atraumatic  Right Ear: External ear normal       Left Ear: External ear normal       Nose: Nose normal  No congestion or rhinorrhea  Mouth/Throat:      Mouth: Mucous membranes are moist       Pharynx: Oropharynx is clear  Eyes:      General: No scleral icterus  Extraocular Movements: Extraocular movements intact  Cardiovascular:      Rate and Rhythm: Normal rate and regular rhythm  Pulses: Normal pulses  Heart sounds: Normal heart sounds     Pulmonary:      Effort: Pulmonary effort is normal       Breath sounds: Normal breath sounds  Abdominal:      General: A surgical scar is present  Palpations: Abdomen is soft  Tenderness: There is abdominal tenderness in the periumbilical area and suprapubic area  Musculoskeletal:         General: Normal range of motion  Cervical back: Normal range of motion  Right lower leg: Edema (Nonpitting) present  Left lower leg: Edema (Nonpitting) present  Skin:     General: Skin is warm and dry  Neurological:      General: No focal deficit present  Mental Status: She is alert and oriented to person, place, and time  Cranial Nerves: No cranial nerve deficit     Psychiatric:         Mood and Affect: Mood normal          Behavior: Behavior normal          ED Medications  Medications   iohexol (OMNIPAQUE) 350 MG/ML injection (SINGLE-DOSE) 100 mL (has no administration in time range)   acetaminophen (TYLENOL) tablet 975 mg (975 mg Oral Given 3/16/23 1652)   meclizine (ANTIVERT) tablet 25 mg (25 mg Oral Given 3/16/23 1651)   oxyCODONE (ROXICODONE) IR tablet 5 mg (5 mg Oral Given 3/16/23 1709)   metoclopramide (REGLAN) injection 10 mg (10 mg Intravenous Given 3/16/23 1931)   magnesium sulfate 2 g/50 mL IVPB (premix) 2 g (0 g Intravenous Stopped 3/16/23 2011)   diphenhydrAMINE (BENADRYL) injection 25 mg (25 mg Intravenous Given 3/16/23 1931)   sodium chloride 0 9 % bolus 500 mL (0 mL Intravenous Stopped 3/16/23 2049)   ketorolac (TORADOL) injection 15 mg (15 mg Intravenous Given 3/16/23 2050)       Diagnostic Studies  Results Reviewed     Procedure Component Value Units Date/Time    Protein / creatinine ratio, urine [[id number]] Collected: 03/16/23 2000    Lab Status: Final result Specimen: Urine, Catheter Updated: 03/16/23 2032     Creatinine, Ur 18 7 mg/dL      Protein Urine Random <4 mg/dL      Prot/Creat Ratio, Ur --    Protein / creatinine ratio, urine [[id number]]  (Abnormal) Collected: 03/16/23 1710    Lab Status: Final result Specimen: Urine, Clean Catch Updated: 03/16/23 1746     Creatinine, Ur 52 1 mg/dL      Protein Urine Random 21 mg/dL      Prot/Creat Ratio, Ur 0 40    Comprehensive metabolic panel [[id number]] Collected: 03/16/23 1710    Lab Status: Final result Specimen: Blood from Arm, Right Updated: 03/16/23 1744     Sodium 139 mmol/L      Potassium 4 3 mmol/L      Chloride 108 mmol/L      CO2 24 mmol/L      ANION GAP 7 mmol/L      BUN 14 mg/dL      Creatinine 0 79 mg/dL      Glucose 66 mg/dL      Calcium 8 9 mg/dL      AST 17 U/L      ALT 24 U/L      Alkaline Phosphatase 97 U/L      Total Protein 6 9 g/dL      Albumin 3 7 g/dL      Total Bilirubin 0 31 mg/dL      eGFR 102 ml/min/1 73sq m     Narrative:      National [employer] guidelines for Chronic Kidney Disease (CKD):   •  Stage 1 with normal or high GFR (GFR > 90 mL/min/1 73 square meters)  •  Stage 2 Mild CKD (GFR = 60-89 mL/min/1 73 square meters)  •  Stage 3A Moderate CKD (GFR = 45-59 mL/min/1 73 square meters)  •  Stage 3B Moderate CKD (GFR = 30-44 mL/min/1 73 square meters)  •  Stage 4 Severe CKD (GFR = 15-29 mL/min/1 73 square meters)  •  Stage 5 End Stage CKD (GFR <15 mL/min/1 73 square meters)  Note: GFR calculation is accurate only with a steady state creatinine    CBC and differential [[id number]]  (Abnormal) Collected: 03/16/23 1710    Lab Status: Final result Specimen: Blood from Arm, Right Updated: 03/16/23 1719     WBC 7 70 Thousand/uL      RBC 3 53 Million/uL      Hemoglobin 10 2 g/dL      Hematocrit 32 5 %      MCV 92 fL      MCH 28 9 pg      MCHC 31 4 g/dL      RDW 14 4 %      MPV 9 2 fL      Platelets 048 Thousands/uL      nRBC 0 /100 WBCs      Neutrophils Relative 71 %      Immat GRANS % 1 %      Lymphocytes Relative 21 %      Monocytes Relative 6 %      Eosinophils Relative 1 %      Basophils Relative 0 %      Neutrophils Absolute 5 47 Thousands/µL      Immature Grans Absolute 0 04 Thousand/uL      Lymphocytes Absolute 1 62 Thousands/µL      Monocytes Absolute 0 45 Thousand/µL      Eosinophils Absolute 0 09 Thousand/µL      Basophils Absolute 0 03 Thousands/µL                  CT VENOGRAM BRAIN   Final Result by Anthony Figueredo MD (2023)         1  No evidence of intracranial hemorrhage, vasogenic edema or mass effect  2   Patent dural venous sinuses  Workstation performed: [id number]               Procedures  Procedures      ED Course  ED Course as of 23 2304   Thu Mar 16, 2023   1659 Spoke with OB/GYN, they recommended pulling labs including CBC, CMP, PC ratio from straight cath  They also stated they we can aggressively treat headaches that she is postpartum   Comprehensive metabolic panel  wnl   8561 Hemoglobin(!): 10 2  About baseline    Prot/Creat Ratio, Ur(!): 0 40  Not from straight cath, will get another sample from straight cath and reevaluate   CT VENOGRAM BRAIN  CT venogram showed no acute abnormalities  Patent dural sinuses   OB/GYN evaluated  Due to persistent, intractable headache and elevated blood pressure, they are suspicious for preeclampsia with severe features  It was discussed that we will try Toradol and if no improvement still in the headache then they would likely admit for further evaluation and treatment     After Toradol given, patient still had headache but was improved  OB notified and a decided to still admit to their service due to headache still being present  Medical Decision Making  Sue Lomeli presented to the ED due to hypertension noted at her OB appointment today  She is 9 days postpartum after  section  Her blood pressure was 160/100  Upon arrival to the ED she also was complaining of headache, vision changes, vertigo  Her blood pressure was slightly elevated while here in the ED  Her headache was attempted to be controlled with 5 mg oxycodone, 975 Tylenol, 15 mg Toradol    She states there was moderate improvement in pain was still 3 out of 10   OB/GYN was called and evaluated the patient  They were suspicious for preeclampsia with severe features due to intractable headache  Patient's P to C ratio was normal after getting sample from straight cath  Patient  section scar was well-healing with no signs of infection which include erythema, induration, pain  Patient admitted to OB/GYN for further evaluation and treatment  Patient mated in stable condition  Amount and/or Complexity of Data Reviewed  Labs: ordered  Decision-making details documented in ED Course  Radiology: ordered  Decision-making details documented in ED Course  Risk  OTC drugs  Prescription drug management  Decision regarding hospitalization  Disposition  Final diagnoses:   Headache   Hypertension     Time reflects when diagnosis was documented in both MDM as applicable and the Disposition within this note     Time User Action Codes Description Comment    3/16/2023 10:54 PM Sandeep Coma Add [R51 9] Headache     3/16/2023 10:55 PM Ad, 5 [facility] Hypertension       ED Disposition     ED Disposition   Admit    Condition   Stable    Date/Time   Thu Mar 16, 2023 10:54 PM    Comment   Case was discussed with OB/GYN and the patient's admission status was agreed to be Admission Status: inpatient status to the service of Dr Otoole Earing   Follow-up Information    None         Patient's Medications   Discharge Prescriptions    No medications on file     No discharge procedures on file  PDMP Review     None           ED Provider  Attending physically available and evaluated Anton Bullock I managed the patient along with the ED Attending      Electronically Signed by         Chandra Kidd DO  [phone]

## 2023-03-17 LAB
ALBUMIN SERPL BCP-MCNC: 3.5 G/DL (ref 3.5–5)
ALBUMIN SERPL BCP-MCNC: 4 G/DL (ref 3.5–5)
ALP SERPL-CCNC: 109 U/L (ref 34–104)
ALP SERPL-CCNC: 95 U/L (ref 34–104)
ALT SERPL W P-5'-P-CCNC: 21 U/L (ref 7–52)
ALT SERPL W P-5'-P-CCNC: 23 U/L (ref 7–52)
ANION GAP SERPL CALCULATED.3IONS-SCNC: 6 MMOL/L (ref 4–13)
ANION GAP SERPL CALCULATED.3IONS-SCNC: 9 MMOL/L (ref 4–13)
AST SERPL W P-5'-P-CCNC: 15 U/L (ref 13–39)
AST SERPL W P-5'-P-CCNC: 16 U/L (ref 13–39)
BILIRUB SERPL-MCNC: 0.23 MG/DL (ref 0.2–1)
BILIRUB SERPL-MCNC: 0.28 MG/DL (ref 0.2–1)
BUN SERPL-MCNC: 10 MG/DL (ref 5–25)
BUN SERPL-MCNC: 11 MG/DL (ref 5–25)
CALCIUM SERPL-MCNC: 7.4 MG/DL (ref 8.4–10.2)
CALCIUM SERPL-MCNC: 7.4 MG/DL (ref 8.4–10.2)
CHLORIDE SERPL-SCNC: 105 MMOL/L (ref 96–108)
CHLORIDE SERPL-SCNC: 107 MMOL/L (ref 96–108)
CO2 SERPL-SCNC: 23 MMOL/L (ref 21–32)
CO2 SERPL-SCNC: 24 MMOL/L (ref 21–32)
CREAT SERPL-MCNC: 0.76 MG/DL (ref 0.6–1.3)
CREAT SERPL-MCNC: 0.79 MG/DL (ref 0.6–1.3)
ERYTHROCYTE [DISTWIDTH] IN BLOOD BY AUTOMATED COUNT: 14.3 % (ref 11.6–15.1)
ERYTHROCYTE [DISTWIDTH] IN BLOOD BY AUTOMATED COUNT: 14.3 % (ref 11.6–15.1)
ERYTHROCYTE [DISTWIDTH] IN BLOOD BY AUTOMATED COUNT: 14.4 % (ref 11.6–15.1)
GFR SERPL CREATININE-BSD FRML MDRD: 102 ML/MIN/1.73SQ M
GFR SERPL CREATININE-BSD FRML MDRD: 107 ML/MIN/1.73SQ M
GLUCOSE SERPL-MCNC: 121 MG/DL (ref 65–140)
GLUCOSE SERPL-MCNC: 157 MG/DL (ref 65–140)
HCT VFR BLD AUTO: 31.2 % (ref 34.8–46.1)
HCT VFR BLD AUTO: 32.3 % (ref 34.8–46.1)
HCT VFR BLD AUTO: 36.3 % (ref 34.8–46.1)
HGB BLD-MCNC: 10.5 G/DL (ref 11.5–15.4)
HGB BLD-MCNC: 11.5 G/DL (ref 11.5–15.4)
HGB BLD-MCNC: 9.8 G/DL (ref 11.5–15.4)
MAGNESIUM SERPL-MCNC: 5.7 MG/DL (ref 1.9–2.7)
MAGNESIUM SERPL-MCNC: 6 MG/DL (ref 1.9–2.7)
MAGNESIUM SERPL-MCNC: 6.7 MG/DL (ref 1.9–2.7)
MCH RBC QN AUTO: 28.3 PG (ref 26.8–34.3)
MCH RBC QN AUTO: 28.5 PG (ref 26.8–34.3)
MCH RBC QN AUTO: 29.5 PG (ref 26.8–34.3)
MCHC RBC AUTO-ENTMCNC: 31.4 G/DL (ref 31.4–37.4)
MCHC RBC AUTO-ENTMCNC: 31.7 G/DL (ref 31.4–37.4)
MCHC RBC AUTO-ENTMCNC: 32.5 G/DL (ref 31.4–37.4)
MCV RBC AUTO: 90 FL (ref 82–98)
MCV RBC AUTO: 90 FL (ref 82–98)
MCV RBC AUTO: 91 FL (ref 82–98)
PLATELET # BLD AUTO: 305 THOUSANDS/UL (ref 149–390)
PLATELET # BLD AUTO: 306 THOUSANDS/UL (ref 149–390)
PLATELET # BLD AUTO: 368 THOUSANDS/UL (ref 149–390)
PMV BLD AUTO: 8.8 FL (ref 8.9–12.7)
PMV BLD AUTO: 8.9 FL (ref 8.9–12.7)
PMV BLD AUTO: 9 FL (ref 8.9–12.7)
POTASSIUM SERPL-SCNC: 3.8 MMOL/L (ref 3.5–5.3)
POTASSIUM SERPL-SCNC: 4.4 MMOL/L (ref 3.5–5.3)
PROT SERPL-MCNC: 6.5 G/DL (ref 6.4–8.4)
PROT SERPL-MCNC: 7.5 G/DL (ref 6.4–8.4)
RBC # BLD AUTO: 3.46 MILLION/UL (ref 3.81–5.12)
RBC # BLD AUTO: 3.56 MILLION/UL (ref 3.81–5.12)
RBC # BLD AUTO: 4.04 MILLION/UL (ref 3.81–5.12)
SODIUM SERPL-SCNC: 137 MMOL/L (ref 135–147)
SODIUM SERPL-SCNC: 137 MMOL/L (ref 135–147)
WBC # BLD AUTO: 6.21 THOUSAND/UL (ref 4.31–10.16)
WBC # BLD AUTO: 6.85 THOUSAND/UL (ref 4.31–10.16)
WBC # BLD AUTO: 8.39 THOUSAND/UL (ref 4.31–10.16)

## 2023-03-17 RX ORDER — NIFEDIPINE 30 MG/1
30 TABLET, EXTENDED RELEASE ORAL DAILY
Status: DISCONTINUED | OUTPATIENT
Start: 2023-03-17 | End: 2023-03-18 | Stop reason: HOSPADM

## 2023-03-17 RX ORDER — BUTALBITAL, ACETAMINOPHEN AND CAFFEINE 50; 325; 40 MG/1; MG/1; MG/1
1 TABLET ORAL ONCE
Status: COMPLETED | OUTPATIENT
Start: 2023-03-17 | End: 2023-03-17

## 2023-03-17 RX ORDER — ACETAMINOPHEN 325 MG/1
975 TABLET ORAL EVERY 6 HOURS SCHEDULED
Status: DISCONTINUED | OUTPATIENT
Start: 2023-03-17 | End: 2023-03-17

## 2023-03-17 RX ORDER — MECLIZINE HCL 12.5 MG/1
12.5 TABLET ORAL EVERY 8 HOURS PRN
Status: DISCONTINUED | OUTPATIENT
Start: 2023-03-17 | End: 2023-03-18 | Stop reason: HOSPADM

## 2023-03-17 RX ORDER — CYCLOBENZAPRINE HCL 10 MG
10 TABLET ORAL 3 TIMES DAILY PRN
Status: DISCONTINUED | OUTPATIENT
Start: 2023-03-17 | End: 2023-03-18 | Stop reason: HOSPADM

## 2023-03-17 RX ORDER — METOCLOPRAMIDE HYDROCHLORIDE 5 MG/ML
10 INJECTION INTRAMUSCULAR; INTRAVENOUS ONCE
Status: COMPLETED | OUTPATIENT
Start: 2023-03-17 | End: 2023-03-17

## 2023-03-17 RX ORDER — SUMATRIPTAN 50 MG/1
50 TABLET, FILM COATED ORAL ONCE
Status: COMPLETED | OUTPATIENT
Start: 2023-03-17 | End: 2023-03-17

## 2023-03-17 RX ORDER — DIPHENHYDRAMINE HYDROCHLORIDE 50 MG/ML
25 INJECTION INTRAMUSCULAR; INTRAVENOUS ONCE
Status: COMPLETED | OUTPATIENT
Start: 2023-03-17 | End: 2023-03-17

## 2023-03-17 RX ORDER — ACETAMINOPHEN 325 MG/1
650 TABLET ORAL EVERY 6 HOURS SCHEDULED
Status: DISCONTINUED | OUTPATIENT
Start: 2023-03-17 | End: 2023-03-18 | Stop reason: HOSPADM

## 2023-03-17 RX ORDER — BUTALBITAL, ACETAMINOPHEN AND CAFFEINE 50; 325; 40 MG/1; MG/1; MG/1
1 TABLET ORAL EVERY 4 HOURS PRN
Status: DISCONTINUED | OUTPATIENT
Start: 2023-03-17 | End: 2023-03-17

## 2023-03-17 RX ADMIN — DOCUSATE SODIUM 100 MG: 100 CAPSULE, LIQUID FILLED ORAL at 08:36

## 2023-03-17 RX ADMIN — SODIUM CHLORIDE 500 MG: 0.9 INJECTION, SOLUTION INTRAVENOUS at 09:38

## 2023-03-17 RX ADMIN — IBUPROFEN 600 MG: 600 TABLET, FILM COATED ORAL at 09:48

## 2023-03-17 RX ADMIN — MAGNESIUM SULFATE HEPTAHYDRATE 2 G/HR: 40 INJECTION, SOLUTION INTRAVENOUS at 00:35

## 2023-03-17 RX ADMIN — IBUPROFEN 600 MG: 600 TABLET, FILM COATED ORAL at 16:34

## 2023-03-17 RX ADMIN — MAGNESIUM SULFATE HEPTAHYDRATE 4 G: 40 INJECTION, SOLUTION INTRAVENOUS at 00:12

## 2023-03-17 RX ADMIN — SUMATRIPTAN SUCCINATE 50 MG: 50 TABLET ORAL at 22:03

## 2023-03-17 RX ADMIN — IBUPROFEN 600 MG: 600 TABLET, FILM COATED ORAL at 04:16

## 2023-03-17 RX ADMIN — SUMATRIPTAN SUCCINATE 50 MG: 50 TABLET ORAL at 20:13

## 2023-03-17 RX ADMIN — LEVOTHYROXINE SODIUM 50 MCG: 50 TABLET ORAL at 06:13

## 2023-03-17 RX ADMIN — OXYCODONE HYDROCHLORIDE 5 MG: 5 TABLET ORAL at 21:09

## 2023-03-17 RX ADMIN — DIPHENHYDRAMINE HYDROCHLORIDE 25 MG: 50 INJECTION, SOLUTION INTRAMUSCULAR; INTRAVENOUS at 08:37

## 2023-03-17 RX ADMIN — ACETAMINOPHEN 650 MG: 325 TABLET ORAL at 12:07

## 2023-03-17 RX ADMIN — BUTALBITAL, ACETAMINOPHEN, AND CAFFEINE 1 TABLET: 325; 50; 40 TABLET ORAL at 16:35

## 2023-03-17 RX ADMIN — METOCLOPRAMIDE 10 MG: 5 INJECTION, SOLUTION INTRAMUSCULAR; INTRAVENOUS at 08:36

## 2023-03-17 RX ADMIN — MECLIZINE 12.5 MG: 12.5 TABLET ORAL at 22:37

## 2023-03-17 RX ADMIN — ACETAMINOPHEN 650 MG: 325 TABLET ORAL at 18:13

## 2023-03-17 RX ADMIN — NIFEDIPINE 30 MG: 30 TABLET, FILM COATED, EXTENDED RELEASE ORAL at 16:41

## 2023-03-17 RX ADMIN — ACETAMINOPHEN 650 MG: 325 TABLET ORAL at 00:45

## 2023-03-17 RX ADMIN — MAGNESIUM SULFATE HEPTAHYDRATE 2 G/HR: 40 INJECTION, SOLUTION INTRAVENOUS at 20:44

## 2023-03-17 RX ADMIN — MAGNESIUM SULFATE HEPTAHYDRATE 2 G/HR: 40 INJECTION, SOLUTION INTRAVENOUS at 10:39

## 2023-03-17 RX ADMIN — IBUPROFEN 600 MG: 600 TABLET, FILM COATED ORAL at 22:03

## 2023-03-17 RX ADMIN — ACETAMINOPHEN 650 MG: 325 TABLET ORAL at 06:13

## 2023-03-17 RX ADMIN — SODIUM CHLORIDE, SODIUM LACTATE, POTASSIUM CHLORIDE, AND CALCIUM CHLORIDE 50 ML/HR: .6; .31; .03; .02 INJECTION, SOLUTION INTRAVENOUS at 20:44

## 2023-03-17 RX ADMIN — Medication 100 MG: at 08:35

## 2023-03-17 RX ADMIN — SODIUM CHLORIDE, SODIUM LACTATE, POTASSIUM CHLORIDE, AND CALCIUM CHLORIDE 50 ML/HR: .6; .31; .03; .02 INJECTION, SOLUTION INTRAVENOUS at 00:12

## 2023-03-17 NOTE — ASSESSMENT & PLAN NOTE
Pain: Tylenol and ibuprofen scheduled, Jber 5 mg as needed  Allow breast pump to be available  DVT PPx: SCDs

## 2023-03-17 NOTE — PLAN OF CARE
Problem: PAIN - ADULT  Goal: Verbalizes/displays adequate comfort level or baseline comfort level  Description: Interventions:  - Encourage patient to monitor pain and request assistance  - Assess pain using appropriate pain scale  - Administer analgesics based on type and severity of pain and evaluate response  - Implement non-pharmacological measures as appropriate and evaluate response  - Consider cultural and social influences on pain and pain management  - Notify physician/advanced practitioner if interventions unsuccessful or patient reports new pain  Outcome: Progressing     Problem: INFECTION - ADULT  Goal: Absence or prevention of progression during hospitalization  Description: INTERVENTIONS:  - Assess and monitor for signs and symptoms of infection  - Monitor lab/diagnostic results  - Monitor all insertion sites, i e  indwelling lines, tubes, and drains  - Monitor endotracheal if appropriate and nasal secretions for changes in amount and color  - [place] appropriate cooling/warming therapies per order  - Administer medications as ordered  - Instruct and encourage patient and family to use good hand hygiene technique  - Identify and instruct in appropriate isolation precautions for identified infection/condition  Outcome: Progressing  Goal: Absence of fever/infection during neutropenic period  Description: INTERVENTIONS:  - Monitor WBC    Outcome: Progressing     Problem: SAFETY ADULT  Goal: Patient will remain free of falls  Description: INTERVENTIONS:  - Educate patient/family on patient safety including physical limitations  - Instruct patient to call for assistance with activity   - Consult OT/PT to assist with strengthening/mobility   - Keep Call bell within reach  - Keep bed low and locked with side rails adjusted as appropriate  - Keep care items and personal belongings within reach  - Initiate and maintain comfort rounds  - Make Fall Risk Sign visible to staff  - Offer Toileting every  Hours, in advance of need  - Initiate/Maintain alarm  - Obtain necessary fall risk management equipment:   - Apply yellow socks and bracelet for high fall risk patients  - Consider moving patient to room near nurses station  Outcome: Progressing  Goal: Maintain or return to baseline ADL function  Description: INTERVENTIONS:  -  Assess patient's ability to carry out ADLs; assess patient's baseline for ADL function and identify physical deficits which impact ability to perform ADLs (bathing, care of mouth/teeth, toileting, grooming, dressing, etc )  - Assess/evaluate cause of self-care deficits   - Assess range of motion  - Assess patient's mobility; develop plan if impaired  - Assess patient's need for assistive devices and provide as appropriate  - Encourage maximum independence but intervene and supervise when necessary  - Involve family in performance of ADLs  - Assess for home care needs following discharge   - Consider OT consult to assist with ADL evaluation and planning for discharge  - Provide patient education as appropriate  Outcome: Progressing  Goal: Maintains/Returns to pre admission functional level  Description: INTERVENTIONS:  - Perform BMAT or MOVE assessment daily    - Set and communicate daily mobility goal to care team and patient/family/caregiver  - Collaborate with rehabilitation services on mobility goals if consulted  - Perform Range of Motion  times a day  - Reposition patient every  hours    - Dangle patient  times a day  - Stand patient  times a day  - Ambulate patient  times a day  - Out of bed to chair  times a day   - Out of bed for meals times a day  - Out of bed for toileting  - Record patient progress and toleration of activity level   Outcome: Progressing     Problem: DISCHARGE PLANNING  Goal: Discharge to home or other facility with appropriate resources  Description: INTERVENTIONS:  - Identify barriers to discharge w/patient and caregiver  - Arrange for needed discharge resources and transportation as appropriate  - Identify discharge learning needs (meds, wound care, etc )  - Arrange for interpretive services to assist at discharge as needed  - Refer to Case Management Department for coordinating discharge planning if the patient needs post-hospital services based on physician/advanced practitioner order or complex needs related to functional status, cognitive ability, or social support system  Outcome: Progressing     Problem: Knowledge Deficit  Goal: Patient/family/caregiver demonstrates understanding of disease process, treatment plan, medications, and discharge instructions  Description: Complete learning assessment and assess knowledge base    Interventions:  - Provide teaching at level of understanding  - Provide teaching via preferred learning methods  Outcome: Progressing

## 2023-03-17 NOTE — ASSESSMENT & PLAN NOTE
Systolic (34TDZ), VHY:931 , Min:131 , ZCP:489   Diastolic (60OHP), CPR:22, Min:78, Max:94    Pt with gHTN in pregnancy    BP while in -148/78-94  CBC/CMP overall WNL, unchanged from prior assessment  Initial UP:C collected from clean-catch; UP:C from straight cath without detectable protein  S/p 24 hours of magnesium  CBC/CMP WNL  HA resolved with imitrex on 3/17/23  Plan for management with Flexeril and headache cocktail - IV Solumedrol 500mg, IV Reglan 10mg, IV Benadryl 25mg, and PO caffeine 100mg  Procardia 30mg XL qD started on 3/17/23  Continue to trend pressures  Continue to monitor for evolution of symptoms

## 2023-03-17 NOTE — UTILIZATION REVIEW
Initial Clinical Review    Admission: Date/Time/Statement:   Admission Orders (From admission, onward)     Ordered        03/16/23 2227  Inpatient Admission  Once                      Orders Placed This Encounter   Procedures   • Inpatient Admission     Standing Status:   Standing     Number of Occurrences:   1     Order Specific Question:   Level of Care     Answer:   Med Surg [16]     Order Specific Question:   Estimated length of stay     Answer:   More than 2 Midnights     Order Specific Question:   Certification     Answer:   I certify that inpatient services are medically necessary for this patient for a duration of greater than two midnights  See H&P and MD Progress Notes for additional information about the patient's course of treatment  ED Arrival Information     Expected   -    Arrival   3/16/2023 15:55    Acuity   Urgent            Means of arrival   Wheelchair    Escorted by   Spouse    Service   OB/GYN    Admission type   Emergency            Arrival complaint   Post Partum 8 days/HTN/Abd Pain/Blurred Vision           Chief Complaint   Patient presents with   • Medical Problem     PT presents to ED with c/o "blurred vision, dizziness, headache, high blood pressure, leg/foot swelling, and pain at surgical site"       Initial Presentation: 28 y o  female presented to ED as inpatient admission for postparum preeclampsia  POD # 9  Patient experiencing an intense headache today and elevated blood pressures  She reports that while she was hospitalized, she did experience an occasional dull headache  After discharge, she reports she had the same dull headache on and off  She reiterates that she did not have a continuous headache since discharge; she had many times while at home she did not have a headache  Proir to arrival very strong, "debilitating" headache came on around 2 PM   She reports was initially bilateral frontal, however now it is more in her right frontal area    She reports occasional blurry vision since this headache is started  On exam Swelling (1+ edema in B/L ankles  Plan Plan for 24 hours of magnesium CBC, CMP, mag every 6 hours while on mag  VS, I/O lung assessments and deep tendon reflexes q 2 hrs while on MGSO 4        Date: 23  She is PPD# 10 s/p  primary  section, low transverse incision  Admitted in the setting of newly diagnosed preeclampsia with severe features - severe range blood pressures and intractable headache, prior blurry vision now resolved  Ongoing magnesium infusion  Plan for management with Flexeril and headache cocktail - IV Solumedrol 500mg, IV Reglan 10mg, IV Benadryl 25mg, and PO caffeine 100mg Low threshold to start Procardia XL 30mg qd should pressures trend towards elevation continue VS, I/O lung assessments and deep tendon reflexes q 2 hrs while on MGSO 4      ED Triage Vitals [23 1606]   Temperature Pulse Respirations Blood Pressure SpO2   98 5 °F (36 9 °C) 92 16 138/94 97 %      Temp Source Heart Rate Source Patient Position - Orthostatic VS BP Location FiO2 (%)   Oral Monitor Lying Right arm --      Pain Score       8          Wt Readings from Last 1 Encounters:   23 94 3 kg (208 lb)     Additional Vital Signs:   Date/Time Temp Pulse Resp BP MAP (mmHg) SpO2 O2 Device Cardiac (WDL) Patient Position - Orthostatic VS   23 1153 98 1 °F (36 7 °C) 91 16 140/89 -- 97 % None (Room air) -- Lying   23 0952 97 8 °F (36 6 °C) 86 16 137/78 -- 100 % None (Room air) -- Lying   23 0757 97 9 °F (36 6 °C) 86 16 131/81 -- 96 % None (Room air) -- Lying   23 0730 -- -- -- -- -- -- -- WDL --   23 0600 97 7 °F (36 5 °C) 83 18 131/82 102 100 % None (Room air) -- Lying   23 0400 97 8 °F (36 6 °C) 92 16 147/85  110 99 % None (Room air) -- Lying   BP: MD aware at 23 0400   23 0200 98 2 °F (36 8 °C) 81 18 129/79 99 100 % None (Room air) -- Lying   23 0041 98 3 °F (36 8 °C) 90 16 141/85 -- 97 % None (Room air) -- Sitting   03/17/23 0012 98 2 °F (36 8 °C) 87 18 141/90 111 96 % None (Room air) -- Lying   03/16/23 2300 -- 89 16 128/86 101 98 % -- -- --   03/16/23 2230 -- 92 16 138/83 105 99 % -- -- --   03/16/23 2215 -- 99 16 139/83 105 98 % -- -- --   03/16/23 2100 -- 86 16 141/89 111 99 % -- -- --   03/16/23 1830 -- 85 16 134/78 101 98 % None (Room air) -- Sitting   03/16/23 1700 -- 92 16 148/90 112 99 % None (Room air) -- Sitting   03/16/23 1615 -- 89 16 138/94 112 98 % None (Room air) -- Sitting     Pertinent Labs/Diagnostic Test Results:   CT VENOGRAM BRAIN   Final Result by Natalie Lemus MD (03/16 2023)   1  No evidence of intracranial hemorrhage, vasogenic edema or mass effect  2   Patent dural venous sinuses           Results from last 7 days   Lab Units 03/17/23 1123 03/17/23 0526 03/16/23 2314 03/16/23  1710   WBC Thousand/uL 6 85 8 39 7 26 7 70   HEMOGLOBIN g/dL 10 5* 9 8* 9 8* 10 2*   HEMATOCRIT % 32 3* 31 2* 31 3* 32 5*   PLATELETS Thousands/uL 305 306 290 309   NEUTROS ABS Thousands/µL  --   --   --  5 47         Results from last 7 days   Lab Units 03/17/23 1123 03/17/23 0526 03/16/23 2314 03/16/23  1710   SODIUM mmol/L  --   --   --  139   POTASSIUM mmol/L  --   --   --  4 3   CHLORIDE mmol/L  --   --   --  108   CO2 mmol/L  --   --   --  24   ANION GAP mmol/L  --   --   --  7   BUN mg/dL  --   --   --  14   CREATININE mg/dL  --   --   --  0 79   EGFR ml/min/1 73sq m  --   --   --  102   CALCIUM mg/dL  --   --   --  8 9   MAGNESIUM mg/dL 6 0* 5 7* 2 6  --      Results from last 7 days   Lab Units 03/16/23  1710   AST U/L 17   ALT U/L 24   ALK PHOS U/L 97   TOTAL PROTEIN g/dL 6 9   ALBUMIN g/dL 3 7   TOTAL BILIRUBIN mg/dL 0 31         Results from last 7 days   Lab Units 03/16/23  1710   GLUCOSE RANDOM mg/dL 66       Results from last 7 days   Lab Units 03/16/23 2000 03/16/23  1710   CREATININE UR mg/dL 18 7 52 1   PROTEIN UR mg/dL <4 21   PROT/CREAT RATIO UR   --  0 40*       ED Treatment: Medication Administration from 03/16/2023 1554 to 03/16/2023 2334       Date/Time Order Dose Route Action     03/16/2023 1652 EDT acetaminophen (TYLENOL) tablet 975 mg 975 mg Oral Given     03/16/2023 1651 EDT meclizine (ANTIVERT) tablet 25 mg 25 mg Oral Given     03/16/2023 1709 EDT oxyCODONE (ROXICODONE) IR tablet 5 mg 5 mg Oral Given     03/16/2023 1931 EDT metoclopramide (REGLAN) injection 10 mg 10 mg Intravenous Given     03/16/2023 1941 EDT magnesium sulfate 2 g/50 mL IVPB (premix) 2 g 2 g Intravenous New Bag     03/16/2023 1931 EDT diphenhydrAMINE (BENADRYL) injection 25 mg 25 mg Intravenous Given     03/16/2023 1928 EDT sodium chloride 0 9 % bolus 500 mL 500 mL Intravenous New Bag     03/16/2023 2050 EDT ketorolac (TORADOL) injection 15 mg 15 mg Intravenous Given        Past Medical History:   Diagnosis Date   • Acne    • ADHD    • Allergic asthma    • Subclinical hypothyroidism      Present on Admission:  • Subclinical hypothyroidism      Admitting Diagnosis: Hypertension [I10]  Headache [R51 9]  Age/Sex: 28 y o  female     Admission Orders:  VS, I/O lung assessments and deep tendon reflexes q 2 hrs while on MGSO 4          Scheduled Medications:  acetaminophen, 650 mg, Oral, Q6H NIYAH  caffeine, 100 mg, Oral, Daily  docusate sodium, 100 mg, Oral, BID  ibuprofen, 600 mg, Oral, Q6H NIYAH  levothyroxine, 50 mcg, Oral, Early Morning  methylPREDNISolone sodium succinate, 500 mg, Intravenous, Daily      Continuous IV Infusions:  lactated ringers, 50 mL/hr, Intravenous, Continuous  magnesium sulfate, 2 g/hr, Intravenous, Continuous      PRN Meds:  aluminum-magnesium hydroxide-simethicone, 15 mL, Oral, Q6H PRN  calcium carbonate, 1,000 mg, Oral, TID PRN  calcium gluconate, 1 g, Intravenous, Once PRN  cyclobenzaprine, 10 mg, Oral, TID PRN  iohexol, 100 mL, Intravenous, Once in imaging  ondansetron, 4 mg, Intravenous, Q8H PRN  oxyCODONE, 5 mg, Oral, Q4H PRN  simethicone, 80 mg, Oral, 4x Daily PRN        None    Network Utilization Review Department  ATTENTION: Please call with any questions or concerns to [phone] and carefully listen to the prompts so that you are directed to the right person  All voicemails are confidential   Harlene Pair all requests for admission clinical reviews, approved or denied determinations and any other requests to dedicated fax number below belonging to the campus where the patient is receiving treatment   List of dedicated fax numbers for the Facilities:  [address] DENIALS (Administrative/Medical Necessity) [phone]   [address] (Maternity/NICU/Pediatrics) [phone]   7 Skye Gusman [phone]   [place] [address] [phone]   [address] [address] [address] 28 [phone]   1554 [facility] [place] [facility] [place] 134 [address] [facility] [phone]

## 2023-03-17 NOTE — PROGRESS NOTES
Progress Note - OB/GYN  Mara Duarte 28 y o  female MRN: [id number]  Unit/Bed#: -01 Encounter: [id number]    Assessment and Plan     Dorn Councilman is a patient of: [facility]   She is PPD# 10 s/p  primary  section, low transverse incision  Admitted in the setting of newly diagnosed preeclampsia with severe features - severe range blood pressures and intractable headache, prior blurry vision now resolved  Ongoing magnesium infusion  Plan by problem:      S/P  section  Assessment & Plan  Pain: Tylenol and ibuprofen scheduled, [place] 5 mg as needed  Allow breast pump to be available  DVT PPx: SCDs    Subclinical hypothyroidism  Assessment & Plan  Continue synthroid    * Preeclampsia in postpartum period  Assessment & Plan  Systolic (31HSJ), VVS:650 , Min:128 , EEO:972   Diastolic (16MGP), ANF:51, Min:78, Max:100    Pt with gHTN in pregnancy    BP while in -148/78-94  CBC/CMP overall WNL, unchanged from prior assessment  Initial UP:C collected from clean-catch; UP:C from straight cath without detectable protein  Plan for 24 hours of magnesium  CBC, CMP, mag every 6 hours while on mag  Reports continued headache this am  Plan for management with Flexeril and headache cocktail - IV Solumedrol 500mg, IV Reglan 10mg, IV Benadryl 25mg, and PO caffeine 100mg  Low threshold to start Procardia XL 30mg qd should pressures trend towards elevation  Continue to trend pressures  Continue to monitor for evolution of symptoms      Disposition    - Continue inpatient management      Subjective/Objective     Chief Complaint: Postpartum State     Subjective:    Dorn Councilman is PPD/POD#10 s/p  primary  section, low transverse incision  She reports continued headache  Denies blurry vision, chest pain, shortness of breath, nausea, vomiting, RUQ/epigastric pain, or acute swelling  Pain is well controlled  Patient is currently voiding  She is ambulating    Patient is currently passing flatus and has had bowel movement  She is tolerating PO, and denies nausea or vomitting  Patient denies fever, chills, chest pain, shortness of breath, or calf tenderness  Lochia is minimal  She is  Breastfeeding and Bottle feeding  She is recovering well and is stable         Vitals:   /81 (BP Location: Left arm)   Pulse 86   Temp 97 9 °F (36 6 °C) (Oral)   Resp 16   Ht 5' 5" (1 651 m)   Wt 94 3 kg (208 lb)   LMP 05/28/2022 (Approximate)   SpO2 96%   Breastfeeding Yes   BMI 34 61 kg/m²       Intake/Output Summary (Last 24 hours) at 3/17/2023 0809  Last data filed at 3/17/2023 0803  Gross per 24 hour   Intake 1480 ml   Output 500 ml   Net 980 ml       Invasive Devices     Peripheral Intravenous Line  Duration           Peripheral IV 03/16/23 Right Antecubital <1 day                Physical Exam:   GEN: Muriel Gale appears well, alert and oriented x 3, pleasant and cooperative   CARDIO: RRR, no murmurs or rubs  RESP:  CTAB, no wheezes or rales  ABDOMEN: soft, no tenderness, no distention, fundus @ 2cm below umbilicus, Incision C/D/I  EXTREMITIES: SCDs on, non tender, no erythema, b/l Fabrizio's sign negative      Labs:     Hemoglobin   Date Value Ref Range Status   03/17/2023 9 8 (L) 11 5 - 15 4 g/dL Final   03/16/2023 9 8 (L) 11 5 - 15 4 g/dL Final     WBC   Date Value Ref Range Status   03/17/2023 8 39 4 31 - 10 16 Thousand/uL Final   03/16/2023 7 26 4 31 - 10 16 Thousand/uL Final     Platelets   Date Value Ref Range Status   03/17/2023 306 149 - 390 Thousands/uL Final   03/16/2023 290 149 - 390 Thousands/uL Final     Creatinine   Date Value Ref Range Status   03/16/2023 0 79 0 60 - 1 30 mg/dL Final     Comment:     Standardized to IDMS reference method   03/05/2023 0 67 0 60 - 1 30 mg/dL Final     Comment:     Standardized to IDMS reference method     AST   Date Value Ref Range Status   03/16/2023 17 13 - 39 U/L Final     Comment:     Specimen collection should occur prior to Sulfasalazine administration due to the potential for falsely depressed results  03/05/2023 26 13 - 39 U/L Final     Comment:     Specimen collection should occur prior to Sulfasalazine administration due to the potential for falsely depressed results  ALT   Date Value Ref Range Status   03/16/2023 24 7 - 52 U/L Final     Comment:     Specimen collection should occur prior to Sulfasalazine administration due to the potential for falsely depressed results  03/05/2023 12 7 - 52 U/L Final     Comment:     Specimen collection should occur prior to Sulfasalazine administration due to the potential for falsely depressed results             Amanda Osborn MD  3/17/2023  8:09 AM

## 2023-03-17 NOTE — H&P
H & P- Obstetrics   Mara Duarte 28 y o  female MRN: [id number]  Unit/Bed#: ED-10 Encounter: [id number]      Assessment: 28 y o  , POD#9 from  1LTCS for maternal request on 3/7/2023 at 39w0d who is being admitted for postparum preeclampsia  Plan:   * Preeclampsia in postpartum period  Assessment & Plan  Pt with gHTN in pregnancy    BP while in -148/78-94  CBC/CMP overall WNL, unchanged from prior assessment  Initial UP:C collected from clean-catch; UP:C from straight cath without detectable protein  Plan for 24 hours of magnesium  CBC, CMP, mag every 6 hours while on mag    S/P  section  Assessment & Plan  Pain: Tylenol and ibuprofen scheduled, [place] 5 mg as needed  Allow breast pump to be available  DVT PPx: SCDs    Subclinical hypothyroidism  Assessment & Plan  Continue synthroid      Discussed with Dr Tanya Cooper  SUBJECTIVE:    Chief Complaint: headache and elevated BPs    HPI: Shine Marques is a 28 y o  POD#9 from primary low-transverse  section for maternal request at 39w0d who presents after experiencing an intense headache today and elevated blood pressures  Patient delivered on 3/7/23  Her pregnancy was complicated by gestational hypertension  She initially presented to the hospital for induction of labor secondary to gestational hypertension  She progressed to complete and began pushing, however requested a primary  due to discomfort and pain  Her  section was overall uncomplicated, and her postpartum course was notable for non-elevated blood pressures  She reports that while she was hospitalized, she did experience an occasional dull headache  After discharge, she reports she had the same dull headache on and off  She reiterates that she did not have a continuous headache since discharge; she had many times while at home she did not have a headache      Today, as she and her partner were driving to her postop appointment, a very strong, "debilitating" headache came on around 2 PM   She reports was initially bilateral frontal, however now it is more in her right frontal area  She reports occasional blurry vision since this headache is started  She denies epigastric/right upper quadrant pain  She does endorse that her ankles have been mildly swollen since delivery; no increase in this lately  No increased swelling in her hands or face  She reports that her postpartum bleeding has been minimal to moderate, similar to a period  Pregnancy complications: gHTN, hypothyroidism, asthma    Patient Active Problem List   Diagnosis   • Asthma   • Functional ovarian cysts   • 38 weeks gestation of pregnancy   • Obesity in pregnancy, antepartum   • Subclinical hypothyroidism   • COVID-19 affecting pregnancy in first trimester   • S/P  section   • Gestational hypertension, third trimester       Review of Systems   Constitutional: Negative for chills and fever  HENT: Negative for sore throat  Eyes: Positive for visual disturbance (slight blurry vision, intermittent)  Respiratory: Negative for cough and shortness of breath  Cardiovascular: Negative for chest pain  Gastrointestinal: Negative for abdominal pain, constipation, diarrhea, nausea and vomiting  Genitourinary: Negative for dysuria and hematuria  Musculoskeletal: Negative for arthralgias and myalgias  Skin: Negative for rash  Neurological: Positive for headaches  Negative for dizziness         OB History    Para Term  AB Living   2 1 1 0 1 1   SAB IAB Ectopic Multiple Live Births   1 0 0 0 1      # Outcome Date GA Lbr Jacob/2nd Weight Sex Delivery Anes PTL Lv   2 Term 23 39w0d  3575 g (7 lb 14 1 oz) F CS-LTranv EPI, Gen  DENG   1 SAB 2022              Obstetric Comments   : 2022 SAB naturally    Hgb electrophoresis WNL       Past Medical History:   Diagnosis Date   • Acne    • ADHD    • Allergic asthma    • Subclinical hypothyroidism Past Surgical History:   Procedure Laterality Date   • FINGER SURGERY      pin placed   • OK  DELIVERY ONLY N/A 3/7/2023    Procedure:  SECTION (); Surgeon: Stella Holman MD;  Location: [facility];  Service: Obstetrics   • SHOULDER SURGERY Right     2019   • SKIN BIOPSY     • WISDOM TOOTH EXTRACTION         Social History     Tobacco Use   • Smoking status: Never   • Smokeless tobacco: Never   Substance Use Topics   • Alcohol use: Not Currently     Alcohol/week: 1 0 standard drink     Types: 1 Glasses of wine per week     Comment: Socially- before pregnancy       Allergies   Allergen Reactions   • Clindamycin Rash     headache  headache     • Tramadol GI Intolerance, Hives, Itching and Rash       (Not in a hospital admission)    OBJECTIVE:  Vitals:  Temp:  [98 5 °F (36 9 °C)] 98 5 °F (36 9 °C)  HR:  [85-92] 85  Resp:  [16] 16  BP: (134-160)/() 134/78  Body mass index is 34 67 kg/m²  Physical Exam:  Physical Exam  Constitutional:       Appearance: Normal appearance  Cardiovascular:      Rate and Rhythm: Normal rate and regular rhythm  Heart sounds: Normal heart sounds  No murmur heard  No friction rub  No gallop  Pulmonary:      Effort: Pulmonary effort is normal  No respiratory distress  Breath sounds: Normal breath sounds  No stridor  No wheezing, rhonchi or rales  Abdominal:      General: There is no distension  Palpations: Abdomen is soft  Tenderness: There is no abdominal tenderness  There is no guarding or rebound  Comments: Fundus at umbilicus, pfannenstiel incision C/D/I w/o erythema/exudate  Glue overlying  Musculoskeletal:         General: Swelling (1+ edema in B/L ankles) present  No tenderness  Neurological:      Mental Status: She is alert  Skin:     General: Skin is warm  Coloration: Skin is not pale  Findings: No erythema              Lab Results   Component Value Date    WBC 7 70 2023    HGB 10 2 (L) 03/16/2023    HCT 32 5 (L) 03/16/2023     03/16/2023     Lab Results   Component Value Date    K 4 3 03/16/2023     03/16/2023    CO2 24 03/16/2023    BUN 14 03/16/2023    CREATININE 0 79 03/16/2023    AST 17 03/16/2023    ALT 24 03/16/2023       Prenatal Labs: Reviewed     Prenatal/Postaprtum/Post-op Labs:   Blood Type:   Lab Results   Component Value Date/Time    ABO Grouping O 03/06/2023 06:10 AM     D (Rh type):   Lab Results   Component Value Date/Time    Rh Factor Positive 03/06/2023 06:10 AM     HCT/HGB:   Lab Results   Component Value Date/Time    Hematocrit 32 5 (L) 03/16/2023 05:10 PM    Hemoglobin 10 2 (L) 03/16/2023 05:10 PM     MCV:   Lab Results   Component Value Date/Time    MCV 92 03/16/2023 05:10 PM     Platelets:   Lab Results   Component Value Date/Time    Platelets 715 81/17/4524 05:10 PM     1 hour Glucola:   Lab Results   Component Value Date/Time    Glucose 92 12/21/2022 12:08 PM     VDRL/RPR:   Lab Results   Component Value Date/Time    RPR Non-Reactive 12/21/2022 12:08 PM     Urine Culture/Screen:   Lab Results   Component Value Date/Time    Urine Culture No Growth <1000 cfu/mL 08/12/2022 02:34 PM     Hep B:   Lab Results   Component Value Date/Time    Hepatitis B Surface Ag nonreactive 05/31/2022 12:00 AM    Hepatitis B Surface Ag non reactive 05/31/2022 12:00 AM     HIV:   Lab Results   Component Value Date/Time    HIV-1/HIV-2 AB Non-Reactive 05/31/2022 12:00 AM    HIV-1/HIV-2 AB Non-Reactive 05/31/2022 12:00 AM     Gonorrhea:   Lab Results   Component Value Date/Time    N gonorrhoeae, DNA Probe Negative 08/31/2022 10:31 AM     Group B Strep:    Lab Results   Component Value Date/Time    Strep Grp B PCR Negative 02/15/2023 02:57 PM          Invasive Devices     Peripheral Intravenous Line  Duration           Peripheral IV 03/16/23 Right Antecubital <1 day                >2 Midnights  INPATIENT       Raquel Blas MD  OB/GYN PGY-3  3/16/2023  9:10 PM

## 2023-03-17 NOTE — PLAN OF CARE
Problem: PAIN - ADULT  Goal: Verbalizes/displays adequate comfort level or baseline comfort level  Description: Interventions:  - Encourage patient to monitor pain and request assistance  - Assess pain using appropriate pain scale  - Administer analgesics based on type and severity of pain and evaluate response  - Implement non-pharmacological measures as appropriate and evaluate response  - Consider cultural and social influences on pain and pain management  - Notify physician/advanced practitioner if interventions unsuccessful or patient reports new pain  Outcome: Progressing     Problem: INFECTION - ADULT  Goal: Absence or prevention of progression during hospitalization  Description: INTERVENTIONS:  - Assess and monitor for signs and symptoms of infection  - Monitor lab/diagnostic results  - Monitor all insertion sites, i e  indwelling lines, tubes, and drains  - Monitor endotracheal if appropriate and nasal secretions for changes in amount and color  - [place] appropriate cooling/warming therapies per order  - Administer medications as ordered  - Instruct and encourage patient and family to use good hand hygiene technique  - Identify and instruct in appropriate isolation precautions for identified infection/condition  Outcome: Progressing  Goal: Absence of fever/infection during neutropenic period  Description: INTERVENTIONS:  - Monitor WBC    Outcome: Progressing     Problem: SAFETY ADULT  Goal: Patient will remain free of falls  Description: INTERVENTIONS:  - Educate patient/family on patient safety including physical limitations  - Instruct patient to call for assistance with activity   - Consult OT/PT to assist with strengthening/mobility   - Keep Call bell within reach  - Keep bed low and locked with side rails adjusted as appropriate  - Keep care items and personal belongings within reach  - Initiate and maintain comfort rounds  - Make Fall Risk Sign visible to staff    - Apply yellow socks and bracelet for high fall risk patients  - Consider moving patient to room near nurses station  Outcome: Progressing  Goal: Maintain or return to baseline ADL function  Description: INTERVENTIONS:  -  Assess patient's ability to carry out ADLs; assess patient's baseline for ADL function and identify physical deficits which impact ability to perform ADLs (bathing, care of mouth/teeth, toileting, grooming, dressing, etc )  - Assess/evaluate cause of self-care deficits   - Assess range of motion  - Assess patient's mobility; develop plan if impaired  - Assess patient's need for assistive devices and provide as appropriate  - Encourage maximum independence but intervene and supervise when necessary  - Involve family in performance of ADLs  - Assess for home care needs following discharge   - Consider OT consult to assist with ADL evaluation and planning for discharge  - Provide patient education as appropriate  Outcome: Progressing  Goal: Maintains/Returns to pre admission functional level  Description: INTERVENTIONS:  - Perform BMAT or MOVE assessment daily    - Set and communicate daily mobility goal to care team and patient/family/caregiver     - Collaborate with rehabilitation services on mobility goals if consulted  -  - Out of bed for toileting  - Record patient progress and toleration of activity level   Outcome: Progressing     Problem: DISCHARGE PLANNING  Goal: Discharge to home or other facility with appropriate resources  Description: INTERVENTIONS:  - Identify barriers to discharge w/patient and caregiver  - Arrange for needed discharge resources and transportation as appropriate  - Identify discharge learning needs (meds, wound care, etc )  - Arrange for interpretive services to assist at discharge as needed  - Refer to Case Management Department for coordinating discharge planning if the patient needs post-hospital services based on physician/advanced practitioner order or complex needs related to functional status, cognitive ability, or social support system  Outcome: Progressing     Problem: Knowledge Deficit  Goal: Patient/family/caregiver demonstrates understanding of disease process, treatment plan, medications, and discharge instructions  Description: Complete learning assessment and assess knowledge base    Interventions:  - Provide teaching at level of understanding  - Provide teaching via preferred learning methods  Outcome: Progressing

## 2023-03-18 VITALS
RESPIRATION RATE: 17 BRPM | HEIGHT: 65 IN | HEART RATE: 84 BPM | BODY MASS INDEX: 34.66 KG/M2 | DIASTOLIC BLOOD PRESSURE: 78 MMHG | TEMPERATURE: 97.8 F | SYSTOLIC BLOOD PRESSURE: 130 MMHG | OXYGEN SATURATION: 97 % | WEIGHT: 208 LBS

## 2023-03-18 LAB
ALBUMIN SERPL BCP-MCNC: 3.7 G/DL (ref 3.5–5)
ALBUMIN SERPL BCP-MCNC: 3.8 G/DL (ref 3.5–5)
ALP SERPL-CCNC: 103 U/L (ref 34–104)
ALP SERPL-CCNC: 95 U/L (ref 34–104)
ALT SERPL W P-5'-P-CCNC: 18 U/L (ref 7–52)
ALT SERPL W P-5'-P-CCNC: 20 U/L (ref 7–52)
ANION GAP SERPL CALCULATED.3IONS-SCNC: 7 MMOL/L (ref 4–13)
ANION GAP SERPL CALCULATED.3IONS-SCNC: 9 MMOL/L (ref 4–13)
AST SERPL W P-5'-P-CCNC: 13 U/L (ref 13–39)
AST SERPL W P-5'-P-CCNC: 15 U/L (ref 13–39)
BILIRUB SERPL-MCNC: 0.34 MG/DL (ref 0.2–1)
BILIRUB SERPL-MCNC: 0.36 MG/DL (ref 0.2–1)
BUN SERPL-MCNC: 11 MG/DL (ref 5–25)
BUN SERPL-MCNC: 9 MG/DL (ref 5–25)
CALCIUM SERPL-MCNC: 7.3 MG/DL (ref 8.4–10.2)
CALCIUM SERPL-MCNC: 7.4 MG/DL (ref 8.4–10.2)
CHLORIDE SERPL-SCNC: 105 MMOL/L (ref 96–108)
CHLORIDE SERPL-SCNC: 105 MMOL/L (ref 96–108)
CO2 SERPL-SCNC: 21 MMOL/L (ref 21–32)
CO2 SERPL-SCNC: 23 MMOL/L (ref 21–32)
CREAT SERPL-MCNC: 0.66 MG/DL (ref 0.6–1.3)
CREAT SERPL-MCNC: 0.75 MG/DL (ref 0.6–1.3)
ERYTHROCYTE [DISTWIDTH] IN BLOOD BY AUTOMATED COUNT: 14.3 % (ref 11.6–15.1)
ERYTHROCYTE [DISTWIDTH] IN BLOOD BY AUTOMATED COUNT: 14.6 % (ref 11.6–15.1)
GFR SERPL CREATININE-BSD FRML MDRD: 108 ML/MIN/1.73SQ M
GFR SERPL CREATININE-BSD FRML MDRD: 120 ML/MIN/1.73SQ M
GLUCOSE SERPL-MCNC: 107 MG/DL (ref 65–140)
GLUCOSE SERPL-MCNC: 135 MG/DL (ref 65–140)
HCT VFR BLD AUTO: 34.6 % (ref 34.8–46.1)
HCT VFR BLD AUTO: 35.2 % (ref 34.8–46.1)
HGB BLD-MCNC: 11.1 G/DL (ref 11.5–15.4)
HGB BLD-MCNC: 11.3 G/DL (ref 11.5–15.4)
MAGNESIUM SERPL-MCNC: 5.1 MG/DL (ref 1.9–2.7)
MAGNESIUM SERPL-MCNC: 7.3 MG/DL (ref 1.9–2.7)
MCH RBC QN AUTO: 28.6 PG (ref 26.8–34.3)
MCH RBC QN AUTO: 28.8 PG (ref 26.8–34.3)
MCHC RBC AUTO-ENTMCNC: 32.1 G/DL (ref 31.4–37.4)
MCHC RBC AUTO-ENTMCNC: 32.1 G/DL (ref 31.4–37.4)
MCV RBC AUTO: 89 FL (ref 82–98)
MCV RBC AUTO: 90 FL (ref 82–98)
PLATELET # BLD AUTO: 375 THOUSANDS/UL (ref 149–390)
PLATELET # BLD AUTO: 382 THOUSANDS/UL (ref 149–390)
PMV BLD AUTO: 9 FL (ref 8.9–12.7)
PMV BLD AUTO: 9 FL (ref 8.9–12.7)
POTASSIUM SERPL-SCNC: 4.7 MMOL/L (ref 3.5–5.3)
POTASSIUM SERPL-SCNC: 5.1 MMOL/L (ref 3.5–5.3)
PROT SERPL-MCNC: 6.9 G/DL (ref 6.4–8.4)
PROT SERPL-MCNC: 7.2 G/DL (ref 6.4–8.4)
RBC # BLD AUTO: 3.86 MILLION/UL (ref 3.81–5.12)
RBC # BLD AUTO: 3.95 MILLION/UL (ref 3.81–5.12)
SODIUM SERPL-SCNC: 135 MMOL/L (ref 135–147)
SODIUM SERPL-SCNC: 135 MMOL/L (ref 135–147)
WBC # BLD AUTO: 10.17 THOUSAND/UL (ref 4.31–10.16)
WBC # BLD AUTO: 14.35 THOUSAND/UL (ref 4.31–10.16)

## 2023-03-18 RX ORDER — LEVOTHYROXINE SODIUM 0.05 MG/1
50 TABLET ORAL DAILY
Qty: 30 TABLET | Refills: 0 | Status: SHIPPED | OUTPATIENT
Start: 2023-03-18 | End: 2023-04-04

## 2023-03-18 RX ORDER — SUMATRIPTAN 50 MG/1
50 TABLET, FILM COATED ORAL ONCE
Qty: 3 TABLET | Refills: 0 | Status: SHIPPED | OUTPATIENT
Start: 2023-03-18 | End: 2023-03-27 | Stop reason: SDUPTHER

## 2023-03-18 RX ORDER — SUMATRIPTAN 50 MG/1
50 TABLET, FILM COATED ORAL ONCE
Status: COMPLETED | OUTPATIENT
Start: 2023-03-18 | End: 2023-03-18

## 2023-03-18 RX ORDER — NIFEDIPINE 30 MG/1
30 TABLET, EXTENDED RELEASE ORAL DAILY
Qty: 30 TABLET | Refills: 0 | Status: SHIPPED | OUTPATIENT
Start: 2023-03-19

## 2023-03-18 RX ADMIN — IBUPROFEN 600 MG: 600 TABLET, FILM COATED ORAL at 04:22

## 2023-03-18 RX ADMIN — ACETAMINOPHEN 650 MG: 325 TABLET ORAL at 05:38

## 2023-03-18 RX ADMIN — SUMATRIPTAN SUCCINATE 50 MG: 50 TABLET ORAL at 14:25

## 2023-03-18 RX ADMIN — ACETAMINOPHEN 650 MG: 325 TABLET ORAL at 12:44

## 2023-03-18 RX ADMIN — NIFEDIPINE 30 MG: 30 TABLET, FILM COATED, EXTENDED RELEASE ORAL at 09:10

## 2023-03-18 RX ADMIN — LEVOTHYROXINE SODIUM 50 MCG: 50 TABLET ORAL at 05:38

## 2023-03-18 RX ADMIN — ACETAMINOPHEN 650 MG: 325 TABLET ORAL at 00:24

## 2023-03-18 RX ADMIN — IBUPROFEN 600 MG: 600 TABLET, FILM COATED ORAL at 09:10

## 2023-03-18 NOTE — PLAN OF CARE
Problem: PAIN - ADULT  Goal: Verbalizes/displays adequate comfort level or baseline comfort level  Description: Interventions:  - Encourage patient to monitor pain and request assistance  - Assess pain using appropriate pain scale  - Administer analgesics based on type and severity of pain and evaluate response  - Implement non-pharmacological measures as appropriate and evaluate response  - Consider cultural and social influences on pain and pain management  - Notify physician/advanced practitioner if interventions unsuccessful or patient reports new pain  Outcome: Progressing     Problem: INFECTION - ADULT  Goal: Absence or prevention of progression during hospitalization  Description: INTERVENTIONS:  - Assess and monitor for signs and symptoms of infection  - Monitor lab/diagnostic results  - Monitor all insertion sites, i e  indwelling lines, tubes, and drains  - Monitor endotracheal if appropriate and nasal secretions for changes in amount and color  - [place] appropriate cooling/warming therapies per order  - Administer medications as ordered  - Instruct and encourage patient and family to use good hand hygiene technique  - Identify and instruct in appropriate isolation precautions for identified infection/condition  Outcome: Progressing  Goal: Absence of fever/infection during neutropenic period  Description: INTERVENTIONS:  - Monitor WBC    Outcome: Progressing     Problem: SAFETY ADULT  Goal: Patient will remain free of falls  Description: INTERVENTIONS:  - Educate patient/family on patient safety including physical limitations  - Instruct patient to call for assistance with activity   - Consult OT/PT to assist with strengthening/mobility   - Keep Call bell within reach  - Keep bed low and locked with side rails adjusted as appropriate  - Keep care items and personal belongings within reach  - Initiate and maintain comfort rounds  - Make Fall Risk Sign visible to staff  - Offer Toileting every  Hours, in advance of need  - Initiate/Maintain alarm  - Obtain necessary fall risk management equipment:   - Apply yellow socks and bracelet for high fall risk patients  - Consider moving patient to room near nurses station  Outcome: Progressing  Goal: Maintain or return to baseline ADL function  Description: INTERVENTIONS:  -  Assess patient's ability to carry out ADLs; assess patient's baseline for ADL function and identify physical deficits which impact ability to perform ADLs (bathing, care of mouth/teeth, toileting, grooming, dressing, etc )  - Assess/evaluate cause of self-care deficits   - Assess range of motion  - Assess patient's mobility; develop plan if impaired  - Assess patient's need for assistive devices and provide as appropriate  - Encourage maximum independence but intervene and supervise when necessary  - Involve family in performance of ADLs  - Assess for home care needs following discharge   - Consider OT consult to assist with ADL evaluation and planning for discharge  - Provide patient education as appropriate  Outcome: Progressing  Goal: Maintains/Returns to pre admission functional level  Description: INTERVENTIONS:  - Perform BMAT or MOVE assessment daily    - Set and communicate daily mobility goal to care team and patient/family/caregiver  - Collaborate with rehabilitation services on mobility goals if consulted  - Perform Range of Motion  times a day  - Reposition patient every  hours    - Dangle patient  times a day  - Stand patient  times a day  - Ambulate patient  times a day  - Out of bed to chair  times a day   - Out of bed for meals  times a day  - Out of bed for toileting  - Record patient progress and toleration of activity level   Outcome: Progressing     Problem: DISCHARGE PLANNING  Goal: Discharge to home or other facility with appropriate resources  Description: INTERVENTIONS:  - Identify barriers to discharge w/patient and caregiver  - Arrange for needed discharge resources and transportation as appropriate  - Identify discharge learning needs (meds, wound care, etc )  - Arrange for interpretive services to assist at discharge as needed  - Refer to Case Management Department for coordinating discharge planning if the patient needs post-hospital services based on physician/advanced practitioner order or complex needs related to functional status, cognitive ability, or social support system  Outcome: Progressing     Problem: Knowledge Deficit  Goal: Patient/family/caregiver demonstrates understanding of disease process, treatment plan, medications, and discharge instructions  Description: Complete learning assessment and assess knowledge base    Interventions:  - Provide teaching at level of understanding  - Provide teaching via preferred learning methods  Outcome: Progressing

## 2023-03-18 NOTE — DISCHARGE SUMMARY
Discharge Summary - Gynecology  Grayson Miu 28 y o  female MRN: [id number]  Unit/Bed#: -01 Encounter: [id number]    Admission Date: 3/16/2023   Discharge Date: 23    Attending Physician:   Eric Darling    Admitting Diagnosis:   Postpartum pre-eclampsia  Subclinical hypothyroidism  History of  section    Discharge Diagnosis:   Same    Procedures Performed:   None    HPI:    32yo  who presented on POD#9 after a 1LTCS with worsening headache and new onset elevated blood pressures  She had gestational hypertension prior to delivery but blood pressures had been well controlled prior to delivery  She presented with a strong debilitating headache, visual changes, and had blood pressures in the 160s in the emergency department  CT venogram showed no acute abnormalities  Headache was unresolved with toradol, and she was admitted for managemetn fo pre-eclampsia with severe features  She received Flexeril, Solumedrol, Reglan, Benadryl, and caffeine for the headache without resolution  Imitrex then given with resolution of headache  She was started on Procardia 30mg XL daily for blood pressure control  She received 24 hours of magnesium for seizure prophylaxis  Her blood pressures remained well controlled, her headache remained treated, and she was discharged on Hospital day 2 to follow up in the office       Lab Results:   Lab Results   Component Value Date    WBC 14 35 (H) 2023    HGB 11 1 (L) 2023    HCT 34 6 (L) 2023    MCV 90 2023     2023     Lab Results   Component Value Date    CALCIUM 7 4 (L) 2023    K 4 7 2023    CO2 23 2023     2023    BUN 11 2023    CREATININE 0 75 2023     No results found for: POCGLU  No results found for: PTT  No results found for: INR, PROTIME    Complications:   none    Condition at Discharge:   good     Discharge Medications:   See after visit summary for reconciled discharge medications provided to patient and family  Discharge instructions: See after visit summary for complete information  Do not place anything (no partner, tampons or douche) in your vagina for 6 weeks  You may walk for exercise for the first 6 weeks then gradually return to your usual activities     You may take stairs one at a time, touching each step with both feet for the first few days, then as tolerated  Please do not drive until tolerating pain and off of narcotics     Please look at your incision in the mirror daily and call for redness or tenderness or increased warmth   Call us for increasing redness or steady drainage from the incision     Please call us for temperature > 100 4*F or 38* C, worsening pain or a foul discharge  No heavy lifting more than one full gallon of milk (about 8 lbs)  No tub baths or swimming  Provisions for Follow-Up Care:   See after visit summary for information related to follow-up care and any pertinent home health orders        Disposition: Home  Planned Readmission: No    Code Status: Full    Hemal Barreto MD  3/18/2023  9:38 AM

## 2023-03-18 NOTE — PROGRESS NOTES
Progress Note - OB/GYN   Mara Duarte 28 y o  female MRN: [id number]  Unit/Bed#: -01 Encounter: [id number]    Assessment:  Post partum Day #11 s/p 1LTCS for maternal request, stable    Plan:  S/P  section  Assessment & Plan  Pain: Tylenol and ibuprofen scheduled, [place] 5 mg as needed  Allow breast pump to be available  DVT PPx: SCDs    Subclinical hypothyroidism  Assessment & Plan  Continue synthroid    * Preeclampsia in postpartum period  Assessment & Plan  Systolic (71OOB), GSU:249 , Min:131 , TNA:592   Diastolic (58PKS), OXU:37, Min:78, Max:94    Pt with gHTN in pregnancy    BP while in -148/78-94  CBC/CMP overall WNL, unchanged from prior assessment  Initial UP:C collected from clean-catch; UP:C from straight cath without detectable protein  S/p 24 hours of magnesium  CBC/CMP WNL  HA resolved with imitrex on 3/17/23  Plan for management with Flexeril and headache cocktail - IV Solumedrol 500mg, IV Reglan 10mg, IV Benadryl 25mg, and PO caffeine 100mg  Procardia 30mg XL qD started on 3/17/23  Continue to trend pressures  Continue to monitor for evolution of symptoms      Subjective/Objective   Chief Complaint:     Post delivery  Patient is doing well  Lochia WNL  Pain well controlled  Patient states her HA resolved with the imitrex  Subjective:     Pain: yes, cramping, improved with meds  Tolerating PO: yes  Voiding: yes  Flatus: yes  Ambulating: yes  Chest pain: no  Shortness of breath: no  Leg pain: no  Lochia: minimal    Objective:     Vitals: /84 (BP Location: Left arm)   Pulse 97   Temp 98 4 °F (36 9 °C) (Oral)   Resp 18   Ht 5' 5" (1 651 m)   Wt 94 3 kg (208 lb)   LMP 2022 (Approximate)   SpO2 97%   Breastfeeding Yes   BMI 34 61 kg/m²     I/O        0701   0700  0701   0700    P  O  700 710    I V  (mL/kg)  2181 7 (23 1)    IV Piggyback 550     Total Intake(mL/kg) 1250 (13 3) 2891 7 (30 7)    Urine (mL/kg/hr) 500 3600 (1 6)    Total Output 500 3600    Net +850 -540 3                Lab Results   Component Value Date    WBC 10 17 (H) 03/18/2023    HGB 11 3 (L) 03/18/2023    HCT 35 2 03/18/2023    MCV 89 03/18/2023     03/18/2023       Physical Exam:     Gen: AAOx3, NAD  CV: no acute distress  Lungs: no acute distress  Abd: Soft, non-tender, non-distended, no rebound or guarding  Uterine fundus firm and non-tender, 3 cm below the umbilicus   Incision c/d/I  Ext: Non tender    [address]  Aly Sales MD  OBPROSPERN PGY-2  3/18/2023  2:28 AM

## 2023-03-23 NOTE — UTILIZATION REVIEW
NOTIFICATION OF ADMISSION DISCHARGE   This is a Notification of Discharge from [facility]  Please be advised that this patient has been discharge from our facility  Below you will find the admission and discharge date and time including the patient’s disposition  UTILIZATION REVIEW CONTACT:  Jessie Perkins  Utilization   Network Utilization Review Department  Phone: [phone] x carefully listen to the prompts  All voicemails are confidential   Email: [email or web address]     ADMISSION INFORMATION  PRESENTATION DATE: 3/16/2023  4:02 PM  OBERVATION ADMISSION DATE:   INPATIENT ADMISSION DATE: 3/16/23 10:27 PM   DISCHARGE DATE: 3/18/2023  2:51 PM   DISPOSITION:Home/Self Care    IMPORTANT INFORMATION:  Send all requests for admission clinical reviews, approved or denied determinations and any other requests to dedicated fax number below belonging to the campus where the patient is receiving treatment   List of dedicated fax numbers:  [address] DENIALS (Administrative/Medical Necessity) [phone]   [address] (Maternity/NICU/Pediatrics) [phone]   [facility] [phone]   [address] [phone]   [address] [phone]   [address] [facility] [phone] 90 [facility] [phone] 1463 [facility] 119 [phone]   [facility]  [phone]   [address] [facility] [phone]   [address] [facility] [address] [facility] [phone]

## 2023-03-27 ENCOUNTER — POSTPARTUM VISIT (OUTPATIENT)
Dept: OBGYN CLINIC | Facility: CLINIC | Age: 28
End: 2023-03-27

## 2023-03-27 VITALS — SYSTOLIC BLOOD PRESSURE: 122 MMHG | DIASTOLIC BLOOD PRESSURE: 80 MMHG | BODY MASS INDEX: 34.45 KG/M2 | WEIGHT: 207 LBS

## 2023-03-27 DIAGNOSIS — R51.9 HEADACHE: ICD-10-CM

## 2023-03-27 DIAGNOSIS — E03.8 SUBCLINICAL HYPOTHYROIDISM: ICD-10-CM

## 2023-03-27 RX ORDER — MELATONIN
1000 DAILY
COMMUNITY

## 2023-03-27 RX ORDER — SUMATRIPTAN 50 MG/1
50 TABLET, FILM COATED ORAL ONCE
Qty: 3 TABLET | Refills: 0 | Status: SHIPPED | OUTPATIENT
Start: 2023-03-27 | End: 2023-04-04 | Stop reason: SDUPTHER

## 2023-03-27 NOTE — PROGRESS NOTES
Assessment/Plan   Diagnoses and all orders for this visit:    Postpartum care following  delivery    Headache  -     SUMAtriptan (IMITREX) 50 mg tablet; Take 1 tablet (50 mg total) by mouth once for 1 dose  -     Ambulatory Referral to [facility]; Future  Preeclampsia in postpartum period  -     Ambulatory Referral to [facility]; Future  Subclinical hypothyroidism  -     Ambulatory Referral to [facility]; Future  Advise patient to continue medications for hypothyroid and blood pressure until follow-up with PCP who she should see/establish with in 1 month - referral placed; Reviewed any s/sx of low blood pressure can call to discuss prior  Discussion  Pt to increase activity as tolerated  Recommend nothing PV until 6 weeks PP  Pt is cleared to resume sexual activity after 6 weeks PP or when she is ready thereafter and is undecided on birth control at this time - reviewed barrier methods, progesterone only options (pill, injection, implant, IUD) while breastfeeding  Reviewed combination OCP once she is at least 3 months PP; Advised at least one year from delivery to conception  All questions answered today to patient's satisfaction  RTO in 3-4 months for routine APE or sooner if needed    Subjective     HPI   Melvin Whatley is a 28 y o  female who presents to the office today for her postpartum check - she is 2w6d PP  Patient delivered a healthy baby by primarly LTCS delivery on 3/7/23 - she was induced for gestational HTN  On 3/16/23 she was re-admitted for PP preE with severe features and was started on Procardia XL 30 mg daily - she has been taking with no issues - denies any dizziness or lightheadness  Patient is doing well and baby is doing well  Baby is breast-feeding by exclusively pumping   Patient denies any fever/chills/chest pain/shortness of breath/dizziness/leg pain  (+) HA - constant dull tension like in forehead - tolerable; Believes due to allergies - plans to start her allergy med (singulair again); denies changes in vision; History of occasional migraines - takes imitrex as needed - only has one pill left and requesting a refill to get her to a follow-up with a PCP; Appetite ok - denies nausea/vomitting; No bowel or bladder issues  Lochia is tolerable like a light period; Tolerating pain with tylenol and ibuprofen as needed; Denies any baby blues/sadness or depression - EPDS 0  Birth control - unsure - was on Viorele in the past which worked well; Last Pap - 8/31/22 - WNL    Review of Systems   Constitutional: Negative for activity change, fatigue, fever and unexpected weight change  HENT: Negative for congestion, dental problem, sinus pressure and sinus pain  Eyes: Negative for visual disturbance  Respiratory: Negative for cough, shortness of breath and wheezing  Cardiovascular: Negative for chest pain and leg swelling  Gastrointestinal: Negative for abdominal distention, abdominal pain, blood in stool, constipation, diarrhea, nausea and vomiting  Endocrine: Negative for polydipsia  Genitourinary: Negative for difficulty urinating, dyspareunia, dysuria, frequency, hematuria, menstrual problem, pelvic pain, urgency, vaginal bleeding, vaginal discharge and vaginal pain  Musculoskeletal: Negative for arthralgias and back pain  Allergic/Immunologic: Negative for environmental allergies  Neurological: Positive for headaches  Negative for dizziness and seizures  Psychiatric/Behavioral: Negative for dysphoric mood and sleep disturbance  The patient is not nervous/anxious          The following portions of the patient's history were reviewed and updated as appropriate: allergies, current medications, past family history, past medical history, past social history, past surgical history and problem list          Past Medical History:   Diagnosis Date   • Acne    • ADHD    • Allergic asthma    • Subclinical hypothyroidism        Past Surgical History:   Procedure Laterality Date   • FINGER SURGERY      pin placed   • CA  DELIVERY ONLY N/A 3/7/2023    Procedure:  SECTION (); Surgeon: Seven Ndiaye MD;  Location: [facility] ;  Service: Obstetrics   • SHOULDER SURGERY Right     2019   • SKIN BIOPSY     • WISDOM TOOTH EXTRACTION         Family History   Problem Relation Age of Onset   • Breast cancer Mother         negative genetic testing   • No Known Problems Father    • Breast cancer Maternal Grandmother    • Heart disease Paternal Grandmother        Social History     Socioeconomic History   • Marital status: /Civil Union     Spouse name: Not on file   • Number of children: Not on file   • Years of education: Not on file   • Highest education level: Not on file   Occupational History   • Not on file   Tobacco Use   • Smoking status: Never   • Smokeless tobacco: Never   Vaping Use   • Vaping Use: Never used   Substance and Sexual Activity   • Alcohol use:  Yes     Alcohol/week: 1 0 standard drink     Types: 1 Glasses of wine per week     Comment: Socially   • Drug use: Never   • Sexual activity: Yes     Partners: Male     Birth control/protection: None   Other Topics Concern   • Not on file   Social History Narrative   • Not on file     Social Determinants of Health     Financial Resource Strain: Not on file   Food Insecurity: Not on file   Transportation Needs: Not on file   Physical Activity: Not on file   Stress: Not on file   Social Connections: Not on file   Intimate Partner Violence: Not on file   Housing Stability: Not on file         Current Outpatient Medications:   •  acetaminophen (TYLENOL) 325 mg tablet, Take 3 tablets (975 mg total) by mouth every 8 (eight) hours, Disp: , Rfl: 0  •  cholecalciferol (VITAMIN D3) 1,000 units tablet, Take 1,000 Units by mouth daily, Disp: , Rfl:   •  docusate sodium (COLACE) 100 mg capsule, Take 100 mg by mouth 2 (two) times a day, Disp: , Rfl:   •  ibuprofen (MOTRIN) 600 mg tablet, Take 1 tablet (600 mg total) by mouth every 6 (six) hours, Disp: 45 tablet, Rfl: 0  •  levothyroxine 50 mcg tablet, Take 1 tablet (50 mcg total) by mouth daily, Disp: 30 tablet, Rfl: 0  •  NIFEdipine (PROCARDIA XL) 30 mg 24 hr tablet, Take 1 tablet (30 mg total) by mouth daily Do not start before March 19, 2023 , Disp: 30 tablet, Rfl: 0  •  ondansetron (Zofran ODT) 4 mg disintegrating tablet, Take 1 tablet (4 mg total) by mouth every 8 (eight) hours as needed for nausea or vomiting, Disp: 90 tablet, Rfl: 0  •  Prenat w/o A-FeCbGl-DSS-FA-DHA (PNV OB+DHA PO), Take by mouth, Disp: , Rfl:   •  SUMAtriptan (IMITREX) 50 mg tablet, Take 1 tablet (50 mg total) by mouth once for 1 dose, Disp: 3 tablet, Rfl: 0  •  calcium carbonate (OS-CINDY) 1250 (500 Ca) MG chewable tablet, Chew 1 tablet daily (Patient not taking: Reported on 3/27/2023), Disp: , Rfl:     Allergies   Allergen Reactions   • Clindamycin Rash     headache  headache     • Tramadol GI Intolerance, Hives, Itching and Rash       Objective   Vitals:    03/27/23 0824   BP: 122/80   BP Location: Left arm   Patient Position: Sitting   Cuff Size: Standard   Weight: 93 9 kg (207 lb)     Physical Exam  Vitals reviewed  Constitutional:       General: She is awake  She is not in acute distress  Appearance: Normal appearance  She is well-developed and well-groomed  She is not ill-appearing, toxic-appearing or diaphoretic  Eyes:      Conjunctiva/sclera: Conjunctivae normal    Cardiovascular:      Rate and Rhythm: Normal rate and regular rhythm  Heart sounds: Normal heart sounds  No murmur heard  Pulmonary:      Effort: Pulmonary effort is normal  No tachypnea, bradypnea or respiratory distress  Breath sounds: Normal breath sounds  No decreased air movement  No wheezing  Abdominal:      General: There is no distension  Palpations: Abdomen is soft  There is no hepatomegaly, splenomegaly or mass  Tenderness: There is no abdominal tenderness        Hernia: No hernia is present  There is no hernia in the left inguinal area or right inguinal area  Comments: Incision - clean, dry, intact - glue still present - advise to gently massage off with Vaseline   Genitourinary:     General: Normal vulva  Exam position: Supine  Pubic Area: No rash or pubic lice  Labia:         Right: No rash, tenderness, lesion or injury  Left: No rash, tenderness, lesion or injury  Urethra: No prolapse, urethral pain, urethral swelling or urethral lesion  Vagina: Normal  No signs of injury and foreign body  No vaginal discharge, erythema, tenderness, bleeding, lesions or prolapsed vaginal walls  Cervix: No cervical motion tenderness, discharge, friability, lesion, erythema or cervical bleeding  Uterus: Not deviated, not enlarged, not fixed, not tender and no uterine prolapse  Adnexa:         Right: No mass, tenderness or fullness  Left: No mass, tenderness or fullness  Lymphadenopathy:      Lower Body: No right inguinal adenopathy  No left inguinal adenopathy  Skin:     General: Skin is warm and dry  Neurological:      Mental Status: She is alert and oriented to person, place, and time  Psychiatric:         Mood and Affect: Mood and affect normal          Speech: Speech normal          Behavior: Behavior normal  Behavior is cooperative  Thought Content:  Thought content normal          Judgment: Judgment normal

## 2023-04-01 ENCOUNTER — NURSE TRIAGE (OUTPATIENT)
Dept: OTHER | Facility: OTHER | Age: 28
End: 2023-04-01

## 2023-04-01 NOTE — TELEPHONE ENCOUNTER
"Regarding: post partum - lump on breast  ----- Message from Teresa Harada sent at 4/1/2023 11:48 AM EDT -----  \" I am 3 weeks postpartum  I have 2 lumps on my right breast, I have tried everything and it will not go away  \"    "

## 2023-04-01 NOTE — TELEPHONE ENCOUNTER
"  Reason for Disposition  • [1] Breast looks infected (e g , spreading redness) AND [2] no fever    Answer Assessment - Initial Assessment Questions  1  PAIN: \"How bad is the pain? \"  (Scale 1-10; or mild, moderate, severe)    - MILD - doesn't interfere with normal activities     - MODERATE - interferes with normal activities or awakens from sleep     - SEVERE - excruciating pain, unable to do any normal activities       \"Excruciating pain\"  2  SKIN: \"Does the skin appear red? \"        Some mild redness  3  LOCATION: \"Which breast?\" (e g , left, right, both)      Right  4  DELIVERY: \"When was the baby born?\"       3 weeks ago  5  BREASTFEEDING: \"Have you been breastfeeding? \" If yes, ask: \"When did you stop? \"      Yes-pumping  6  ONSET: \"When did the breast pain start? \" (e g , hours, days)      Yesterday  7  FEVER: \"Do you have a fever? \" If Yes, ask: \"What is it, how was it measured, and when did it start? \"      Denies fever  8  OTHER SYMPTOMS: \"Do you have any other symptoms? \" (e g , abdominal pain, feeling sad or depressed, weakness)      Some warmth over the area, chills    Protocols used: POSTPARTUM - BREAST PAIN AND ENGORGEMENT-ADULT-AH    Pt is 3 weeks postpartum complaining of breast lumps and pain  Pt  has been exclusively pumping and first noticed 2 painful lumps on her right breast yesterday  She states the area is very painful (6/10) and there is some redness and warmth  She denies fever but states she did have chills earlier  She has been using warm compresses as well as massage towards the nipple without relief  Paged on call  Per on call, ordering abx  Continue with warm compresses and pumping  Go to ED if symptoms worsen  Spoke to pt to make her aware of the above  Pt verbalized understanding and is agreeable       "

## 2023-04-03 DIAGNOSIS — J30.9 ALLERGIC RHINITIS, UNSPECIFIED SEASONALITY, UNSPECIFIED TRIGGER: Primary | ICD-10-CM

## 2023-04-03 RX ORDER — MONTELUKAST SODIUM 10 MG/1
10 TABLET ORAL
Qty: 30 TABLET | Refills: 0 | Status: SHIPPED | OUTPATIENT
Start: 2023-04-03

## 2023-04-04 ENCOUNTER — APPOINTMENT (OUTPATIENT)
Dept: LAB | Facility: CLINIC | Age: 28
End: 2023-04-04

## 2023-04-04 ENCOUNTER — OFFICE VISIT (OUTPATIENT)
Dept: FAMILY MEDICINE CLINIC | Facility: CLINIC | Age: 28
End: 2023-04-04

## 2023-04-04 VITALS
WEIGHT: 204 LBS | BODY MASS INDEX: 33.99 KG/M2 | SYSTOLIC BLOOD PRESSURE: 118 MMHG | HEART RATE: 126 BPM | DIASTOLIC BLOOD PRESSURE: 66 MMHG | HEIGHT: 65 IN | OXYGEN SATURATION: 98 % | TEMPERATURE: 97.8 F

## 2023-04-04 DIAGNOSIS — E03.8 SUBCLINICAL HYPOTHYROIDISM: ICD-10-CM

## 2023-04-04 DIAGNOSIS — R51.9 HEADACHE: ICD-10-CM

## 2023-04-04 DIAGNOSIS — G43.909 MIGRAINE WITHOUT STATUS MIGRAINOSUS, NOT INTRACTABLE, UNSPECIFIED MIGRAINE TYPE: ICD-10-CM

## 2023-04-04 PROBLEM — O98.511 COVID-19 AFFECTING PREGNANCY IN FIRST TRIMESTER: Status: RESOLVED | Noted: 2023-01-16 | Resolved: 2023-04-04

## 2023-04-04 PROBLEM — U07.1 COVID-19 AFFECTING PREGNANCY IN FIRST TRIMESTER: Status: RESOLVED | Noted: 2023-01-16 | Resolved: 2023-04-04

## 2023-04-04 PROBLEM — Z3A.38 38 WEEKS GESTATION OF PREGNANCY: Status: RESOLVED | Noted: 2022-08-03 | Resolved: 2023-04-04

## 2023-04-04 PROBLEM — O99.210 OBESITY IN PREGNANCY, ANTEPARTUM: Status: RESOLVED | Noted: 2022-09-01 | Resolved: 2023-04-04

## 2023-04-04 PROBLEM — O13.3 GESTATIONAL HYPERTENSION, THIRD TRIMESTER: Status: RESOLVED | Noted: 2023-03-07 | Resolved: 2023-04-04

## 2023-04-04 LAB
ALBUMIN SERPL BCP-MCNC: 3.7 G/DL (ref 3.5–5)
ALP SERPL-CCNC: 86 U/L (ref 46–116)
ALT SERPL W P-5'-P-CCNC: 22 U/L (ref 12–78)
ANION GAP SERPL CALCULATED.3IONS-SCNC: 3 MMOL/L (ref 4–13)
AST SERPL W P-5'-P-CCNC: 17 U/L (ref 5–45)
BASOPHILS # BLD AUTO: 0.05 THOUSANDS/ÂΜL (ref 0–0.1)
BASOPHILS NFR BLD AUTO: 1 % (ref 0–1)
BILIRUB SERPL-MCNC: 0.36 MG/DL (ref 0.2–1)
BUN SERPL-MCNC: 14 MG/DL (ref 5–25)
CALCIUM SERPL-MCNC: 10.3 MG/DL (ref 8.3–10.1)
CHLORIDE SERPL-SCNC: 109 MMOL/L (ref 96–108)
CO2 SERPL-SCNC: 24 MMOL/L (ref 21–32)
CREAT SERPL-MCNC: 0.75 MG/DL (ref 0.6–1.3)
EOSINOPHIL # BLD AUTO: 0.2 THOUSAND/ÂΜL (ref 0–0.61)
EOSINOPHIL NFR BLD AUTO: 3 % (ref 0–6)
ERYTHROCYTE [DISTWIDTH] IN BLOOD BY AUTOMATED COUNT: 14.2 % (ref 11.6–15.1)
GFR SERPL CREATININE-BSD FRML MDRD: 108 ML/MIN/1.73SQ M
GLUCOSE SERPL-MCNC: 79 MG/DL (ref 65–140)
HCT VFR BLD AUTO: 35.2 % (ref 34.8–46.1)
HGB BLD-MCNC: 11.2 G/DL (ref 11.5–15.4)
IMM GRANULOCYTES # BLD AUTO: 0.02 THOUSAND/UL (ref 0–0.2)
IMM GRANULOCYTES NFR BLD AUTO: 0 % (ref 0–2)
LYMPHOCYTES # BLD AUTO: 1.64 THOUSANDS/ÂΜL (ref 0.6–4.47)
LYMPHOCYTES NFR BLD AUTO: 26 % (ref 14–44)
MAGNESIUM SERPL-MCNC: 2.1 MG/DL (ref 1.6–2.6)
MCH RBC QN AUTO: 28.6 PG (ref 26.8–34.3)
MCHC RBC AUTO-ENTMCNC: 31.8 G/DL (ref 31.4–37.4)
MCV RBC AUTO: 90 FL (ref 82–98)
MONOCYTES # BLD AUTO: 0.6 THOUSAND/ÂΜL (ref 0.17–1.22)
MONOCYTES NFR BLD AUTO: 9 % (ref 4–12)
NEUTROPHILS # BLD AUTO: 3.85 THOUSANDS/ÂΜL (ref 1.85–7.62)
NEUTS SEG NFR BLD AUTO: 61 % (ref 43–75)
NRBC BLD AUTO-RTO: 0 /100 WBCS
PLATELET # BLD AUTO: 282 THOUSANDS/UL (ref 149–390)
PMV BLD AUTO: 10 FL (ref 8.9–12.7)
POTASSIUM SERPL-SCNC: 4.3 MMOL/L (ref 3.5–5.3)
PROT SERPL-MCNC: 7.1 G/DL (ref 6.4–8.4)
RBC # BLD AUTO: 3.92 MILLION/UL (ref 3.81–5.12)
SODIUM SERPL-SCNC: 136 MMOL/L (ref 135–147)
TSH SERPL DL<=0.05 MIU/L-ACNC: 0.79 UIU/ML (ref 0.45–4.5)
WBC # BLD AUTO: 6.36 THOUSAND/UL (ref 4.31–10.16)

## 2023-04-04 RX ORDER — SUMATRIPTAN 50 MG/1
50 TABLET, FILM COATED ORAL ONCE
Qty: 9 TABLET | Refills: 5 | Status: SHIPPED | OUTPATIENT
Start: 2023-04-04 | End: 2023-04-04

## 2023-04-04 NOTE — PROGRESS NOTES
Assessment/Plan:    1  Preeclampsia in postpartum period  Assessment & Plan:  Patient with HTN postpartum due to preeclampsia  We will recheck her routine labs at this time  Her blood pressure has been well controlled with Nifedipine 30 mg daily  We discussed plan to eventually wean her off her blood pressure medication within the next 3 months  She was instructed to check her BP daily at home with her cuff  Due to pedal edema, we will start patient on HCTZ 12 5 mg daily x 2 weeks  Follow up in 2 weeks and bring blood pressure readings to office  If BP is well controlled and decreasing, we will stop HCTZ and start weaning patient off her Nifedipine  Patient instructed to hold HCTZ if she experiences dizziness or SBP <100  She verbalized understanding  Orders:  -     Ambulatory Referral to Family Practice  -     CBC and differential; Future; Expected date: 04/04/2023  -     Comprehensive metabolic panel; Future; Expected date: 04/04/2023  -     Magnesium; Future    2  Subclinical hypothyroidism  Assessment & Plan:  Patient with no history of thyroid disorder  She was started on synthroid during pregnancy due to history of miscarriage  She was instructed to stop her synthroid today  We will recheck TSH/T4 level today and repeat again in 3 months  Orders:  -     Ambulatory Referral to Family Practice  -     TSH, 3rd generation with Free T4 reflex; Future; Expected date: 04/04/2023    3  Headache  Assessment & Plan:  Patient with history of migraines  She is taking Imitrex 50 mg as needed, about once per week  Medication was refilled today  Orders:  -     Ambulatory Referral to Family Practice  -     CBC and differential; Future; Expected date: 04/04/2023  -     Comprehensive metabolic panel; Future; Expected date: 04/04/2023  -     Magnesium; Future    4  Migraine without status migrainosus, not intractable, unspecified migraine type  -     SUMAtriptan (IMITREX) 50 mg tablet;  Take 1 tablet (50 mg total) by mouth once for 1 dose      Subjective:      Patient ID: Lilian Stevens is a 28 y o  female  HPI    Patient with PMhx of hypothyroidism and post partum HTN due to preeclampsia  , she is 1 month post partum after   She was induced around 39 weeks because she was diagnosed with gestational HTN  After delivery, she developed swelling in her feet and ankles  It has slightly improved but she still has pedal edema  Patient was prescribed Nifedipine 30 mg and has been on it daily  Patient is breastfeeding  She states that her daughter is doing well  She also feels she is doing well overall  She has started walking and is trying to stay active as much as possible  She is planning to stay home for about a year before returning to work  Patient's headaches have improved  She gets migraines about once per week and takes imitrex which does help  She does have seasonal allergies and gets recurrent sinus congestion and infections during the spring  She is also allergic to dog dander and she is allergic to her [place] Bulldog  She is taking Zyrtec for years and was recently prescribed singulair for her symptoms        The following portions of the patient's history were reviewed and updated as appropriate: allergies, current medications, past family history, past medical history, past social history, past surgical history, and problem list       Current Outpatient Medications:   •  acetaminophen (TYLENOL) 325 mg tablet, Take 3 tablets (975 mg total) by mouth every 8 (eight) hours, Disp: , Rfl: 0  •  cholecalciferol (VITAMIN D3) 1,000 units tablet, Take 1,000 Units by mouth daily, Disp: , Rfl:   •  ibuprofen (MOTRIN) 600 mg tablet, Take 1 tablet (600 mg total) by mouth every 6 (six) hours, Disp: 45 tablet, Rfl: 0  •  montelukast (SINGULAIR) 10 mg tablet, Take 1 tablet (10 mg total) by mouth daily at bedtime, Disp: 30 tablet, Rfl: 0  •  NIFEdipine (PROCARDIA XL) 30 mg 24 hr tablet, Take "1 tablet (30 mg total) by mouth daily Do not start before March 19, 2023 , Disp: 30 tablet, Rfl: 0  •  Prenat w/o A-FeCbGl-DSS-FA-DHA (PNV OB+DHA PO), Take by mouth, Disp: , Rfl:   •  SUMAtriptan (IMITREX) 50 mg tablet, Take 1 tablet (50 mg total) by mouth once for 1 dose, Disp: 9 tablet, Rfl: 5      Review of Systems   Constitutional: Negative for chills and fever  HENT: Negative for ear pain and sore throat  Eyes: Negative for pain and visual disturbance  Respiratory: Negative for cough and shortness of breath  Cardiovascular: Positive for leg swelling  Negative for chest pain and palpitations  Gastrointestinal: Negative for abdominal pain and vomiting  Genitourinary: Negative for dysuria and hematuria  Musculoskeletal: Negative for arthralgias and back pain  Skin: Negative for color change and rash  Neurological: Positive for headaches  Negative for dizziness, seizures, syncope and light-headedness  Psychiatric/Behavioral: Negative  All other systems reviewed and are negative  Objective:      /66 (BP Location: Left arm, Patient Position: Sitting, Cuff Size: Standard)   Pulse (!) 126   Temp 97 8 °F (36 6 °C)   Ht 5' 5\" (1 651 m)   Wt 92 5 kg (204 lb)   SpO2 98%   BMI 33 95 kg/m²          Physical Exam  Vitals and nursing note reviewed  Constitutional:       General: She is not in acute distress  Appearance: Normal appearance  She is not toxic-appearing  HENT:      Head: Normocephalic and atraumatic  Right Ear: Tympanic membrane and external ear normal       Left Ear: Tympanic membrane and external ear normal       Nose: No congestion  Eyes:      Extraocular Movements: Extraocular movements intact  Conjunctiva/sclera: Conjunctivae normal       Pupils: Pupils are equal, round, and reactive to light  Neck:      Vascular: No carotid bruit  Cardiovascular:      Rate and Rhythm: Normal rate and regular rhythm  Heart sounds: Normal heart sounds   No " murmur heard  Pulmonary:      Effort: Pulmonary effort is normal  No respiratory distress  Breath sounds: Normal breath sounds  No wheezing  Abdominal:      General: Abdomen is flat  A surgical scar is present  Bowel sounds are normal       Palpations: Abdomen is soft  Tenderness: There is no abdominal tenderness  Musculoskeletal:         General: Normal range of motion  Cervical back: Normal range of motion  Right lower leg: Edema present  Left lower leg: Edema present  Comments: Non-pitting pedal edema    Lymphadenopathy:      Cervical: No cervical adenopathy  Skin:     General: Skin is warm and dry  Neurological:      General: No focal deficit present  Mental Status: She is alert and oriented to person, place, and time  Psychiatric:         Mood and Affect: Mood normal          Behavior: Behavior normal          Thought Content:  Thought content normal

## 2023-04-04 NOTE — ASSESSMENT & PLAN NOTE
Patient with HTN postpartum due to preeclampsia  We will recheck her routine labs at this time  Her blood pressure has been well controlled with Nifedipine 30 mg daily  We discussed plan to eventually wean her off her blood pressure medication within the next 3 months  She was instructed to check her BP daily at home with her cuff  Due to pedal edema, we will start patient on HCTZ 12 5 mg daily x 2 weeks  Follow up in 2 weeks and bring blood pressure readings to office  If BP is well controlled and decreasing, we will stop HCTZ and start weaning patient off her Nifedipine  Patient instructed to hold HCTZ if she experiences dizziness or SBP <100  She verbalized understanding

## 2023-04-04 NOTE — ASSESSMENT & PLAN NOTE
Patient with no history of thyroid disorder  She was started on synthroid during pregnancy due to history of miscarriage  She was instructed to stop her synthroid today  We will recheck TSH/T4 level today and repeat again in 3 months

## 2023-04-04 NOTE — ASSESSMENT & PLAN NOTE
Patient with history of migraines  She is taking Imitrex 50 mg as needed, about once per week  Medication was refilled today

## 2023-04-05 RX ORDER — HYDROCHLOROTHIAZIDE 12.5 MG/1
12.5 TABLET ORAL DAILY
Qty: 30 TABLET | Refills: 0 | Status: SHIPPED | OUTPATIENT
Start: 2023-04-05

## 2023-04-06 ENCOUNTER — TELEPHONE (OUTPATIENT)
Dept: FAMILY MEDICINE CLINIC | Facility: CLINIC | Age: 28
End: 2023-04-06

## 2023-04-06 ENCOUNTER — TELEPHONE (OUTPATIENT)
Dept: POSTPARTUM | Facility: CLINIC | Age: 28
End: 2023-04-06

## 2023-04-06 NOTE — TELEPHONE ENCOUNTER
Has developed hard, painful, hard lumps in the past two weeks  She exclusively pumps  She has been using heat, massage, extra pumping  She has also used a [place]  She did call her doctor and they prescribed an antibiotic, but she would prefer not to take it if she doesn't have to  Advised the most current recommendations: ice, advil, rest, no extra pumping or massage  Directed her to the protocol online  She is only using a [place] wearable pump  Asked her to check the flange size again and can consider putting a small amount of olive oil or coconut oil in the opening of the flange for more comfort and to go easy on the level of suction  Thang Perezon said she really doesn't want to give up, but did ask about possibly weaning from the pump and what that might look like  We discussed slowly diminishing the length of time she pumps and or the amount of milk she pumps each time  Encouraged her to hang in there, but if she does decide to stop to do so slowly

## 2023-04-06 NOTE — TELEPHONE ENCOUNTER
Carrie Kruger left message - Miguel Hawley is 4wks - she is looking to wean from breastfeeding & exclusively pump, not sure how to do that  Also mentioned has concerns of clogs, feels lumps in breasts would like to discuss

## 2023-04-06 NOTE — TELEPHONE ENCOUNTER
Spoke with patient 4/5/23  I advised her to continue to monitor BP for next 2 weeks  She may cut diuretic in half and take daily  If SBP <100 then hold HCTZ  We will consider reducing medications at follow up in 2 weeks  Blood work results were reviewed

## 2023-04-07 ENCOUNTER — TELEPHONE (OUTPATIENT)
Dept: POSTPARTUM | Facility: CLINIC | Age: 28
End: 2023-04-07

## 2023-04-07 NOTE — TELEPHONE ENCOUNTER
"  Virtual Brief Visit    Patient is located in the following state in which I hold an active license [place]      Assessment/Plan:    Problem List Items Addressed This Visit    None      Recent Visits  Date Type Provider Dept   04/06/23 Telephone Karena Vazquez [facility]   04/06/23 Telephone Jeffrey Holcomb [address] [facility]   04/04/23 Office Visit Jeffrey Holcomb DO [facility]   Showing recent visits within past 7 days and meeting all other requirements  Today's Visits  Date Type Provider Dept   04/07/23 Telephone Karena Vazquez [facility]   Showing today's visits and meeting all other requirements  Future Appointments  No visits were found meeting these conditions  Showing future appointments within next 150 days and meeting all other requirements         Visit Time    Visit Start Time: 1110  Visit Stop Time: 1140  Total Visit Duration: 30 minutes    Mara has been an exclusive pumper since her baby was born a month ago  She has been using a [place] pump for each pumping session  Pumping has always been slightly uncomfortable  She had been pumping every 3-4 hours around the clock  At one point, she was expressing 30-35 ounces each day  She had a surplus of milk each day early on but recently has expressed just enough to feed the baby  Recently, her baby has been sleeping longer intervals at night and occasionally Mara went up to 6 hours without pumping  Mara developed some tender firm areas last week which she \"worked out\" with heat and massage  She also started pumping every 2 hours ATC  Two days ago, she developed a firm tender area in her right breast near her axilla again  Now most of the breast is engorged  And her left breast is now also engorged  She has continued to pump every 3-4 hours  She has stopped using heat, massage and went back to her usual pumping routine after speaking with us yesterday    She has been pumping for less time and expressing less milk as instructed as " well  She is very uncomfortable today (acutally even more engorged) since she started the weaning process yesterday  She denies chills, fever, flu like symptoms or erythema of either breast   I explained to Mara that by pumping more frequently this week she likely increased milk production and by starting the weaning process maybe a little too ambitiously, she is now more engorged  I suggested that she continue to pump at her normal intervals, expressing enough milk to allow for good breast comfort after pumping  I suggested she pump based on how she feels, rather than on a timer  I recommended lymphatic breast massage prior to pumping and cool compresses after and between pumping sessions  I suggested that Napoleon Salmon begin the weaning process once she is no longer engorged  I explained how to gradually increase the interval between pumping sessions and decrease the volume of milk expressed based on how her body feels until she no longer feels the need to pump  I suggested that she carefully review the user manual for her pump to trouble shoot why pumping is painful  I reviewed the symptoms that would indicate mastitis and encouraged her to reach out to her OB for treatment should they occur  I encouraged Mara to call back with any additional questions or concerns

## 2023-04-07 NOTE — TELEPHONE ENCOUNTER
Spoke with Mara - she spoke with lactation yesterday & trying what was recommended - but feels today it is getting worse would like to discuss further  Lumps in breast, difficult to hold baby  She is exclusively pumping now for Estrella 4w

## 2023-04-24 ENCOUNTER — TELEPHONE (OUTPATIENT)
Dept: FAMILY MEDICINE CLINIC | Facility: CLINIC | Age: 28
End: 2023-04-24

## 2023-04-24 NOTE — TELEPHONE ENCOUNTER
FMLA forms received from [employer]  Faxed to employer as requested on the forms  Confirmation rcvd   Copy kept for my record, placed the rest to scan bin

## 2023-04-24 NOTE — TELEPHONE ENCOUNTER
Pt called requesting that medical leave forms be completed and faxed to her employer by 4/28 or sooner  Pt's employer is expecting her back at work Monday 5/1/23 and she is not returning until 5/30/23  Pt also requested a call when she can  the original copy of medical leave form

## 2023-04-25 ENCOUNTER — TELEPHONE (OUTPATIENT)
Dept: FAMILY MEDICINE CLINIC | Facility: CLINIC | Age: 28
End: 2023-04-25

## 2023-04-25 NOTE — TELEPHONE ENCOUNTER
"Patients Short term Disability papers received from \"[facility]\" and forwarded for completion         "

## 2023-04-28 ENCOUNTER — POSTPARTUM VISIT (OUTPATIENT)
Dept: OBGYN CLINIC | Facility: CLINIC | Age: 28
End: 2023-04-28

## 2023-04-28 VITALS — BODY MASS INDEX: 34.45 KG/M2 | WEIGHT: 207 LBS | SYSTOLIC BLOOD PRESSURE: 122 MMHG | DIASTOLIC BLOOD PRESSURE: 74 MMHG

## 2023-04-28 DIAGNOSIS — Z09 POSTOPERATIVE EXAMINATION: Primary | ICD-10-CM

## 2023-04-28 DIAGNOSIS — Z30.09 ENCOUNTER FOR COUNSELING REGARDING CONTRACEPTION: ICD-10-CM

## 2023-04-28 DIAGNOSIS — G43.009 MIGRAINE WITHOUT AURA AND WITHOUT STATUS MIGRAINOSUS, NOT INTRACTABLE: ICD-10-CM

## 2023-04-28 RX ORDER — ACETAMINOPHEN AND CODEINE PHOSPHATE 120; 12 MG/5ML; MG/5ML
1 SOLUTION ORAL DAILY
Qty: 84 TABLET | Refills: 1 | Status: SHIPPED | OUTPATIENT
Start: 2023-04-28

## 2023-04-28 RX ORDER — ACETAMINOPHEN, ASPIRIN AND CAFFEINE 250; 250; 65 MG/1; MG/1; MG/1
1 TABLET, FILM COATED ORAL DAILY PRN
Qty: 30 TABLET | Refills: 0 | Status: SHIPPED | OUTPATIENT
Start: 2023-04-28

## 2023-04-28 RX ORDER — METOCLOPRAMIDE 10 MG/1
10 TABLET ORAL 4 TIMES DAILY
Qty: 30 TABLET | Refills: 0 | Status: SHIPPED | OUTPATIENT
Start: 2023-04-28

## 2023-04-28 NOTE — PROGRESS NOTES
Postpartum Visit  Kenzie Vazquez MD    23    Subjective   Leeanne Diggs is a 28 y o   female who presents for a postpartum visit  She is s/p CS on 3/7/23    She delivered a female   Noted some irritation and a little raised, erythematous area to right aspect of incision  Somewhat more painful  No bleeding or discharge      Current Outpatient Medications:     cholecalciferol (VITAMIN D3) 1,000 units tablet, Take 1,000 Units by mouth daily, Disp: , Rfl:     ibuprofen (MOTRIN) 600 mg tablet, Take 1 tablet (600 mg total) by mouth every 6 (six) hours, Disp: 45 tablet, Rfl: 0    Prenat w/o A-FeCbGl-DSS-FA-DHA (PNV OB+DHA PO), Take by mouth, Disp: , Rfl:     acetaminophen (TYLENOL) 325 mg tablet, Take 3 tablets (975 mg total) by mouth every 8 (eight) hours (Patient not taking: Reported on 2023), Disp: , Rfl: 0    hydrochlorothiazide (HYDRODIURIL) 25 mg tablet, Take 1 tablet (25 mg total) by mouth daily (Patient not taking: Reported on 2023), Disp: 14 tablet, Rfl: 0    SUMAtriptan (IMITREX) 50 mg tablet, Take 1 tablet (50 mg total) by mouth once for 1 dose, Disp: 9 tablet, Rfl: 5    Allergies   Allergen Reactions    Clindamycin Rash     headache  headache      Tramadol GI Intolerance, Hives, Itching and Rash       Review of Systems  Constitutional: no fever, feels well  Breasts: no complaints   Gastrointestinal: no complaints   Genitourinary: as noted in HPI  Neurological: no complaints of headache    Objective      /74 (BP Location: Left arm, Patient Position: Sitting, Cuff Size: Standard)   Wt 93 9 kg (207 lb)   Breastfeeding Yes   BMI 34 45 kg/m²   Physical Exam  Constitutional:       Appearance: Normal appearance  HENT:      Head: Normocephalic and atraumatic  Eyes:      Extraocular Movements: Extraocular movements intact  Conjunctiva/sclera: Conjunctivae normal    Pulmonary:      Effort: Pulmonary effort is normal    Abdominal:      General: There is no distension  Palpations: Abdomen is soft  Comments: Incision clean, dry, intact, <1cm area of pale pink, induration c/w irritation likely from underlying suture, no visible suture, discharge, bleeding, skin intact     Genitourinary:     Comments: def  Musculoskeletal:         General: Normal range of motion  Cervical back: Normal range of motion  Skin:     General: Skin is warm and dry  Neurological:      General: No focal deficit present  Psychiatric:         Mood and Affect: Mood normal          Behavior: Behavior normal          Thought Content:  Thought content normal            Assessment/Plan:  Yojana Simmons is a 28 y o  who is postpartum from a CS     Incision healing well, no sign of infection, mild irritation from absorbing suture at right aspect of incision  Migraines--will try reglan  Contraception:  POPs sent

## 2023-05-03 ENCOUNTER — OFFICE VISIT (OUTPATIENT)
Dept: FAMILY MEDICINE CLINIC | Facility: CLINIC | Age: 28
End: 2023-05-03

## 2023-05-03 VITALS
WEIGHT: 204.2 LBS | TEMPERATURE: 98.5 F | HEIGHT: 65 IN | HEART RATE: 88 BPM | SYSTOLIC BLOOD PRESSURE: 120 MMHG | DIASTOLIC BLOOD PRESSURE: 80 MMHG | BODY MASS INDEX: 34.02 KG/M2 | OXYGEN SATURATION: 97 %

## 2023-05-03 DIAGNOSIS — G43.909 MIGRAINE WITHOUT STATUS MIGRAINOSUS, NOT INTRACTABLE, UNSPECIFIED MIGRAINE TYPE: ICD-10-CM

## 2023-05-03 DIAGNOSIS — F90.2 ATTENTION DEFICIT HYPERACTIVITY DISORDER (ADHD), COMBINED TYPE: ICD-10-CM

## 2023-05-03 DIAGNOSIS — M62.89 PELVIC FLOOR DYSFUNCTION IN FEMALE: ICD-10-CM

## 2023-05-03 DIAGNOSIS — R19.8 ABDOMINAL WEAKNESS: ICD-10-CM

## 2023-05-03 PROBLEM — F90.9 ADHD: Status: ACTIVE | Noted: 2022-01-06

## 2023-05-03 RX ORDER — SUMATRIPTAN 50 MG/1
50 TABLET, FILM COATED ORAL ONCE AS NEEDED
Qty: 9 TABLET | Refills: 0 | Status: SHIPPED | OUTPATIENT
Start: 2023-05-03

## 2023-05-03 NOTE — ASSESSMENT & PLAN NOTE
Fax received from [facility] regarding Pt  Pt location at SNF: NH wing  Fax sent by:     Fax regarding concern: clarification    Assessment:         Any recommendations or new orders?       Patient is doing well  Her preeclampsia has resolved and she is no longer taking medication for her blood pressure  She is normotensive and reports feeling good    Patient's short term disability form

## 2023-05-03 NOTE — PROGRESS NOTES
Assessment/Plan:    1  Postpartum examination following  delivery  Assessment & Plan:  Patient is doing well  Her preeclampsia has resolved and she is no longer taking medication for her blood pressure  She is normotensive and reports feeling good  Patient's short term disability form     Orders:  -     Ambulatory Referral to Physical Therapy; Future    2  Attention deficit hyperactivity disorder (ADHD), combined type  Assessment & Plan:  Patient with history of ADHD  We discussed benefits and risks of starting medication  Side effects discussed with the patient, including tachycardia, elevated BP, headache, decreased appetite and anxiety  He verbalized understanding  Patient has no cardiac history and no family history of cardiomyopathy  She was instructed to stop medication and notify PCP if she experiences any adverse side effects  Start Vyvanse 40 mg daily   reviewed and shows no suspicious activity  Follow up in 6 months to recheck  Controlled substance agreement was reviewed and signed today  Orders:  -     lisdexamfetamine (Vyvanse) 40 MG capsule; Take 1 capsule (40 mg total) by mouth every morning Max Daily Amount: 40 mg    3  Migraine without status migrainosus, not intractable, unspecified migraine type  Assessment & Plan:  Patient with persistent migraines  She may continue Imitrex as needed  Patient does not wish to start daily medication at this time  We discussed referral to Neurology which she defers at this time  Orders:  -     SUMAtriptan (IMITREX) 50 mg tablet; Take 1 tablet (50 mg total) by mouth once as needed for migraine for up to 9 doses    4  Pelvic floor dysfunction in female  Assessment & Plan:  Patient referred to PT for postpartum evaluation  Orders:  -     Ambulatory Referral to Physical Therapy; Future    5  Abdominal weakness  Assessment & Plan:  Abdominal muscle weakness post partum  Patient was referred to PT       Orders:  - Ambulatory Referral to Physical Therapy; Future      Subjective:      Patient ID: Arias Heredia is a 28 y o  female  HPI    Patient presenting for postpartum follow up after  on 3/7/23  She states that she is doing better overall  She has stopped her blood pressure medications for preeclampsia after weaning them down  She also has noticed her feet swelling improved  Her blood pressure was high initially after she stopped the medications, but slowly normalized  She has been off her blood pressure medications for 2 weeks now and her BP is now normal    Patient is still getting migraine headaches a few times per week  She does not wish to go on daily medication  She is taking imitrex as needed  She also was given Reglan for nausea  Patient denies dizziness or vision changes  Patient's incision site is dry but the right edge of the incision is tender and red  She did have it checked by her OB/gyn  The patient is feeling well otherwise  She has stopped breastfeeding  Patient has history of ADHD since childhood  She was on medication in high school and college  She stopped taking it in  due to pregnancy  She would like to restaret it now that she stopped breastfeeding   She denies side effects from taking it in the past       The following portions of the patient's history were reviewed and updated as appropriate: allergies, current medications, past family history, past medical history, past social history, past surgical history, and problem list       Current Outpatient Medications:     aspirin-acetaminophen-caffeine (EXCEDRIN MIGRAINE) 250-250-65 MG per tablet, Take 1 tablet by mouth daily as needed for headaches, Disp: 30 tablet, Rfl: 0    lisdexamfetamine (Vyvanse) 40 MG capsule, Take 1 capsule (40 mg total) by mouth every morning Max Daily Amount: 40 mg, Disp: 30 capsule, Rfl: 0    SUMAtriptan (IMITREX) 50 mg tablet, Take 1 tablet (50 mg total) by mouth once as needed for "migraine for up to 9 doses, Disp: 9 tablet, Rfl: 0    cholecalciferol (VITAMIN D3) 1,000 units tablet, Take 1,000 Units by mouth daily (Patient not taking: Reported on 5/3/2023), Disp: , Rfl:     metoclopramide (Reglan) 10 mg tablet, Take 1 tablet (10 mg total) by mouth 4 (four) times a day, Disp: 30 tablet, Rfl: 0    norethindrone (Saray) 0 35 MG tablet, Take 1 tablet (0 35 mg total) by mouth daily, Disp: 84 tablet, Rfl: 1    Prenat w/o A-FeCbGl-DSS-FA-DHA (PNV OB+DHA PO), Take by mouth, Disp: , Rfl:       Review of Systems   Constitutional: Negative for chills and fever  HENT: Negative for ear pain and sore throat  Eyes: Negative for pain and visual disturbance  Respiratory: Negative for cough and shortness of breath  Cardiovascular: Negative for chest pain and palpitations  Gastrointestinal: Negative for abdominal pain and vomiting  Genitourinary: Negative for dysuria and hematuria  Musculoskeletal: Negative for arthralgias and back pain  Skin: Negative for color change and rash  Neurological: Negative for seizures and syncope  All other systems reviewed and are negative  Objective:      /80 (BP Location: Left arm, Patient Position: Sitting, Cuff Size: Adult)   Pulse 88   Temp 98 5 °F (36 9 °C)   Ht 5' 5\" (1 651 m)   Wt 92 6 kg (204 lb 3 2 oz)   SpO2 97%   Breastfeeding No   BMI 33 98 kg/m²          Physical Exam  Vitals and nursing note reviewed  Constitutional:       General: She is not in acute distress  Appearance: Normal appearance  She is not toxic-appearing  HENT:      Head: Normocephalic and atraumatic  Eyes:      Extraocular Movements: Extraocular movements intact  Conjunctiva/sclera: Conjunctivae normal       Pupils: Pupils are equal, round, and reactive to light  Cardiovascular:      Rate and Rhythm: Normal rate and regular rhythm  Heart sounds: Normal heart sounds  No murmur heard    Pulmonary:      Effort: Pulmonary effort is normal  " No respiratory distress  Breath sounds: Normal breath sounds  No wheezing  Abdominal:      General: Abdomen is flat  A surgical scar is present  There is no distension  Palpations: Abdomen is soft  Musculoskeletal:      Cervical back: Normal range of motion  Skin:     General: Skin is warm  Neurological:      General: No focal deficit present  Mental Status: She is alert and oriented to person, place, and time  Mental status is at baseline  Psychiatric:         Mood and Affect: Mood normal          Behavior: Behavior normal          Thought Content: Thought content normal          Judgment: Judgment normal        Greater than 50% of the visit was spent counseling and coordinating care  Total time spent 40 min

## 2023-05-03 NOTE — ASSESSMENT & PLAN NOTE
Patient with history of ADHD  We discussed benefits and risks of starting medication  Side effects discussed with the patient, including tachycardia, elevated BP, headache, decreased appetite and anxiety  He verbalized understanding  Patient has no cardiac history and no family history of cardiomyopathy  She was instructed to stop medication and notify PCP if she experiences any adverse side effects  Start Vyvanse 40 mg daily   reviewed and shows no suspicious activity  Follow up in 6 months to recheck  Controlled substance agreement was reviewed and signed today

## 2023-05-03 NOTE — ASSESSMENT & PLAN NOTE
Patient with persistent migraines  She may continue Imitrex as needed  Patient does not wish to start daily medication at this time  We discussed referral to Neurology which she defers at this time

## 2023-05-09 ENCOUNTER — TELEPHONE (OUTPATIENT)
Dept: FAMILY MEDICINE CLINIC | Facility: CLINIC | Age: 28
End: 2023-05-09

## 2023-05-09 NOTE — TELEPHONE ENCOUNTER
I received another form, a different one this time from [facility] requesting more information  I have completed what I could, however, they are requesting records and there's too much for me to print, they would need to be processed through medical records      I left stapled forms on your desk for your return, they do need a response by Monday 5/15/23

## 2023-05-10 ENCOUNTER — TELEPHONE (OUTPATIENT)
Dept: OBGYN CLINIC | Facility: CLINIC | Age: 28
End: 2023-05-10

## 2023-05-11 NOTE — TELEPHONE ENCOUNTER
Pa  not needed just needed a quaintly change. Spoke to pharmacy to have them  change it. Patient aware via phone message.

## 2023-05-15 NOTE — TELEPHONE ENCOUNTER
Form has been completed and is in on my desk  Please attach required forms and fax today, it needs to be sent by 5/15   Thank you

## 2023-05-31 ENCOUNTER — APPOINTMENT (OUTPATIENT)
Dept: LAB | Facility: CLINIC | Age: 28
End: 2023-05-31
Payer: COMMERCIAL

## 2023-05-31 ENCOUNTER — PATIENT MESSAGE (OUTPATIENT)
Dept: OBGYN CLINIC | Facility: CLINIC | Age: 28
End: 2023-05-31

## 2023-05-31 DIAGNOSIS — F90.2 ATTENTION DEFICIT HYPERACTIVITY DISORDER (ADHD), COMBINED TYPE: ICD-10-CM

## 2023-05-31 DIAGNOSIS — E03.8 SUBCLINICAL HYPOTHYROIDISM: ICD-10-CM

## 2023-05-31 DIAGNOSIS — Z30.011 INITIATION OF ORAL CONTRACEPTION: Primary | ICD-10-CM

## 2023-05-31 DIAGNOSIS — E03.8 SUBCLINICAL HYPOTHYROIDISM: Primary | ICD-10-CM

## 2023-05-31 LAB — TSH SERPL DL<=0.05 MIU/L-ACNC: 0.98 UIU/ML (ref 0.45–4.5)

## 2023-05-31 PROCEDURE — 36415 COLL VENOUS BLD VENIPUNCTURE: CPT

## 2023-05-31 PROCEDURE — 84443 ASSAY THYROID STIM HORMONE: CPT

## 2023-06-01 RX ORDER — DESOGESTREL AND ETHINYL ESTRADIOL 21-5 (28)
1 KIT ORAL DAILY
Qty: 84 TABLET | Refills: 0 | Status: SHIPPED | OUTPATIENT
Start: 2023-06-01

## 2023-06-24 DIAGNOSIS — Z30.011 INITIATION OF ORAL CONTRACEPTION: ICD-10-CM

## 2023-06-29 RX ORDER — DESOGESTREL AND ETHINYL ESTRADIOL 21-5 (28)
1 KIT ORAL DAILY
Qty: 84 TABLET | Refills: 0 | Status: SHIPPED | OUTPATIENT
Start: 2023-06-29

## 2023-07-17 NOTE — PROGRESS NOTES
Assessment/Plan   Problem List Items Addressed This Visit    None  Visit Diagnoses     Family history of breast cancer    -  Primary    Relevant Orders    US breast screening bilateral complete (ABUS)    Well woman exam              Discussion  I have discussed the importance of monthly self-breast exams, exercise and healthy diet. Encourage safe sexual practices; STI testing - declines  Contraception - ADELA    The current ASCCP guidelines were reviewed. Patient's last pap was 2022 and therefore, a pap with HPV cotesting is  not indicated at this time. All questions have been answered to her satisfaction  RTO for APE or sooner if needed    Subjective     HPI   Mauro Chan is a 33 y.o. female who presents for annual well woman exam.     LMP - 05/2023; She is taking continuous ADELA to control menstrual migraines. Period lasted 7 days, very heavy for CD1-CD3. No vulvar itch/burn; No vaginal itch/burn; No abn discharge or odor; No urinary sx - burning/pain/frequency/hematuria    (+) SBEs - no breast masses, asymmetry, nipple discharge or bleeding, changes in skin of breast, or breast tenderness bilaterally. +family history of breast CA; not BRCA +, grandmother and mother. No abd/pelvic pain or HAs;     Pt is sexually active in a mutually monog sexual relationship; No issues with intercourse; She declines sti/hiv/hep testing; Feels safe at home    Current contraception: ADELA  Condom use: no  Gardasil - completed series    (+) PCP for routine Bw/care; Last Pap - 2022 WNL  No hx of abnormal paps. Review of Systems   Constitutional: Negative for fatigue. Eyes: Negative for photophobia and visual disturbance. Respiratory: Negative for cough and shortness of breath. Cardiovascular: Negative for chest pain and palpitations. Gastrointestinal: Negative for abdominal pain, blood in stool, constipation, diarrhea, nausea and rectal pain.    Genitourinary: Negative for dyspareunia, dysuria, flank pain, frequency, genital sores, menstrual problem, pelvic pain, urgency, vaginal bleeding, vaginal discharge and vaginal pain. Musculoskeletal: Negative for arthralgias and back pain. Skin: Negative for rash. Neurological: Negative for weakness and headaches. The following portions of the patient's history were reviewed and updated as appropriate: allergies, current medications, past family history, past medical history, past social history, past surgical history and problem list.         OB History        2    Para   1    Term   1       0    AB   1    Living   1       SAB   1    IAB   0    Ectopic   0    Multiple   0    Live Births   1           Obstetric Comments   : 2022 SAB naturally   Hgb electrophoresis WNL             Past Medical History:   Diagnosis Date   • Acne    • ADHD    • Allergic asthma    • Hypertension    • Subclinical hypothyroidism        Past Surgical History:   Procedure Laterality Date   • FINGER SURGERY      pin placed   • IA  DELIVERY ONLY N/A 3/7/2023    Procedure:  SECTION (); Surgeon: Brunilda Contreras MD;  [facility];  Service: Obstetrics   • SHOULDER SURGERY Right        • SKIN BIOPSY     • WISDOM TOOTH EXTRACTION         Family History   Problem Relation Age of Onset   • Breast cancer Mother         negative genetic testing   • No Known Problems Father    • Breast cancer Maternal Grandmother    • Heart disease Paternal Grandmother        Social History     Socioeconomic History   • Marital status: /Civil Union     Spouse name: Not on file   • Number of children: Not on file   • Years of education: Not on file   • Highest education level: Not on file   Occupational History   • Not on file   Tobacco Use   • Smoking status: Never   • Smokeless tobacco: Never   Vaping Use   • Vaping Use: Never used   Substance and Sexual Activity   • Alcohol use:  Yes     Alcohol/week: 1.0 standard drink of alcohol     Types: 1 Glasses of wine per week     Comment: Socially   • Drug use: Never   • Sexual activity: Yes     Partners: Male     Birth control/protection: None   Other Topics Concern   • Not on file   Social History Narrative   • Not on file     Social Determinants of Health     Financial Resource Strain: Not on file   Food Insecurity: Not on file   Transportation Needs: Not on file   Physical Activity: Not on file   Stress: Not on file   Social Connections: Not on file   Intimate Partner Violence: Not on file   Housing Stability: Not on file         Current Outpatient Medications:   •  desogestrel-ethinyl estradiol (Lelo Stake) 0.15-0.02/0.01 MG (21/5) per tablet, Take 1 tablet by mouth daily, Disp: 84 tablet, Rfl: 0  •  Prenat w/o A-FeCbGl-DSS-FA-DHA (PNV OB+DHA PO), Take by mouth, Disp: , Rfl:   •  lisdexamfetamine (Vyvanse) 40 MG capsule, Take 1 capsule (40 mg total) by mouth every morning Max Daily Amount: 40 mg (Patient not taking: Reported on 7/20/2023), Disp: 30 capsule, Rfl: 0  •  SUMAtriptan (IMITREX) 50 mg tablet, Take 1 tablet (50 mg total) by mouth once as needed for migraine for up to 9 doses (Patient not taking: Reported on 7/20/2023), Disp: 9 tablet, Rfl: 0    Allergies   Allergen Reactions   • Clindamycin Rash     headache  headache     • Tramadol GI Intolerance, Hives, Itching and Rash       Objective   Vitals:    07/20/23 1358   BP: 126/82   BP Location: Left arm   Patient Position: Sitting   Cuff Size: Standard   Weight: 90.3 kg (199 lb)   Height: 5' 5" (1.651 m)     Physical Exam  Vitals and nursing note reviewed. Constitutional:       Appearance: Normal appearance. She is well-developed and normal weight. HENT:      Head: Normocephalic and atraumatic. Eyes:      Conjunctiva/sclera: Conjunctivae normal.   Cardiovascular:      Rate and Rhythm: Normal rate and regular rhythm. Heart sounds: Normal heart sounds. Pulmonary:      Effort: Pulmonary effort is normal.      Breath sounds: Normal breath sounds. Chest:   Breasts:     Breasts are symmetrical.      Right: Normal. No inverted nipple, mass, nipple discharge, skin change or tenderness. Left: Normal. No inverted nipple, mass, nipple discharge, skin change or tenderness. Abdominal:      General: Abdomen is flat. There is no distension. Palpations: Abdomen is soft. There is no mass. Tenderness: There is no abdominal tenderness. There is no right CVA tenderness or left CVA tenderness. Genitourinary:     General: Normal vulva. Exam position: Lithotomy position. Pubic Area: No rash or pubic lice. Labia:         Right: No rash or tenderness. Left: No rash or tenderness. Urethra: No urethral pain. Vagina: Normal. No vaginal discharge. Cervix: Normal.      Uterus: Normal. No uterine prolapse. Adnexa: Right adnexa normal and left adnexa normal.        Right: No mass or tenderness. Left: No mass or tenderness. Musculoskeletal:         General: No tenderness. Normal range of motion. Cervical back: Normal range of motion. No tenderness. Right lower leg: No edema. Left lower leg: No edema. Lymphadenopathy:      Cervical: No cervical adenopathy. Upper Body:      Right upper body: No supraclavicular or axillary adenopathy. Left upper body: No supraclavicular or axillary adenopathy. Lower Body: No right inguinal adenopathy. No left inguinal adenopathy. Skin:     General: Skin is warm and dry. Neurological:      Mental Status: She is alert and oriented to person, place, and time. Motor: No weakness. Psychiatric:         Mood and Affect: Mood normal.         Behavior: Behavior normal.         Thought Content: Thought content normal.         Judgment: Judgment normal.         There are no Patient Instructions on file for this visit.

## 2023-07-20 ENCOUNTER — ANNUAL EXAM (OUTPATIENT)
Dept: OBGYN CLINIC | Facility: CLINIC | Age: 28
End: 2023-07-20
Payer: COMMERCIAL

## 2023-07-20 VITALS
DIASTOLIC BLOOD PRESSURE: 82 MMHG | BODY MASS INDEX: 33.15 KG/M2 | WEIGHT: 199 LBS | HEIGHT: 65 IN | SYSTOLIC BLOOD PRESSURE: 126 MMHG

## 2023-07-20 DIAGNOSIS — Z80.3 FAMILY HISTORY OF BREAST CANCER: Primary | ICD-10-CM

## 2023-07-20 DIAGNOSIS — Z01.419 WELL WOMAN EXAM: ICD-10-CM

## 2023-07-20 DIAGNOSIS — Z30.011 INITIATION OF ORAL CONTRACEPTION: ICD-10-CM

## 2023-07-20 PROCEDURE — 99395 PREV VISIT EST AGE 18-39: CPT

## 2023-07-20 RX ORDER — DESOGESTREL AND ETHINYL ESTRADIOL 21-5 (28)
1 KIT ORAL DAILY
Qty: 84 TABLET | Refills: 3 | Status: SHIPPED | OUTPATIENT
Start: 2023-07-20

## 2023-07-31 ENCOUNTER — OFFICE VISIT (OUTPATIENT)
Dept: FAMILY MEDICINE CLINIC | Facility: CLINIC | Age: 28
End: 2023-07-31
Payer: COMMERCIAL

## 2023-07-31 VITALS
DIASTOLIC BLOOD PRESSURE: 82 MMHG | SYSTOLIC BLOOD PRESSURE: 122 MMHG | WEIGHT: 199 LBS | BODY MASS INDEX: 31.98 KG/M2 | HEIGHT: 66 IN | OXYGEN SATURATION: 97 % | TEMPERATURE: 98.9 F | HEART RATE: 83 BPM

## 2023-07-31 DIAGNOSIS — M54.41 ACUTE MIDLINE LOW BACK PAIN WITH RIGHT-SIDED SCIATICA: Primary | ICD-10-CM

## 2023-07-31 DIAGNOSIS — M51.9 LUMBAR DISC DISEASE: ICD-10-CM

## 2023-07-31 PROCEDURE — 99213 OFFICE O/P EST LOW 20 MIN: CPT | Performed by: FAMILY MEDICINE

## 2023-07-31 RX ORDER — TIZANIDINE 2 MG/1
2 TABLET ORAL EVERY 8 HOURS PRN
Qty: 30 TABLET | Refills: 0 | Status: SHIPPED | OUTPATIENT
Start: 2023-07-31 | End: 2023-08-01 | Stop reason: ALTCHOICE

## 2023-07-31 RX ORDER — MELOXICAM 15 MG/1
15 TABLET ORAL DAILY
Qty: 30 TABLET | Refills: 0 | Status: SHIPPED | OUTPATIENT
Start: 2023-07-31

## 2023-07-31 NOTE — PROGRESS NOTES
Name: Eilleen Dakin      : 1995      MRN: [id number]  Encounter Provider: Celeste Grigsby DO  Encounter Date: 2023   Encounter department: [address] [place] [address]   Patient is 25-year-old female with history of bulging disks in her low back, she thinks around L5. She has been lifting her dog because he is injured. The dog weighs 25 pounds. She also has a 16pound 3month old baby. She is feeling pain in the paravertebral muscles of her spine. She also has a second pain over her lower lumbar area. She says that they had stuck her 4 times to do an epidural.  I do not feel any masses or fluctuance. 1. Acute midline low back pain with right-sided sciatica  -     meloxicam (MOBIC) 15 mg tablet; Take 1 tablet (15 mg total) by mouth daily  -     tiZANidine (ZANAFLEX) 2 mg tablet; Take 1 tablet (2 mg total) by mouth every 8 (eight) hours as needed for muscle spasms    2. Lumbar disc disease  -     meloxicam (MOBIC) 15 mg tablet; Take 1 tablet (15 mg total) by mouth daily  -     tiZANidine (ZANAFLEX) 2 mg tablet; Take 1 tablet (2 mg total) by mouth every 8 (eight) hours as needed for muscle spasms    Patient has been using ice and she can continue although she may try warm compresses also. I prescribed a muscle relaxer to take mostly at bedtime unless she has someone to watch the baby during the day. She is not breast-feeding. I also prescribed meloxicam to take once a day instead of Motrin. If no imporvement in two weks, may need to do imaging. Subjective      Chief Complaint   Patient presents with   • Back Pain     Lower back pain, lump. Pain x 3 days. Patient had a baby about 4.5 months ago. Has a lump where the epidural is. Yasmany Patel is 16years old. But also has an injured dog - 25 pounds. History of bulging discs. Had one episode of tinglin in right leg after  was massaging area. Nor urinary or bowel problems.     Review of Systems Genitourinary: Negative for difficulty urinating. Musculoskeletal: Positive for back pain. Negative for gait problem. Skin:        lump   Neurological: Positive for numbness (one episode of tinglin in right leg). Current Outpatient Medications on File Prior to Visit   Medication Sig   • desogestrel-ethinyl estradiol (Nicolasa Ogren) 0.15-0.02/0.01 MG (21/5) per tablet Take 1 tablet by mouth daily   • Prenat w/o A-FeCbGl-DSS-FA-DHA (PNV OB+DHA PO) Take by mouth   • lisdexamfetamine (Vyvanse) 40 MG capsule Take 1 capsule (40 mg total) by mouth every morning Max Daily Amount: 40 mg (Patient not taking: Reported on 7/20/2023)   • SUMAtriptan (IMITREX) 50 mg tablet Take 1 tablet (50 mg total) by mouth once as needed for migraine for up to 9 doses (Patient not taking: Reported on 7/20/2023)       Objective     /82 (BP Location: Right arm, Patient Position: Sitting, Cuff Size: Large)   Pulse 83   Temp 98.9 °F (37.2 °C)   Ht 5' 5.5" (1.664 m)   Wt 90.3 kg (199 lb)   LMP 05/20/2023 (Exact Date)   SpO2 97%   BMI 32.61 kg/m²     Physical Exam  Vitals and nursing note reviewed. Constitutional:       General: She is not in acute distress. HENT:      Head: Normocephalic. Musculoskeletal:         General: Tenderness (over midline lumbar area) present. Comments: Positive for paravertebral muscle spasm. Skin:     General: Skin is warm and dry. Findings: No rash. Neurological:      Mental Status: She is alert and oriented to person, place, and time. Sensory: No sensory deficit.    Psychiatric:         Mood and Affect: Mood normal.       Karyn Abreu DO

## 2023-08-01 DIAGNOSIS — M54.41 ACUTE MIDLINE LOW BACK PAIN WITH RIGHT-SIDED SCIATICA: Primary | ICD-10-CM

## 2023-08-01 DIAGNOSIS — M51.9 LUMBAR DISC DISEASE: ICD-10-CM

## 2023-08-01 RX ORDER — METHOCARBAMOL 500 MG/1
500 TABLET, FILM COATED ORAL 3 TIMES DAILY PRN
Qty: 30 TABLET | Refills: 0 | Status: SHIPPED | OUTPATIENT
Start: 2023-08-01

## 2023-08-15 ENCOUNTER — TELEPHONE (OUTPATIENT)
Dept: OBGYN CLINIC | Facility: CLINIC | Age: 28
End: 2023-08-15

## 2023-08-15 NOTE — PROGRESS NOTES
Assessment/Plan   Diagnoses and all orders for this visit:    Yeast vaginitis  -     fluconazole (DIFLUCAN) 150 mg tablet; 1 tab po day 1, skip day 2, 1 tab po day 3  -     Genital Comprehensive Culture  -     POCT wet mount    Thyromegaly  -     US thyroid; Future    Discussion  Reviewed noting yeast on wet mount; Genital culture obtained - will adjust treatment based on results; Considering patient symptomatic - offered treatment - can either do OTC Monistat 3 or 7 day; or can take Diflucan 150 mg as prescribed above - which is what she opted to do. Aware may take a couple days after completes therapy for symptoms to fully resolve. Can consider increasing yogurt in diet or taking probiotics like acidophilus for prevention. Discussed potential causes of yeast vaginitis. Significant bilateral thyromegaly appreciated - thyroid ultrasound ordered. Encourage patient to reach back out to primary care office to see if they recommend any additional lab tests to be done. All questions answered at this time. Patient to call or RTO with any further questions/concerns. RTO for APE when due or sooner if needed    Subjective      HPI   Shasha Miller is a 33 y.o. female who presents for possible infection. A couple weeks ago noticed a vaginal odor - didn't think much of it - thought hot summer/sweaty body odor; Three days ago noticed significant burning/itching after intercourse - occurred twice; Did OTC Monistat 1 day stacy with some relief but not fully. slight vulvar itch/burn; more significant vaginal itch/burn; No abn discharge or odor currently; No urinary sx - burning/pain/frequency/hematuria  No abd/pelvic pain except mild tenderness LLQ this AM; No fever/chills except some chills last night;   LMP - 5/20/23 - takes OCP continuously to prevent menstrual migraines - endorses taking pill correctly without missing any pills;  This past week did have some BTB on OCP - wearing a pad constantly for the past week; Occasionally may get BTB for a day or two - this was longer lasting the full week and was wearing a pad the whole week; Denies any new soaps, lotions, detergents, or other products. Pt is sexually active in a mutually monog/ sexual relationship; Declines STI cultures; Declines hiv/hep testing; Feels safe at home  Current contraception: Francine Redder continuously for menstrual migraines  Recent pregnancy/delivery - 3/2023 primary [facility]; She is not currently breastfeeding. She also reports her neck/thyroid looking enlarged or puffy - first noticed end of March/after delivery; Spoke with PCP who ordered TSH with reflex fT4 - normal beginning of April and repeated end of May - normal as well. Last Pap - 8/31/22 - WNL (recent annual 7/20/23)    Review of Systems   Constitutional: Negative for activity change, fatigue, fever and unexpected weight change. HENT: Negative for congestion, dental problem, sinus pressure and sinus pain. Eyes: Negative for visual disturbance. Respiratory: Negative for cough, shortness of breath and wheezing. Cardiovascular: Negative for chest pain and leg swelling. Gastrointestinal: Negative for abdominal distention, abdominal pain, blood in stool, constipation, diarrhea, nausea and vomiting. Endocrine: Negative for polydipsia. Genitourinary: Positive for vaginal pain. Negative for difficulty urinating, dyspareunia, dysuria, frequency, hematuria, menstrual problem, pelvic pain, urgency, vaginal bleeding and vaginal discharge. Musculoskeletal: Negative for arthralgias and back pain. Allergic/Immunologic: Negative for environmental allergies. Neurological: Negative for dizziness, seizures and headaches. Psychiatric/Behavioral: Negative for dysphoric mood and sleep disturbance. The patient is not nervous/anxious.         The following portions of the patient's history were reviewed and updated as appropriate: allergies, current medications, past family history, past medical history, past social history, past surgical history and problem list.         Past Medical History:   Diagnosis Date   • Acne    • ADHD    • Allergic asthma    • Hypertension    • Subclinical hypothyroidism        Past Surgical History:   Procedure Laterality Date   • FINGER SURGERY      pin placed   • IN  DELIVERY ONLY N/A 3/7/2023    Procedure:  SECTION (); Surgeon: Martin Roque MD;  Location: [facility];  Service: Obstetrics   • SHOULDER SURGERY Right     2019   • SKIN BIOPSY     • WISDOM TOOTH EXTRACTION         Family History   Problem Relation Age of Onset   • Breast cancer Mother         negative genetic testing   • No Known Problems Father    • Breast cancer Maternal Grandmother    • Heart disease Paternal Grandmother        Social History     Socioeconomic History   • Marital status: /Civil Union     Spouse name: Not on file   • Number of children: Not on file   • Years of education: Not on file   • Highest education level: Not on file   Occupational History   • Not on file   Tobacco Use   • Smoking status: Never   • Smokeless tobacco: Never   Vaping Use   • Vaping Use: Never used   Substance and Sexual Activity   • Alcohol use:  Yes     Alcohol/week: 1.0 standard drink of alcohol     Types: 1 Glasses of wine per week     Comment: Socially   • Drug use: Never   • Sexual activity: Yes     Partners: Male     Birth control/protection: None   Other Topics Concern   • Not on file   Social History Narrative   • Not on file     Social Determinants of Health     Financial Resource Strain: Not on file   Food Insecurity: Not on file   Transportation Needs: Not on file   Physical Activity: Not on file   Stress: Not on file   Social Connections: Not on file   Intimate Partner Violence: Not on file   Housing Stability: Not on file         Current Outpatient Medications:   •  desogestrel-ethinyl estradiol (KARIVA) 0.15-0.02/0.01 MG () per tablet, Take 1 tablet by mouth daily, Disp: 84 tablet, Rfl: 3  •  fluconazole (DIFLUCAN) 150 mg tablet, 1 tab po day 1, skip day 2, 1 tab po day 3, Disp: 2 tablet, Rfl: 0  •  methocarbamol (ROBAXIN) 500 mg tablet, Take 1 tablet (500 mg total) by mouth 3 (three) times a day as needed for muscle spasms, Disp: 30 tablet, Rfl: 0  •  Prenat w/o A-FeCbGl-DSS-FA-DHA (PNV OB+DHA PO), Take by mouth, Disp: , Rfl:   •  lisdexamfetamine (Vyvanse) 40 MG capsule, Take 1 capsule (40 mg total) by mouth every morning Max Daily Amount: 40 mg (Patient not taking: Reported on 7/20/2023), Disp: 30 capsule, Rfl: 0  •  meloxicam (MOBIC) 15 mg tablet, Take 1 tablet (15 mg total) by mouth daily (Patient not taking: Reported on 8/16/2023), Disp: 30 tablet, Rfl: 0  •  SUMAtriptan (IMITREX) 50 mg tablet, Take 1 tablet (50 mg total) by mouth once as needed for migraine for up to 9 doses (Patient not taking: Reported on 7/20/2023), Disp: 9 tablet, Rfl: 0    Allergies   Allergen Reactions   • Clindamycin Rash     headache  headache     • Tramadol GI Intolerance, Hives, Itching and Rash       Objective   Vitals:    08/16/23 1147   BP: 120/80   BP Location: Left arm   Patient Position: Sitting   Cuff Size: Standard   Weight: 90.3 kg (199 lb)   Height: 5' 5.5" (1.664 m)     Physical Exam  Vitals reviewed. Constitutional:       General: She is awake. She is not in acute distress. Appearance: Normal appearance. She is well-developed and well-groomed. She is not ill-appearing, toxic-appearing or diaphoretic. Eyes:      Conjunctiva/sclera: Conjunctivae normal.   Neck:      Thyroid: Thyromegaly present. No thyroid mass or thyroid tenderness. Abdominal:      General: There is no distension. Palpations: Abdomen is soft. There is no hepatomegaly, splenomegaly or mass. Tenderness: There is no abdominal tenderness. Hernia: No hernia is present. There is no hernia in the left inguinal area or right inguinal area. Genitourinary:     General: Normal vulva.       Exam position: Supine. Pubic Area: No rash or pubic lice. Labia:         Right: No rash, tenderness, lesion or injury. Left: No rash, tenderness, lesion or injury. Urethra: No prolapse, urethral pain, urethral swelling or urethral lesion. Vagina: Normal. No signs of injury and foreign body. No vaginal discharge, erythema, tenderness, bleeding, lesions or prolapsed vaginal walls. Cervix: No cervical motion tenderness, discharge, friability, lesion, erythema or cervical bleeding. Uterus: Not deviated, not enlarged, not fixed, not tender and no uterine prolapse. Adnexa:         Right: No mass, tenderness or fullness. Left: No mass, tenderness or fullness. Lymphadenopathy:      Cervical: No cervical adenopathy. Right cervical: No superficial, deep or posterior cervical adenopathy. Left cervical: No superficial, deep or posterior cervical adenopathy. Upper Body:      Right upper body: No supraclavicular adenopathy. Left upper body: No supraclavicular adenopathy. Lower Body: No right inguinal adenopathy. No left inguinal adenopathy. Skin:     General: Skin is warm and dry. Neurological:      Mental Status: She is alert and oriented to person, place, and time. Psychiatric:         Mood and Affect: Mood and affect normal.         Speech: Speech normal.         Behavior: Behavior normal. Behavior is cooperative. Thought Content:  Thought content normal.         Judgment: Judgment normal.

## 2023-08-15 NOTE — TELEPHONE ENCOUNTER
Pt lmom- after intercourse she started having burning and itching. She tried monistat last night and had some relief.

## 2023-08-15 NOTE — TELEPHONE ENCOUNTER
Placed call to patient, patient reports 3 days ago she noticed burning and internal itching of the vagina. When she had intercourse yesterday it got worse. Unprotected w/ . No chance of pregnancy. Sweaty odor. Denies bleeding/spotting, discharge, redness, or swelling of the area. Tried Monistat 1 OTC and Monistat cream for itching, minimal relief then this morning it came right back. Bothersome that she couldn't sleep. Breakthrough bleeding on OCP for a week, continued taking her pill through the breakthrough bleeding. Patient is unsure if she caught an infection from the pads she was wearing. Spoke to Dr. Jeremias Thorpe - She should be seen in the office for vaginitis if possible. If we are unable to see her in the office and she could be empirically treated with Diflucan and Flagyl for both yeast and BV though it would be bettter to have testing first.      Will speak to Benita Stephenson and Svetlana Alex to see if we could accomodate patient to be seen in office tomorrow.

## 2023-08-16 ENCOUNTER — OFFICE VISIT (OUTPATIENT)
Dept: OBGYN CLINIC | Facility: CLINIC | Age: 28
End: 2023-08-16
Payer: COMMERCIAL

## 2023-08-16 VITALS
SYSTOLIC BLOOD PRESSURE: 120 MMHG | BODY MASS INDEX: 31.98 KG/M2 | HEIGHT: 66 IN | WEIGHT: 199 LBS | DIASTOLIC BLOOD PRESSURE: 80 MMHG

## 2023-08-16 DIAGNOSIS — E01.0 THYROMEGALY: ICD-10-CM

## 2023-08-16 DIAGNOSIS — B37.31 YEAST VAGINITIS: Primary | ICD-10-CM

## 2023-08-16 PROBLEM — Z98.891 S/P CESAREAN SECTION: Status: RESOLVED | Noted: 2023-03-07 | Resolved: 2023-08-16

## 2023-08-16 LAB
BV WHIFF TEST VAG QL: ABNORMAL
CLUE CELLS SPEC QL WET PREP: ABNORMAL
SL AMB POCT WET MOUNT: ABNORMAL
T VAGINALIS VAG QL WET PREP: ABNORMAL
YEAST VAG QL WET PREP: PRESENT

## 2023-08-16 PROCEDURE — 99214 OFFICE O/P EST MOD 30 MIN: CPT | Performed by: PHYSICIAN ASSISTANT

## 2023-08-16 PROCEDURE — 87210 SMEAR WET MOUNT SALINE/INK: CPT | Performed by: PHYSICIAN ASSISTANT

## 2023-08-16 RX ORDER — FLUCONAZOLE 150 MG/1
TABLET ORAL
Qty: 2 TABLET | Refills: 0 | Status: SHIPPED | OUTPATIENT
Start: 2023-08-16 | End: 2023-08-18

## 2023-08-16 NOTE — ASSESSMENT & PLAN NOTE
Significant bilateral thyromegaly appreciated - thyroid ultrasound ordered. Encourage patient to reach back out to primary care office to see if they recommend any additional lab tests to be done.

## 2023-08-18 ENCOUNTER — OFFICE VISIT (OUTPATIENT)
Dept: FAMILY MEDICINE CLINIC | Facility: CLINIC | Age: 28
End: 2023-08-18
Payer: COMMERCIAL

## 2023-08-18 VITALS
RESPIRATION RATE: 16 BRPM | DIASTOLIC BLOOD PRESSURE: 82 MMHG | TEMPERATURE: 98.5 F | OXYGEN SATURATION: 98 % | SYSTOLIC BLOOD PRESSURE: 128 MMHG | HEART RATE: 73 BPM | BODY MASS INDEX: 32.12 KG/M2 | WEIGHT: 196 LBS

## 2023-08-18 DIAGNOSIS — J02.9 PHARYNGITIS, UNSPECIFIED ETIOLOGY: Primary | ICD-10-CM

## 2023-08-18 LAB — S PYO AG THROAT QL: NEGATIVE

## 2023-08-18 PROCEDURE — 87070 CULTURE OTHR SPECIMN AEROBIC: CPT | Performed by: FAMILY MEDICINE

## 2023-08-18 PROCEDURE — 99213 OFFICE O/P EST LOW 20 MIN: CPT | Performed by: FAMILY MEDICINE

## 2023-08-18 PROCEDURE — 87880 STREP A ASSAY W/OPTIC: CPT | Performed by: FAMILY MEDICINE

## 2023-08-18 RX ORDER — AMOXICILLIN 875 MG/1
875 TABLET, COATED ORAL 2 TIMES DAILY
Qty: 14 TABLET | Refills: 0 | Status: SHIPPED | OUTPATIENT
Start: 2023-08-18 | End: 2023-08-25

## 2023-08-18 NOTE — PROGRESS NOTES
Name: Nicolasa Bruce      : 1995      MRN: [id number]  Encounter Provider: Nusrat Lyon DO  Encounter Date: 2023   Encounter department: [address] [place] [address]     1. Pharyngitis, unspecified etiology  -     POCT rapid strepA  -     amoxicillin (AMOXIL) 875 mg tablet; Take 1 tablet (875 mg total) by mouth 2 (two) times a day for 7 days  -     Throat culture        Patient with pharyngitis. Rapid strep test negative. Will treat empirically with amoxicillin. Patient has thyroid ultrasound scheduled for tomorrow ordered by her OB/GYN for thyromegaly. Subjective      Sore throat with low-grade temperature for the past 2 days. No cough sinus congestion or rash. Review of Systems   Constitutional: Negative. HENT: Positive for sore throat. Respiratory: Negative. Cardiovascular: Negative. Gastrointestinal: Negative. Genitourinary: Negative. Musculoskeletal: Negative. Psychiatric/Behavioral: Negative.         Current Outpatient Medications on File Prior to Visit   Medication Sig   • desogestrel-ethinyl estradiol (Lelo Stake) 0.15-0.02/0.01 MG () per tablet Take 1 tablet by mouth daily   • methocarbamol (ROBAXIN) 500 mg tablet Take 1 tablet (500 mg total) by mouth 3 (three) times a day as needed for muscle spasms   • Prenat w/o A-FeCbGl-DSS-FA-DHA (PNV OB+DHA PO) Take by mouth   • fluconazole (DIFLUCAN) 150 mg tablet 1 tab po day 1, skip day 2, 1 tab po day 3 (Patient not taking: Reported on 2023)   • lisdexamfetamine (Vyvanse) 40 MG capsule Take 1 capsule (40 mg total) by mouth every morning Max Daily Amount: 40 mg (Patient not taking: Reported on 2023)   • meloxicam (MOBIC) 15 mg tablet Take 1 tablet (15 mg total) by mouth daily (Patient not taking: Reported on 2023)   • SUMAtriptan (IMITREX) 50 mg tablet Take 1 tablet (50 mg total) by mouth once as needed for migraine for up to 9 doses (Patient not taking: Reported on 2023) Objective     /82 (BP Location: Left arm, Patient Position: Sitting, Cuff Size: Standard)   Pulse 73   Temp 98.5 °F (36.9 °C) (Temporal)   Resp 16   Wt 88.9 kg (196 lb)   LMP 05/20/2023 (Exact Date)   SpO2 98%   BMI 32.12 kg/m²     Physical Exam  Vitals and nursing note reviewed. Constitutional:       General: She is not in acute distress. Appearance: She is well-developed. She is not diaphoretic. HENT:      Head: Normocephalic and atraumatic. Mouth/Throat:      Pharynx: Oropharyngeal exudate and posterior oropharyngeal erythema present. Eyes:      General:         Right eye: No discharge. Conjunctiva/sclera: Conjunctivae normal.      Pupils: Pupils are equal, round, and reactive to light. Neck:      Thyroid: Thyromegaly present. Cardiovascular:      Rate and Rhythm: Normal rate and regular rhythm. Pulmonary:      Effort: Pulmonary effort is normal. No respiratory distress. Breath sounds: Normal breath sounds. Musculoskeletal:      Cervical back: Normal range of motion. Lymphadenopathy:      Cervical: Cervical adenopathy present. Skin:     General: Skin is warm and dry. Neurological:      Mental Status: She is alert and oriented to person, place, and time. Psychiatric:         Behavior: Behavior normal.         Thought Content:  Thought content normal.         Judgment: Judgment normal.       Dalia Vazquez,

## 2023-08-19 ENCOUNTER — HOSPITAL ENCOUNTER (OUTPATIENT)
Dept: RADIOLOGY | Facility: HOSPITAL | Age: 28
Discharge: HOME | End: 2023-08-19
Payer: COMMERCIAL

## 2023-08-19 DIAGNOSIS — E01.0 THYROMEGALY: ICD-10-CM

## 2023-08-19 PROCEDURE — 76536 US EXAM OF HEAD AND NECK: CPT

## 2023-08-20 LAB — BACTERIA THROAT CULT: NORMAL

## 2023-08-24 ENCOUNTER — TELEPHONE (OUTPATIENT)
Dept: FAMILY MEDICINE CLINIC | Facility: CLINIC | Age: 28
End: 2023-08-24

## 2023-08-24 ENCOUNTER — TELEPHONE (OUTPATIENT)
Dept: OBGYN CLINIC | Facility: CLINIC | Age: 28
End: 2023-08-24

## 2023-08-24 NOTE — TELEPHONE ENCOUNTER
Patient called, left message on answering machine, [facility] and would like to review and what the next steps are.

## 2023-08-24 NOTE — TELEPHONE ENCOUNTER
Patient called requesting a referral to endocrinology and would like to speak with someone regarding her recent ultrasound of her thyroid.      Thank you

## 2023-08-25 ENCOUNTER — NURSE TRIAGE (OUTPATIENT)
Dept: OTHER | Facility: OTHER | Age: 28
End: 2023-08-25

## 2023-08-25 ENCOUNTER — TELEPHONE (OUTPATIENT)
Dept: OBGYN CLINIC | Facility: CLINIC | Age: 28
End: 2023-08-25

## 2023-08-25 ENCOUNTER — APPOINTMENT (OUTPATIENT)
Dept: LAB | Facility: CLINIC | Age: 28
End: 2023-08-25
Payer: COMMERCIAL

## 2023-08-25 DIAGNOSIS — E04.9 ENLARGED THYROID: Primary | ICD-10-CM

## 2023-08-25 DIAGNOSIS — E04.9 ENLARGED THYROID: ICD-10-CM

## 2023-08-25 LAB
ALBUMIN SERPL BCP-MCNC: 4.1 G/DL (ref 3.5–5)
ALP SERPL-CCNC: 58 U/L (ref 34–104)
ALT SERPL W P-5'-P-CCNC: 11 U/L (ref 7–52)
ANION GAP SERPL CALCULATED.3IONS-SCNC: 11 MMOL/L
AST SERPL W P-5'-P-CCNC: 17 U/L (ref 13–39)
BASOPHILS # BLD AUTO: 0.05 THOUSANDS/ÂΜL (ref 0–0.1)
BASOPHILS NFR BLD AUTO: 1 % (ref 0–1)
BILIRUB SERPL-MCNC: 0.4 MG/DL (ref 0.2–1)
BUN SERPL-MCNC: 14 MG/DL (ref 5–25)
CALCIUM SERPL-MCNC: 9.3 MG/DL (ref 8.4–10.2)
CHLORIDE SERPL-SCNC: 105 MMOL/L (ref 96–108)
CO2 SERPL-SCNC: 23 MMOL/L (ref 21–32)
CREAT SERPL-MCNC: 0.94 MG/DL (ref 0.6–1.3)
EOSINOPHIL # BLD AUTO: 0.04 THOUSAND/ÂΜL (ref 0–0.61)
EOSINOPHIL NFR BLD AUTO: 1 % (ref 0–6)
ERYTHROCYTE [DISTWIDTH] IN BLOOD BY AUTOMATED COUNT: 16.9 % (ref 11.6–15.1)
GFR SERPL CREATININE-BSD FRML MDRD: 82 ML/MIN/1.73SQ M
GLUCOSE P FAST SERPL-MCNC: 72 MG/DL (ref 65–99)
HCT VFR BLD AUTO: 41 % (ref 34.8–46.1)
HGB BLD-MCNC: 13.4 G/DL (ref 11.5–15.4)
IMM GRANULOCYTES # BLD AUTO: 0.02 THOUSAND/UL (ref 0–0.2)
IMM GRANULOCYTES NFR BLD AUTO: 0 % (ref 0–2)
LYMPHOCYTES # BLD AUTO: 1.7 THOUSANDS/ÂΜL (ref 0.6–4.47)
LYMPHOCYTES NFR BLD AUTO: 25 % (ref 14–44)
MCH RBC QN AUTO: 27.7 PG (ref 26.8–34.3)
MCHC RBC AUTO-ENTMCNC: 32.7 G/DL (ref 31.4–37.4)
MCV RBC AUTO: 85 FL (ref 82–98)
MONOCYTES # BLD AUTO: 0.48 THOUSAND/ÂΜL (ref 0.17–1.22)
MONOCYTES NFR BLD AUTO: 7 % (ref 4–12)
NEUTROPHILS # BLD AUTO: 4.49 THOUSANDS/ÂΜL (ref 1.85–7.62)
NEUTS SEG NFR BLD AUTO: 66 % (ref 43–75)
NRBC BLD AUTO-RTO: 0 /100 WBCS
PLATELET # BLD AUTO: 236 THOUSANDS/UL (ref 149–390)
PMV BLD AUTO: 11.7 FL (ref 8.9–12.7)
POTASSIUM SERPL-SCNC: 4.1 MMOL/L (ref 3.5–5.3)
PROT SERPL-MCNC: 7.2 G/DL (ref 6.4–8.4)
RBC # BLD AUTO: 4.83 MILLION/UL (ref 3.81–5.12)
SODIUM SERPL-SCNC: 139 MMOL/L (ref 135–147)
T4 FREE SERPL-MCNC: 0.52 NG/DL (ref 0.61–1.12)
TSH SERPL DL<=0.05 MIU/L-ACNC: 28.17 UIU/ML (ref 0.45–4.5)
WBC # BLD AUTO: 6.78 THOUSAND/UL (ref 4.31–10.16)

## 2023-08-25 PROCEDURE — 86376 MICROSOMAL ANTIBODY EACH: CPT

## 2023-08-25 PROCEDURE — 84439 ASSAY OF FREE THYROXINE: CPT

## 2023-08-25 PROCEDURE — 80053 COMPREHEN METABOLIC PANEL: CPT

## 2023-08-25 PROCEDURE — 84443 ASSAY THYROID STIM HORMONE: CPT

## 2023-08-25 PROCEDURE — 85025 COMPLETE CBC W/AUTO DIFF WBC: CPT

## 2023-08-25 PROCEDURE — 86800 THYROGLOBULIN ANTIBODY: CPT

## 2023-08-25 PROCEDURE — 36415 COLL VENOUS BLD VENIPUNCTURE: CPT

## 2023-08-25 NOTE — TELEPHONE ENCOUNTER
Regarding: test results concerns  ----- Message from Natalie Sotelo sent at 8/25/2023  4:55 PM EDT -----  "I am calling about my test results that are showing in [facility] for my thyroid Iand is enlarged and am feeling tired.  I didn't know if I need to start taking my medication"

## 2023-08-25 NOTE — TELEPHONE ENCOUNTER
Pt lmom - blood work for thyroid just came back, really high, and has enlarged thyroid causing pain and discomfort. Kev Anderson ordered US on it. PCP is closed right now. Was on thyroid meds during pregnancy managed by [facility]. Wondering if should start taking medication or let it go for the weekend. Can call or message on [employer].

## 2023-08-25 NOTE — TELEPHONE ENCOUNTER
Reason for Disposition  • [1] Follow-up call from patient regarding patient's clinical status AND [2] information urgent    Answer Assessment - Initial Assessment Questions  1. REASON FOR CALL or QUESTION: "What is your reason for calling today?" or "How can I best  help you?" or "What question do you have that I can help answer?"      Patient saw her high TSH level on her MyChart - 28  2. CALLER: Document the source of call. (e.g., laboratory, patient).       Patient calling    Protocols used: [facility]

## 2023-08-25 NOTE — TELEPHONE ENCOUNTER
TSH 28.169. TT sent to on call provider to review. Last noted pt on levothyroxine 50mcg daily during pregnancy, received recommendations from Dr. Shavonne Ferrari for pt to take 50mcg levothyroxine daily until able to f/u with PCP Monday morning. If pt does not have any remaining will call back and okay to send refills per on call provider. Called and reviewed with pt. Pt states she reached out to PCP on call and is awaiting to hear back. Aware of recommendations and to follow PCP recommendations and management once able to review with PCP. Pt verbalizes understanding and has no questions at this time.

## 2023-08-26 DIAGNOSIS — E03.9 HYPOTHYROIDISM, UNSPECIFIED TYPE: Primary | ICD-10-CM

## 2023-08-26 LAB
THYROGLOB AB SERPL-ACNC: 122.4 IU/ML (ref 0–0.9)
THYROPEROXIDASE AB SERPL-ACNC: >600 IU/ML (ref 0–34)

## 2023-08-26 RX ORDER — LEVOTHYROXINE SODIUM 0.05 MG/1
50 TABLET ORAL DAILY
Qty: 30 TABLET | Refills: 2 | Status: SHIPPED | OUTPATIENT
Start: 2023-08-26 | End: 2023-09-07 | Stop reason: DRUGHIGH

## 2023-08-26 RX ORDER — LEVOTHYROXINE SODIUM 0.05 MG/1
50 TABLET ORAL DAILY
COMMUNITY
End: 2023-08-26 | Stop reason: SDUPTHER

## 2023-08-31 DIAGNOSIS — G43.909 MIGRAINE WITHOUT STATUS MIGRAINOSUS, NOT INTRACTABLE, UNSPECIFIED MIGRAINE TYPE: ICD-10-CM

## 2023-08-31 RX ORDER — SUMATRIPTAN 50 MG/1
50 TABLET, FILM COATED ORAL ONCE AS NEEDED
Qty: 9 TABLET | Refills: 0 | Status: SHIPPED | OUTPATIENT
Start: 2023-08-31

## 2023-09-05 ENCOUNTER — PATIENT MESSAGE (OUTPATIENT)
Dept: OBGYN CLINIC | Facility: CLINIC | Age: 28
End: 2023-09-05

## 2023-09-05 DIAGNOSIS — B37.31 YEAST VAGINITIS: Primary | ICD-10-CM

## 2023-09-06 RX ORDER — FLUCONAZOLE 150 MG/1
TABLET ORAL
Qty: 2 TABLET | Refills: 0 | Status: SHIPPED | OUTPATIENT
Start: 2023-09-06 | End: 2023-09-08

## 2023-09-06 NOTE — PATIENT COMMUNICATION
Patient c/o reoccurring yeast infection sx. Patient was seen on 8/16/23 for yeast infection at [address] [place] [address]. Culture came back GBS+. Patient was having vaginal itching internally and odor. Was placed on diflucan x2. Patient was on amox for a thyroid infection. Amox was finished about 2 weeks ago. Pts symptoms resolved from before but reappeared after finished antibiotics. Patient is now complaining of vaginal burning,  Itching, odor. Denies burning with urination, urgency, frequency, fevers, hematuria. No chance of pregnancy. Spoke to Dr. Breanna Gutierrez with diflucan 150 with 2-doses and follow up in 2-3 months. prescription sent to [facility]. [employer] message sent to patient (okay per communication form).

## 2023-09-07 ENCOUNTER — OFFICE VISIT (OUTPATIENT)
Dept: ENDOCRINOLOGY | Facility: HOSPITAL | Age: 28
End: 2023-09-07
Payer: COMMERCIAL

## 2023-09-07 VITALS
BODY MASS INDEX: 32.17 KG/M2 | HEART RATE: 85 BPM | WEIGHT: 200.2 LBS | DIASTOLIC BLOOD PRESSURE: 80 MMHG | HEIGHT: 66 IN | SYSTOLIC BLOOD PRESSURE: 118 MMHG

## 2023-09-07 DIAGNOSIS — E03.8 HYPOTHYROIDISM DUE TO HASHIMOTO'S THYROIDITIS: Primary | ICD-10-CM

## 2023-09-07 DIAGNOSIS — E06.3 HYPOTHYROIDISM DUE TO HASHIMOTO'S THYROIDITIS: Primary | ICD-10-CM

## 2023-09-07 PROCEDURE — 99204 OFFICE O/P NEW MOD 45 MIN: CPT | Performed by: INTERNAL MEDICINE

## 2023-09-07 RX ORDER — LEVOTHYROXINE SODIUM 0.1 MG/1
100 TABLET ORAL DAILY
Qty: 30 TABLET | Refills: 6 | Status: SHIPPED | OUTPATIENT
Start: 2023-09-07

## 2023-09-07 RX ORDER — LEVOCETIRIZINE DIHYDROCHLORIDE 5 MG/1
5 TABLET, FILM COATED ORAL EVERY EVENING
COMMUNITY

## 2023-09-07 NOTE — PATIENT INSTRUCTIONS
Given your weight. We'll get you to a dose more befitting your prepregnant weight( 125 mcg), I'll increase for now to 100 mcg daily. Follow up in 3 months with blood work.

## 2023-09-07 NOTE — PROGRESS NOTES
9/7/2023    Assessment/Plan      Diagnoses and all orders for this visit:    Hypothyroidism due to Hashimoto's thyroiditis  -     levothyroxine 100 mcg tablet; Take 1 tablet (100 mcg total) by mouth daily  -     TSH, 3rd generation Lab Collect; Future  -     T4, free Lab Collect; Future    Other orders  -     levocetirizine (XYZAL) 5 MG tablet; Take 5 mg by mouth every evening        Assessment/Plan:  Hypothyroidism due to Hashimoto's thyroiditis. Most recent blood work does show significant hypothyroidism with positive thyroid antibodies consistent with Hashimoto's thyroiditis. Her current dose of 50 mcg daily is really not replacement dosage for her size, it is likely closer to 125 mcg daily. I will therefore increase her levothyroxine to 100 mcg daily. We did review taking the medicine properly and to avoid vitamins until the evening. She can eat after 30 minutes though. I reviewed both hypo and hyperthyroid symptoms with her. I will then have her follow-up in 2 to 3 months with preceding TSH and free T4.      CC: Hypothyroid consult    History of Present Illness     HPI: Mickey Abdul is a 33y.o. year old female with history of hypothyroidism for evaluation/consult. She reports that she was trying to get pregnant after having had a miscarriage and was started on low-dose levothyroxine to help her get pregnant as probably TSH was above 2.5. This was around April or May 2022. She was then able to get pregnant and delivered her baby about 6 months ago. She had no prior history of thyroid disease. There is a family history of thyroid nodules and goiter in her grandmother but no other thyroid disease or autoimmune disease. She noticed over the last several months since delivery that her neck has been swollen. Blood work was normal after delivery on several occasions even after they took her off medications.   She recently had blood work done because of that enlarged thyroid and was found to be hypothyroid and started on levothyroxine 50 mcg daily as several weeks ago. She feels like her thyroid is a bit less swollen already. She also had an ultrasound done. She had been placed on antibiotics and Diflucan because of some throat hurting but strep test was negative. She has of note been taking her levothyroxine 50 mcg daily on an empty stomach at about 8 AM and waits an hour to eat. She has not been taking her prenatal vitamins as she was not sure how to take them. She had a prepregnant weight of about 170 pounds and gained weight up to 230 pounds but then lost 30 pounds or more since. She has thinks she is lost more weight than the 30 pounds and then gained some back if she has been eating more. She did nurse for about 2 to 3 months but had to stop due to frequent mastitis. Other symptomatology include significant fatigue even though she sleeps well as her baby sleeps all night. She is cold. She denies heat intolerance, palpitation, or tremors. She denies anxiety or depression, diarrhea or constipation. She denies dry skin or brittle nails but had been having some hair loss which she had attributed to the postpartum state. She had noticed some trouble with swallowing harder foods since she had this enlargement of the thyroid. She has no history of head or neck radiation in the past.  She has no diplopia. Menstrual cycles have recurred and she has been getting them every 3 to 4 months because she is on oral contraceptives that are constant hormonal pills. Review of Systems   Constitutional: Positive for fatigue. Negative for unexpected weight change. Significant fatigue. HENT: Positive for trouble swallowing. Negative for hearing loss and tinnitus. Some trouble with swallowing harder foods. Some soret throat. No XRT to the head or neck in the past.   Eyes: Negative for visual disturbance. No diplopia. Respiratory: Positive for shortness of breath.  Negative for chest tightness. SOB with laying flat. Can not lay down flat. Worse with swollen thyroid. Cardiovascular: Negative for chest pain, palpitations and leg swelling. Gastrointestinal: Positive for nausea. Negative for abdominal pain, constipation and diarrhea. Some a.m. nausea. Endocrine: Positive for cold intolerance. Negative for heat intolerance. Freezing cold 2 weeks ago, now getting some hot spells. Genitourinary:        Menses every 3-4 months on OCP due to constant hormone pills. No nursing. Milk production dried up, was getting mastitis. Musculoskeletal: Positive for back pain. Negative for arthralgias. Some low back pain. Skin: Negative for rash. No dry skin. No brittle nails. Had Hair loss. Neurological: Positive for light-headedness. Negative for dizziness, tremors, weakness and headaches. Occasional lightheaded. Psychiatric/Behavioral: Negative for dysphoric mood and sleep disturbance. The patient is not nervous/anxious. Historical Information   Past Medical History:   Diagnosis Date   • Acne    • ADHD    • Allergic asthma    • Eczema     as a child   • Hypertension    • Subclinical hypothyroidism      Past Surgical History:   Procedure Laterality Date   • FINGER SURGERY Left     pin placed, middle   • MD  DELIVERY ONLY N/A 2023    Procedure:  SECTION ();   Surgeon: Blanca Chavez MD;  Location: [facility] ;  Service: Obstetrics   • SHOULDER SURGERY Right     2019   • SKIN BIOPSY     • WISDOM TOOTH EXTRACTION       Social History   Social History     Substance and Sexual Activity   Alcohol Use Yes   • Alcohol/week: 1.0 standard drink of alcohol   • Types: 1 Glasses of wine per week    Comment: Socially     Social History     Substance and Sexual Activity   Drug Use Never     Social History     Tobacco Use   Smoking Status Never   Smokeless Tobacco Never     Family History:   Family History   Problem Relation Age of Onset   • Breast cancer Mother         negative genetic testing   • Aortic aneurysm Father    • No Known Problems Sister    • Thyroid disease unspecified Maternal Grandmother         thyroid goiter/nodules   • Breast cancer Maternal Grandmother    • Heart disease Paternal Grandmother    • No Known Problems Daughter        Meds/Allergies   Current Outpatient Medications   Medication Sig Dispense Refill   • desogestrel-ethinyl estradiol (Nat Sinks) 0.15-0.02/0.01 MG (21/5) per tablet Take 1 tablet by mouth daily 84 tablet 3   • fluconazole (DIFLUCAN) 150 mg tablet 1 tab po day 1, skip day 2, 1 tab po day 3 2 tablet 0   • levocetirizine (XYZAL) 5 MG tablet Take 5 mg by mouth every evening     • levothyroxine 100 mcg tablet Take 1 tablet (100 mcg total) by mouth daily 30 tablet 6   • Prenat w/o A-FeCbGl-DSS-FA-DHA (PNV OB+DHA PO) Take by mouth     • SUMAtriptan (IMITREX) 50 mg tablet TAKE 1 TABLET (50 MG TOTAL) BY MOUTH ONCE AS NEEDED FOR MIGRAINE FOR UP TO 9 DOSES 9 tablet 0   • lisdexamfetamine (Vyvanse) 40 MG capsule Take 1 capsule (40 mg total) by mouth every morning Max Daily Amount: 40 mg (Patient not taking: Reported on 7/20/2023) 30 capsule 0   • meloxicam (MOBIC) 15 mg tablet Take 1 tablet (15 mg total) by mouth daily (Patient not taking: Reported on 8/16/2023) 30 tablet 0   • methocarbamol (ROBAXIN) 500 mg tablet Take 1 tablet (500 mg total) by mouth 3 (three) times a day as needed for muscle spasms (Patient not taking: Reported on 9/7/2023) 30 tablet 0     No current facility-administered medications for this visit. Allergies   Allergen Reactions   • Clindamycin Rash     headache  headache     • Tramadol GI Intolerance, Hives, Itching and Rash       Objective   Vitals: Blood pressure 118/80, pulse 85, height 5' 5.5" (1.664 m), weight 90.8 kg (200 lb 3.2 oz), not currently breastfeeding. Invasive Devices     None                 Physical Exam  Vitals reviewed.    Constitutional:       Appearance: Normal appearance. She is well-developed. HENT:      Head: Normocephalic and atraumatic. Eyes:      Extraocular Movements: Extraocular movements intact. Conjunctiva/sclera: Conjunctivae normal.      Comments: No lid lag, stare, proptosis, or periorbital edema. Neck:      Thyroid: No thyromegaly. Vascular: No carotid bruit. Comments: 5-6 cm thyroid nodule lobes palpable bilaterally. No nodules palpable. No bruits over the thyroid gland no anterior neck or thyroid tenderness. Cardiovascular:      Rate and Rhythm: Normal rate and regular rhythm. Heart sounds: Normal heart sounds. No murmur heard. Pulmonary:      Effort: Pulmonary effort is normal.      Breath sounds: Normal breath sounds. No wheezing. Abdominal:      General: Bowel sounds are normal.      Palpations: Abdomen is soft. Tenderness: There is no abdominal tenderness. Musculoskeletal:         General: No deformity. Normal range of motion. Cervical back: Normal range of motion and neck supple. Right lower leg: No edema. Left lower leg: No edema. Comments: No tremor of the outstretched hands. No spinous process tenderness. No CVA tenderness. Lymphadenopathy:      Cervical: No cervical adenopathy. Skin:     General: Skin is warm and dry. Findings: No rash. Neurological:      Mental Status: She is alert and oriented to person, place, and time. Deep Tendon Reflexes: Reflexes are normal and symmetric. Comments: Patellar deep tendon reflexes diminished. The history was obtained from the review of the chart and from the patient. Lab Results:      On 8/25/2023 showed a TSH of 28.169 with a free T4 of 0.52. Thyroid peroxidase antibodies were over 600 and thyroglobulin antibodies are 122.4.     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     COMPARISON:  None     TECHNIQUE:   Ultrasound of the thyroid was performed with a high frequency linear transducer in transverse and sagittal planes including volumetric imaging sweeps as well as traditional still imaging technique.     FINDINGS:  Thyroid parenchyma is diffusely heterogeneous in echotexture and also hyperemic.     Right lobe: 6.5 x 2.0 x 2.5 cm. Volume 15.3 mL  Left lobe:  5.5 x 1.9 x 2.3 cm. Volume 11.5 mL  Isthmus: 0.5  cm.     No thyroid nodules are demonstrated. Multiple normal size lymph nodes in level 6 inferior to the isthmus.     IMPRESSION:     Enlarged hypervascular heterogeneous thyroid gland without discrete thyroid nodule.       Future Appointments   Date Time Provider [address]   12/15/2023  2:00 PM Vern Lopez MD [facility]

## 2023-09-14 DIAGNOSIS — M79.10 MYALGIA: ICD-10-CM

## 2023-09-14 DIAGNOSIS — M25.50 ARTHRALGIA OF MULTIPLE SITES, BILATERAL: Primary | ICD-10-CM

## 2023-09-15 ENCOUNTER — APPOINTMENT (OUTPATIENT)
Dept: LAB | Facility: CLINIC | Age: 28
End: 2023-09-15
Payer: COMMERCIAL

## 2023-09-15 DIAGNOSIS — M79.10 MYALGIA: ICD-10-CM

## 2023-09-15 DIAGNOSIS — M25.50 ARTHRALGIA OF MULTIPLE JOINTS: Primary | ICD-10-CM

## 2023-09-15 LAB
CRP SERPL QL: 10.9 MG/L
ERYTHROCYTE [SEDIMENTATION RATE] IN BLOOD: 23 MM/HOUR (ref 0–19)

## 2023-09-15 PROCEDURE — 86038 ANTINUCLEAR ANTIBODIES: CPT

## 2023-09-15 PROCEDURE — 86140 C-REACTIVE PROTEIN: CPT | Performed by: INTERNAL MEDICINE

## 2023-09-15 PROCEDURE — 85652 RBC SED RATE AUTOMATED: CPT | Performed by: INTERNAL MEDICINE

## 2023-09-15 PROCEDURE — 36415 COLL VENOUS BLD VENIPUNCTURE: CPT | Performed by: INTERNAL MEDICINE

## 2023-09-15 PROCEDURE — 86430 RHEUMATOID FACTOR TEST QUAL: CPT | Performed by: INTERNAL MEDICINE

## 2023-09-17 LAB — ANA SER QL IA: NEGATIVE

## 2023-09-18 LAB — RHEUMATOID FACT SER QL LA: NEGATIVE

## 2023-09-22 DIAGNOSIS — E06.3 HYPOTHYROIDISM DUE TO HASHIMOTO'S THYROIDITIS: Primary | ICD-10-CM

## 2023-09-22 DIAGNOSIS — E03.8 HYPOTHYROIDISM DUE TO HASHIMOTO'S THYROIDITIS: Primary | ICD-10-CM

## 2023-09-22 RX ORDER — LEVOTHYROXINE SODIUM 75 MCG
75 TABLET ORAL DAILY
Qty: 30 TABLET | Refills: 5 | Status: SHIPPED | OUTPATIENT
Start: 2023-09-22

## 2023-10-03 ENCOUNTER — TELEPHONE (OUTPATIENT)
Dept: OBGYN CLINIC | Facility: CLINIC | Age: 28
End: 2023-10-03

## 2023-10-03 DIAGNOSIS — E06.3 HYPOTHYROIDISM DUE TO HASHIMOTO'S THYROIDITIS: Primary | ICD-10-CM

## 2023-10-03 DIAGNOSIS — E03.8 HYPOTHYROIDISM DUE TO HASHIMOTO'S THYROIDITIS: Primary | ICD-10-CM

## 2023-10-03 RX ORDER — LEVOTHYROXINE SODIUM 0.1 MG/1
100 TABLET ORAL DAILY
Qty: 30 TABLET | Refills: 2 | Status: SHIPPED | OUTPATIENT
Start: 2023-10-03

## 2023-10-03 NOTE — TELEPHONE ENCOUNTER
Patient left message on machine - following up, still having same symptoms had with two rounds of medication. Painful and burning with urination and discomfort. Not sure if needs appt or another medication. After medication was better for a little bit but now symptoms are back.

## 2023-10-04 ENCOUNTER — OFFICE VISIT (OUTPATIENT)
Dept: OBGYN CLINIC | Facility: CLINIC | Age: 28
End: 2023-10-04
Payer: COMMERCIAL

## 2023-10-04 VITALS
SYSTOLIC BLOOD PRESSURE: 116 MMHG | WEIGHT: 195 LBS | BODY MASS INDEX: 31.34 KG/M2 | DIASTOLIC BLOOD PRESSURE: 78 MMHG | HEIGHT: 66 IN

## 2023-10-04 DIAGNOSIS — N76.1 SUBACUTE VAGINITIS: Primary | ICD-10-CM

## 2023-10-04 DIAGNOSIS — R30.0 DYSURIA: ICD-10-CM

## 2023-10-04 LAB
BV WHIFF TEST VAG QL: NEGATIVE
CLUE CELLS SPEC QL WET PREP: ABNORMAL
PH SMN: 3.5 [PH]
SL AMB  POCT GLUCOSE, UA: NEGATIVE
SL AMB LEUKOCYTE ESTERASE,UA: ABNORMAL
SL AMB POCT BILIRUBIN,UA: NEGATIVE
SL AMB POCT BLOOD,UA: ABNORMAL
SL AMB POCT CLARITY,UA: CLEAR
SL AMB POCT COLOR,UA: YELLOW
SL AMB POCT KETONES,UA: NEGATIVE
SL AMB POCT NITRITE,UA: NEGATIVE
SL AMB POCT PH,UA: 6
SL AMB POCT SPECIFIC GRAVITY,UA: 1.01
SL AMB POCT URINE PROTEIN: NEGATIVE
SL AMB POCT UROBILINOGEN: NEGATIVE
SL AMB POCT WET MOUNT: ABNORMAL
T VAGINALIS VAG QL WET PREP: ABNORMAL
YEAST VAG QL WET PREP: ABNORMAL

## 2023-10-04 PROCEDURE — 99214 OFFICE O/P EST MOD 30 MIN: CPT | Performed by: PHYSICIAN ASSISTANT

## 2023-10-04 PROCEDURE — 87210 SMEAR WET MOUNT SALINE/INK: CPT | Performed by: PHYSICIAN ASSISTANT

## 2023-10-04 PROCEDURE — 81002 URINALYSIS NONAUTO W/O SCOPE: CPT | Performed by: PHYSICIAN ASSISTANT

## 2023-10-04 RX ORDER — FLUCONAZOLE 150 MG/1
TABLET ORAL
Qty: 2 TABLET | Refills: 0 | Status: SHIPPED | OUTPATIENT
Start: 2023-10-04 | End: 2023-10-06

## 2023-10-04 NOTE — PROGRESS NOTES
Assessment/Plan:    No problem-specific Assessment & Plan notes found for this encounter. Problem List Items Addressed This Visit    None  Visit Diagnoses     Subacute vaginitis    -  Primary    Relevant Orders    Genital Comprehensive Culture    POCT wet mount    Dysuria        Relevant Orders    Urine culture          Wet mount did again demonstrate yeast. Will plan Dilfucan 150 mg x 2. Will also plan addition of coconut oil and acidophilus. Will plan to await cx results of urine given that pt is not having typical UTI like sx. Pending results, would also consider interstitial cystitis in the differential. If dyspareunia continues  I would consider pelvic US as well. Subjective:      Patient ID: Matthew Travis is a 33 y.o. female. Pt presents today for evaluation of vaginitis. Mid August began with itching and irritation. Was seen in office and found yeast on wet mount. Was tx'd and felt like sx resolved but have now recurred. She has been tx'd twice with Diflucan with resolve then recurrence. She notes that she will get some discomfort internally in her uterus area after IC. She will also have vaginal burning with IC  She notes that she will also occasionally have some dysuria. Not every episode though. She denies urgency but thinks possibly some frequency. Pt denies any STD concerns. She is in a long term monogmaous relationship. She is not breast feeding. The following portions of the patient's history were reviewed and updated as appropriate:   She  has a past medical history of Acne, ADHD, Allergic asthma, Eczema, Hypertension, and Subclinical hypothyroidism.   She   Patient Active Problem List    Diagnosis Date Noted   • Arthralgia of multiple sites, bilateral 09/14/2023   • Myalgia 09/14/2023   • Hypothyroidism due to Hashimoto's thyroiditis 09/07/2023   • Thyromegaly 08/16/2023   • Migraine without status migrainosus, not intractable 05/03/2023   • Abdominal weakness 05/03/2023 • Pelvic floor dysfunction in female 2023   • Headache 2023   • Subclinical hypothyroidism    • Functional ovarian cysts 2022   • ADHD 2022   • Asthma 2022     She  has a past surgical history that includes Shoulder surgery (Right); Skin biopsy; [place] tooth extraction; Finger surgery (Left); and pr  delivery only (N/A, 2023). Her family history includes Aortic aneurysm in her father; Breast cancer in her maternal grandmother and mother; Heart disease in her paternal grandmother; No Known Problems in her daughter and sister; Thyroid disease unspecified in her maternal grandmother. She  reports that she has never smoked. She has never used smokeless tobacco. She reports current alcohol use of about 1.0 standard drink of alcohol per week. She reports that she does not use drugs.   Current Outpatient Medications   Medication Sig Dispense Refill   • desogestrel-ethinyl estradiol (Remona Randal) 0.15-0.02/0.01 MG () per tablet Take 1 tablet by mouth daily 84 tablet 3   • levocetirizine (XYZAL) 5 MG tablet Take 5 mg by mouth every evening     • levothyroxine (Synthroid) 100 mcg tablet Take 1 tablet (100 mcg total) by mouth daily 30 tablet 2   • Prenat w/o A-FeCbGl-DSS-FA-DHA (PNV OB+DHA PO) Take by mouth     • lisdexamfetamine (Vyvanse) 40 MG capsule Take 1 capsule (40 mg total) by mouth every morning Max Daily Amount: 40 mg (Patient not taking: Reported on 2023) 30 capsule 0   • meloxicam (MOBIC) 15 mg tablet Take 1 tablet (15 mg total) by mouth daily (Patient not taking: Reported on 2023) 30 tablet 0   • methocarbamol (ROBAXIN) 500 mg tablet Take 1 tablet (500 mg total) by mouth 3 (three) times a day as needed for muscle spasms (Patient not taking: Reported on 2023) 30 tablet 0   • SUMAtriptan (IMITREX) 50 mg tablet TAKE 1 TABLET (50 MG TOTAL) BY MOUTH ONCE AS NEEDED FOR MIGRAINE FOR UP TO 9 DOSES 9 tablet 0   • Synthroid 75 MCG tablet Take 1 tablet (75 mcg total) by mouth daily 30 tablet 5     No current facility-administered medications for this visit. Current Outpatient Medications on File Prior to Visit   Medication Sig   • desogestrel-ethinyl estradiol (Aishwarya Albrecht) 0.15-0.02/0.01 MG (21/5) per tablet Take 1 tablet by mouth daily   • levocetirizine (XYZAL) 5 MG tablet Take 5 mg by mouth every evening   • levothyroxine (Synthroid) 100 mcg tablet Take 1 tablet (100 mcg total) by mouth daily   • Prenat w/o A-FeCbGl-DSS-FA-DHA (PNV OB+DHA PO) Take by mouth   • lisdexamfetamine (Vyvanse) 40 MG capsule Take 1 capsule (40 mg total) by mouth every morning Max Daily Amount: 40 mg (Patient not taking: Reported on 7/20/2023)   • meloxicam (MOBIC) 15 mg tablet Take 1 tablet (15 mg total) by mouth daily (Patient not taking: Reported on 8/16/2023)   • methocarbamol (ROBAXIN) 500 mg tablet Take 1 tablet (500 mg total) by mouth 3 (three) times a day as needed for muscle spasms (Patient not taking: Reported on 9/7/2023)   • SUMAtriptan (IMITREX) 50 mg tablet TAKE 1 TABLET (50 MG TOTAL) BY MOUTH ONCE AS NEEDED FOR MIGRAINE FOR UP TO 9 DOSES   • Synthroid 75 MCG tablet Take 1 tablet (75 mcg total) by mouth daily     No current facility-administered medications on file prior to visit. She is allergic to clindamycin and tramadol. .    Review of Systems   Constitutional: Negative. Genitourinary: Positive for dyspareunia, dysuria, pelvic pain and vaginal pain. Negative for vaginal discharge. Psychiatric/Behavioral: Negative. Objective:      /78 (BP Location: Left arm, Patient Position: Sitting, Cuff Size: Large)   Ht 5' 5.5" (1.664 m)   Wt 88.5 kg (195 lb)   LMP 09/20/2023 (Approximate)   BMI 31.96 kg/m²          Physical Exam  Vitals reviewed. Constitutional:       Appearance: She is normal weight. HENT:      Head: Normocephalic and atraumatic. Cardiovascular:      Rate and Rhythm: Normal rate and regular rhythm.    Pulmonary:      Effort: Pulmonary effort is normal.      Breath sounds: Normal breath sounds. Genitourinary:     Labia:         Right: Rash present. No tenderness, lesion or injury. Left: Rash present. No tenderness, lesion or injury. Urethra: No prolapse, urethral pain, urethral swelling or urethral lesion. Vagina: Normal.      Cervix: Normal.      Uterus: Normal.       Comments: Moderate amount of external erythema noted on exam. No lesions or vesicles. Small amount of white d/c present in vaginal vault. No internal vaginal erythema noted. Cx not erythematous   Skin:     General: Skin is warm and dry. Neurological:      Mental Status: She is alert. Psychiatric:         Mood and Affect: Mood normal.         Behavior: Behavior normal.         Thought Content:  Thought content normal.         Judgment: Judgment normal.

## 2023-10-06 ENCOUNTER — TELEPHONE (OUTPATIENT)
Dept: OBGYN CLINIC | Facility: CLINIC | Age: 28
End: 2023-10-06

## 2023-10-06 NOTE — TELEPHONE ENCOUNTER
Patient left message on machine - symptoms have gotten worse. Was started on diflucan again. Wanted to speak to someone before the weekend. Is really uncomfortable and having some pain. 3rd time it's happened. Thinks related to autoimmune disease. Raj Álvarez was waiting for results to come back from lab to see if anything else is going on also.

## 2023-10-06 NOTE — TELEPHONE ENCOUNTER
Pt called and LM in regards to her symptoms are worsening since having seen bryon. Pt requested possible new medication?

## 2023-10-06 NOTE — TELEPHONE ENCOUNTER
Patient reports internal burning and itching have worsened and still experiencing sharp stabbing pain intermittently. Has second dose of diflucan to take tonight. This is 3rd round of diflucan treatments, each being 2 tablets per patient. Patient states recently diagnosed with an autoimmune disease and believes is more prone to yeast infections now, never had gotten them in the past. Patient aware UC and genital culture results not completed or received at this time. Patient states she obtained probiotic, forgot about coconut oil recommendations and will obtain and apply, internally and externally, to see if helps relieve symptoms. Feels uncomfortable, has child's [employer] party this weekend, was hoping for treatment but verbalizes understanding of importance to treat appropriately pending culture results. Patient aware will review with on call provider if any further recommendations at this time, and if received will call patient back to review. Patient has no questions at this time.

## 2023-10-06 NOTE — TELEPHONE ENCOUNTER
Called patient and reviewed recommendations received from Dr. Anai Chappell that patient "could also try some Monistat cream externally and/or lidocaine jelly to decrease her discomfort while waiting for treatment to take effect. Occasionally, ice can also help with itching and swelling associated with vaginitis." Patient verbalizes understanding and has no questions at this time.

## 2023-10-09 ENCOUNTER — NURSE TRIAGE (OUTPATIENT)
Dept: OTHER | Facility: OTHER | Age: 28
End: 2023-10-09

## 2023-10-09 DIAGNOSIS — N76.0 VAGINAL INFECTION: Primary | ICD-10-CM

## 2023-10-09 RX ORDER — AMOXICILLIN 875 MG/1
875 TABLET, COATED ORAL 2 TIMES DAILY
Qty: 10 TABLET | Refills: 0 | Status: SHIPPED | OUTPATIENT
Start: 2023-10-09 | End: 2023-10-14

## 2023-10-09 NOTE — TELEPHONE ENCOUNTER
Patient called states she seen her results in [facility] and is requesting recommendations or treatment for her vaginal burning and pain .  CHIO VALLECILLO

## 2023-10-09 NOTE — TELEPHONE ENCOUNTER
Patient called and LMOVM regarding her abnormal urine culture results. Would like a call back to discuss. Symptoms worsening.

## 2023-10-10 NOTE — TELEPHONE ENCOUNTER
Reason for Disposition  • MODERATE-SEVERE itching (i.e., interferes with school, work, or sleep)    Answer Assessment - Initial Assessment Questions  1. SYMPTOM: "What's the main symptom you're concerned about?" (e.g., pain, itching, dryness)      Vaginal burning and itching     2. LOCATION: "Where is the itching and burning located?" (e.g., inside/outside, left/right)      Inside     3. ONSET: "When did these symptoms start?"      approx 9/27/23- seen 10/4/2023 in office. 4. PAIN: "Is there any pain?" If Yes, ask: "How bad is it?" (Scale: 1-10; mild, moderate, severe)      Yes, severe burning     5. ITCHING: "Is there any itching?" If Yes, ask: "How bad is it?" (Scale: 1-10; mild, moderate, severe)      Yes, severe    6. CAUSE: "What do you think is causing the discharge?" "Have you had the same problem before? What happened then?"      Unsure     7. OTHER SYMPTOMS: "Do you have any other symptoms?" (e.g., fever, itching, vaginal bleeding, pain with urination, injury to genital area, vaginal foreign body)      Clear to white d/c    8. PREGNANCY: "Is there any chance you are pregnant?" "When was your last menstrual period?"      Denies, LMP 9/25/23    Patient reports that she has strep B in her urine but that they told her they were not going to treat it. However, in August of this year, she had the same thing and was treated with amoxicillin 875 mg. Patient reports that made all of her symptoms go away. Informed patient that I will send an urgent message to the office regarding all of this information, but she would like the medication sent to her pharmacy tonight so she can pick it up first thing in the morning. Contacted on-call provider who initially recommended RN send Rx in for clindamycin vaginal suppositories, but patient is allergic to clindamycin. On-call provider recommended RN send prescription for amoxicillin 875 mg twice daily for 5 days.   Medication sent; verified confirmation receipt by pharmacy. On-call provider also recommended patient may try lidocaine ointment and Dermaplast OTC. Contacted patient to make her aware of above recommendations and reviewed medication instructions. Patient verbalized understanding and was appreciative.     Protocols used: [facility]

## 2023-10-10 NOTE — TELEPHONE ENCOUNTER
Regarding: vaginal burning/itching  ----- Message from Dawood Etienne sent at 10/9/2023 10:00 PM EDT -----  Pt called in and stated, "I have internal vaginal burning and itching. I was seen for this last week but my symptoms have not improved, they have gotten worse. There is minimal discharge but I am really uncomfortable. I was prescribed diflucan which did not work. I have tried everything over the counter including acidophilus. Nothing has worked.  This is interfering with my life, I need help."

## 2023-10-17 ENCOUNTER — APPOINTMENT (OUTPATIENT)
Dept: LAB | Facility: CLINIC | Age: 28
End: 2023-10-17
Payer: COMMERCIAL

## 2023-10-17 ENCOUNTER — TELEPHONE (OUTPATIENT)
Dept: OBGYN CLINIC | Facility: CLINIC | Age: 28
End: 2023-10-17

## 2023-10-17 DIAGNOSIS — R39.9 UTI SYMPTOMS: ICD-10-CM

## 2023-10-17 DIAGNOSIS — R10.2 PELVIC PRESSURE IN FEMALE: Primary | ICD-10-CM

## 2023-10-17 LAB
BACTERIA UR QL AUTO: NORMAL /HPF
BILIRUB UR QL STRIP: NEGATIVE
CLARITY UR: CLEAR
COLOR UR: COLORLESS
GLUCOSE UR STRIP-MCNC: NEGATIVE MG/DL
HGB UR QL STRIP.AUTO: NEGATIVE
KETONES UR STRIP-MCNC: NEGATIVE MG/DL
LEUKOCYTE ESTERASE UR QL STRIP: NEGATIVE
NITRITE UR QL STRIP: NEGATIVE
NON-SQ EPI CELLS URNS QL MICRO: NORMAL /HPF
PH UR STRIP.AUTO: 6.5 [PH]
PROT UR STRIP-MCNC: NEGATIVE MG/DL
RBC #/AREA URNS AUTO: NORMAL /HPF
SP GR UR STRIP.AUTO: 1.01 (ref 1–1.03)
UROBILINOGEN UR STRIP-ACNC: <2 MG/DL
WBC #/AREA URNS AUTO: NORMAL /HPF

## 2023-10-17 PROCEDURE — 81001 URINALYSIS AUTO W/SCOPE: CPT

## 2023-10-17 PROCEDURE — 87086 URINE CULTURE/COLONY COUNT: CPT

## 2023-10-17 PROCEDURE — 87147 CULTURE TYPE IMMUNOLOGIC: CPT

## 2023-10-17 NOTE — TELEPHONE ENCOUNTER
Spoke to patient, patient said her symptoms are different now. She is having mild itching, lower back pain, frequency with urination, no burning, some pelvic pressure and a little bit of clear discharge. Wondering where to go from here? Told patient I would send provider a message to see what she recommends and give her a call back. Patient understood.

## 2023-10-17 NOTE — TELEPHONE ENCOUNTER
Patient made aware of recommendations. Will complete urine culture at lab and also call to schedule pelvic ultrasound. Aware if workup is negative our next step would be for her to see urology. Patient understood.

## 2023-10-19 ENCOUNTER — PATIENT MESSAGE (OUTPATIENT)
Dept: OBGYN CLINIC | Facility: CLINIC | Age: 28
End: 2023-10-19

## 2023-10-19 DIAGNOSIS — N30.00 ACUTE CYSTITIS WITHOUT HEMATURIA: Primary | ICD-10-CM

## 2023-10-19 LAB
BACTERIA UR CULT: ABNORMAL
BACTERIA UR CULT: ABNORMAL

## 2023-10-19 RX ORDER — SULFAMETHOXAZOLE AND TRIMETHOPRIM 800; 160 MG/1; MG/1
1 TABLET ORAL EVERY 12 HOURS SCHEDULED
Qty: 6 TABLET | Refills: 0 | Status: SHIPPED | OUTPATIENT
Start: 2023-10-19 | End: 2023-10-22

## 2023-10-19 RX ORDER — PHENAZOPYRIDINE HYDROCHLORIDE 100 MG/1
100 TABLET, FILM COATED ORAL 3 TIMES DAILY PRN
Qty: 10 TABLET | Refills: 0 | Status: SHIPPED | OUTPATIENT
Start: 2023-10-19

## 2023-10-27 ENCOUNTER — PATIENT MESSAGE (OUTPATIENT)
Dept: OBGYN CLINIC | Facility: CLINIC | Age: 28
End: 2023-10-27

## 2023-10-27 DIAGNOSIS — B37.31 YEAST VAGINITIS: Primary | ICD-10-CM

## 2023-10-27 RX ORDER — FLUCONAZOLE 100 MG/1
TABLET ORAL
Qty: 17 TABLET | Refills: 0 | Status: SHIPPED | OUTPATIENT
Start: 2023-10-27 | End: 2024-06-27

## 2023-10-27 NOTE — PATIENT COMMUNICATION
c/o vaginal swelling, irritation, swollen. Vaginal burning and itching, redness. [address] Discharge with an odor, no chance of pregnancy. This has been going on for months and Belen Ruth prescribed other meds that have not helped fully. Has tried over the counter coconut oil & pro biopics. Last seen 10/4/23 by Linda Jimenez mount did again demonstrate yeast. Dilfucan 150 mg x 2. Urine culture results - our urine and genital culture both grow out with group B strep bacteria. That is a bacteria that many women are colonized for and we do not typically treat for it outside of pregnancy. It is likely a contaminate in the urine and not causing a UTI at the levels it is seen. Has not tried Boric acid. Patient states her symptoms improved slightly then came back worse. Request RX for BV tx. Spoke to Dr. Yessenia Gray - Diflucan 100 mg daily for a week then weekly for a month and then monthly for 6 months. Prescription sent. [employer] message sent to patient (okay per communication form).

## 2023-10-30 ENCOUNTER — TELEMEDICINE (OUTPATIENT)
Dept: FAMILY MEDICINE CLINIC | Facility: CLINIC | Age: 28
End: 2023-10-30
Payer: COMMERCIAL

## 2023-10-30 DIAGNOSIS — J06.9 UPPER RESPIRATORY TRACT INFECTION, UNSPECIFIED TYPE: Primary | ICD-10-CM

## 2023-10-30 PROCEDURE — 99213 OFFICE O/P EST LOW 20 MIN: CPT | Performed by: FAMILY MEDICINE

## 2023-10-30 RX ORDER — ALBUTEROL SULFATE 90 UG/1
2 AEROSOL, METERED RESPIRATORY (INHALATION) EVERY 6 HOURS PRN
Qty: 18 G | Refills: 0 | Status: SHIPPED | OUTPATIENT
Start: 2023-10-30

## 2023-10-30 RX ORDER — AMOXICILLIN 875 MG/1
875 TABLET, COATED ORAL 2 TIMES DAILY
Qty: 14 TABLET | Refills: 0 | Status: SHIPPED | OUTPATIENT
Start: 2023-10-30 | End: 2023-11-06

## 2023-10-30 NOTE — PROGRESS NOTES
Virtual Regular Visit    Verification of patient location:    Patient is located at Home in the following state in which I hold an active license [place]      Assessment/Plan:    Problem List Items Addressed This Visit    None  Visit Diagnoses     Upper respiratory tract infection, unspecified type    -  Primary    Relevant Medications    amoxicillin (AMOXIL) 875 mg tablet    albuterol (Ventolin HFA) 90 mcg/act inhaler               Reason for visit is   Chief Complaint   Patient presents with   • Virtual Regular Visit          Encounter provider Yossi Campbell DO    Provider located at [facility]  [address] [place] [address] [facility]  [phone]      Recent Visits  No visits were found meeting these conditions. Showing recent visits within past 7 days and meeting all other requirements  Today's Visits  Date Type Provider Dept   10/30/23 Telemedicine Yossi Campbell DO [facility]   Showing today's visits and meeting all other requirements  Future Appointments  No visits were found meeting these conditions. Showing future appointments within next 150 days and meeting all other requirements       The patient was identified by name and date of birth. Lopez Parker was informed that this is a telemedicine visit and that the visit is being conducted through the [employer]. She agrees to proceed. .  My office door was closed. No one else was in the room. She acknowledged consent and understanding of privacy and security of the video platform. The patient has agreed to participate and understands they can discontinue the visit at any time. Patient is aware this is a billable service. Subjective  Lopez Parker is a 33 y.o. female with upper respiratory symptoms. 10 to 12-day history of upper respiratory symptoms which started with sore throat but now with sinus pressure thick postnasal drainage and productive cough.   Denies fever chills or shortness of breath. Past Medical History:   Diagnosis Date   • Acne    • ADHD    • Allergic asthma    • Eczema     as a child   • Hypertension    • Subclinical hypothyroidism        Past Surgical History:   Procedure Laterality Date   • FINGER SURGERY Left     pin placed, middle   • NH  DELIVERY ONLY N/A 2023    Procedure:  SECTION (); Surgeon: Kenna Goode MD;  Location: [facility] ;  Service: Obstetrics   • SHOULDER SURGERY Right     2019   • SKIN BIOPSY     • WISDOM TOOTH EXTRACTION         Current Outpatient Medications   Medication Sig Dispense Refill   • albuterol (Ventolin HFA) 90 mcg/act inhaler Inhale 2 puffs every 6 (six) hours as needed for wheezing 18 g 0   • amoxicillin (AMOXIL) 875 mg tablet Take 1 tablet (875 mg total) by mouth 2 (two) times a day for 7 days 14 tablet 0   • desogestrel-ethinyl estradiol (KARIVA) 0.15-0.02/0.01 MG (/) per tablet Take 1 tablet by mouth daily 84 tablet 3   • fluconazole (DIFLUCAN) 100 mg tablet Take one tablet by mouth daily for a 7 days, then once a week for one month, and then monthly for 6 months.  17 tablet 0   • levocetirizine (XYZAL) 5 MG tablet Take 5 mg by mouth every evening     • levothyroxine (Synthroid) 100 mcg tablet Take 1 tablet (100 mcg total) by mouth daily 30 tablet 2   • lisdexamfetamine (Vyvanse) 40 MG capsule Take 1 capsule (40 mg total) by mouth every morning Max Daily Amount: 40 mg (Patient not taking: Reported on 2023) 30 capsule 0   • meloxicam (MOBIC) 15 mg tablet Take 1 tablet (15 mg total) by mouth daily (Patient not taking: Reported on 2023) 30 tablet 0   • methocarbamol (ROBAXIN) 500 mg tablet Take 1 tablet (500 mg total) by mouth 3 (three) times a day as needed for muscle spasms (Patient not taking: Reported on 2023) 30 tablet 0   • phenazopyridine (PYRIDIUM) 100 mg tablet Take 1 tablet (100 mg total) by mouth 3 (three) times a day as needed for bladder spasms 10 tablet 0   • Prenat w/o A-FeCbGl-DSS-FA-DHA (PNV OB+DHA PO) Take by mouth     • SUMAtriptan (IMITREX) 50 mg tablet TAKE 1 TABLET (50 MG TOTAL) BY MOUTH ONCE AS NEEDED FOR MIGRAINE FOR UP TO 9 DOSES 9 tablet 0   • Synthroid 75 MCG tablet Take 1 tablet (75 mcg total) by mouth daily 30 tablet 5     No current facility-administered medications for this visit. Allergies   Allergen Reactions   • Clindamycin Rash     headache  headache     • Tramadol GI Intolerance, Hives, Itching and Rash       Review of Systems   Constitutional: Negative. HENT:  Positive for congestion, sinus pressure and sinus pain. Respiratory:  Positive for cough. Cardiovascular: Negative. Gastrointestinal: Negative. Genitourinary: Negative. Musculoskeletal: Negative. Psychiatric/Behavioral: Negative. Video Exam    There were no vitals filed for this visit. Physical Exam  Constitutional:       Appearance: She is well-developed. HENT:      Head: Normocephalic and atraumatic. Eyes:      Pupils: Pupils are equal, round, and reactive to light. Pulmonary:      Effort: Pulmonary effort is normal. No respiratory distress. Musculoskeletal:      Cervical back: Normal range of motion. Neurological:      Mental Status: She is alert and oriented to person, place, and time. Psychiatric:         Behavior: Behavior normal.         Thought Content:  Thought content normal.         Judgment: Judgment normal.          Visit Time  Total Visit Duration: 15

## 2023-10-31 DIAGNOSIS — E03.8 HYPOTHYROIDISM DUE TO HASHIMOTO'S THYROIDITIS: ICD-10-CM

## 2023-10-31 DIAGNOSIS — E06.3 HYPOTHYROIDISM DUE TO HASHIMOTO'S THYROIDITIS: ICD-10-CM

## 2023-10-31 RX ORDER — LEVOTHYROXINE SODIUM 0.1 MG/1
100 TABLET ORAL DAILY
Qty: 90 TABLET | Refills: 3 | Status: SHIPPED | OUTPATIENT
Start: 2023-10-31

## 2023-11-05 ENCOUNTER — HOSPITAL ENCOUNTER (OUTPATIENT)
Dept: ULTRASOUND IMAGING | Facility: HOSPITAL | Age: 28
Discharge: HOME | End: 2023-11-05
Payer: COMMERCIAL

## 2023-11-05 DIAGNOSIS — R10.2 PELVIC PRESSURE IN FEMALE: ICD-10-CM

## 2023-11-05 PROCEDURE — 76856 US EXAM PELVIC COMPLETE: CPT

## 2023-11-05 PROCEDURE — 76830 TRANSVAGINAL US NON-OB: CPT

## 2023-11-17 ENCOUNTER — HOSPITAL ENCOUNTER (OUTPATIENT)
Dept: ULTRASOUND IMAGING | Facility: CLINIC | Age: 28
Discharge: HOME | End: 2023-11-17
Payer: COMMERCIAL

## 2023-11-17 VITALS — BODY MASS INDEX: 31.34 KG/M2 | HEIGHT: 66 IN | WEIGHT: 195 LBS

## 2023-11-17 DIAGNOSIS — Z80.3 FAMILY HISTORY OF BREAST CANCER: ICD-10-CM

## 2023-11-17 DIAGNOSIS — N64.4 BREAST PAIN, RIGHT: ICD-10-CM

## 2023-11-17 PROCEDURE — 76641 ULTRASOUND BREAST COMPLETE: CPT

## 2023-11-30 ENCOUNTER — APPOINTMENT (OUTPATIENT)
Dept: LAB | Facility: CLINIC | Age: 28
End: 2023-11-30
Payer: COMMERCIAL

## 2023-11-30 DIAGNOSIS — E06.3 HYPOTHYROIDISM DUE TO HASHIMOTO'S THYROIDITIS: ICD-10-CM

## 2023-11-30 DIAGNOSIS — E03.8 HYPOTHYROIDISM DUE TO HASHIMOTO'S THYROIDITIS: ICD-10-CM

## 2023-11-30 LAB
T4 FREE SERPL-MCNC: 1.03 NG/DL (ref 0.61–1.12)
TSH SERPL DL<=0.05 MIU/L-ACNC: 1.42 UIU/ML (ref 0.45–4.5)

## 2023-11-30 PROCEDURE — 36415 COLL VENOUS BLD VENIPUNCTURE: CPT

## 2023-11-30 PROCEDURE — 84443 ASSAY THYROID STIM HORMONE: CPT

## 2023-11-30 PROCEDURE — 84439 ASSAY OF FREE THYROXINE: CPT

## 2023-12-08 ENCOUNTER — TELEPHONE (OUTPATIENT)
Dept: OBGYN CLINIC | Facility: CLINIC | Age: 28
End: 2023-12-08

## 2023-12-08 DIAGNOSIS — R30.0 DYSURIA: ICD-10-CM

## 2023-12-08 DIAGNOSIS — N30.10 INTERSTITIAL CYSTITIS: Primary | ICD-10-CM

## 2023-12-08 NOTE — TELEPHONE ENCOUNTER
Patient left message on machine - would like an appt with provider asap. Having some ongoing issues that Adrian Wei and Dr. Jose Sutton were trying to treat. Got a little better, and now same problems again. Going on for months. Has an autoimmune disease, not sure if that's causing these problems. Sees specialist in one week. Would like a call back to schedule an appt.

## 2023-12-11 NOTE — TELEPHONE ENCOUNTER
Placed call to patient as she should be referred to Urology for possible interstitial cystitis, dysuria. Placed call to patient, per patient her urinary symptoms have resolved. However,  patient is having vaginal symptoms - believes it may be linked to her autoimmune disease. Has an upcoming appointment with endocrinology this week. C/O vaginal dryness, odor, discomfort during intercourse. Appointment kept for 12/12/23.

## 2023-12-12 ENCOUNTER — OFFICE VISIT (OUTPATIENT)
Dept: OBGYN CLINIC | Facility: CLINIC | Age: 28
End: 2023-12-12
Payer: COMMERCIAL

## 2023-12-12 VITALS
SYSTOLIC BLOOD PRESSURE: 120 MMHG | BODY MASS INDEX: 31.92 KG/M2 | HEIGHT: 66 IN | DIASTOLIC BLOOD PRESSURE: 86 MMHG | WEIGHT: 198.6 LBS

## 2023-12-12 DIAGNOSIS — N76.0 BV (BACTERIAL VAGINOSIS): Primary | ICD-10-CM

## 2023-12-12 DIAGNOSIS — B37.31 YEAST VAGINITIS: ICD-10-CM

## 2023-12-12 DIAGNOSIS — B96.89 BV (BACTERIAL VAGINOSIS): Primary | ICD-10-CM

## 2023-12-12 LAB
BV WHIFF TEST VAG QL: NEGATIVE
CLUE CELLS SPEC QL WET PREP: ABNORMAL
PH SMN: 5 [PH]
SL AMB POCT WET MOUNT: ABNORMAL
T VAGINALIS VAG QL WET PREP: ABNORMAL
YEAST VAG QL WET PREP: ABNORMAL

## 2023-12-12 PROCEDURE — 99214 OFFICE O/P EST MOD 30 MIN: CPT | Performed by: PHYSICIAN ASSISTANT

## 2023-12-12 PROCEDURE — 87210 SMEAR WET MOUNT SALINE/INK: CPT | Performed by: PHYSICIAN ASSISTANT

## 2023-12-12 RX ORDER — METRONIDAZOLE 500 MG/1
500 TABLET ORAL EVERY 12 HOURS SCHEDULED
Qty: 14 TABLET | Refills: 0 | Status: SHIPPED | OUTPATIENT
Start: 2023-12-12 | End: 2023-12-19

## 2023-12-12 RX ORDER — FLUCONAZOLE 100 MG/1
TABLET ORAL
Qty: 17 TABLET | Refills: 0 | Status: SHIPPED | OUTPATIENT
Start: 2023-12-12 | End: 2024-08-12

## 2023-12-12 NOTE — PROGRESS NOTES
Assessment/Plan:    No problem-specific Assessment & Plan notes found for this encounter. Problem List Items Addressed This Visit    None  Visit Diagnoses       BV (bacterial vaginosis)    -  Primary    Relevant Medications    metroNIDAZOLE (FLAGYL) 500 mg tablet    Other Relevant Orders    POCT wet mount    Yeast vaginitis        Relevant Medications    fluconazole (DIFLUCAN) 100 mg tablet            + Clue cells seen on wet mount. Will plan tx w Flagyl 500 mg BID x 7 days. Will continue probiotic and coconut oil as well. Will tx otherwise if needed based on cx results. Subjective:      Patient ID: Matthew Travis is a 33 y.o. female. Pt with continued vaginal complaints, new sx over the last 2 weeks. She has been taking the Diflucan daily and sx were better. No itching or typical yeast sx. She then noted with IC that she has been having some odor and discomfort. Pain at this point was more vaginal as opposed to suprapubic. No fevers/chills. No new partners. Pt has been using acidophilus OTC for the last month. She has been using coconut oil as well. No urinary sx. No dysuria. No frequency or urgency. The following portions of the patient's history were reviewed and updated as appropriate: She  has a past medical history of Acne, ADHD, Allergic asthma, Eczema, Hypertension, and Subclinical hypothyroidism.   She   Patient Active Problem List    Diagnosis Date Noted    Arthralgia of multiple sites, bilateral 09/14/2023    Myalgia 09/14/2023    Hypothyroidism due to Hashimoto's thyroiditis 09/07/2023    Thyromegaly 08/16/2023    Migraine without status migrainosus, not intractable 05/03/2023    Abdominal weakness 05/03/2023    Pelvic floor dysfunction in female 05/03/2023    Headache 04/04/2023    Subclinical hypothyroidism     Functional ovarian cysts 02/16/2022    ADHD 01/06/2022    Asthma 01/06/2022     She  has a past surgical history that includes Shoulder surgery (Right); Skin biopsy; [place] tooth extraction; Finger surgery (Left); and pr  delivery only (N/A, 2023). Her family history includes Aortic aneurysm in her father; BRCA1 Negative in her mother; Breast cancer in her maternal grandmother and mother; Heart disease in her paternal grandmother; Lung cancer in her paternal grandfather; No Known Problems in her daughter and sister; Thyroid disease unspecified in her maternal grandmother. She  reports that she has never smoked. She has never used smokeless tobacco. She reports current alcohol use of about 1.0 standard drink of alcohol per week. She reports that she does not use drugs. Current Outpatient Medications   Medication Sig Dispense Refill    desogestrel-ethinyl estradiol (KARIVA) 0.15-0.02/0.01 MG () per tablet Take 1 tablet by mouth daily 84 tablet 3    fluconazole (DIFLUCAN) 100 mg tablet Take one tablet by mouth daily for a 7 days, then once a week for one month, and then monthly for 6 months.  17 tablet 0    levocetirizine (XYZAL) 5 MG tablet Take 5 mg by mouth every evening      levothyroxine (Synthroid) 100 mcg tablet Take 1 tablet (100 mcg total) by mouth daily 90 tablet 3    metroNIDAZOLE (FLAGYL) 500 mg tablet Take 1 tablet (500 mg total) by mouth every 12 (twelve) hours for 7 days 14 tablet 0    albuterol (Ventolin HFA) 90 mcg/act inhaler Inhale 2 puffs every 6 (six) hours as needed for wheezing (Patient not taking: Reported on 2023) 18 g 0    lisdexamfetamine (Vyvanse) 40 MG capsule Take 1 capsule (40 mg total) by mouth every morning Max Daily Amount: 40 mg (Patient not taking: Reported on 2023) 30 capsule 0    meloxicam (MOBIC) 15 mg tablet Take 1 tablet (15 mg total) by mouth daily (Patient not taking: Reported on 2023) 30 tablet 0    methocarbamol (ROBAXIN) 500 mg tablet Take 1 tablet (500 mg total) by mouth 3 (three) times a day as needed for muscle spasms (Patient not taking: Reported on 2023) 30 tablet 0 phenazopyridine (PYRIDIUM) 100 mg tablet Take 1 tablet (100 mg total) by mouth 3 (three) times a day as needed for bladder spasms 10 tablet 0    Prenat w/o A-FeCbGl-DSS-FA-DHA (PNV OB+DHA PO) Take by mouth (Patient not taking: Reported on 12/12/2023)      SUMAtriptan (IMITREX) 50 mg tablet TAKE 1 TABLET (50 MG TOTAL) BY MOUTH ONCE AS NEEDED FOR MIGRAINE FOR UP TO 9 DOSES 9 tablet 0    Synthroid 75 MCG tablet Take 1 tablet (75 mcg total) by mouth daily 30 tablet 5     No current facility-administered medications for this visit. Current Outpatient Medications on File Prior to Visit   Medication Sig    desogestrel-ethinyl estradiol (KARIVA) 0.15-0.02/0.01 MG (21/5) per tablet Take 1 tablet by mouth daily    levocetirizine (XYZAL) 5 MG tablet Take 5 mg by mouth every evening    levothyroxine (Synthroid) 100 mcg tablet Take 1 tablet (100 mcg total) by mouth daily    [DISCONTINUED] fluconazole (DIFLUCAN) 100 mg tablet Take one tablet by mouth daily for a 7 days, then once a week for one month, and then monthly for 6 months.     albuterol (Ventolin HFA) 90 mcg/act inhaler Inhale 2 puffs every 6 (six) hours as needed for wheezing (Patient not taking: Reported on 12/12/2023)    lisdexamfetamine (Vyvanse) 40 MG capsule Take 1 capsule (40 mg total) by mouth every morning Max Daily Amount: 40 mg (Patient not taking: Reported on 7/20/2023)    meloxicam (MOBIC) 15 mg tablet Take 1 tablet (15 mg total) by mouth daily (Patient not taking: Reported on 8/16/2023)    methocarbamol (ROBAXIN) 500 mg tablet Take 1 tablet (500 mg total) by mouth 3 (three) times a day as needed for muscle spasms (Patient not taking: Reported on 9/7/2023)    phenazopyridine (PYRIDIUM) 100 mg tablet Take 1 tablet (100 mg total) by mouth 3 (three) times a day as needed for bladder spasms    Prenat w/o A-FeCbGl-DSS-FA-DHA (PNV OB+DHA PO) Take by mouth (Patient not taking: Reported on 12/12/2023)    SUMAtriptan (IMITREX) 50 mg tablet TAKE 1 TABLET (50 MG TOTAL) BY MOUTH ONCE AS NEEDED FOR MIGRAINE FOR UP TO 9 DOSES    Synthroid 75 MCG tablet Take 1 tablet (75 mcg total) by mouth daily     No current facility-administered medications on file prior to visit. She is allergic to clindamycin and tramadol. .    Review of Systems   Constitutional: Negative. Cardiovascular: Negative. Gastrointestinal: Negative. Genitourinary:  Positive for dysuria and vaginal discharge. + vaginal odor   Psychiatric/Behavioral: Negative. Objective:      /86 (BP Location: Left arm, Patient Position: Sitting, Cuff Size: Standard)   Ht 5' 5.5" (1.664 m)   Wt 90.1 kg (198 lb 9.6 oz)   LMP 11/27/2023 (Approximate)   BMI 32.55 kg/m²          Physical Exam  Vitals reviewed. Exam conducted with a chaperone present (Clair DÍAZ). Constitutional:       Appearance: She is normal weight. HENT:      Head: Normocephalic and atraumatic. Cardiovascular:      Rate and Rhythm: Normal rate and regular rhythm. Pulmonary:      Effort: Pulmonary effort is normal.      Breath sounds: Normal breath sounds. Genitourinary:     General: Normal vulva. Labia:         Right: No rash, tenderness, lesion or injury. Left: No rash, tenderness, lesion or injury. Urethra: No prolapse, urethral pain, urethral swelling or urethral lesion. Vagina: Vaginal discharge present. Cervix: Normal.      Uterus: Normal.       Adnexa: Right adnexa normal and left adnexa normal.      Comments: + small amount of bubbly whitish discharge present on vaginal exam. No CMT. No erythema noted. Skin:     General: Skin is warm and dry. Neurological:      Mental Status: She is alert. Psychiatric:         Mood and Affect: Mood normal.         Behavior: Behavior normal.         Thought Content:  Thought content normal.         Judgment: Judgment normal.

## 2023-12-15 ENCOUNTER — HOSPITAL ENCOUNTER (OUTPATIENT)
Dept: NON INVASIVE DIAGNOSTICS | Facility: HOSPITAL | Age: 28
Discharge: HOME | End: 2023-12-15
Payer: COMMERCIAL

## 2023-12-15 ENCOUNTER — OFFICE VISIT (OUTPATIENT)
Dept: ENDOCRINOLOGY | Facility: HOSPITAL | Age: 28
End: 2023-12-15
Payer: COMMERCIAL

## 2023-12-15 ENCOUNTER — LAB (OUTPATIENT)
Dept: LAB | Facility: MEDICAL CENTER | Age: 28
End: 2023-12-15
Payer: COMMERCIAL

## 2023-12-15 ENCOUNTER — OFFICE VISIT (OUTPATIENT)
Dept: FAMILY MEDICINE CLINIC | Facility: CLINIC | Age: 28
End: 2023-12-15
Payer: COMMERCIAL

## 2023-12-15 VITALS
HEIGHT: 66 IN | BODY MASS INDEX: 31.72 KG/M2 | WEIGHT: 197.4 LBS | SYSTOLIC BLOOD PRESSURE: 122 MMHG | DIASTOLIC BLOOD PRESSURE: 84 MMHG | HEART RATE: 89 BPM

## 2023-12-15 VITALS
TEMPERATURE: 97.8 F | BODY MASS INDEX: 32.28 KG/M2 | HEART RATE: 78 BPM | WEIGHT: 197 LBS | DIASTOLIC BLOOD PRESSURE: 78 MMHG | SYSTOLIC BLOOD PRESSURE: 118 MMHG | OXYGEN SATURATION: 99 %

## 2023-12-15 DIAGNOSIS — M25.50 ARTHRALGIA, UNSPECIFIED JOINT: ICD-10-CM

## 2023-12-15 DIAGNOSIS — E01.0 THYROMEGALY: ICD-10-CM

## 2023-12-15 DIAGNOSIS — R79.82 ELEVATED C-REACTIVE PROTEIN (CRP): ICD-10-CM

## 2023-12-15 DIAGNOSIS — R53.83 OTHER FATIGUE: ICD-10-CM

## 2023-12-15 DIAGNOSIS — R20.2 TINGLING IN EXTREMITIES: ICD-10-CM

## 2023-12-15 DIAGNOSIS — M25.50 ARTHRALGIA OF MULTIPLE SITES, BILATERAL: ICD-10-CM

## 2023-12-15 DIAGNOSIS — E06.3 HYPOTHYROIDISM DUE TO HASHIMOTO'S THYROIDITIS: Primary | ICD-10-CM

## 2023-12-15 DIAGNOSIS — M79.661 PAIN AND SWELLING OF RIGHT LOWER LEG: ICD-10-CM

## 2023-12-15 DIAGNOSIS — M79.89 PAIN AND SWELLING OF RIGHT LOWER LEG: ICD-10-CM

## 2023-12-15 DIAGNOSIS — E03.8 HYPOTHYROIDISM DUE TO HASHIMOTO'S THYROIDITIS: Primary | ICD-10-CM

## 2023-12-15 DIAGNOSIS — M25.50 ARTHRALGIA, UNSPECIFIED JOINT: Primary | ICD-10-CM

## 2023-12-15 LAB
B BURGDOR IGG+IGM SER QL IA: NEGATIVE
BASOPHILS # BLD AUTO: 0.05 THOUSANDS/ÂΜL (ref 0–0.1)
BASOPHILS NFR BLD AUTO: 1 % (ref 0–1)
EOSINOPHIL # BLD AUTO: 0.06 THOUSAND/ÂΜL (ref 0–0.61)
EOSINOPHIL NFR BLD AUTO: 1 % (ref 0–6)
ERYTHROCYTE [DISTWIDTH] IN BLOOD BY AUTOMATED COUNT: 13.2 % (ref 11.6–15.1)
ERYTHROCYTE [SEDIMENTATION RATE] IN BLOOD: 25 MM/HOUR (ref 0–19)
HCT VFR BLD AUTO: 42.5 % (ref 34.8–46.1)
HGB BLD-MCNC: 14.5 G/DL (ref 11.5–15.4)
IMM GRANULOCYTES # BLD AUTO: 0.01 THOUSAND/UL (ref 0–0.2)
IMM GRANULOCYTES NFR BLD AUTO: 0 % (ref 0–2)
LYMPHOCYTES # BLD AUTO: 2.26 THOUSANDS/ÂΜL (ref 0.6–4.47)
LYMPHOCYTES NFR BLD AUTO: 34 % (ref 14–44)
MCH RBC QN AUTO: 29.4 PG (ref 26.8–34.3)
MCHC RBC AUTO-ENTMCNC: 34.1 G/DL (ref 31.4–37.4)
MCV RBC AUTO: 86 FL (ref 82–98)
MONOCYTES # BLD AUTO: 0.53 THOUSAND/ÂΜL (ref 0.17–1.22)
MONOCYTES NFR BLD AUTO: 8 % (ref 4–12)
NEUTROPHILS # BLD AUTO: 3.82 THOUSANDS/ÂΜL (ref 1.85–7.62)
NEUTS SEG NFR BLD AUTO: 56 % (ref 43–75)
NRBC BLD AUTO-RTO: 0 /100 WBCS
PLATELET # BLD AUTO: 250 THOUSANDS/UL (ref 149–390)
PMV BLD AUTO: 11.1 FL (ref 8.9–12.7)
RBC # BLD AUTO: 4.93 MILLION/UL (ref 3.81–5.12)
WBC # BLD AUTO: 6.73 THOUSAND/UL (ref 4.31–10.16)

## 2023-12-15 PROCEDURE — 36415 COLL VENOUS BLD VENIPUNCTURE: CPT

## 2023-12-15 PROCEDURE — 80053 COMPREHEN METABOLIC PANEL: CPT

## 2023-12-15 PROCEDURE — 82728 ASSAY OF FERRITIN: CPT

## 2023-12-15 PROCEDURE — 86618 LYME DISEASE ANTIBODY: CPT

## 2023-12-15 PROCEDURE — 82306 VITAMIN D 25 HYDROXY: CPT

## 2023-12-15 PROCEDURE — 82607 VITAMIN B-12: CPT

## 2023-12-15 PROCEDURE — 83550 IRON BINDING TEST: CPT

## 2023-12-15 PROCEDURE — 85025 COMPLETE CBC W/AUTO DIFF WBC: CPT

## 2023-12-15 PROCEDURE — 99214 OFFICE O/P EST MOD 30 MIN: CPT | Performed by: INTERNAL MEDICINE

## 2023-12-15 PROCEDURE — 93971 EXTREMITY STUDY: CPT

## 2023-12-15 PROCEDURE — 86140 C-REACTIVE PROTEIN: CPT

## 2023-12-15 PROCEDURE — 99214 OFFICE O/P EST MOD 30 MIN: CPT | Performed by: NURSE PRACTITIONER

## 2023-12-15 PROCEDURE — 83540 ASSAY OF IRON: CPT

## 2023-12-15 PROCEDURE — 85652 RBC SED RATE AUTOMATED: CPT

## 2023-12-15 RX ORDER — LEVOTHYROXINE SODIUM 112 UG/1
112 TABLET ORAL DAILY
Qty: 90 TABLET | Refills: 1 | Status: SHIPPED | OUTPATIENT
Start: 2023-12-15

## 2023-12-15 NOTE — PROGRESS NOTES
[facility]    ASSESSMENT AND PLAN     1. Arthralgia, unspecified joint  Assessment & Plan:  Symptoms reviewed at length today  Physical exam unremarkable  Will recheck labs as below  Based on conversation - do suspect to find anemia  Based on elevated inflammatory markers without identified cause, will also refer to rheumatology  Further plan and/or referral will be dependant on testing/imaging results  Pt is agreeable with plan    Orders:  -     C-reactive protein; Future  -     Sedimentation rate, automated; Future  -     Lyme Total AB W Reflex to IGM/IGG; Future  -     CBC and differential; Future  -     Comprehensive metabolic panel; Future  -     Iron Panel (Includes Ferritin, Iron Sat%, Iron, and TIBC); Future  -     Ambulatory Referral to Rheumatology; Future  -     POCT ECG    2. Elevated C-reactive protein (CRP)  -     C-reactive protein; Future  -     Lyme Total AB W Reflex to IGM/IGG; Future  -     Ambulatory Referral to Rheumatology; Future  -     POCT ECG    3. Other fatigue  -     Iron Panel (Includes Ferritin, Iron Sat%, Iron, and TIBC); Future  -     Ambulatory Referral to Rheumatology; Future  -     POCT ECG    4. Pain and swelling of right lower leg  Assessment & Plan:  Suspicion low - but will check Doppler for completion.    Orders:  -     VAS lower limb venous duplex study, unilateral/limited; Future; Expected date: 12/15/2023           SUBJECTIVE       Patient ID: Mara Duarte is a 28 y.o. female.    Chief Complaint   Patient presents with    Numbness     In right leg    Tingling     In right leg    Follow-up     Review C-reactive protein lab    Fatigue       HISTORY OF PRESENT ILLNESS    Acute visit  Present today for follow up to abnormal lab work a few months ago. Reports elevated informatory markers. Reports currently symptomatic - notes extreme fatigue, joint pain (primary lower extremities - ankles and knees, lower back and cervical), headaches, intermittent dizzy spells  "(faint feeling) and constipation.   Reports Lyme Disease as a child - age 2. History pre eclampsia and post delivery excellerated  HTN and was on Procardia for a few months after delivery.  Has not been on any further HTN medications since.     Reports front half of foot and toes were tingling last night. Transitioned to throbbing during the night - causing her to not sleep well. Reports waking this am her posterior calf is \"tight\" and her leg feels heavy    Gyn history  Recently treated for BV this month.  Strep and yeast in August and again in October.    Endo  New diagnosis for Hashimoto in August  TSH 28  Currently Levothyroxine dose 112 - increased at today's visit    Reports history Raynaud's 2019  Reports fingers would go completely white  Reports getting less frequently since percutaneous pinning left middle finger (severed tendon)          The following portions of the patient's history were reviewed and updated as appropriate: allergies, current medications, past family history, past medical history, past social history, past surgical history, and problem list.    REVIEW OF SYSTEMS  Review of Systems   Constitutional:  Positive for activity change, appetite change and fatigue.   HENT: Negative.     Respiratory: Negative.     Cardiovascular: Negative.    Gastrointestinal:  Positive for constipation. Negative for abdominal pain.   Genitourinary: Negative.    Musculoskeletal:  Positive for arthralgias and myalgias.   Neurological:  Positive for numbness.   Psychiatric/Behavioral: Negative.         OBJECTIVE      VITAL SIGNS  /78 (BP Location: Left arm, Patient Position: Sitting, Cuff Size: Standard)   Pulse 78   Temp 97.8 °F (36.6 °C) (Temporal)   Wt 89.4 kg (197 lb)   LMP 11/27/2023 (Approximate)   SpO2 99%   BMI 32.28 kg/m²     CURRENT MEDICATIONS    Current Outpatient Medications:     desogestrel-ethinyl estradiol (KARIVA) 0.15-0.02/0.01 MG (21/5) per tablet, Take 1 tablet by mouth daily, Disp: " 84 tablet, Rfl: 3    fluconazole (DIFLUCAN) 100 mg tablet, Take one tablet by mouth daily for a 7 days, then once a week for one month, and then monthly for 6 months., Disp: 17 tablet, Rfl: 0    levocetirizine (XYZAL) 5 MG tablet, Take 5 mg by mouth every evening, Disp: , Rfl:     levothyroxine 112 mcg tablet, Take 1 tablet (112 mcg total) by mouth daily, Disp: 90 tablet, Rfl: 1    albuterol (Ventolin HFA) 90 mcg/act inhaler, Inhale 2 puffs every 6 (six) hours as needed for wheezing (Patient not taking: Reported on 12/12/2023), Disp: 18 g, Rfl: 0    lisdexamfetamine (Vyvanse) 40 MG capsule, Take 1 capsule (40 mg total) by mouth every morning Max Daily Amount: 40 mg (Patient not taking: Reported on 7/20/2023), Disp: 30 capsule, Rfl: 0    Prenat w/o A-FeCbGl-DSS-FA-DHA (PNV OB+DHA PO), Take by mouth (Patient not taking: Reported on 12/12/2023), Disp: , Rfl:       PHYSICAL EXAMINATION   Physical Exam  Vitals and nursing note reviewed.   Constitutional:       General: She is not in acute distress.     Appearance: Normal appearance. She is well-developed and well-groomed. She is not ill-appearing.   HENT:      Head: Normocephalic.   Neck:      Thyroid: No thyromegaly.   Cardiovascular:      Rate and Rhythm: Normal rate and regular rhythm.      Heart sounds: Normal heart sounds.   Pulmonary:      Effort: Pulmonary effort is normal. No respiratory distress.      Breath sounds: Normal breath sounds and air entry.   Musculoskeletal:      Right lower leg: No edema.      Left lower leg: No edema.   Lymphadenopathy:      Cervical: No cervical adenopathy.   Skin:     General: Skin is warm and dry.      Findings: No bruising or ecchymosis.   Neurological:      General: No focal deficit present.      Mental Status: She is alert and oriented to person, place, and time.   Psychiatric:         Attention and Perception: Attention normal.         Mood and Affect: Mood normal.         Behavior: Behavior normal.

## 2023-12-15 NOTE — ASSESSMENT & PLAN NOTE
Most recent thyroid function studies are normal. She is biochemically euthyroid, but she does continue to have a thyroid goiter and enlargement of her thyroid and has some compressive symptoms with the sensation of feeling like her throat closes at times when she lays down on her back. I will increase her levothyroxine to 112 mcg daily to try to get her TSH in the low normal range to see if we can suppress the size of the thyroid. I did reassure her that at this point based on her thyroid levels, her fatigue, her weight struggles, her neuropathy symptoms, her arthralgias, are not related to the thyroid. I also imparted to her that her frequent migraines occurring on a daily basis are not related to the thyroid.

## 2023-12-15 NOTE — PATIENT INSTRUCTIONS
The thyroid blood work is improved.     Given the large thyroid, I want the thyroid on the overactive side of normal.     We'll increase the levothyroxine to 112 mcg daily.     Please see the PCP about the inflammation markers that are very high, theses may be causing the joint issues.     Follow up in 3 months with blood work.     We'll check vitamin D and B12 levels.     The symptoms can be neuropathy. Work with PCP about further evaluation of this.

## 2023-12-15 NOTE — PROGRESS NOTES
12/18/2023    Assessment/Plan     1. Hypothyroidism due to Hashimoto's thyroiditis  -     T4, free Lab Collect; Future; Expected date: 03/04/2024  -     TSH, 3rd generation Lab Collect; Future; Expected date: 03/04/2024  -     T3, free; Future; Expected date: 03/04/2024  -     levothyroxine 112 mcg tablet; Take 1 tablet (112 mcg total) by mouth daily    2. Tingling in extremities  -     Vitamin B12; Future  -     Vitamin D 25 hydroxy Lab Collect; Future    3. Arthralgia of multiple sites, bilateral  -     Vitamin B12; Future  -     Vitamin D 25 hydroxy Lab Collect; Future    4. Thyromegaly         1. Hypothyroidism due to Hashimoto's thyroiditis.  Most recent thyroid function studies are normal. She is biochemically euthyroid, but she does continue to have a thyroid goiter and enlargement of her thyroid and has some compressive symptoms with the sensation of feeling like her throat closes at times when she lays down on her back. I will increase her levothyroxine to 112 mcg daily to try to get her TSH in the low normal range to see if we can suppress the size of the thyroid. I did reassure her that at this point based on her thyroid levels, her fatigue, her weight struggles, her neuropathy symptoms, her arthralgias, are not related to the thyroid. I also imparted to her that her frequent migraines occurring on a daily basis are not related to the thyroid.    2. Arthralgias and tingling of the extremities.  For completeness, I will check vitamin B12 and vitamin D level now. If these are normal, at least in the mid normal range, then there is some other cause of these symptoms. I have asked her to please follow up with her primary care physician as she does have a markedly elevated C-reactive protein and an elevated sedimentation rate in the setting of a negative DIMITRY and rheumatoid factor. I wonder if there is some autoimmune disease or rheumatologic disease that is undiagnosed at this time. I have also asked her to  follow up with her primary care physician regarding her frequent migraines and potential neuropathic symptoms as perhaps she needs evaluation by neurology.     She will get vitamin B12, and a 25-hydroxy vitamin D level performed now.    I have asked her to follow up in 3 months with preceding TSH, free T4, and free T3.    CC: Hypothyroidism.    HPI: Mara Duarte is a 28-year-old female with history of hypothyroidism due to Hashimoto's thyroiditis for follow-up visit.     She had been trying to get pregnant after having had a miscarriage and was started on low-dose levothyroxine to help her get pregnant around 04/2022 or 05/2022. She was then able to get pregnant and delivered a baby in 03/2023. She noticed swelling in her neck after delivery and was found to have hypothyroidism with positive thyroid antibodies. She originally started on levothyroxine 50 mcg daily, but given her size, she was increased to levothyroxine 100 mcg daily. She is here for a follow-up visit.    She is taking levothyroxine 100 mcg on an empty stomach, 30 minutes before breakfast. She follows a routine of keeping a 3 to 4 hours gap between her mediciations and vitamins, although she is not currently taking any vitamins.    She notes a transient improvement in her condition to the higher dose of levothyroxine, experiencing a temporary alleviation of symptoms. However, she now describes a recurrence of fatigue, constipation, persistent cold intolerance, and an overall feeling of being unwell. She denies symptoms of heat intolerance, diaphoresis, diarrhea, abdominal pain, or nausea.    She expresses emotional distress due to her constant fatigue, which significantly impacts her ability to engage actively in daily activities, particularly in caring for her 9-month-old daughter. She has persistent joint pain, notably in her knees, and recently experienced tingling and throbbing sensations from the ball of her foot to her toes, followed by  generalized leg pain and tightness. She is concerned about potential risks, including blood clots from her birth control usage or neuropathy, based on online research.    She has 3-pound weight loss since 09/2023, partially attributed to a decreased appetite and difficulty in preparing healthy meals due to extreme exhaustion. She mentions consuming salty foods last night, which she suspects may have led to mild foot swelling today. Despite a good night's sleep totaling 8 hours or more, she feels persistently fatigued and finds herself needing to take daytime naps.    She denies hand tremors, palpitations, chest pain, or dyspnea. She has dry hands and persistent itching on her arms and legs, weakened nails that necessitate frequent trimming, and observed hair thinning. She denies anxiety or depression. She denies dysphagia but mentions discomfort or breathing difficulties when lying flat. She reports a menstrual cycle occurring every 3 months while on birth control pills, confirming that she is not currently breastfeeding.    She reports daily occurrences of migraines. She is taking Excedrin, but she does not want to keep taking it every day.     Additionally, she mentions that she has symptoms of pain, burning sensation, and increased frequency, which she initially led to suspicion of a urinary tract problem, but subsequent evaluation revealed to be related to vaginal issues. She is currently under treatment with fluconazole and Flagyl. She expresses uncertainty about potential relationship between her recent thyroid concerns and the development of these issues. She has never had a yeast infection in the past.      Review of Systems  The pertinent positive and negative findings are as noted in the HPI.    Historical Information   Past Medical History:   Diagnosis Date    Acne     ADHD     Allergic asthma     Eczema     as a child    Hypertension     Subclinical hypothyroidism      Past Surgical History:   Procedure  Laterality Date    FINGER SURGERY Left     pin placed, middle    [place]  DELIVERY ONLY N/A 2023    Procedure:  SECTION ();  Surgeon: Jane Awad MD;  Location: [facility] ;  Service: Obstetrics    SHOULDER SURGERY Right     2019    SKIN BIOPSY      WISDOM TOOTH EXTRACTION       Social History   Social History     Substance and Sexual Activity   Alcohol Use Yes    Alcohol/week: 1.0 standard drink of alcohol    Types: 1 Glasses of wine per week    Comment: Socially     Social History     Substance and Sexual Activity   Drug Use Never     Social History     Tobacco Use   Smoking Status Never   Smokeless Tobacco Never     Family History:   Family History   Problem Relation Age of Onset    BRCA1 Negative Mother     Breast cancer Mother 58        negative genetic testing    Aortic aneurysm Father     No Known Problems Sister     No Known Problems Daughter     Thyroid disease unspecified Maternal Grandmother         thyroid goiter/nodules    Breast cancer Maternal Grandmother 75    Heart disease Paternal Grandmother     Lung cancer Paternal Grandfather        Meds/Allergies   Current Outpatient Medications   Medication Sig Dispense Refill    desogestrel-ethinyl estradiol (KARIVA) 0.15-0.02/0.01 MG () per tablet Take 1 tablet by mouth daily 84 tablet 3    fluconazole (DIFLUCAN) 100 mg tablet Take one tablet by mouth daily for a 7 days, then once a week for one month, and then monthly for 6 months. 17 tablet 0    levocetirizine (XYZAL) 5 MG tablet Take 5 mg by mouth every evening      levothyroxine 112 mcg tablet Take 1 tablet (112 mcg total) by mouth daily 90 tablet 1    metroNIDAZOLE (FLAGYL) 500 mg tablet Take 1 tablet (500 mg total) by mouth every 12 (twelve) hours for 7 days (Patient not taking: Reported on 12/15/2023) 14 tablet 0    albuterol (Ventolin HFA) 90 mcg/act inhaler Inhale 2 puffs every 6 (six) hours as needed for wheezing (Patient not taking: Reported on 2023) 18 g 0     "lisdexamfetamine (Vyvanse) 40 MG capsule Take 1 capsule (40 mg total) by mouth every morning Max Daily Amount: 40 mg (Patient not taking: Reported on 7/20/2023) 30 capsule 0    Prenat w/o A-FeCbGl-DSS-FA-DHA (PNV OB+DHA PO) Take by mouth (Patient not taking: Reported on 12/12/2023)       No current facility-administered medications for this visit.     Allergies   Allergen Reactions    Clindamycin Rash     headache  headache      Tramadol GI Intolerance, Hives, Itching and Rash       Objective   Vitals: Blood pressure 122/84, pulse 89, height 5' 5.5\" (1.664 m), weight 89.5 kg (197 lb 6.4 oz), last menstrual period 11/27/2023, not currently breastfeeding.  Invasive Devices       None                   Physical Exam  Physical exam normal with pertinent positives and negatives.  HEENT: No lid lags, stare, proptosis, or periorbital edema.  Neck: Thyroid exam demonstrates an enlarged thyroid about 1.5 times normal size with no nodules palpable.  Respiratory: Lungs clear.  Cardiovascular: Heart regular. No murmur.  Neurological: No tremor of the outstretched hands. Patellar deep tendon reflexes normal.   Musculoskeletal: No lower extremity edema evident. Mild warmth in the posterior calf on both legs unchanged and negative Fabrizio sign on the right.    The history was obtained from the review of the chart and from the patient.      Lab Results:          Lab Results   Component Value Date    CREATININE 0.90 12/15/2023    CREATININE 0.94 08/25/2023    CREATININE 0.75 04/04/2023    BUN 12 12/15/2023    K 4.2 12/15/2023     12/15/2023    CO2 26 12/15/2023     eGFR   Date Value Ref Range Status   12/15/2023 86 ml/min/1.73sq m Final       Lab Results   Component Value Date    ALT 19 12/15/2023    AST 18 12/15/2023    ALKPHOS 64 12/15/2023       Lab Results   Component Value Date    FREET4 1.03 11/30/2023       Blood work performed on 11/30/2023 showed a TSH of 1.416 with a free T4 of 1.03.     Of note, blood work performed " on 09/15/2023 showed an DIMITRY that was negative, a rheumatoid factor that was negative, a C-reactive protein of 10.9, and a sedimentation rate of 23.    Future Appointments   Date Time Provider Department Center   4/4/2024  1:00 PM Sandy Sanchez MD ENDO [facility]   4/22/2024  1:00 PM Pieter Mejia MD R[facility]       Transcribed for Sandy Sanchez MD, by Nile  on 12/18/23 at 12:27 PM. Powered by Dragon [employer].

## 2023-12-16 LAB
25(OH)D3 SERPL-MCNC: 37 NG/ML (ref 30–100)
ALBUMIN SERPL BCP-MCNC: 4.4 G/DL (ref 3.5–5)
ALP SERPL-CCNC: 64 U/L (ref 34–104)
ALT SERPL W P-5'-P-CCNC: 19 U/L (ref 7–52)
ANION GAP SERPL CALCULATED.3IONS-SCNC: 7 MMOL/L
AST SERPL W P-5'-P-CCNC: 18 U/L (ref 13–39)
BILIRUB SERPL-MCNC: 0.3 MG/DL (ref 0.2–1)
BUN SERPL-MCNC: 12 MG/DL (ref 5–25)
CALCIUM SERPL-MCNC: 9.4 MG/DL (ref 8.4–10.2)
CHLORIDE SERPL-SCNC: 104 MMOL/L (ref 96–108)
CO2 SERPL-SCNC: 26 MMOL/L (ref 21–32)
CREAT SERPL-MCNC: 0.9 MG/DL (ref 0.6–1.3)
CRP SERPL QL: 8.6 MG/L
FERRITIN SERPL-MCNC: 7 NG/ML (ref 11–307)
GFR SERPL CREATININE-BSD FRML MDRD: 86 ML/MIN/1.73SQ M
GLUCOSE SERPL-MCNC: 84 MG/DL (ref 65–140)
IRON SATN MFR SERPL: 8 % (ref 15–50)
IRON SERPL-MCNC: 40 UG/DL (ref 50–212)
POTASSIUM SERPL-SCNC: 4.2 MMOL/L (ref 3.5–5.3)
PROT SERPL-MCNC: 7.4 G/DL (ref 6.4–8.4)
SODIUM SERPL-SCNC: 137 MMOL/L (ref 135–147)
TIBC SERPL-MCNC: 473 UG/DL (ref 250–450)
UIBC SERPL-MCNC: 433 UG/DL (ref 155–355)
VIT B12 SERPL-MCNC: 269 PG/ML (ref 180–914)

## 2023-12-16 PROCEDURE — 93971 EXTREMITY STUDY: CPT | Performed by: SURGERY

## 2023-12-18 ENCOUNTER — TELEPHONE (OUTPATIENT)
Dept: FAMILY MEDICINE CLINIC | Facility: CLINIC | Age: 28
End: 2023-12-18

## 2023-12-18 DIAGNOSIS — D50.9 IRON DEFICIENCY ANEMIA, UNSPECIFIED IRON DEFICIENCY ANEMIA TYPE: Primary | ICD-10-CM

## 2023-12-21 ENCOUNTER — TELEPHONE (OUTPATIENT)
Dept: HEMATOLOGY ONCOLOGY | Facility: CLINIC | Age: 28
End: 2023-12-21

## 2023-12-21 PROBLEM — M79.89 PAIN AND SWELLING OF RIGHT LOWER LEG: Status: ACTIVE | Noted: 2023-12-21

## 2023-12-21 PROBLEM — M79.661 PAIN AND SWELLING OF RIGHT LOWER LEG: Status: ACTIVE | Noted: 2023-12-21

## 2023-12-21 PROCEDURE — 93000 ELECTROCARDIOGRAM COMPLETE: CPT | Performed by: NURSE PRACTITIONER

## 2023-12-21 NOTE — TELEPHONE ENCOUNTER
Patient Call    Who are you speaking with? Patient    If it is not the patient, are they listed on an active communication consent form? Yes   What is the reason for this call? Feel sick, fatigue, iron supplements make worse, cramps   Does this require a call back? Yes   If a call back is required, please list best call back number [phone]    If a call back is required, advise that a message will be forwarded to their care team and someone will return their call as soon as possible.   Did you relay this information to the patient? Yes

## 2023-12-21 NOTE — ASSESSMENT & PLAN NOTE
Symptoms reviewed at length today  Physical exam unremarkable  Will recheck labs as below  Based on conversation - do suspect to find anemia  Based on elevated inflammatory markers without identified cause, will also refer to rheumatology  Further plan and/or referral will be dependant on testing/imaging results  Pt is agreeable with plan

## 2023-12-21 NOTE — TELEPHONE ENCOUNTER
Patient has consult next week. Advise to d/c oral iron at this time. We can discuss IV iron at the visit. Hgb is 14.5 so this can wait until consult.

## 2023-12-26 ENCOUNTER — OFFICE VISIT (OUTPATIENT)
Dept: HEMATOLOGY ONCOLOGY | Facility: CLINIC | Age: 28
End: 2023-12-26
Payer: COMMERCIAL

## 2023-12-26 ENCOUNTER — TELEPHONE (OUTPATIENT)
Dept: HEMATOLOGY ONCOLOGY | Facility: CLINIC | Age: 28
End: 2023-12-26

## 2023-12-26 VITALS
BODY MASS INDEX: 31.98 KG/M2 | OXYGEN SATURATION: 100 % | HEART RATE: 89 BPM | WEIGHT: 199 LBS | SYSTOLIC BLOOD PRESSURE: 126 MMHG | HEIGHT: 66 IN | RESPIRATION RATE: 18 BRPM | DIASTOLIC BLOOD PRESSURE: 64 MMHG | TEMPERATURE: 97.4 F

## 2023-12-26 DIAGNOSIS — E61.1 IRON DEFICIENCY: Primary | ICD-10-CM

## 2023-12-26 PROCEDURE — 99204 OFFICE O/P NEW MOD 45 MIN: CPT | Performed by: PHYSICIAN ASSISTANT

## 2023-12-26 RX ORDER — SODIUM CHLORIDE 9 MG/ML
20 INJECTION, SOLUTION INTRAVENOUS ONCE
OUTPATIENT
Start: 2024-01-10

## 2023-12-26 NOTE — TELEPHONE ENCOUNTER
While we try to accommodate patient requests, our priority is to schedule treatment according to Doctor's orders and site availability.    Does the Provider use the intake sheet or checkout note?  What would be a preferred day of the week that would work best for your infusion appointment? Any day   Do you prefer mornings or afternoons for your appointments? Afternoons   Are there any days or dates that do not work for your schedule, including any upcoming vacations?   We are going to try our best to schedule you at the infusion center closest to your home.  In the event that we are unable to what would be your next preferred infusion site or sites?     1. [facility] infusion   2. [facility] infusion   3.   [facility] infusion       Do you have transportation to take you to all of your appointments? Yes

## 2023-12-26 NOTE — PROGRESS NOTES
Hematology/Oncology Outpatient Consult  Mara Duarte 28 y.o. female 1995 [id number]    Date:  2023      Assessment and Plan:  1. Iron deficiency  28-year-old female presents for consultation regarding iron deficiency with a ferritin of 7 in the setting of recent pregnancy and possibly relating to bleeding immediate postpartum after .  She did require 1 dose of Venofer in the hospital.  Her hemoglobin has improved but ferritin is still very low.  She has tried oral iron but has failed this.  She states that her diet also has not been well throughout pregnancy secondary to nausea.  She has been so fatigued that it has been difficult to make healthy foods.  It sounds like she may not be getting enough iron in diet.  At this time I would recommend iron infusions.  She is agreeable.  She will receive Venofer 200 mg IV weekly x 6.  Repeat labs in approximately 3 months.  I reviewed that she likely will not have improvement in her symptoms until at least receiving 3 to 4 infusions.  Follow-up with after labs are completed.    - CBC and differential; Future  - Ferritin; Future     HPI:  28-year-old female presents for consultation visit regarding low ferritin.    Patient had a miscarriage in 2022.  She states that she does not have significant bleeding with this.    She then was pregnant and delivered baby in 2023.    She had emergent  secondary to gestational hypertension/preeclampsia.    She did have blood loss with this requiring Venofer x 1 dose inpatient.    She did not have prolonged postpartum bleeding.    She had been off her birth control approximately 1 year prior to 2022.  She did not have menorrhagia.    She took prenatal with iron.  Additionally she states during pregnancy she did have a lot of energy.  She did have diagnosis also during pregnancy and postpartum of Hashimoto's thyroiditis with hypothyroidism.  She developed a goiter.  She is following with  endocrinology at this time.    She went to her PCP secondary to having arthralgias, myalgias, significant fatigue.  Her baby is sleeping 12 hours and patient is also sleeping well.  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    ROS: Review of Systems   Constitutional:  Positive for fatigue. Negative for activity change and appetite change.   Respiratory:  Negative for cough and shortness of breath.    Cardiovascular:  Negative for chest pain, palpitations and leg swelling.   Gastrointestinal:  Negative for abdominal pain, constipation, diarrhea, nausea and vomiting.   Genitourinary:  Negative for difficulty urinating, dysuria and hematuria.   Musculoskeletal:  Positive for arthralgias and myalgias.   Skin: Negative.    Neurological:  Positive for light-headedness, numbness and headaches.        RLS   Hematological:  Does not bruise/bleed easily.   Psychiatric/Behavioral: Negative.       Past Medical History:   Diagnosis Date    Acne     ADHD     Allergic asthma     Disease of thyroid gland     Eczema     as a child    Hypertension     Subclinical hypothyroidism        Past Surgical History:   Procedure Laterality Date    FINGER SURGERY Left     pin placed, middle    [place]  DELIVERY ONLY N/A 2023    Procedure:  SECTION ();  Surgeon: Jane Awad MD;  Location: [facility] ;  Service: Obstetrics    SHOULDER SURGERY Right     2019    SKIN BIOPSY      WISDOM TOOTH EXTRACTION         Social History     Socioeconomic History    Marital status: /Civil Union     Spouse name: None    Number of children: None    Years of education: None    Highest education level: None   Occupational History    None   Tobacco Use    Smoking status: Never     Passive exposure: Never    Smokeless tobacco: Never   Vaping Use    Vaping status: Never Used   Substance and Sexual Activity    Alcohol use: Yes      Alcohol/week: 1.0 standard drink of alcohol     Types: 1 Glasses of wine per week     Comment: Socially    Drug use: Never    Sexual activity: Yes     Partners: Male     Birth control/protection: OCP   Other Topics Concern    None   Social History Narrative    None     Social Determinants of Health     Financial Resource Strain: Not on file   Food Insecurity: Not on file   Transportation Needs: Not on file   Physical Activity: Not on file   Stress: Not on file   Social Connections: Not on file   Intimate Partner Violence: Not on file   Housing Stability: Not on file       Family History   Problem Relation Age of Onset    BRCA1 Negative Mother     Breast cancer Mother 58        negative genetic testing    Aortic aneurysm Father     No Known Problems Sister     No Known Problems Daughter     Thyroid disease unspecified Maternal Grandmother         thyroid goiter/nodules    Breast cancer Maternal Grandmother 75    Heart disease Paternal Grandmother     Lung cancer Paternal Grandfather        Allergies   Allergen Reactions    Clindamycin Rash     headache  headache      Tramadol GI Intolerance, Hives, Itching and Rash         Current Outpatient Medications:     desogestrel-ethinyl estradiol (KARIVA) 0.15-0.02/0.01 MG (21/5) per tablet, Take 1 tablet by mouth daily, Disp: 84 tablet, Rfl: 3    fluconazole (DIFLUCAN) 100 mg tablet, Take one tablet by mouth daily for a 7 days, then once a week for one month, and then monthly for 6 months., Disp: 17 tablet, Rfl: 0    levocetirizine (XYZAL) 5 MG tablet, Take 5 mg by mouth every evening, Disp: , Rfl:     levothyroxine 112 mcg tablet, Take 1 tablet (112 mcg total) by mouth daily, Disp: 90 tablet, Rfl: 1    albuterol (Ventolin HFA) 90 mcg/act inhaler, Inhale 2 puffs every 6 (six) hours as needed for wheezing (Patient not taking: Reported on 12/12/2023), Disp: 18 g, Rfl: 0    lisdexamfetamine (Vyvanse) 40 MG capsule, Take 1 capsule (40 mg total) by mouth every morning Max Daily  "Amount: 40 mg (Patient not taking: Reported on 7/20/2023), Disp: 30 capsule, Rfl: 0    Prenat w/o A-FeCbGl-DSS-FA-DHA (PNV OB+DHA PO), Take by mouth (Patient not taking: Reported on 12/12/2023), Disp: , Rfl:     Physical Exam:  /64 (BP Location: Left arm, Patient Position: Sitting, Cuff Size: Adult)   Pulse 89   Temp (!) 97.4 °F (36.3 °C) (Temporal)   Resp 18   Ht 5' 5.5\" (1.664 m)   Wt 90.3 kg (199 lb)   LMP 11/27/2023 (Approximate)   SpO2 100%   BMI 32.61 kg/m²     Physical Exam  Vitals reviewed.   Constitutional:       General: She is not in acute distress.     Appearance: She is well-developed. She is not ill-appearing.   HENT:      Head: Normocephalic and atraumatic.   Eyes:      General: No scleral icterus.     Conjunctiva/sclera: Conjunctivae normal.   Cardiovascular:      Rate and Rhythm: Normal rate and regular rhythm.      Heart sounds: Normal heart sounds. No murmur heard.  Pulmonary:      Effort: Pulmonary effort is normal. No respiratory distress.      Breath sounds: Normal breath sounds.   Abdominal:      Palpations: Abdomen is soft.      Tenderness: There is no abdominal tenderness.   Musculoskeletal:         General: No tenderness. Normal range of motion.      Cervical back: Normal range of motion and neck supple.      Right lower leg: No edema.      Left lower leg: No edema.   Lymphadenopathy:      Cervical: No cervical adenopathy.   Skin:     General: Skin is warm and dry.   Neurological:      Mental Status: She is alert and oriented to person, place, and time.      Cranial Nerves: No cranial nerve deficit.   Psychiatric:         Mood and Affect: Mood normal.         Behavior: Behavior normal.       Labs:  Lab Results   Component Value Date    WBC 6.73 12/15/2023    HGB 14.5 12/15/2023    HCT 42.5 12/15/2023    MCV 86 12/15/2023     12/15/2023     I have spent 40 minutes with Patient  today in which greater than 50% of this time was spent in counseling/coordination of care " regarding Diagnostic results, Risks and benefits of tx options, Instructions for management, Patient and family education, Impressions, Documenting in the medical record, Reviewing / ordering tests, medicine, procedures  , and Obtaining or reviewing history  .     Patient voiced understanding and agreement in the above discussion. Aware to contact our office with questions/symptoms in the interim.     This note has been generated by voice recognition software system.  Therefore, there may be spelling, grammar, and or syntax errors. Please contact if questions arise.

## 2024-01-10 ENCOUNTER — HOSPITAL ENCOUNTER (OUTPATIENT)
Dept: INFUSION CENTER | Facility: CLINIC | Age: 29
Discharge: HOME | End: 2024-01-10
Payer: COMMERCIAL

## 2024-01-10 VITALS
SYSTOLIC BLOOD PRESSURE: 138 MMHG | DIASTOLIC BLOOD PRESSURE: 99 MMHG | HEART RATE: 94 BPM | TEMPERATURE: 98.9 F | RESPIRATION RATE: 16 BRPM

## 2024-01-10 DIAGNOSIS — E61.1 IRON DEFICIENCY: Primary | ICD-10-CM

## 2024-01-10 PROCEDURE — 96365 THER/PROPH/DIAG IV INF INIT: CPT

## 2024-01-10 RX ORDER — SODIUM CHLORIDE 9 MG/ML
20 INJECTION, SOLUTION INTRAVENOUS ONCE
Status: COMPLETED | OUTPATIENT
Start: 2024-01-10 | End: 2024-01-10

## 2024-01-10 RX ORDER — SODIUM CHLORIDE 9 MG/ML
20 INJECTION, SOLUTION INTRAVENOUS ONCE
Status: CANCELLED | OUTPATIENT
Start: 2024-01-17

## 2024-01-10 RX ADMIN — IRON SUCROSE 200 MG: 20 INJECTION, SOLUTION INTRAVENOUS at 14:11

## 2024-01-10 RX ADMIN — SODIUM CHLORIDE 20 ML/HR: 9 INJECTION, SOLUTION INTRAVENOUS at 14:11

## 2024-01-10 NOTE — PLAN OF CARE
Problem: INFECTION - ADULT  Goal: Absence or prevention of progression during hospitalization  Description: INTERVENTIONS:  - Assess and monitor for signs and symptoms of infection  - Monitor lab/diagnostic results  - Monitor all insertion sites, i.e. indwelling lines, tubes, and drains  - Monitor endotracheal if appropriate and nasal secretions for changes in amount and color  - [place] appropriate cooling/warming therapies per order  - Administer medications as ordered  - Instruct and encourage patient and family to use good hand hygiene technique  - Identify and instruct in appropriate isolation precautions for identified infection/condition  Outcome: Progressing  Goal: Absence of fever/infection during neutropenic period  Description: INTERVENTIONS:  - Monitor WBC    Outcome: Progressing     Problem: Knowledge Deficit  Goal: Patient/family/caregiver demonstrates understanding of disease process, treatment plan, medications, and discharge instructions  Description: Complete learning assessment and assess knowledge base.  Interventions:  - Provide teaching at level of understanding  - Provide teaching via preferred learning methods  Outcome: Progressing

## 2024-01-10 NOTE — PROGRESS NOTES
Pt. Denied new symptoms or concerns today. Venofer completed as ordered without adverse event.  Future appointments reviewed. Pt. Will return on Wednesday 1/17/24 at 1400.  AVS declined.

## 2024-01-16 ENCOUNTER — OFFICE VISIT (OUTPATIENT)
Dept: FAMILY MEDICINE CLINIC | Facility: CLINIC | Age: 29
End: 2024-01-16
Payer: COMMERCIAL

## 2024-01-16 ENCOUNTER — TELEPHONE (OUTPATIENT)
Dept: NEUROLOGY | Facility: CLINIC | Age: 29
End: 2024-01-16

## 2024-01-16 VITALS
HEIGHT: 65 IN | WEIGHT: 195.2 LBS | SYSTOLIC BLOOD PRESSURE: 112 MMHG | BODY MASS INDEX: 32.52 KG/M2 | OXYGEN SATURATION: 97 % | DIASTOLIC BLOOD PRESSURE: 76 MMHG | HEART RATE: 92 BPM | TEMPERATURE: 98.2 F

## 2024-01-16 DIAGNOSIS — G43.909 MIGRAINE WITHOUT STATUS MIGRAINOSUS, NOT INTRACTABLE, UNSPECIFIED MIGRAINE TYPE: Primary | ICD-10-CM

## 2024-01-16 PROCEDURE — 99214 OFFICE O/P EST MOD 30 MIN: CPT | Performed by: NURSE PRACTITIONER

## 2024-01-16 RX ORDER — TOPIRAMATE 25 MG/1
25 TABLET ORAL 2 TIMES DAILY
Qty: 60 TABLET | Refills: 1 | Status: SHIPPED | OUTPATIENT
Start: 2024-01-16

## 2024-01-16 RX ORDER — IBUPROFEN 800 MG/1
TABLET ORAL
COMMUNITY
Start: 2024-01-02

## 2024-01-16 RX ORDER — METHYLPREDNISOLONE 4 MG/1
TABLET ORAL
COMMUNITY
Start: 2024-01-15

## 2024-01-16 NOTE — ASSESSMENT & PLAN NOTE
Recent observation stay records reviewed  Potential treatment options discussed  On birth control-no immediate plans for pregnancy  Will trial topiramate: 25 mg twice daily  Will likely need to titrate up  Dose/use/potential side effects reviewed  Referral today to establish with in network neurology  Will have staff assist in scheduling first available  Consider adding Nurtec for abortive  Patient agreeable with above plan    ER precautions for any acute change

## 2024-01-16 NOTE — TELEPHONE ENCOUNTER
Aisha from [facility] office called due to a referral for this pt. Pt was there in the office but did not have the time to do the Triage intake. Pt was call back when able to do so. Ph# was provided.

## 2024-01-16 NOTE — PROGRESS NOTES
Pending sale to [facility]    ASSESSMENT AND PLAN     1. Migraine without status migrainosus, not intractable, unspecified migraine type  Assessment & Plan:  Recent observation stay records reviewed  Potential treatment options discussed  On birth control-no immediate plans for pregnancy  Will trial topiramate: 25 mg twice daily  Will likely need to titrate up  Dose/use/potential side effects reviewed  Referral today to establish with in network neurology  Will have staff assist in scheduling first available  Consider adding Nurtec for abortive  Patient agreeable with above plan    ER precautions for any acute change    Orders:  -     topiramate (Topamax) 25 mg tablet; Take 1 tablet (25 mg total) by mouth 2 (two) times a day  -     Ambulatory Referral to Neurology; Future           SUBJECTIVE       Patient ID: Mara Duarte is a 28 y.o. female.    Chief Complaint   Patient presents with    Follow-up       HISTORY OF PRESENT ILLNESS    Hospital follow-up  Patient presents today after ER visit (overnight observation stay-[facility]).  Admitted for strokelike symptoms-diagnosis of complex migraines.  Patient has had migraines on and off for years.  But notes they have been worsening over the last several weeks to months.  Not taking any maintenance medication, but was taking Imitrex for short period of time with minimal relief.  She is currently taking Excedrin Migraine, and using it most every day.  She has had no other environmental changes that could be contributing to the increase in headaches.  She has had no other characteristic changes since discharge.  She is agreeable to starting a maintenance medication today.    She is currently being treated for iron deficient anemia with infusions-her next treatment is on Wednesday.  She was recently diagnosed with Hashimoto's thyroiditis, and is currently on levothyroxine 112 daily      [facility] ER record reviewed:  History source: Patient and medical record.  Mara Duarte is a 28  "y.o. female with past medical history significant for hypothyroidism, JARON, preeclamspia . She presented to [facility] with complaint of one month of headaches with sudden right eye vision blurriness around 1930. Approximately 1920 she began having right-sided visual field deficit described as blurry vision \"crescent\" of blurry vision. She denies any floaters, diplopia. States she has been having a mild frontal headache for approximately 1 month. When she attempted to get out of the car she noted that her right leg felt heavy and has been having some weakness in the right arm as well. Denies any anticoagulation, recent trauma. No history of similar symptoms but does state that approximately 2 weeks ago she had numbness in the right foot that came and resolved within hours. Does report family history of autoimmune disease. Denies chest pain, shortness of breath, abdominal pain, change in bowel or bladder habits.     Of note, she also had an episode of numbness in the distal aspect of her right foot about 2 weeks back. This lasted for about 2 hours, after which it resolved. This did not cooccur with the headache. Since then, she has seen her cardiologist for recurrent leg swelling, with normal Dopplers. She has also seen rheumatology for arthralgia, generalized fatigue, with elevated ESR/CRP. Patient has had had a history of preeclampsia, with delivery in March 2023. She had dental issues, was having antibiotics for the last week, and had a root canal done on 12/26 on her left upper molar. No other changes in medications.     Imagine reviewed:  CT: 1. No acute intracranial hemorrhage.     MRI brain: 1.  No acute intracranial abnormality including acute infarct, hemorrhage or   mass. No abnormal FLAIR hyperintensity to suggest demyelinating lesions.   2.  Bilateral orbits and globes appear unremarkable.     Labs reviewed:  CBC, CMP, sed rate, CRP, Lyme                  The following portions of the patient's " "history were reviewed and updated as appropriate: allergies, current medications, past family history, past medical history, past social history, past surgical history, and problem list.    REVIEW OF SYSTEMS  Review of Systems   Constitutional:  Negative for activity change and appetite change. Fatigue: Improving.  Respiratory: Negative.     Cardiovascular: Negative.    Gastrointestinal: Negative.    Genitourinary: Negative.    Neurological:  Positive for headaches (Migraines as in HPI).   Psychiatric/Behavioral: Negative.         OBJECTIVE      VITAL SIGNS  /76 (BP Location: Left arm, Patient Position: Sitting, Cuff Size: Adult)   Pulse 92   Temp 98.2 °F (36.8 °C)   Ht 5' 5\" (1.651 m)   Wt 88.5 kg (195 lb 3.2 oz)   SpO2 97%   BMI 32.48 kg/m²     CURRENT MEDICATIONS    Current Outpatient Medications:     albuterol (Ventolin HFA) 90 mcg/act inhaler, Inhale 2 puffs every 6 (six) hours as needed for wheezing, Disp: 18 g, Rfl: 0    aspirin-acetaminophen-caffeine (EXCEDRIN MIGRAINE) 250-250-65 MG per tablet, Take 1 tablet by mouth every 6 (six) hours as needed, Disp: , Rfl:     desogestrel-ethinyl estradiol (KARIVA) 0.15-0.02/0.01 MG (21/5) per tablet, Take 1 tablet by mouth daily, Disp: 84 tablet, Rfl: 3    fluconazole (DIFLUCAN) 100 mg tablet, Take one tablet by mouth daily for a 7 days, then once a week for one month, and then monthly for 6 months., Disp: 17 tablet, Rfl: 0    ibuprofen (MOTRIN) 800 mg tablet, , Disp: , Rfl:     levocetirizine (XYZAL) 5 MG tablet, Take 5 mg by mouth every evening, Disp: , Rfl:     levothyroxine 112 mcg tablet, Take 1 tablet (112 mcg total) by mouth daily, Disp: 90 tablet, Rfl: 1    methylPREDNISolone 4 MG tablet therapy pack, , Disp: , Rfl:     Prenat w/o A-FeCbGl-DSS-FA-DHA (PNV OB+DHA PO), Take by mouth, Disp: , Rfl:     topiramate (Topamax) 25 mg tablet, Take 1 tablet (25 mg total) by mouth 2 (two) times a day, Disp: 60 tablet, Rfl: 1    lisdexamfetamine (Vyvanse) 40 MG " capsule, Take 1 capsule (40 mg total) by mouth every morning Max Daily Amount: 40 mg (Patient not taking: Reported on 7/20/2023), Disp: 30 capsule, Rfl: 0      PHYSICAL EXAMINATION   Physical Exam  Vitals and nursing note reviewed.   Constitutional:       General: She is not in acute distress.     Appearance: Normal appearance. She is well-developed and well-groomed. She is not ill-appearing.   HENT:      Head: Normocephalic.      Right Ear: Tympanic membrane and ear canal normal.      Left Ear: Tympanic membrane and ear canal normal.   Eyes:      Extraocular Movements: Extraocular movements intact.      Conjunctiva/sclera: Conjunctivae normal.      Pupils: Pupils are equal, round, and reactive to light.   Neck:      Thyroid: Thyromegaly (Goiter-following with endocrinology) present.   Cardiovascular:      Rate and Rhythm: Normal rate and regular rhythm.      Heart sounds: Normal heart sounds.   Pulmonary:      Effort: Pulmonary effort is normal. No respiratory distress.      Breath sounds: Normal breath sounds.   Skin:     General: Skin is warm and dry.   Neurological:      General: No focal deficit present.      Mental Status: She is alert and oriented to person, place, and time.   Psychiatric:         Attention and Perception: Attention normal.         Mood and Affect: Mood normal.         Behavior: Behavior normal.

## 2024-01-17 ENCOUNTER — HOSPITAL ENCOUNTER (OUTPATIENT)
Dept: INFUSION CENTER | Facility: CLINIC | Age: 29
Discharge: HOME | End: 2024-01-17
Payer: COMMERCIAL

## 2024-01-17 ENCOUNTER — TELEPHONE (OUTPATIENT)
Age: 29
End: 2024-01-17

## 2024-01-17 VITALS
DIASTOLIC BLOOD PRESSURE: 82 MMHG | HEART RATE: 86 BPM | RESPIRATION RATE: 16 BRPM | TEMPERATURE: 98.2 F | SYSTOLIC BLOOD PRESSURE: 125 MMHG

## 2024-01-17 DIAGNOSIS — E61.1 IRON DEFICIENCY: Primary | ICD-10-CM

## 2024-01-17 PROCEDURE — 96365 THER/PROPH/DIAG IV INF INIT: CPT

## 2024-01-17 RX ORDER — SODIUM CHLORIDE 9 MG/ML
20 INJECTION, SOLUTION INTRAVENOUS ONCE
Status: CANCELLED | OUTPATIENT
Start: 2024-01-24

## 2024-01-17 RX ORDER — SODIUM CHLORIDE 9 MG/ML
20 INJECTION, SOLUTION INTRAVENOUS ONCE
Status: COMPLETED | OUTPATIENT
Start: 2024-01-17 | End: 2024-01-17

## 2024-01-17 RX ADMIN — IRON SUCROSE 200 MG: 20 INJECTION, SOLUTION INTRAVENOUS at 14:17

## 2024-01-17 RX ADMIN — SODIUM CHLORIDE 20 ML/HR: 0.9 INJECTION, SOLUTION INTRAVENOUS at 14:11

## 2024-01-17 NOTE — PROGRESS NOTES
Patient tolerated venofer infusion without incident. Patient declined AVS but is aware of appointment on 1/24 at 2:00pm.

## 2024-01-17 NOTE — TELEPHONE ENCOUNTER
Patient states her migraine is still really bad and she is asking that [facility] be called in for her for more relief. She is currently getting her iron infusion which also makes her head worse. This can be sent to [facility] in [place]

## 2024-01-18 DIAGNOSIS — G43.909 MIGRAINE WITHOUT STATUS MIGRAINOSUS, NOT INTRACTABLE, UNSPECIFIED MIGRAINE TYPE: Primary | ICD-10-CM

## 2024-01-19 ENCOUNTER — TELEPHONE (OUTPATIENT)
Dept: OBGYN CLINIC | Facility: CLINIC | Age: 29
End: 2024-01-19

## 2024-01-19 DIAGNOSIS — B37.31 YEAST VAGINITIS: Primary | ICD-10-CM

## 2024-01-19 RX ORDER — FLUCONAZOLE 150 MG/1
150 TABLET ORAL ONCE
Qty: 1 TABLET | Refills: 0 | Status: SHIPPED | OUTPATIENT
Start: 2024-01-19 | End: 2024-01-19

## 2024-01-19 NOTE — TELEPHONE ENCOUNTER
Patient left message on machine - requesting prescription for diflucan. Has been on antibiotics, off and on, over a month for tooth having a problem with. Just had 3rd root canal, hopefully issue is getting resolved. Requesting prescription be sent to [facility] pharmacy in [place]

## 2024-01-19 NOTE — TELEPHONE ENCOUNTER
Placed call to patient. Patient reports being on a few rounds of antibiotics for a tooth issue she has been getting taken care of. Patient reports current symptoms are white vaginal discharge and vaginal itching and irritation. Patient states she had one leftover tablet of diflucan previously ordered, took that yesterday 1/18/24 as symptoms were really bad per patient. Patient request additional treatment of diflucan at this time. Patient verbalizes understanding of script sent to provider to sign to take tomorrow. If symptoms do not improve or worsen to call office back. Patient has no additional questions at this time.

## 2024-01-22 ENCOUNTER — TELEPHONE (OUTPATIENT)
Dept: FAMILY MEDICINE CLINIC | Facility: CLINIC | Age: 29
End: 2024-01-22

## 2024-01-22 ENCOUNTER — TELEPHONE (OUTPATIENT)
Dept: OBGYN CLINIC | Facility: CLINIC | Age: 29
End: 2024-01-22

## 2024-01-22 DIAGNOSIS — B37.31 CANDIDA VAGINITIS: ICD-10-CM

## 2024-01-22 DIAGNOSIS — B37.31 CANDIDA VAGINITIS: Primary | ICD-10-CM

## 2024-01-22 RX ORDER — FLUCONAZOLE 150 MG/1
TABLET ORAL
Qty: 2 TABLET | Refills: 0 | Status: SHIPPED | OUTPATIENT
Start: 2024-01-22 | End: 2024-01-22 | Stop reason: SDUPTHER

## 2024-01-22 RX ORDER — FLUCONAZOLE 150 MG/1
TABLET ORAL
Qty: 2 TABLET | Refills: 0 | Status: SHIPPED | OUTPATIENT
Start: 2024-01-22 | End: 2024-01-31

## 2024-01-22 NOTE — TELEPHONE ENCOUNTER
I will put in two additional doses of diflucan to be taken 3 days apart. However, please let her know that diflucan can take up to a full week for resolution of symptoms after the last dose. Also, if she is on antibiotics, she NEEDS to start a probiotic. Anything for womens vaginal health that has acidophilus or lactobacilius in it. Organic coconut oil can be utilized for vulvar irritation. And recommend that she uses hypoallergenic/sensitive soap.     If her symptoms do not resolve with diflucan, she needs to come in for an appt.

## 2024-01-22 NOTE — TELEPHONE ENCOUNTER
PA for rimegepant sulfate (NURTEC) 75 mg     Submitted via  []CMM-KEY   []Surescripts-Case ID #   []Faxed to plan   [x]Other website -QulcifMH-Yfwqequb-QL: [id number]  []Phone call Case ID #     Office notes sent, clinical questions answered. Awaiting determination

## 2024-01-22 NOTE — TELEPHONE ENCOUNTER
Patient called, left message on answering machine, unable to come in due to childcare issues and she doesn't feel well enough to drive into the office due to her low iron that she is getting infusions for. Rrquesting diflucan. On antibiotic(s) for 3 months now, still taking Augmentin TID. Vaginal white chunky discharge, vaginal itching.

## 2024-01-22 NOTE — TELEPHONE ENCOUNTER
PA for rimegepant sulfate (NURTEC) 75 mg  Denied    Reason:              Message sent to provider pool Yes    Denial letter scanned into Media Yes    Appeal started No

## 2024-01-22 NOTE — TELEPHONE ENCOUNTER
Patient called, left message on answering machine, completed diflucan and has had no relief. Patient is still on the Augmentin TID for tooth issue. Still having vaginal symptoms. Tried OTC medications without relief.  Vagisil wipes give 3 minutes of relief then becomes uncomfortable again.

## 2024-01-24 ENCOUNTER — TELEPHONE (OUTPATIENT)
Age: 29
End: 2024-01-24

## 2024-01-24 ENCOUNTER — HOSPITAL ENCOUNTER (OUTPATIENT)
Dept: INFUSION CENTER | Facility: CLINIC | Age: 29
Discharge: HOME | End: 2024-01-24
Payer: COMMERCIAL

## 2024-01-24 VITALS
HEART RATE: 80 BPM | TEMPERATURE: 97.4 F | DIASTOLIC BLOOD PRESSURE: 78 MMHG | SYSTOLIC BLOOD PRESSURE: 116 MMHG | RESPIRATION RATE: 18 BRPM

## 2024-01-24 DIAGNOSIS — E61.1 IRON DEFICIENCY: Primary | ICD-10-CM

## 2024-01-24 PROCEDURE — 96365 THER/PROPH/DIAG IV INF INIT: CPT

## 2024-01-24 RX ORDER — SODIUM CHLORIDE 9 MG/ML
20 INJECTION, SOLUTION INTRAVENOUS ONCE
Status: COMPLETED | OUTPATIENT
Start: 2024-01-24 | End: 2024-01-24

## 2024-01-24 RX ORDER — SODIUM CHLORIDE 9 MG/ML
20 INJECTION, SOLUTION INTRAVENOUS ONCE
Status: CANCELLED | OUTPATIENT
Start: 2024-02-01

## 2024-01-24 RX ADMIN — IRON SUCROSE 200 MG: 20 INJECTION, SOLUTION INTRAVENOUS at 14:27

## 2024-01-24 RX ADMIN — SODIUM CHLORIDE 20 ML/HR: 0.9 INJECTION, SOLUTION INTRAVENOUS at 14:27

## 2024-01-24 NOTE — TELEPHONE ENCOUNTER
Patient states that she is taking Topamax and nurtec with no relief for the migraine patient does state that she is becoming dizzy and she states that Topamax states it can cause her to be dizzy and she does not feel safe taking this medication since she has a baby at home patient does have an appointment with Neuro next week and would like to stop taking the Topamax. Please reach out to patient with response she did approve to leave a message stating wether or not she can stop since she will be going to an appointment later on.

## 2024-01-24 NOTE — PLAN OF CARE
Problem: Potential for Falls  Goal: Patient will remain free of falls  Description: INTERVENTIONS:  - Educate patient/family on patient safety including physical limitations  - Instruct patient to call for assistance with activity   - Consult OT/PT to assist with strengthening/mobility   - Keep Call bell within reach  - Keep bed low and locked with side rails adjusted as appropriate  - Keep care items and personal belongings within reach  - Initiate and maintain comfort rounds  - Apply yellow socks and bracelet for high fall risk patients  - Consider moving patient to room near nurses station  Outcome: Progressing

## 2024-02-01 ENCOUNTER — HOSPITAL ENCOUNTER (OUTPATIENT)
Dept: INFUSION CENTER | Facility: CLINIC | Age: 29
Discharge: HOME | End: 2024-02-01
Payer: COMMERCIAL

## 2024-02-01 ENCOUNTER — APPOINTMENT (OUTPATIENT)
Dept: LAB | Facility: CLINIC | Age: 29
End: 2024-02-01
Payer: COMMERCIAL

## 2024-02-01 VITALS
TEMPERATURE: 98.1 F | HEART RATE: 81 BPM | DIASTOLIC BLOOD PRESSURE: 84 MMHG | RESPIRATION RATE: 18 BRPM | SYSTOLIC BLOOD PRESSURE: 131 MMHG

## 2024-02-01 DIAGNOSIS — E03.9 HYPOTHYROIDISM, UNSPECIFIED TYPE: ICD-10-CM

## 2024-02-01 DIAGNOSIS — D50.9 IRON DEFICIENCY ANEMIA, UNSPECIFIED IRON DEFICIENCY ANEMIA TYPE: ICD-10-CM

## 2024-02-01 DIAGNOSIS — E61.1 IRON DEFICIENCY: Primary | ICD-10-CM

## 2024-02-01 LAB
BASOPHILS # BLD AUTO: 0.04 THOUSANDS/ÂΜL (ref 0–0.1)
BASOPHILS NFR BLD AUTO: 1 % (ref 0–1)
CRP SERPL QL: 7.5 MG/L
EOSINOPHIL # BLD AUTO: 0.07 THOUSAND/ÂΜL (ref 0–0.61)
EOSINOPHIL NFR BLD AUTO: 1 % (ref 0–6)
ERYTHROCYTE [DISTWIDTH] IN BLOOD BY AUTOMATED COUNT: 13.4 % (ref 11.6–15.1)
FERRITIN SERPL-MCNC: 146 NG/ML (ref 11–307)
HCT VFR BLD AUTO: 39.5 % (ref 34.8–46.1)
HGB BLD-MCNC: 13.3 G/DL (ref 11.5–15.4)
IMM GRANULOCYTES # BLD AUTO: 0.02 THOUSAND/UL (ref 0–0.2)
IMM GRANULOCYTES NFR BLD AUTO: 0 % (ref 0–2)
IRON SATN MFR SERPL: 24 % (ref 15–50)
IRON SERPL-MCNC: 82 UG/DL (ref 50–212)
LYMPHOCYTES # BLD AUTO: 1.28 THOUSANDS/ÂΜL (ref 0.6–4.47)
LYMPHOCYTES NFR BLD AUTO: 23 % (ref 14–44)
MCH RBC QN AUTO: 29.4 PG (ref 26.8–34.3)
MCHC RBC AUTO-ENTMCNC: 33.7 G/DL (ref 31.4–37.4)
MCV RBC AUTO: 87 FL (ref 82–98)
MONOCYTES # BLD AUTO: 0.51 THOUSAND/ÂΜL (ref 0.17–1.22)
MONOCYTES NFR BLD AUTO: 9 % (ref 4–12)
NEUTROPHILS # BLD AUTO: 3.74 THOUSANDS/ÂΜL (ref 1.85–7.62)
NEUTS SEG NFR BLD AUTO: 66 % (ref 43–75)
NRBC BLD AUTO-RTO: 0 /100 WBCS
PLATELET # BLD AUTO: 209 THOUSANDS/UL (ref 149–390)
PMV BLD AUTO: 10.7 FL (ref 8.9–12.7)
RBC # BLD AUTO: 4.52 MILLION/UL (ref 3.81–5.12)
TIBC SERPL-MCNC: 342 UG/DL (ref 250–450)
TSH SERPL DL<=0.05 MIU/L-ACNC: 1.15 UIU/ML (ref 0.45–4.5)
UIBC SERPL-MCNC: 260 UG/DL (ref 155–355)
WBC # BLD AUTO: 5.66 THOUSAND/UL (ref 4.31–10.16)

## 2024-02-01 PROCEDURE — 83540 ASSAY OF IRON: CPT

## 2024-02-01 PROCEDURE — 36415 COLL VENOUS BLD VENIPUNCTURE: CPT

## 2024-02-01 PROCEDURE — 96365 THER/PROPH/DIAG IV INF INIT: CPT

## 2024-02-01 PROCEDURE — 86140 C-REACTIVE PROTEIN: CPT

## 2024-02-01 PROCEDURE — 85025 COMPLETE CBC W/AUTO DIFF WBC: CPT

## 2024-02-01 PROCEDURE — 84443 ASSAY THYROID STIM HORMONE: CPT

## 2024-02-01 PROCEDURE — 83550 IRON BINDING TEST: CPT

## 2024-02-01 PROCEDURE — 82728 ASSAY OF FERRITIN: CPT

## 2024-02-01 RX ORDER — SODIUM CHLORIDE 9 MG/ML
20 INJECTION, SOLUTION INTRAVENOUS ONCE
Status: COMPLETED | OUTPATIENT
Start: 2024-02-01 | End: 2024-02-01

## 2024-02-01 RX ORDER — SODIUM CHLORIDE 9 MG/ML
20 INJECTION, SOLUTION INTRAVENOUS ONCE
Status: CANCELLED | OUTPATIENT
Start: 2024-02-07

## 2024-02-01 RX ADMIN — IRON SUCROSE 200 MG: 20 INJECTION, SOLUTION INTRAVENOUS at 13:54

## 2024-02-01 RX ADMIN — SODIUM CHLORIDE 20 ML/HR: 0.9 INJECTION, SOLUTION INTRAVENOUS at 13:54

## 2024-02-01 NOTE — PROGRESS NOTES
Patient tolerated infusion without incident. Denies need for AVS, aware of next scheduled appt on 2/7 @ 2pm.

## 2024-02-02 ENCOUNTER — CONSULT (OUTPATIENT)
Dept: NEUROLOGY | Facility: CLINIC | Age: 29
End: 2024-02-02
Payer: COMMERCIAL

## 2024-02-02 VITALS
BODY MASS INDEX: 32.22 KG/M2 | TEMPERATURE: 98.4 F | WEIGHT: 193.4 LBS | HEIGHT: 65 IN | DIASTOLIC BLOOD PRESSURE: 80 MMHG | RESPIRATION RATE: 18 BRPM | SYSTOLIC BLOOD PRESSURE: 120 MMHG | HEART RATE: 100 BPM | OXYGEN SATURATION: 99 %

## 2024-02-02 DIAGNOSIS — G43.709 CHRONIC MIGRAINE WITHOUT AURA WITHOUT STATUS MIGRAINOSUS, NOT INTRACTABLE: Primary | ICD-10-CM

## 2024-02-02 PROCEDURE — 99205 OFFICE O/P NEW HI 60 MIN: CPT

## 2024-02-02 RX ORDER — CYPROHEPTADINE HYDROCHLORIDE 4 MG/1
4 TABLET ORAL
Qty: 30 TABLET | Refills: 3 | Status: SHIPPED | OUTPATIENT
Start: 2024-02-02

## 2024-02-02 NOTE — PROGRESS NOTES
Review of Systems   Constitutional:  Positive for fatigue. Negative for appetite change and fever.   HENT: Negative.  Negative for hearing loss, tinnitus, trouble swallowing and voice change.    Eyes:  Positive for visual disturbance (blur vision/loss of vision - R eye). Negative for photophobia and pain.   Respiratory: Negative.  Negative for shortness of breath.    Cardiovascular: Negative.  Negative for palpitations.   Gastrointestinal: Negative.  Negative for nausea and vomiting.   Endocrine: Negative.  Negative for cold intolerance.   Genitourinary: Negative.  Negative for dysuria, frequency and urgency.   Musculoskeletal:  Negative for back pain, gait problem, myalgias, neck pain and neck stiffness.   Skin: Negative.  Negative for rash.   Allergic/Immunologic: Negative.    Neurological:  Positive for weakness (RLE), numbness (episodes of R leg completely numb to the toes) and headaches (daily since Novemeber). Negative for dizziness, tremors, seizures, syncope, facial asymmetry, speech difficulty and light-headedness.   Hematological: Negative.  Does not bruise/bleed easily.   Psychiatric/Behavioral: Negative.  Negative for confusion, hallucinations and sleep disturbance.    All other systems reviewed and are negative.

## 2024-02-02 NOTE — PROGRESS NOTES
[facility] Consult  PATIENT:  Mara Duarte  MRN:  [id number]  :  1995  DATE OF SERVICE:  2024  REFERRED BY: Juliane Winters CRNP  PMD: EVA Ann    Assessment/Plan:     Mara Duarte is a very pleasant 28 y.o. female with a past medical history that includes functional ovarian cyst, hypothyroidism, asthma, migraine, ADHD, iron deficiency referred here for evaluation of headache.    Initial evaluation 2024    Chronic migraines without aura:    I had the pleasure of seeing Mara today in the office at [facility] in [place].  She is presenting today for an initial new patient consultation in regard to what are suspected to be migraine headaches.  The patient noted that in 2023 she started having migraine headaches that were occurring on the same side of the head where she recently had a root canal/tooth extraction.  The patient stated that she was also having some type of significant headache/migraine on a day-to-day basis.  She noted about 20 out of 30 days in the month where she was having more severely debilitating headache/migraine.  The patient stated that all around the same time she had found out that she was iron deficient, where she was needing to receive iron infusions.  Also recent diagnosis of Hashimoto's thyroiditis which she was not sure if this could be contributing to her headaches as well.  The patient also noted a recent episode of blurred vision/vision loss out of the right eye at the end of December where she went to [facility] for.  She stated that this occurred for about 30 minutes in length before full resolution of her vision afterwards.  She noted right lower extremity weakness along with this episode.  The patient stated that she had a separate episode of right-sided numbness of her toes, where she stated that this episode was slightly painful.  This lasted about 30 minutes and did  not seem to recur at that time.  Difficult to discern at this time if these 2 events were likely related to TIA or complication from migraine headaches.  Given the patient's age and lack of vascular risk factors, seems highly unlikely it was related to a TIA and there is more likely a secondary cause of these episodes occurring.  Patient was also recommended to see a neuro-ophthalmologist in regard to her transient vision loss, and recommended that she follow-up with this appointment as well.  Would like for the patient to have a full dilated eye exam to possibly evaluate for any significant papilledema in regard or concern to potential IIH.  The patient's headaches seem to be most likely correlating to a chronic migraine without aura.  The patient noted that she was tried on Topamax by her primary care provider, but caused her significant lightheadedness and she had to stop taking the medication.  I did want the patient to try cyproheptadine 4 mg, take 1 tablet by mouth once daily at bedtime for migraine prevention.  The patient was going to try this for about 6 to 8 weeks and reach out to me if she had improvement in regards to her migraine headaches.  The patient was able to receive Nurtec 75 mg, taking 1 tablet by mouth once at the onset of her headaches.  Recommended that the patient continue with this medication for abortive therapy of her significantly severe migraine headaches at this time.    Patient Instructions:    -Would recommend that the patient see a neuro-ophthalmologist in regard to her most recent episode of blurry vision that she ended up in the hospital for.  At this time, difficulty discern this episode was potentially related to TIA versus migraine versus acute outbreak of IIH.  However, I do believe that she needs a full in-depth dilated eye exam to properly evaluate for papilledema or any other concerns with the eye at this time.  -Continue to follow with hematology in regard to needing iron  infusions moving forward for iron deficiency anemia.  -Continue to follow with endocrinology in regards to the patient's Hashimoto's thyroiditis and hypothyroidism.  Would recommend that the patient also talk to her endocrinologist as well about the low vitamin B12 level, and would recommend supplementation for this as well.    Headache Calendar  Please maintain a headache calendar  Consider using phone applications such as Migraine Buddy or Migraine Diary    Headache/migraine treatment:     Rescue medications (for immediate treatment of a headache):   It is ok to take ibuprofen, acetaminophen or naproxen (Advil, Tylenol,  Aleve, Excedrin) if they help your headaches you should limit these to No more than 3 times a week to avoid medication overuse/rebound headaches.     For your more moderate to severe migraines take this medication early    - Continue Nurtec 75 mg, take 1 tablet by mouth once as needed for headaches. Max dose: 75 mg/day    Prescription preventive medications for headaches/migraines   (to take every day to help prevent headaches - not to take at the time of headache):  - Start cyproheptadine 4 mg, take 1 tablet by mouth once daily at bedtime for migraine prevention.    *Typically these types of medications take time until you see the benefit, although some may see improvement in days, often it may take weeks, especially if the medication is being titrated up to a beneficial level. Please contact us if there are any concerns or questions regarding the medication.     Over the counter preventive supplements for headaches/migraines (if you try, try for 3 months straight)  (to take every day to help prevent headaches - not to take at the time of headache):  There are combo pills online of these - none of which regulated by FDA and double check dosing - take appropriate dose only once a day- prevent a migraine, migravent, mind ease, migrelief   [] Magnesium 400mg daily (If any diarrhea or upset stomach,  decrease dose  as tolerated)  [] Riboflavin (Vitamin B2) 400mg daily (may make your urine bright/neon yellow)    Lifestyle Recommendations:  [x] SLEEP - Maintain a regular sleep schedule: Adults need at least 7-8 hours of uninterrupted a night. Maintain good sleep hygiene:  Going to bed and waking up at consistent times, avoiding excessive daytime naps, avoiding caffeinated beverages in the evening, avoid excessive stimulation in the evening and generally using bed primarily for sleeping.  One hour before bedtime would recommend turning lights down lower, decreasing your activity (may read quietly, listen to music at a low volume). When you get into bed, should eliminate all technology (no texting, emailing, playing with your phone, iPad or tablet in bed).  [x] HYDRATION - Maintain good hydration.  Drink  2L of fluid a day (4 typical small water bottles)  [x] DIET - Maintain good nutrition. In particular don't skip meals and try and eat healthy balanced meals regularly.  [x] TRIGGERS - Look for other triggers and avoid them: Limit caffeine to 1-2 cups a day or less. Avoid dietary triggers that you have noticed bring on your headaches (this could include aged cheese, peanuts, MSG, aspartame and nitrates).  [x] EXERCISE - physical exercise as we all know is good for you in many ways, and not only is good for your heart, but also is beneficial for your mental health, cognitive health and  chronic pain/headaches. I would encourage at the least 5 days of physical exercise weekly for at least 30 minutes.     Education and Follow-up  [x] Please call with any questions or concerns. Of course if any new concerning symptoms go to the emergency department.  [x] Follow up in 4 months with George REYES      CC:   We had the pleasure of evaluating Mara Duarte in neurological consultation today. Mara Duarte is a  28 y.o. female who presents today for evaluation of headaches.     History obtained from patient as well as  available medical record review.  History of Present Illness:   Current medical illnesses  or past medical history include functional ovarian cyst, hypothyroidism, asthma, migraine, ADHD, iron deficiency       Interval History:    Ended up needing a root canal and then ended up having to see a second endodontist for pain in the tooth and root canal. Started in November of 2023 with the headaches/migraines on the same side as the tooth. She states that she is also iron anemic and getting iron infusions. She has Hashimoto's thyroiditis and started treatment for this as well. She had blurred vision/vision loss in the right eye at the end of December and went to [facility]. Occurred for about over 30 minutes when it did initially, and then had full resolution of her vision afterwards. Right leg weakness with the blurred vision at the end of December when this occurred. Right foot numbness from toes to the middle of the foot in mid-December, had one episode shortly afterwards but hasn't happened. Lasted about 30 minutes in length she states and was slightly painful. Eventually went away with time. Started Topamax through primary care, does take Excedrin and Nurtec for abortive therapy.     Headaches started? November 2023, never had migraines just normal occasional tension headache prior  How often do the headaches occur?   - as of 2/2/2024: almost every day 30/30 days headaches, 20/30 days in the month with severely debilitating headaches  What time of the day do the headaches start?  Waking up and then they are starting shortly after  How long do the headaches last? Does have one continuous headaches daily, the Nurtec will help take the edge off of the headaches but not fully go away.  Are you ever headache free? No    Aura? No auras       Where is your headache located and pain quality? Left frontal and temple area, throbbing and pounding pain   What is the intensity of pain? Worst 8/10, Average: 6/10  Associated  symptoms:   [x] Problems with concentration  [x] Photophobia     [x]Phonophobia  [x] Blurred vision (just one episode of blurred vision, total loss of vision)   [x] Prefer quiet, dark room  [x] Tinnitus     Things that make the headache worse? No specific movements make it worse    Any positional change headaches? No positional change headaches    Headache triggers:  recent dental work, hypothyroidism, iron deficiency, stress    Have you seen someone else for headaches or pain? No  Have you had trigger point injection performed and how often? No  Have you had Botox injection performed and how often? No   Have you had epidural injections or transforaminal injections performed? Did have epidural for delivery in March of 2023  Are you current pregnant or planning on getting pregnant? No, does have an oral contraception pill   Have you ever had any Brain imaging? MRI brenda and orbits wo and w contrast in 12/30/2023.    Last eye exam: She does have an appointment with neuro-opthmalogist in March and encouraged her to keep this appointment.     What medications do you take or have you taken for your headaches?   ABORTIVE:    OTC medications: Excedrin migraine, Motrin   Prescription: Nurtec 75 mg    Past/ failed/contraindicated:  OTC medications: Tylenol   Prescription: Imitrex (palpitations)    PREVENTIVE:   None    Past/ failed/contraindicated:  Topamax (lightheadedness)      LIFESTYLE  Sleep   - averages: usually getting about 9 hours of sleep at night on average.   Problems falling asleep?:   No  Problems staying asleep?:  No    - No snoring or sleep apnea    Physical activity: has not been exercising and working out as of late, has been feeling very fatigued with hypothyroidism and iron deficiency     Water: 6-7, 16 ounce water bottles per day  Caffeine: started drinking soda recently, Excedrin had been daily but not daily at this point    Mood: Denies history of anxiety or depression or other diagnosed mood  disorder    The following portions of the patient's history were reviewed and updated as appropriate: allergies, current medications, past family history, past medical history, past social history, past surgical history and problem list.    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, stay at home mom   Education: Bachelor's degree in radiology   Lives with  and child     Illicit Drugs: denies  Alcohol/tobacco: Denies alcohol use, Denies tobacco use    Past Medical History:     Past Medical History:   Diagnosis Date    Acne     ADHD     Allergic asthma     Cluster headache     Disease of thyroid gland     Eczema     as a child    Hypertension     Migraines     Subclinical hypothyroidism     Vision loss        Patient Active Problem List   Diagnosis    ADHD    Asthma    Functional ovarian cysts    Headache    Migraine without status migrainosus, not intractable    Abdominal weakness    Pelvic floor dysfunction in female    Thyromegaly    Hypothyroidism due to Hashimoto's thyroiditis    Arthralgia of multiple sites, bilateral    Myalgia    Tingling in extremities    Arthralgia    Pain and swelling of right lower leg    Iron deficiency       Medications:      Current Outpatient Medications   Medication Sig Dispense Refill    albuterol (Ventolin HFA) 90 mcg/act inhaler Inhale 2 puffs every 6 (six) hours as needed for wheezing 18 g 0    aspirin-acetaminophen-caffeine (EXCEDRIN MIGRAINE) 250-250-65 MG per tablet Take 1 tablet by mouth every 6 (six) hours as needed      desogestrel-ethinyl estradiol (KARIVA) 0.15-0.02/0.01 MG (21/5) per tablet Take 1 tablet by mouth daily 84 tablet 3    fluconazole (DIFLUCAN) 100 mg tablet Take one tablet by mouth daily for a 7 days, then once a week for one month, and then monthly for 6 months. 17 tablet 0    ibuprofen  (MOTRIN) 800 mg tablet       levocetirizine (XYZAL) 5 MG tablet Take 5 mg by mouth every evening      levothyroxine 112 mcg tablet Take 1 tablet (112 mcg total) by mouth daily 90 tablet 1    Prenat w/o A-FeCbGl-DSS-FA-DHA (PNV OB+DHA PO) Take by mouth      rimegepant sulfate (NURTEC) 75 mg TBDP Take 1 tablet (75 mg total) by mouth once as needed (migraine) for up to 1 dose No more than one tab in 24 hours 9 tablet 0    lisdexamfetamine (Vyvanse) 40 MG capsule Take 1 capsule (40 mg total) by mouth every morning Max Daily Amount: 40 mg (Patient not taking: Reported on 7/20/2023) 30 capsule 0    methylPREDNISolone 4 MG tablet therapy pack  (Patient not taking: Reported on 2/2/2024)      topiramate (Topamax) 25 mg tablet Take 1 tablet (25 mg total) by mouth 2 (two) times a day (Patient not taking: Reported on 2/2/2024) 60 tablet 1     No current facility-administered medications for this visit.        Allergies:      Allergies   Allergen Reactions    Clindamycin Rash     headache  headache      Tramadol GI Intolerance, Hives, Itching and Rash       Family History:     Family History   Problem Relation Age of Onset    BRCA1 Negative Mother     Breast cancer Mother 58        negative genetic testing    Aortic aneurysm Father     No Known Problems Sister     No Known Problems Daughter     Thyroid disease unspecified Maternal Grandmother         thyroid goiter/nodules    Breast cancer Maternal Grandmother 75    Heart disease Paternal Grandmother     Lung cancer Paternal Grandfather        Social History:       Social History     Socioeconomic History    Marital status: /Civil Union     Spouse name: Not on file    Number of children: Not on file    Years of education: Not on file    Highest education level: Not on file   Occupational History    Not on file   Tobacco Use    Smoking status: Never     Passive exposure: Never    Smokeless tobacco: Never   Vaping Use    Vaping status: Never Used   Substance and Sexual  "Activity    Alcohol use: Not Currently     Alcohol/week: 1.0 standard drink of alcohol     Types: 1 Glasses of wine per week     Comment: Socially    Drug use: Never    Sexual activity: Yes     Partners: Male     Birth control/protection: OCP   Other Topics Concern    Not on file   Social History Narrative    Not on file     Social Determinants of Health     Financial Resource Strain: Low Risk  (12/29/2023)    Received from [facility]    Overall Financial Resource Strain (CARDIA)     Difficulty of Paying Living Expenses: Not hard at all   Food Insecurity: No Food Insecurity (12/29/2023)    Received from [facility]    Hunger Vital Sign     Worried About Running Out of Food in the Last Year: Never true     Ran Out of Food in the Last Year: Never true   Transportation Needs: No Transportation Needs (12/29/2023)    Received from [facility]    PRAPARE - Transportation     Lack of Transportation (Medical): No     Lack of Transportation (Non-Medical): No   Physical Activity: Not on file   Stress: Not on file   Social Connections: Not on file   Intimate Partner Violence: Not At Risk (12/29/2023)    Received from [facility]    Humiliation, Afraid, Rape, and Kick questionnaire     Fear of Current or Ex-Partner: No     Emotionally Abused: No     Physically Abused: No     Sexually Abused: No   Housing Stability: Low Risk  (12/29/2023)    Received from [facility]    Housing Stability Vital Sign     Unable to Pay for Housing in the Last Year: No     Number of Places Lived in the Last Year: 1     Unstable Housing in the Last Year: No         Objective:     Physical Exam:                                                                 Vitals:            Constitutional:    /80 (BP Location: Left arm, Patient Position: Sitting, Cuff Size: Standard)   Pulse 100   Temp 98.4 °F (36.9 °C) (Temporal)   Resp 18   Ht 5' 5\" (1.651 m)   Wt " 87.7 kg (193 lb 6.4 oz)   SpO2 99%   BMI 32.18 kg/m²   BP Readings from Last 3 Encounters:   02/02/24 120/80   02/01/24 131/84   01/24/24 116/78     Pulse Readings from Last 3 Encounters:   02/02/24 100   02/01/24 81   01/24/24 80         Well developed, well nourished, well groomed. No dysmorphic features.       Psychiatric:  Normal behavior and appropriate affect        Neurological Examination:     Mental status/cognitive function:   Orientated to time, place and person. Recent and remote memory intact. Attention span and concentration as well as fund of knowledge are appropriate for age. Normal language and spontaneous speech.    Cranial Nerves:  II-visual fields full.   III, IV, VI-Pupils were equal, round, and reactive to light and accomodation. Extraocular movements were full and conjugate without nystagmus. Conjugate gaze, normal smooth pursuits, normal saccades   V-facial sensation symmetric.    VII-facial expression symmetric, intact forehead wrinkle, strong eye closure, symmetric smile    VIII-hearing grossly intact bilaterally   IX, X-palate elevation symmetric, no dysarthria.   XI-shoulder shrug strength intact    XII-tongue protrusion midline.    Motor Exam: symmetric bulk and tone throughout, no pronator drift. Power/strength 5/5 bilateral upper and lower extremities, no atrophy, fasciculations or abnormal movements noted.   Sensory: grossly intact light touch in all extremities.   Reflexes: brachioradialis 2+, biceps 2+, knee 2+ bilaterally  Coordination: Finger nose finger intact bilaterally, no apparent dysmetria, ataxia or tremor noted  Gait: steady casual and tandem gait.        Pertinent lab results:    C-reactive protein: 7.5    TSH: 1.140    Vitamin B12: 302     Pertinent Imaging:     MRI brain and orbits with and without contrast, 12/30/2023:    IMPRESSION:      1.  No acute intracranial abnormality including acute infarct, hemorrhage or   mass. No abnormal FLAIR hyperintensity to suggest  demyelinating lesions.   2.  Bilateral orbits and globes appear unremarkable.      Review of Systems:     Review of Systems   Constitutional:  Positive for fatigue. Negative for appetite change and fever.   HENT: Negative.  Negative for hearing loss, tinnitus, trouble swallowing and voice change.    Eyes:  Positive for visual disturbance (blur vision/loss of vision - R eye). Negative for photophobia and pain.   Respiratory: Negative.  Negative for shortness of breath.    Cardiovascular: Negative.  Negative for palpitations.   Gastrointestinal: Negative.  Negative for nausea and vomiting.   Endocrine: Negative.  Negative for cold intolerance.   Genitourinary: Negative.  Negative for dysuria, frequency and urgency.   Musculoskeletal:  Negative for back pain, gait problem, myalgias, neck pain and neck stiffness.   Skin: Negative.  Negative for rash.   Allergic/Immunologic: Negative.    Neurological:  Positive for weakness (RLE), numbness (episodes of R leg completely numb to the toes) and headaches (daily since Novemeber). Negative for dizziness, tremors, seizures, syncope, facial asymmetry, speech difficulty and light-headedness.   Hematological: Negative.  Does not bruise/bleed easily.   Psychiatric/Behavioral: Negative.  Negative for confusion, hallucinations and sleep disturbance.    All other systems reviewed and are negative.       I have spent 60 minutes with the patient today in which greater than 50% of this time was spent in counseling/coordination of care regarding Diagnostic results, Risks and benefits of tx options, Instructions for management, Patient and family education, Importance of tx compliance, Risk factor reductions, Impressions, Counseling / Coordination of care, Documenting in the medical record, Reviewing / ordering tests, medicine, procedures  , and Obtaining or reviewing history  . I also spent 20 minutes non face to face for this patient the same day.     Activity Minutes    Precharting/reviewing 10   Patient care/counseling 60   Postcharting/care coordination 10       Author:  Nolan Messina PA-C 2/2/2024 8:30 AM

## 2024-02-02 NOTE — PATIENT INSTRUCTIONS
Patient Instructions:    -Would recommend that the patient see a neuro-ophthalmologist in regard to her most recent episode of blurry vision that she ended up in the hospital for.  At this time, difficulty conservative this episode was potentially related to TIA versus migraine versus acute outbreak of IIH.  However, I do believe that she needs a full in-depth dilated eye exam to properly evaluate for papilledema or any other concerns with the eye at this time.  -Continue to follow with hematology in regard to needing iron infusions moving forward for iron deficiency anemia.  -Continue to follow with endocrinology in regards to the patient's Hashimoto's thyroiditis and hypothyroidism.  Would recommend that the patient also talk to her endocrinologist as well about the low vitamin B12 level, and would recommend supplementation for this as well.    Headache Calendar  Please maintain a headache calendar  Consider using phone applications such as Migraine Buddy or Migraine Diary    Headache/migraine treatment:     Rescue medications (for immediate treatment of a headache):   It is ok to take ibuprofen, acetaminophen or naproxen (Advil, Tylenol,  Aleve, Excedrin) if they help your headaches you should limit these to No more than 3 times a week to avoid medication overuse/rebound headaches.     For your more moderate to severe migraines take this medication early    - Continue Nurtec 75 mg, take 1 tablet by mouth once as needed for headaches. Max dose: 75 mg/day    Prescription preventive medications for headaches/migraines   (to take every day to help prevent headaches - not to take at the time of headache):  - Start cyproheptadine 4 mg, take 1 tablet by mouth once daily at bedtime for migraine prevention.    *Typically these types of medications take time until you see the benefit, although some may see improvement in days, often it may take weeks, especially if the medication is being titrated up to a beneficial level.  Please contact us if there are any concerns or questions regarding the medication.     Over the counter preventive supplements for headaches/migraines (if you try, try for 3 months straight)  (to take every day to help prevent headaches - not to take at the time of headache):  There are combo pills online of these - none of which regulated by FDA and double check dosing - take appropriate dose only once a day- prevent a migraine, migravent, mind ease, migrelief   [] Magnesium 400mg daily (If any diarrhea or upset stomach, decrease dose  as tolerated)  [] Riboflavin (Vitamin B2) 400mg daily (may make your urine bright/neon yellow)    Lifestyle Recommendations:  [x] SLEEP - Maintain a regular sleep schedule: Adults need at least 7-8 hours of uninterrupted a night. Maintain good sleep hygiene:  Going to bed and waking up at consistent times, avoiding excessive daytime naps, avoiding caffeinated beverages in the evening, avoid excessive stimulation in the evening and generally using bed primarily for sleeping.  One hour before bedtime would recommend turning lights down lower, decreasing your activity (may read quietly, listen to music at a low volume). When you get into bed, should eliminate all technology (no texting, emailing, playing with your phone, iPad or tablet in bed).  [x] HYDRATION - Maintain good hydration.  Drink  2L of fluid a day (4 typical small water bottles)  [x] DIET - Maintain good nutrition. In particular don't skip meals and try and eat healthy balanced meals regularly.  [x] TRIGGERS - Look for other triggers and avoid them: Limit caffeine to 1-2 cups a day or less. Avoid dietary triggers that you have noticed bring on your headaches (this could include aged cheese, peanuts, MSG, aspartame and nitrates).  [x] EXERCISE - physical exercise as we all know is good for you in many ways, and not only is good for your heart, but also is beneficial for your mental health, cognitive health and  chronic  pain/headaches. I would encourage at the least 5 days of physical exercise weekly for at least 30 minutes.     Education and Follow-up  [x] Please call with any questions or concerns. Of course if any new concerning symptoms go to the emergency department.  [x] Follow up in 4 months with George REYES

## 2024-02-06 ENCOUNTER — NURSE TRIAGE (OUTPATIENT)
Age: 29
End: 2024-02-06

## 2024-02-06 NOTE — TELEPHONE ENCOUNTER
"Patient states that she lifted a mattress 1 week ago and has back pain with radiation to the LLE to the level of the knee. Patient declined an office visit. Patient also reports L great toe is numb. Patient is requesting a prescription for steroids. She has tried Motrin and ice with no relief.   Reason for Disposition   Back pain    Answer Assessment - Initial Assessment Questions  1. ONSET: \"When did the pain begin?\"       Few days  2. LOCATION: \"Where does it hurt?\" (upper, mid or lower back)      Lumbar spine with radiation to LLE and L toe numb  3. SEVERITY: \"How bad is the pain?\"  (e.g., Scale 1-10; mild, moderate, or severe)    - MILD (1-3): doesn't interfere with normal activities     - MODERATE (4-7): interferes with normal activities or awakens from sleep     - SEVERE (8-10): excruciating pain, unable to do any normal activities       moderate  4. PATTERN: \"Is the pain constant?\" (e.g., yes, no; constant, intermittent)       constant  5. RADIATION: \"Does the pain shoot into your legs or elsewhere?\"      LLE  6. CAUSE:  \"What do you think is causing the back pain?\"       Lifted a mattress 1 week ago  7. BACK OVERUSE:  \"Any recent lifting of heavy objects, strenuous work or exercise?\"      unsure  8. MEDICATIONS: \"What have you taken so far for the pain?\" (e.g., nothing, acetaminophen, NSAIDS)      motrin  9. NEUROLOGIC SYMPTOMS: \"Do you have any weakness, numbness, or problems with bowel/bladder control?\"      Numb big  10. OTHER SYMPTOMS: \"Do you have any other symptoms?\" (e.g., fever, abdominal pain, burning with urination, blood in urine)        denies  11. PREGNANCY: \"Is there any chance you are pregnant?\" (e.g., yes, no; LMP)        N/A    Protocols used: Back Pain-ADULT-[place]    "

## 2024-02-07 ENCOUNTER — OFFICE VISIT (OUTPATIENT)
Dept: FAMILY MEDICINE CLINIC | Facility: CLINIC | Age: 29
End: 2024-02-07
Payer: COMMERCIAL

## 2024-02-07 ENCOUNTER — APPOINTMENT (OUTPATIENT)
Dept: RADIOLOGY | Facility: CLINIC | Age: 29
End: 2024-02-07
Payer: COMMERCIAL

## 2024-02-07 ENCOUNTER — HOSPITAL ENCOUNTER (OUTPATIENT)
Dept: INFUSION CENTER | Facility: CLINIC | Age: 29
Discharge: HOME | End: 2024-02-07
Payer: COMMERCIAL

## 2024-02-07 VITALS
TEMPERATURE: 98.7 F | WEIGHT: 190.4 LBS | SYSTOLIC BLOOD PRESSURE: 128 MMHG | DIASTOLIC BLOOD PRESSURE: 80 MMHG | HEIGHT: 65 IN | HEART RATE: 98 BPM | BODY MASS INDEX: 31.72 KG/M2 | OXYGEN SATURATION: 98 %

## 2024-02-07 VITALS
HEART RATE: 83 BPM | SYSTOLIC BLOOD PRESSURE: 120 MMHG | RESPIRATION RATE: 18 BRPM | DIASTOLIC BLOOD PRESSURE: 77 MMHG | TEMPERATURE: 98.3 F

## 2024-02-07 DIAGNOSIS — M54.42 ACUTE LEFT-SIDED LOW BACK PAIN WITH LEFT-SIDED SCIATICA: Primary | ICD-10-CM

## 2024-02-07 DIAGNOSIS — M54.42 ACUTE LEFT-SIDED LOW BACK PAIN WITH LEFT-SIDED SCIATICA: ICD-10-CM

## 2024-02-07 DIAGNOSIS — E61.1 IRON DEFICIENCY: Primary | ICD-10-CM

## 2024-02-07 PROCEDURE — 72110 X-RAY EXAM L-2 SPINE 4/>VWS: CPT

## 2024-02-07 PROCEDURE — 99213 OFFICE O/P EST LOW 20 MIN: CPT

## 2024-02-07 PROCEDURE — 96365 THER/PROPH/DIAG IV INF INIT: CPT

## 2024-02-07 RX ORDER — CYCLOBENZAPRINE HCL 10 MG
10 TABLET ORAL 3 TIMES DAILY PRN
Qty: 30 TABLET | Refills: 0 | Status: SHIPPED | OUTPATIENT
Start: 2024-02-07

## 2024-02-07 RX ORDER — PREDNISONE 10 MG/1
TABLET ORAL DAILY
Qty: 30 TABLET | Refills: 0 | Status: SHIPPED | OUTPATIENT
Start: 2024-02-07 | End: 2024-02-19

## 2024-02-07 RX ORDER — SODIUM CHLORIDE 9 MG/ML
20 INJECTION, SOLUTION INTRAVENOUS ONCE
Status: CANCELLED | OUTPATIENT
Start: 2024-02-14

## 2024-02-07 RX ORDER — SODIUM CHLORIDE 9 MG/ML
20 INJECTION, SOLUTION INTRAVENOUS ONCE
Status: COMPLETED | OUTPATIENT
Start: 2024-02-07 | End: 2024-02-07

## 2024-02-07 RX ADMIN — IRON SUCROSE 200 MG: 20 INJECTION, SOLUTION INTRAVENOUS at 14:12

## 2024-02-07 RX ADMIN — SODIUM CHLORIDE 20 ML/HR: 0.9 INJECTION, SOLUTION INTRAVENOUS at 14:09

## 2024-02-07 NOTE — ASSESSMENT & PLAN NOTE
Patient presents today with over a week of low back pain that has now progressed into the back of the left thigh and knee, as well as the left big toe. Positive L straight leg raise today on exam with minor sensory deficits in the left leg. Discussed with patient that this is likely sciatic pain, however a herniated disc is also on the differential. I would like to obtain an x-ray to evaluate the low back for abnormalities.     For her symptoms, I recommended use of a muscle relaxer, which she can take at night to help with sleep. Counseled about ADRs including drowsiness. Also recommended a steroid to calm down any inflammatory cause of the back pain, which she is agreeable with. Sent steroid taper today.     I would also like her to be evaluated by our comprehensive spine therapy team, as I believe she may need to complete a few weeks of treatment to improve her range of motion. She is agreeable to trying this. Also given a handout today of exercises she can do at home in the meantime.     If PT does not help her symptoms, I would recommend a MRI of the lower spine and/or chiropractic care.     Patient is very agreeable with plan today and will follow up with us as needed if the back pain continues.

## 2024-02-07 NOTE — PROGRESS NOTES
Name: Mara Duarte      : 1995      MRN: [id number]  Encounter Provider: Lydia Rangel PA-C  Encounter Date: 2024   Encounter department: [facility]    Assessment & Plan     1. Acute left-sided low back pain with left-sided sciatica  Assessment & Plan:  Patient presents today with over a week of low back pain that has now progressed into the back of the left thigh and knee, as well as the left big toe. Positive L straight leg raise today on exam with minor sensory deficits in the left leg. Discussed with patient that this is likely sciatic pain, however a herniated disc is also on the differential. I would like to obtain an x-ray to evaluate the low back for abnormalities.     For her symptoms, I recommended use of a muscle relaxer, which she can take at night to help with sleep. Counseled about ADRs including drowsiness. Also recommended a steroid to calm down any inflammatory cause of the back pain, which she is agreeable with. Sent steroid taper today.     I would also like her to be evaluated by our comprehensive spine therapy team, as I believe she may need to complete a few weeks of treatment to improve her range of motion. She is agreeable to trying this. Also given a handout today of exercises she can do at home in the meantime.     If PT does not help her symptoms, I would recommend a MRI of the lower spine and/or chiropractic care.     Patient is very agreeable with plan today and will follow up with us as needed if the back pain continues.     Orders:  -     XR spine lumbar minimum 4 views non injury; Future; Expected date: 2024  -     predniSONE 10 mg tablet; Take 4 tablets (40 mg total) by mouth daily for 3 days, THEN 3 tablets (30 mg total) daily for 3 days, THEN 2 tablets (20 mg total) daily for 3 days, THEN 1 tablet (10 mg total) daily for 3 days.  -     cyclobenzaprine (FLEXERIL) 10 mg tablet; Take 1 tablet (10 mg total) by mouth 3 (three) times a day  "as needed for muscle spasms  -     Ambulatory Referral to Comprehensive Spine PT; Future           Subjective      Patient presents today with concerns of low back pain that started last Monday. Patient was lifting a heavy mattress and the pain began soon after. She states she feels she may have turned wrong when lifting. She states the pain is now radiating into the back of the left thigh and knee. Three days ago, the numbness extended into her left big toe which is still present today. She describes the pain as \"shocking\" and aching. She has not had any x-rays yet. She denies loss of bowel or bladder function. Her gait is normal. She has been trying OTC pain relief, ice, and some stretches at home with minimal improvement. She states she thinks she has a history of a herniated disc around L5 many years ago.      Back Pain  This is a new problem. The current episode started 1 to 4 weeks ago. The problem occurs constantly. The problem is unchanged. The pain is present in the lumbar spine and gluteal. The quality of the pain is described as shooting and aching. The pain radiates to the left foot, left thigh and left knee. The pain is at a severity of 6/10. The pain is moderate. The pain is The same all the time. The symptoms are aggravated by position, bending, sitting and standing. Associated symptoms include numbness. Pertinent negatives include no abdominal pain, fever, headaches or weakness. She has tried ice, heat, bed rest, analgesics, NSAIDs and home exercises for the symptoms. The treatment provided mild relief.     Review of Systems   Constitutional:  Negative for chills, fatigue and fever.   Gastrointestinal:  Negative for abdominal pain and constipation.   Genitourinary:  Negative for difficulty urinating.   Musculoskeletal:  Positive for back pain. Negative for arthralgias, gait problem, joint swelling, myalgias, neck pain and neck stiffness.   Neurological:  Positive for numbness. Negative for dizziness, " "tremors, weakness, light-headedness and headaches.       Current Outpatient Medications on File Prior to Visit   Medication Sig    albuterol (Ventolin HFA) 90 mcg/act inhaler Inhale 2 puffs every 6 (six) hours as needed for wheezing    aspirin-acetaminophen-caffeine (EXCEDRIN MIGRAINE) 250-250-65 MG per tablet Take 1 tablet by mouth every 6 (six) hours as needed    Cyanocobalamin (VITAMIN B-12 PO) Take by mouth    desogestrel-ethinyl estradiol (KARIVA) 0.15-0.02/0.01 MG (21/5) per tablet Take 1 tablet by mouth daily    ibuprofen (MOTRIN) 800 mg tablet     levocetirizine (XYZAL) 5 MG tablet Take 5 mg by mouth every evening    levothyroxine 112 mcg tablet Take 1 tablet (112 mcg total) by mouth daily    Prenat w/o A-FeCbGl-DSS-FA-DHA (PNV OB+DHA PO) Take by mouth    rimegepant sulfate (NURTEC) 75 mg TBDP Take 1 tablet (75 mg total) by mouth once as needed (migraine) for up to 1 dose No more than one tab in 24 hours    cyproheptadine (PERIACTIN) 4 mg tablet Take 1 tablet (4 mg total) by mouth daily at bedtime    [DISCONTINUED] fluconazole (DIFLUCAN) 100 mg tablet Take one tablet by mouth daily for a 7 days, then once a week for one month, and then monthly for 6 months. (Patient not taking: Reported on 2/7/2024)       Objective     /80 (BP Location: Left arm, Patient Position: Sitting, Cuff Size: Adult)   Pulse 98   Temp 98.7 °F (37.1 °C)   Ht 5' 5\" (1.651 m)   Wt 86.4 kg (190 lb 6.4 oz)   LMP 02/06/2024 (Exact Date)   SpO2 98%   BMI 31.68 kg/m²     Physical Exam  Vitals and nursing note reviewed.   Constitutional:       General: She is not in acute distress.     Appearance: Normal appearance. She is not ill-appearing or diaphoretic.   HENT:      Head: Normocephalic and atraumatic.   Pulmonary:      Effort: Pulmonary effort is normal. No respiratory distress.   Musculoskeletal:      Lumbar back: Tenderness present. No swelling or spasms. Normal range of motion. Positive left straight leg raise test. Negative " "right straight leg raise test.        Back:       Comments: Locations of most pain on exam today.    Skin:     Coloration: Skin is not cyanotic or pale.   Neurological:      General: No focal deficit present.      Mental Status: She is alert and oriented to person, place, and time.      Motor: No weakness.      Coordination: Coordination is intact. Coordination normal.      Gait: Gait is intact.      Deep Tendon Reflexes: Reflexes are normal and symmetric.      Comments: Patient reports numbness into left buttock and back of left thigh, with radiation into the left big toe. Sensory testing today reveals slight deficit on the left lower leg. Patient is able to feel sharp and dull sensations, but reports the left leg feels \"different\" on the left side.    Psychiatric:         Mood and Affect: Mood normal.         Behavior: Behavior normal. Behavior is cooperative.         Judgment: Judgment normal.       Lydia Rangel PA-C    "

## 2024-02-07 NOTE — PROGRESS NOTES
Pt tolerated venofer infusion without incident.  Pt declined AVS but is aware of her future appt on Feb. 14 at 2:00.

## 2024-02-12 DIAGNOSIS — G43.909 MIGRAINE WITHOUT STATUS MIGRAINOSUS, NOT INTRACTABLE, UNSPECIFIED MIGRAINE TYPE: ICD-10-CM

## 2024-02-12 RX ORDER — RIMEGEPANT SULFATE 75 MG/75MG
TABLET, ORALLY DISINTEGRATING ORAL
Qty: 9 TABLET | Refills: 0 | Status: SHIPPED | OUTPATIENT
Start: 2024-02-12

## 2024-02-14 ENCOUNTER — HOSPITAL ENCOUNTER (OUTPATIENT)
Dept: INFUSION CENTER | Facility: CLINIC | Age: 29
Discharge: HOME | End: 2024-02-14
Payer: COMMERCIAL

## 2024-02-14 VITALS
HEART RATE: 87 BPM | TEMPERATURE: 98.8 F | SYSTOLIC BLOOD PRESSURE: 122 MMHG | RESPIRATION RATE: 18 BRPM | DIASTOLIC BLOOD PRESSURE: 82 MMHG

## 2024-02-14 DIAGNOSIS — E61.1 IRON DEFICIENCY: Primary | ICD-10-CM

## 2024-02-14 RX ORDER — SODIUM CHLORIDE 9 MG/ML
20 INJECTION, SOLUTION INTRAVENOUS ONCE
Status: CANCELLED | OUTPATIENT
Start: 2024-02-14

## 2024-02-14 RX ORDER — SODIUM CHLORIDE 9 MG/ML
20 INJECTION, SOLUTION INTRAVENOUS ONCE
Status: COMPLETED | OUTPATIENT
Start: 2024-02-14 | End: 2024-02-14

## 2024-02-14 RX ADMIN — SODIUM CHLORIDE 20 ML/HR: 0.9 INJECTION, SOLUTION INTRAVENOUS at 14:09

## 2024-02-14 RX ADMIN — IRON SUCROSE 200 MG: 20 INJECTION, SOLUTION INTRAVENOUS at 14:09

## 2024-02-14 NOTE — PROGRESS NOTES
Pt tolerated venofer infusion without incident.  Pt declined AVS but is aware this is her last scheduled venofer infusion.

## 2024-02-19 DIAGNOSIS — M54.42 ACUTE LEFT-SIDED LOW BACK PAIN WITH LEFT-SIDED SCIATICA: Primary | ICD-10-CM

## 2024-02-19 RX ORDER — PREDNISONE 20 MG/1
20 TABLET ORAL DAILY
Qty: 10 TABLET | Refills: 0 | Status: SHIPPED | OUTPATIENT
Start: 2024-02-19 | End: 2024-02-29

## 2024-02-22 ENCOUNTER — EVALUATION (OUTPATIENT)
Dept: PHYSICAL THERAPY | Facility: CLINIC | Age: 29
End: 2024-02-22
Payer: COMMERCIAL

## 2024-02-22 DIAGNOSIS — G43.909 MIGRAINE WITHOUT STATUS MIGRAINOSUS, NOT INTRACTABLE, UNSPECIFIED MIGRAINE TYPE: ICD-10-CM

## 2024-02-22 DIAGNOSIS — M54.50 ACUTE RIGHT-SIDED LOW BACK PAIN WITHOUT SCIATICA: Primary | ICD-10-CM

## 2024-02-22 PROCEDURE — 97161 PT EVAL LOW COMPLEX 20 MIN: CPT | Performed by: PHYSICAL THERAPIST

## 2024-02-22 PROCEDURE — 97110 THERAPEUTIC EXERCISES: CPT | Performed by: PHYSICAL THERAPIST

## 2024-02-22 NOTE — PROGRESS NOTES
PT Evaluation     Today's date: 2024  Patient name: Mara Duarte  : 1995  MRN: [id number]  Referring provider: Lydia Rangel PA-C  Dx:   Encounter Diagnosis     ICD-10-CM    1. Acute right-sided low back pain without sciatica  M54.50 Ambulatory Referral to Comprehensive Spine PT                     Assessment/Plan  Ms. Duarte is a 28 y.o. female presenting to outpatient physical therapy with LBP which restricts their ability to participate in ADLs, work, and recreational activities without pain. Functional limitations are result of the following impairments: lumbar hypermobility, TTP R lumbar paraspinals, glute weakness, and decreased TrA activation with functional movement. Symptoms are consistent with treatment-based category stabilization. Patient does not demonstrate nerve tension or radicular symptoms at this time. PMH is sig for hashimoto's, migraine, asthma, post-partem (11 months). No further referral appears necessary at this time based upon examination results    Patient was given the opportunity to ask questions, and all questions were answered to the patient's satisfaction. The patients's greatest concerns are the pain not getting better, not being able to care for self or others, and pain getting worse. Patient appears motivated, agrees with the POC, and is a good candidate for skilled physical therapy at this time. Patient was provided with handout of HEP consisting of TrA iso, glute strengthening, and lumbar rotation stretching/    Symptom irritability: Low   Understanding of Dx/Px/POC: good  Prognosis: good  Negative prognostic indicators include: post-partem, hashimoto's  STarT back score: low risk    Goals  STG (3 weeks)  Pain: Patient will subjectively report maximum pain decreased by 2/10  Strength: Patient's will demonstrate glute strength improved by 1/2 grade  ROM: Patient will increase lumbar  ROM by 25%  Function: Patient increase score on FOTO by 10 points    LTG (6  weeks)  Pain: Patient will subjectively report less than 2/10 pain with functional activities.  Strength: Patient's will demonstrate glute strength improved to WNL  ROM: Patient will increase lumbar ROM to at least 50%  Function: Patient will increase score on FOTO to predicted value or better  Patient will be ind with HEP by DC    Plan  Plan details: Prognosis above is given PT services 1-2x/week over the next 6 weeks and home program adherence.   Patient would benefit from: skilled physical therapy, home exercise program  Referral necessary: no  Planned modality interventions: Hydrocollator packs, Cryotherapy, TENS, Low level laser, and Cupping  Planned therapy interventions: joint mobilization, manual therapy, massage, neuromuscular re-education, patient education, stretching, strengthening, therapeutic activities, therapeutic exercise, home exercise program, functional ROM exercises, gait training, flexibility, balance, abdominal trunk stabilization, motor coordination training, coordination and behavior modification  Frequency: 1-2x/week for 6 weeks  Duration in visits: 6-12  Plan of Care beginning date: 2/22/2024   Plan of Care expiration date: 4/4/2024  Treatment plan discussed with: patient    Subjective  IE (2/22/2024): Patient is a 28 y.o. female who presents for an initial outpatient physical therapy consultation regarding their LBP. Patient reports that her pain began about 2-3 weeks ago, about an hour after lifting a mattress. She describes her pain as across the low back and down the L leg to the L leg toe. She notes that her L big toe went completely numb. She was prescribed a steroid taper, which eliminated the L leg pain. The L toe numbness remained. She now is experiencing low back with bending over. The pain occasionally radiates out to the R hip and down the back of her R thigh. Her pain doesn't go past her knee.     Aggravating factors: bending over to pick something up. 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   Tenderness of the erector spinae.     Neurological Testing     Reflexes   Left   Patellar (L4): normal (2+)    Right   Patellar (L4): normal (2+)    Additional Neurological Details  Patient describes numbness of the L big toe.     Active Range of Motion   Cervical/Thoracic Spine       Thoracic    Extension:  Restriction level: minimal    Lumbar   Flexion:  with pain Restriction level: minimal  Extension:  Restriction level: minimal  Left lateral flexion:  Restriction level: minimal  Right lateral flexion:  Restriction level: minimal  Left rotation:  Restriction level: minimal  Right rotation:  Restriction level: minimal  Mechanical Assessment    Cervical      Thoracic      Lumbar    Standing flexion: repeated movements   Pain location:no change  Standing extension: repeated movements  Pain location: no change    Strength/Myotome Testing     Left Hip   Planes of Motion   Flexion: 4+  Extension: 4+  Abduction: 4+    Right Hip   Planes of Motion   Flexion: 4+  Extension: 4+  Abduction: 4+    Left Knee   Flexion: 5  Extension: 5    Right Knee   Flexion: 5  Extension: 5    Left Ankle/Foot   Dorsiflexion: 5  Plantar flexion: " 5  Great toe extension: 5    Right Ankle/Foot   Dorsiflexion: 5  Plantar flexion: 5  Great toe extension: 5    Tests     Lumbar   Positive prone instability .   Negative SIJ compression and sacral thrust .     Left   Negative crossed SLR, femoral stretch, passive SLR and slump test.     Right   Negative crossed SLR, passive SLR, quadrant and slump test.     Right Pelvic Girdle/Sacrum   Negative: thigh thrust.     Left Hip   Negative LEVON and FADIR.     Right Hip   Negative LEVON and FADIR.              Precautions: n/a      Manuals 2/22            Lumbar STM                                                    Neuro Re-Ed             TrA iso HEP            TrA+ march HEP            TrA +             Palloff press                                                    Ther Ex             bike             clamshell HEP            LTR HEP            bridge             Prone hip extension                                                    Ther Activity                                       Gait Training                                       Modalities                          IE/HEP JF

## 2024-02-28 ENCOUNTER — OFFICE VISIT (OUTPATIENT)
Dept: PHYSICAL THERAPY | Facility: CLINIC | Age: 29
End: 2024-02-28
Payer: COMMERCIAL

## 2024-02-28 DIAGNOSIS — G43.909 MIGRAINE WITHOUT STATUS MIGRAINOSUS, NOT INTRACTABLE, UNSPECIFIED MIGRAINE TYPE: ICD-10-CM

## 2024-02-28 DIAGNOSIS — M54.50 ACUTE RIGHT-SIDED LOW BACK PAIN WITHOUT SCIATICA: Primary | ICD-10-CM

## 2024-02-28 PROCEDURE — 97112 NEUROMUSCULAR REEDUCATION: CPT | Performed by: PHYSICAL THERAPIST

## 2024-02-28 PROCEDURE — 97110 THERAPEUTIC EXERCISES: CPT | Performed by: PHYSICAL THERAPIST

## 2024-02-28 RX ORDER — RIMEGEPANT SULFATE 75 MG/75MG
75 TABLET, ORALLY DISINTEGRATING ORAL ONCE
Qty: 9 TABLET | Refills: 0 | Status: SHIPPED | OUTPATIENT
Start: 2024-02-28 | End: 2024-02-28

## 2024-02-28 NOTE — TELEPHONE ENCOUNTER
Verbal order left for [facility] pharmacy since [facility] aakash never filled 2 weeks ago

## 2024-02-28 NOTE — PROGRESS NOTES
"Daily Note     Today's date: 2024  Patient name: Mara Duarte  : 1995  MRN: [id number]  Referring provider: Lydia Rangel PA-C  Dx:   Encounter Diagnosis     ICD-10-CM    1. Acute right-sided low back pain without sciatica  M54.50                      Subjective: Patient reports that her back is feeling a little better with her exercises at home.      Objective: See treatment diary below      Assessment: Reviewed HEP to ensure proper form and to answer any questions regarding prescribed exercises. Patient demonstrates good recall and form with minimal cuing. Patient reports mild back pain with anti-rotation core exercises. Continue to progress as tolerated. Patient will continue to benefit from skilled PT in order to address impairments and improve function.       Plan: Continue per plan of care.  Progress treatment as tolerated.       Precautions: n/a      Manuals            Lumbar STM                                                    Neuro Re-Ed             TrA iso HEP 5\"   x10           TrA+ march HEP 5\"  2x10           TrA + shoulder flexion  Bolster press  5\"   2x10           Palloff press  BTB  X20 ea           Pball shoulder extension                                       Ther Ex             bike  5 min           clamshell HEP BTB  2x10 ea           LTR HEP            bridge  BTB  3x10           Prone hip extension  Leaning on table     2x10                                                  Ther Activity             STS  nv                        Gait Training                                       Modalities                          IE/HEP JF                 "

## 2024-02-28 NOTE — TELEPHONE ENCOUNTER
Reason for call:   [x] Refill   [] Prior Auth  [] Other:     Office:   [x] PCP/Provider -   [] Specialty/Provider -     Medication:   Nurtec 75mg- take 1 tablet by mouth once as needed for migraine      [facility]: [facility]    Does the patient have enough for 3 days?   [] Yes   [x] No - Send as HP to POD

## 2024-03-02 DIAGNOSIS — Z30.011 INITIATION OF ORAL CONTRACEPTION: ICD-10-CM

## 2024-03-04 ENCOUNTER — TELEPHONE (OUTPATIENT)
Dept: OTHER | Facility: HOSPITAL | Age: 29
End: 2024-03-04

## 2024-03-04 DIAGNOSIS — G43.909 MIGRAINE WITHOUT STATUS MIGRAINOSUS, NOT INTRACTABLE, UNSPECIFIED MIGRAINE TYPE: ICD-10-CM

## 2024-03-04 RX ORDER — DESOGESTREL AND ETHINYL ESTRADIOL 21-5 (28)
1 KIT ORAL DAILY
Qty: 84 TABLET | Refills: 1 | Status: SHIPPED | OUTPATIENT
Start: 2024-03-04

## 2024-03-04 RX ORDER — RIMEGEPANT SULFATE 75 MG/75MG
75 TABLET, ORALLY DISINTEGRATING ORAL ONCE AS NEEDED
Qty: 9 TABLET | Refills: 0 | Status: SHIPPED | OUTPATIENT
Start: 2024-03-04

## 2024-03-04 NOTE — TELEPHONE ENCOUNTER
Patient called the RX Refill Line. Message is being forwarded to the office.     Patient is requesting rimegepant sulfate (Nurtec) 75 mg TBDP [[id number]]  ENDED  On inactive med list    Please contact patient at  [phone]

## 2024-03-04 NOTE — TELEPHONE ENCOUNTER
It came off her med list because duration is set. I removed that and resent Rx to [facility] per pt request (I called her to confirm).    Advised her to call the pharmacy in 30-45 min to make sure they received on their end and when it will be ready for . Call back if they're still having issues. I can speak with pharmacist and give verbal directly if needed.

## 2024-03-25 ENCOUNTER — APPOINTMENT (OUTPATIENT)
Dept: LAB | Facility: CLINIC | Age: 29
End: 2024-03-25
Payer: COMMERCIAL

## 2024-03-25 DIAGNOSIS — E03.8 HYPOTHYROIDISM DUE TO HASHIMOTO'S THYROIDITIS: ICD-10-CM

## 2024-03-25 DIAGNOSIS — E06.3 HYPOTHYROIDISM DUE TO HASHIMOTO'S THYROIDITIS: ICD-10-CM

## 2024-03-25 DIAGNOSIS — E61.1 IRON DEFICIENCY: ICD-10-CM

## 2024-03-25 LAB
FERRITIN SERPL-MCNC: 202 NG/ML (ref 11–307)
T3FREE SERPL-MCNC: 3.17 PG/ML (ref 2.5–3.9)
T4 FREE SERPL-MCNC: 1.06 NG/DL (ref 0.61–1.12)
TSH SERPL DL<=0.05 MIU/L-ACNC: 1.8 UIU/ML (ref 0.45–4.5)

## 2024-03-25 PROCEDURE — 84443 ASSAY THYROID STIM HORMONE: CPT

## 2024-03-25 PROCEDURE — 82728 ASSAY OF FERRITIN: CPT

## 2024-03-25 PROCEDURE — 84481 FREE ASSAY (FT-3): CPT

## 2024-03-25 PROCEDURE — 84439 ASSAY OF FREE THYROXINE: CPT

## 2024-03-25 PROCEDURE — 36415 COLL VENOUS BLD VENIPUNCTURE: CPT

## 2024-03-26 ENCOUNTER — OFFICE VISIT (OUTPATIENT)
Dept: HEMATOLOGY ONCOLOGY | Facility: CLINIC | Age: 29
End: 2024-03-26
Payer: COMMERCIAL

## 2024-03-26 VITALS
RESPIRATION RATE: 17 BRPM | WEIGHT: 198 LBS | HEIGHT: 65 IN | DIASTOLIC BLOOD PRESSURE: 76 MMHG | TEMPERATURE: 98.2 F | BODY MASS INDEX: 32.99 KG/M2 | HEART RATE: 95 BPM | OXYGEN SATURATION: 98 % | SYSTOLIC BLOOD PRESSURE: 124 MMHG

## 2024-03-26 DIAGNOSIS — E61.1 IRON DEFICIENCY: Primary | ICD-10-CM

## 2024-03-26 PROCEDURE — 99213 OFFICE O/P EST LOW 20 MIN: CPT | Performed by: PHYSICIAN ASSISTANT

## 2024-03-26 NOTE — PROGRESS NOTES
Hematology/Oncology Outpatient Follow-up  Mara Duarte 28 y.o. female 1995 [id number]    Date:  3/26/2024      Assessment and Plan:  Iron deficiency in setting of pregnancy and postpartum  Patient's iron was low, ferritin was 7 in 2023.  She completed intravenous iron therapy, Venofer 100 mg weekly x 6  Ferritin has improved and maintained above 200  Hemoglobin is normal  She feels significantly improved in regards to fatigue, no more myalgias  She finally feels back to herself in regards to the fatigue.    Recommend monitoring again in 6 months for which she will follow with PCP.  Additionally recommend assessing ferritin in this patient with any subsequent pregnancies in the second and third trimester.  Follow up PRN    HPI:  28-year-old female presents for follow up visit regarding low ferritin.     Patient had a miscarriage in 2022.  She states that she does not have significant bleeding with this.     She then was pregnant and delivered baby in 2023.     She had emergent  secondary to gestational hypertension/preeclampsia.     She did have blood loss with this requiring Venofer x 1 dose inpatient.     She did not have prolonged postpartum bleeding.     She had been off her birth control approximately 1 year prior to 2022.  She did not have menorrhagia.     She took prenatal with iron.  Additionally she states during pregnancy she did have a lot of energy.  She did have diagnosis also during pregnancy and postpartum of Hashimoto's thyroiditis with hypothyroidism.  She developed a goiter.  She is following with endocrinology at this time.     After delivery, she went to her PCP secondary to having arthralgias, myalgias, significant fatigue.  Her baby is sleeping 12 hours and patient is also sleeping well.  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    She was seen  in consult in Dec. Her iron stores were low. She completed venofer 200 mg IV weekly x 6.    ROS: Review of Systems   Constitutional:  Negative for activity change, appetite change, chills, fatigue, fever and unexpected weight change.   Respiratory:  Negative for cough and shortness of breath.    Cardiovascular:  Negative for chest pain, palpitations and leg swelling.   Gastrointestinal:  Negative for abdominal pain, constipation, diarrhea, nausea and vomiting.   Genitourinary:  Negative for difficulty urinating, dysuria and hematuria.   Musculoskeletal:  Negative for arthralgias.   Skin: Negative.    Neurological:  Negative for dizziness, weakness, light-headedness, numbness and headaches.   Hematological: Negative.    Psychiatric/Behavioral: Negative.       Past Medical History:   Diagnosis Date    Acne     ADHD     Allergic asthma     Cluster headache     Disease of thyroid gland     Eczema     as a child    Hypertension     Migraines     Subclinical hypothyroidism     Vision loss        Past Surgical History:   Procedure Laterality Date    FINGER SURGERY Left     pin placed, middle    [place]  DELIVERY ONLY N/A 2023    Procedure:  SECTION ();  Surgeon: Jane Awad MD;  Location: [facility] ;  Service: Obstetrics    SHOULDER SURGERY Right     2019    SKIN BIOPSY      WISDOM TOOTH EXTRACTION         Social History     Socioeconomic History    Marital status: /Civil Union     Spouse name: Not on file    Number of children: Not on file    Years of education: Not on file    Highest education level: Not on file   Occupational History    Not on file   Tobacco Use    Smoking status: Never     Passive exposure: Never    Smokeless tobacco: Never   Vaping Use    Vaping status: Never Used   Substance and Sexual Activity    Alcohol use: Not Currently     Alcohol/week: 1.0 standard drink of alcohol     Types: 1 Glasses of wine per week     Comment: Socially    Drug use: Never    Sexual activity:  Yes     Partners: Male     Birth control/protection: OCP   Other Topics Concern    Not on file   Social History Narrative    Not on file     Social Determinants of Health     Financial Resource Strain: Low Risk  (12/29/2023)    Received from [facility]    Overall Financial Resource Strain (CARDIA)     Difficulty of Paying Living Expenses: Not hard at all   Food Insecurity: No Food Insecurity (12/29/2023)    Received from [facility]    Hunger Vital Sign     Worried About Running Out of Food in the Last Year: Never true     Ran Out of Food in the Last Year: Never true   Transportation Needs: No Transportation Needs (12/29/2023)    Received from [facility]    PRAPARE - Transportation     Lack of Transportation (Medical): No     Lack of Transportation (Non-Medical): No   Physical Activity: Not on file   Stress: Not on file   Social Connections: Not on file   Intimate Partner Violence: Not At Risk (12/29/2023)    Received from [facility]    Humiliation, Afraid, Rape, and Kick questionnaire     Fear of Current or Ex-Partner: No     Emotionally Abused: No     Physically Abused: No     Sexually Abused: No   Housing Stability: Low Risk  (12/29/2023)    Received from [facility]    Housing Stability Vital Sign     Unable to Pay for Housing in the Last Year: No     Number of Places Lived in the Last Year: 1     Unstable Housing in the Last Year: No       Family History   Problem Relation Age of Onset    BRCA1 Negative Mother     Breast cancer Mother 58        negative genetic testing    Aortic aneurysm Father     No Known Problems Sister     No Known Problems Daughter     Thyroid disease unspecified Maternal Grandmother         thyroid goiter/nodules    Breast cancer Maternal Grandmother 75    Heart disease Paternal Grandmother     Lung cancer Paternal Grandfather        Allergies   Allergen Reactions    Clindamycin Rash      "headache  headache      Tramadol GI Intolerance, Hives, Itching and Rash         Current Outpatient Medications:     aspirin-acetaminophen-caffeine (EXCEDRIN MIGRAINE) 250-250-65 MG per tablet, Take 1 tablet by mouth every 6 (six) hours as needed, Disp: , Rfl:     Cyanocobalamin (VITAMIN B-12 PO), Take by mouth, Disp: , Rfl:     desogestrel-ethinyl estradiol (KARIVA) 0.15-0.02/0.01 MG (21/5) per tablet, Take 1 tablet by mouth daily, Disp: 84 tablet, Rfl: 1    ibuprofen (MOTRIN) 800 mg tablet, , Disp: , Rfl:     levocetirizine (XYZAL) 5 MG tablet, Take 5 mg by mouth every evening, Disp: , Rfl:     levothyroxine 112 mcg tablet, Take 1 tablet (112 mcg total) by mouth daily, Disp: 90 tablet, Rfl: 1    Prenat w/o A-FeCbGl-DSS-FA-DHA (PNV OB+DHA PO), Take by mouth, Disp: , Rfl:     rimegepant sulfate (Nurtec) 75 mg TBDP, Take 1 tablet (75 mg total) by mouth once as needed (migraine), Disp: 9 tablet, Rfl: 0    albuterol (Ventolin HFA) 90 mcg/act inhaler, Inhale 2 puffs every 6 (six) hours as needed for wheezing (Patient not taking: Reported on 3/26/2024), Disp: 18 g, Rfl: 0    cyclobenzaprine (FLEXERIL) 10 mg tablet, Take 1 tablet (10 mg total) by mouth 3 (three) times a day as needed for muscle spasms, Disp: 30 tablet, Rfl: 0    cyproheptadine (PERIACTIN) 4 mg tablet, Take 1 tablet (4 mg total) by mouth daily at bedtime, Disp: 30 tablet, Rfl: 3    Physical Exam:  /76 (BP Location: Right arm, Patient Position: Sitting, Cuff Size: Adult)   Pulse 95   Temp 98.2 °F (36.8 °C)   Resp 17   Ht 5' 5\" (1.651 m)   Wt 89.8 kg (198 lb)   SpO2 98%   BMI 32.95 kg/m²     Physical Exam  Vitals reviewed.   Constitutional:       General: She is not in acute distress.     Appearance: She is well-developed. She is not ill-appearing.   HENT:      Head: Normocephalic and atraumatic.   Eyes:      General: No scleral icterus.     Conjunctiva/sclera: Conjunctivae normal.   Cardiovascular:      Rate and Rhythm: Normal rate and regular " rhythm.      Heart sounds: Normal heart sounds. No murmur heard.  Pulmonary:      Effort: Pulmonary effort is normal. No respiratory distress.      Breath sounds: Normal breath sounds.   Abdominal:      Palpations: Abdomen is soft.      Tenderness: There is no abdominal tenderness.   Musculoskeletal:         General: No tenderness. Normal range of motion.      Cervical back: Normal range of motion and neck supple.      Right lower leg: No edema.      Left lower leg: No edema.   Lymphadenopathy:      Cervical: No cervical adenopathy.   Skin:     General: Skin is warm and dry.   Neurological:      Mental Status: She is alert and oriented to person, place, and time.      Cranial Nerves: No cranial nerve deficit.   Psychiatric:         Mood and Affect: Mood normal.         Behavior: Behavior normal.       Labs:  Lab Results   Component Value Date    WBC 5.66 02/01/2024    HGB 13.3 02/01/2024    HCT 39.5 02/01/2024    MCV 87 02/01/2024     02/01/2024     I have spent 20 minutes with Patient  today in which greater than 50% of this time was spent in counseling/coordination of care regarding Diagnostic results, Risks and benefits of tx options, Instructions for management, Impressions, Documenting in the medical record, Reviewing / ordering tests, medicine, procedures  , and Obtaining or reviewing history  .     Patient voiced understanding and agreement in the above discussion. Aware to contact our office with questions/symptoms in the interim.     This note has been generated by voice recognition software system.  Therefore, there may be spelling, grammar, and or syntax errors. Please contact if questions arise.

## 2024-04-02 ENCOUNTER — TELEPHONE (OUTPATIENT)
Age: 29
End: 2024-04-02

## 2024-04-02 DIAGNOSIS — B37.31 VAGINAL YEAST INFECTION: Primary | ICD-10-CM

## 2024-04-02 RX ORDER — FLUCONAZOLE 150 MG/1
150 TABLET ORAL ONCE
Qty: 1 TABLET | Refills: 0 | Status: SHIPPED | OUTPATIENT
Start: 2024-04-02 | End: 2024-04-02

## 2024-04-02 NOTE — TELEPHONE ENCOUNTER
Patient called asking if we can call in Diflucan to her pharmacy for a yeast infection.  She was on antibiotics for recent mouth surgery.  Please call patient when that is done.

## 2024-04-04 ENCOUNTER — OFFICE VISIT (OUTPATIENT)
Dept: ENDOCRINOLOGY | Facility: HOSPITAL | Age: 29
End: 2024-04-04
Payer: COMMERCIAL

## 2024-04-04 VITALS
HEIGHT: 65 IN | OXYGEN SATURATION: 98 % | WEIGHT: 196 LBS | DIASTOLIC BLOOD PRESSURE: 78 MMHG | BODY MASS INDEX: 32.65 KG/M2 | HEART RATE: 81 BPM | SYSTOLIC BLOOD PRESSURE: 114 MMHG

## 2024-04-04 DIAGNOSIS — E06.3 HYPOTHYROIDISM DUE TO HASHIMOTO'S THYROIDITIS: Primary | ICD-10-CM

## 2024-04-04 DIAGNOSIS — E01.0 THYROMEGALY: ICD-10-CM

## 2024-04-04 DIAGNOSIS — E03.8 HYPOTHYROIDISM DUE TO HASHIMOTO'S THYROIDITIS: Primary | ICD-10-CM

## 2024-04-04 PROCEDURE — 99214 OFFICE O/P EST MOD 30 MIN: CPT | Performed by: INTERNAL MEDICINE

## 2024-04-04 RX ORDER — LEVOTHYROXINE SODIUM 112 UG/1
112 TABLET ORAL DAILY
Qty: 90 TABLET | Refills: 3 | Status: SHIPPED | OUTPATIENT
Start: 2024-04-04

## 2024-04-04 NOTE — PATIENT INSTRUCTIONS
The thyroid blood work is normal.     Continue the same levothyroxine 112 mcg daily.     Let's check a thyroid ultrasound to see if the thyroid size has changed.     Follow up in 6 months with blood work.

## 2024-04-04 NOTE — PROGRESS NOTES
4/4/2024    Assessment/Plan     1. Hypothyroidism due to Hashimoto's thyroiditis  -     US thyroid; Future; Expected date: 04/04/2024  -     T4, free; Future; Expected date: 09/02/2024  -     TSH, 3rd generation; Future; Expected date: 09/02/2024  -     levothyroxine 112 mcg tablet; Take 1 tablet (112 mcg total) by mouth daily    2. Thyromegaly  -     US thyroid; Future; Expected date: 04/04/2024  -     T4, free; Future; Expected date: 09/02/2024  -     TSH, 3rd generation; Future; Expected date: 09/02/2024         1. Hypothyroidism due to Hashimoto's thyroiditis.  The patient's most recent thyroid function studies are normal, indicating both biochemically and clinically euthyroid status. It is my assessment that her previous fatigue was primarily due to iron deficiency rather than thyroid-related issues. The patient will maintain her current regimen of levothyroxine 112 mcg daily.    2. Thyromegaly.  An ultrasound conducted in 08/2023 revealed an enlarged thyroid, which was when she had significantly elevated in TSH levels. However, her thyroid levels have shown a decrease over the past 6 months. She continues to experience minor compressive symptoms when lying down, but these symptoms are showing signs of improvement. A repeat ultrasound will be scheduled. A thyroid ultrasound will be performed at her earliest convenience. Should her thyroid remain significantly elevated in size, we may consider increasing her levothyroxine dosage to potentially shrink the size, considering her diagnosis of Hashimoto's.    The patient is scheduled for a follow-up visit in 6 months, which will include a preceding TSH and free T4. She has been advised to contact us if she decides to conceive.    CC: Hypothyroid, goiter follow-up    HPI: Mara Duarte is a 28-year-old female with history of hypothyroidism due to Hashimoto's thyroiditis, here for follow-up visit. She has been trying to get pregnant after having a miscarriage and has  been on low-dose of levothyroxine to help her get pregnant in the past. She was able to get pregnant in . After delivery, she noticed a swelling in her neck and was found to have hypothyroidism with positive thyroid antibodies. She is on thyroid hormone on a daily basis for replacement purposes.    She is currently on a daily regimen of levothyroxine 112 mcg 1 tablet daily, which she reports as beneficial. She denies experiencing excessive body temperature fluctuations, palpitations, tremors, diarrhea, or constipation. Her sleep pattern remains unaffected. She has completed six rounds of iron infusions due to low iron levels. Prior to this, she experienced fatigue, which she initially attributed to her thyroid condition. However, her physician confirmed that her thyroid levels were within the normal range. She denies experiencing dry skin, brittle nails, or hair loss. Her menstrual cycles are regular, occurring monthly prior to starting birth control. She briefly  her child and plans to conceive once her health improves. She denies dysphagia but reports difficulty breathing when lying flat, a symptom that has slightly improved since the dosage was increased.      Review of Systems  The pertinent positive and negative findings are as noted in the HPI.    Historical Information   Past Medical History:   Diagnosis Date    Acne     ADHD     Allergic asthma     Cluster headache     Disease of thyroid gland     Eczema     as a child    Hypertension     Migraines     Subclinical hypothyroidism     Vision loss      Past Surgical History:   Procedure Laterality Date    FINGER SURGERY Left     pin placed, middle    [place]  DELIVERY ONLY N/A 2023    Procedure:  SECTION ();  Surgeon: Jane Awad MD;  Location: [facility];  Service: Obstetrics    SHOULDER SURGERY Right     2019    SKIN BIOPSY      WISDOM TOOTH EXTRACTION       Social History   Social History     Substance and Sexual  "Activity   Alcohol Use Not Currently    Alcohol/week: 1.0 standard drink of alcohol    Types: 1 Glasses of wine per week    Comment: Socially     Social History     Substance and Sexual Activity   Drug Use Never     Social History     Tobacco Use   Smoking Status Never    Passive exposure: Never   Smokeless Tobacco Never     Family History:   Family History   Problem Relation Age of Onset    BRCA1 Negative Mother     Breast cancer Mother 58        negative genetic testing    Aortic aneurysm Father     No Known Problems Sister     No Known Problems Daughter     Thyroid disease unspecified Maternal Grandmother         thyroid goiter/nodules    Breast cancer Maternal Grandmother 75    Heart disease Paternal Grandmother     Lung cancer Paternal Grandfather        Meds/Allergies   Current Outpatient Medications   Medication Sig Dispense Refill    albuterol (Ventolin HFA) 90 mcg/act inhaler Inhale 2 puffs every 6 (six) hours as needed for wheezing 18 g 0    aspirin-acetaminophen-caffeine (EXCEDRIN MIGRAINE) 250-250-65 MG per tablet Take 1 tablet by mouth every 6 (six) hours as needed      Cyanocobalamin (VITAMIN B-12 PO) Take by mouth      desogestrel-ethinyl estradiol (KARIVA) 0.15-0.02/0.01 MG (21/5) per tablet Take 1 tablet by mouth daily 84 tablet 1    ibuprofen (MOTRIN) 800 mg tablet       levocetirizine (XYZAL) 5 MG tablet Take 5 mg by mouth every evening      levothyroxine 112 mcg tablet Take 1 tablet (112 mcg total) by mouth daily 90 tablet 3    Prenat w/o A-FeCbGl-DSS-FA-DHA (PNV OB+DHA PO) Take by mouth      rimegepant sulfate (Nurtec) 75 mg TBDP Take 1 tablet (75 mg total) by mouth once as needed (migraine) 9 tablet 0     No current facility-administered medications for this visit.     Allergies   Allergen Reactions    Clindamycin Rash     headache  headache      Tramadol GI Intolerance, Hives, Itching and Rash       Objective   Vitals: Blood pressure 114/78, pulse 81, height 5' 5\" (1.651 m), weight 88.9 kg " (196 lb), SpO2 98%, not currently breastfeeding.  Invasive Devices       None                   Physical Exam  Physical exam normal with pertinent positives and negatives.    Eyes: No lid lags, scale, proptosis, or periorbital edema.  Neck: The thyroid is full, about 5.5 to 5.5 cm in size per lobe, with the right larger than the left. No palpable nodules or lymphadenopathy in the neck.  Lungs: Clear.  Heart: Heart rhythm is regular. No murmurs.  Neurological: No tremor of the outstretched hands. Patellar deep tendon reflexes are normal.    The history was obtained from the review of the chart and from the patient.      Lab Results:          Lab Results   Component Value Date    CREATININE 0.79 12/29/2023    CREATININE 0.90 12/15/2023    CREATININE 0.94 08/25/2023    BUN 13 12/29/2023    K 3.5 12/29/2023     12/29/2023    CO2 25 12/29/2023     eGFRcr   Date Value Ref Range Status   12/29/2023 103 >59 Final         Lab Results   Component Value Date    ALT 12 12/29/2023    AST 16 12/29/2023    ALKPHOS 62 12/29/2023       Lab Results   Component Value Date    TSH 2.00 09/23/2022    FREET4 1.06 03/25/2024       Blood work performed on 03/25/2024 showed a TSH of 1.802 with a free T4 of 1.06 and a free T3 of 3.17.      Future Appointments   Date Time Provider Department Center   4/10/2024  7:30 PM [facility] 1 [facility]   4/22/2024  1:00 PM Pieter Mejia MD [facility]   5/16/2024  2:00 PM [facility] [place]   7/22/2024  8:00 AM Holli Bolton PA-C [facility]       Transcribed for Sandy Sancehz MD, by Leonor Gupta on 04/04/24 at 6:32 PM. Powered by [employer].

## 2024-04-05 DIAGNOSIS — N76.0 VAGINITIS: Primary | ICD-10-CM

## 2024-04-05 RX ORDER — FLUCONAZOLE 150 MG/1
150 TABLET ORAL ONCE
Qty: 1 TABLET | Refills: 1 | Status: SHIPPED | OUTPATIENT
Start: 2024-04-05 | End: 2024-04-05

## 2024-04-10 ENCOUNTER — HOSPITAL ENCOUNTER (OUTPATIENT)
Dept: ULTRASOUND IMAGING | Facility: HOSPITAL | Age: 29
Discharge: HOME | End: 2024-04-10
Payer: COMMERCIAL

## 2024-04-10 DIAGNOSIS — E01.0 THYROMEGALY: ICD-10-CM

## 2024-04-10 DIAGNOSIS — E03.8 HYPOTHYROIDISM DUE TO HASHIMOTO'S THYROIDITIS: ICD-10-CM

## 2024-04-10 DIAGNOSIS — E06.3 HYPOTHYROIDISM DUE TO HASHIMOTO'S THYROIDITIS: ICD-10-CM

## 2024-04-10 PROCEDURE — 76536 US EXAM OF HEAD AND NECK: CPT

## 2024-04-16 DIAGNOSIS — Z30.011 INITIATION OF ORAL CONTRACEPTION: ICD-10-CM

## 2024-04-16 RX ORDER — DESOGESTREL AND ETHINYL ESTRADIOL 21-5 (28)
1 KIT ORAL DAILY
Qty: 84 TABLET | Refills: 1 | Status: SHIPPED | OUTPATIENT
Start: 2024-04-16

## 2024-04-17 NOTE — PLAN OF CARE
Problem: PAIN - ADULT  Goal: Verbalizes/displays adequate comfort level or baseline comfort level  Description: Interventions:  - Encourage patient to monitor pain and request assistance  - Assess pain using appropriate pain scale  - Administer analgesics based on type and severity of pain and evaluate response  - Implement non-pharmacological measures as appropriate and evaluate response  - Consider cultural and social influences on pain and pain management  - Notify physician/advanced practitioner if interventions unsuccessful or patient reports new pain  3/10/2023 1305 by Hi Stevens RN  Outcome: Completed  3/10/2023 0902 by Hi Stevens RN  Outcome: Progressing     Problem: INFECTION - ADULT  Goal: Absence or prevention of progression during hospitalization  Description: INTERVENTIONS:  - Assess and monitor for signs and symptoms of infection  - Monitor lab/diagnostic results  - Monitor all insertion sites, i e  indwelling lines, tubes, and drains  - Monitor endotracheal if appropriate and nasal secretions for changes in amount and color  - [place] appropriate cooling/warming therapies per order  - Administer medications as ordered  - Instruct and encourage patient and family to use good hand hygiene technique  - Identify and instruct in appropriate isolation precautions for identified infection/condition  3/10/2023 1305 by Hi Stevens RN  Outcome: Completed  3/10/2023 0902 by Hi Stevens RN  Outcome: Progressing  Goal: Absence of fever/infection during neutropenic period  Description: INTERVENTIONS:  - Monitor WBC    3/10/2023 1305 by Hi Stevens RN  Outcome: Completed  3/10/2023 0902 by Hi Stevens RN  Outcome: Progressing     Problem: SAFETY ADULT  Goal: Patient will remain free of falls  Description: INTERVENTIONS:  - Educate patient/family on patient safety including physical limitations  - Instruct patient to call for assistance with activity   - Consult OT/PT to assist with strengthening/mobility   - Keep Call bell within reach  - Keep bed low and locked with side rails adjusted as appropriate  - Keep care items and personal belongings within reach  - Initiate and maintain comfort rounds  - Make Fall Risk Sign visible to staff  - Offer Toileting every  Hours, in advance of need  - Initiate/Maintain alarm  - Obtain necessary fall risk management equipment:   - Apply yellow socks and bracelet for high fall risk patients  - Consider moving patient to room near nurses station  3/10/2023 1305 by Trevor Lrener RN  Outcome: Completed  3/10/2023 0902 by Trevor Lerner RN  Outcome: Progressing  Goal: Maintain or return to baseline ADL function  Description: INTERVENTIONS:  -  Assess patient's ability to carry out ADLs; assess patient's baseline for ADL function and identify physical deficits which impact ability to perform ADLs (bathing, care of mouth/teeth, toileting, grooming, dressing, etc )  - Assess/evaluate cause of self-care deficits   - Assess range of motion  - Assess patient's mobility; develop plan if impaired  - Assess patient's need for assistive devices and provide as appropriate  - Encourage maximum independence but intervene and supervise when necessary  - Involve family in performance of ADLs  - Assess for home care needs following discharge   - Consider OT consult to assist with ADL evaluation and planning for discharge  - Provide patient education as appropriate  3/10/2023 1305 by Trevor Lerner RN  Outcome: Completed  3/10/2023 0902 by Trevor Lerner RN  Outcome: Progressing  Goal: Maintains/Returns to pre admission functional level  Description: INTERVENTIONS:  - Perform BMAT or MOVE assessment daily    - Set and communicate daily mobility goal to care team and patient/family/caregiver  - Collaborate with rehabilitation services on mobility goals if consulted  - Perform Range of Motion  times a day  - Reposition patient every  hours    - Dangle patient  times a day  - Stand patient  times a day  - Ambulate patient  times a day  - Out of bed to chair  times a day   - Out of bed for meals  times a day  - Out of bed for toileting  - Record patient progress and toleration of activity level   3/10/2023 1305 by Vera Chacon RN  Outcome: Completed  3/10/2023 0902 by Vera Chacon RN  Outcome: Progressing     Problem: DISCHARGE PLANNING  Goal: Discharge to home or other facility with appropriate resources  Description: INTERVENTIONS:  - Identify barriers to discharge w/patient and caregiver  - Arrange for needed discharge resources and transportation as appropriate  - Identify discharge learning needs (meds, wound care, etc )  - Arrange for interpretive services to assist at discharge as needed  - Refer to Case Management Department for coordinating discharge planning if the patient needs post-hospital services based on physician/advanced practitioner order or complex needs related to functional status, cognitive ability, or social support system  3/10/2023 1305 by Vera Chacon RN  Outcome: Completed  3/10/2023 0902 by Vera Chacon RN  Outcome: Progressing     Problem: POSTPARTUM  Goal: Experiences normal postpartum course  Description: INTERVENTIONS:  - Monitor maternal vital signs  - Assess uterine involution and lochia  3/10/2023 1305 by Vera Chacon RN  Outcome: Completed  3/10/2023 0902 by Vera Chacon RN  Outcome: Progressing  Goal: Appropriate maternal -  bonding  Description: INTERVENTIONS:  - Identify family support  - Assess for appropriate maternal/infant bonding   -Encourage maternal/infant bonding opportunities  - Referral to  or  as needed  3/10/2023  by Vera Chacon RN  Outcome: Completed  3/10/2023 0902 by Vera Chacon RN  Outcome: Progressing  Goal: Establishment of infant feeding pattern  Description: INTERVENTIONS:  - Assess breast/bottle feeding  - Refer to lactation as needed  3/10/2023 1305 by Vera Chacon RN  Outcome: Completed  3/10/2023 0902 by Kaylee Parmar RN  Outcome: Progressing  Goal: Incision(s), wounds(s) or drain site(s) healing without S/S of infection  Description: INTERVENTIONS  - Assess and document dressing, incision, wound bed, drain sites and surrounding tissue  - Provide patient and family education  - Perform skin care/dressing changes every   3/10/2023 1305 by Kaylee Parmar RN  Outcome: Completed  3/10/2023 0902 by Kaylee Parmar RN  Outcome: Progressing No

## 2024-05-03 ENCOUNTER — OFFICE VISIT (OUTPATIENT)
Dept: FAMILY MEDICINE CLINIC | Facility: CLINIC | Age: 29
End: 2024-05-03
Payer: COMMERCIAL

## 2024-05-03 VITALS
TEMPERATURE: 97.8 F | DIASTOLIC BLOOD PRESSURE: 72 MMHG | HEART RATE: 116 BPM | OXYGEN SATURATION: 98 % | SYSTOLIC BLOOD PRESSURE: 120 MMHG | WEIGHT: 190.2 LBS | BODY MASS INDEX: 31.69 KG/M2 | HEIGHT: 65 IN

## 2024-05-03 DIAGNOSIS — J01.90 ACUTE SINUSITIS, RECURRENCE NOT SPECIFIED, UNSPECIFIED LOCATION: ICD-10-CM

## 2024-05-03 PROCEDURE — 99214 OFFICE O/P EST MOD 30 MIN: CPT | Performed by: FAMILY MEDICINE

## 2024-05-03 RX ORDER — ALBUTEROL SULFATE 90 UG/1
2 AEROSOL, METERED RESPIRATORY (INHALATION) EVERY 6 HOURS PRN
Qty: 18 G | Refills: 0 | Status: SHIPPED | OUTPATIENT
Start: 2024-05-03

## 2024-05-03 RX ORDER — FLUTICASONE PROPIONATE 50 MCG
2 SPRAY, SUSPENSION (ML) NASAL DAILY
Qty: 16 G | Refills: 0 | Status: SHIPPED | OUTPATIENT
Start: 2024-05-03

## 2024-05-03 RX ORDER — DOXYCYCLINE 100 MG/1
100 TABLET ORAL 2 TIMES DAILY
Qty: 14 TABLET | Refills: 0 | Status: SHIPPED | OUTPATIENT
Start: 2024-05-03 | End: 2024-05-10

## 2024-05-03 RX ORDER — FLUCONAZOLE 150 MG/1
150 TABLET ORAL ONCE
Qty: 1 TABLET | Refills: 0 | Status: SHIPPED | OUTPATIENT
Start: 2024-05-03 | End: 2024-05-03

## 2024-05-03 NOTE — PROGRESS NOTES
Assessment/Plan:   1. Acute sinusitis, recurrence not specified, unspecified location  Symptoms appear secondary to acute sinusitis.  Start supportive care.  Maintain hydration.  Take over-the-counter Mucinex.  Start treatment with fluticasone nasal spray as well as her albuterol.  Will start treatment with doxycycline.  Follow-up if any symptoms persist.  - albuterol (Ventolin HFA) 90 mcg/act inhaler; Inhale 2 puffs every 6 (six) hours as needed for wheezing  Dispense: 18 g; Refill: 0  - fluticasone (FLONASE) 50 mcg/act nasal spray; 2 sprays into each nostril daily  Dispense: 16 g; Refill: 0  - doxycycline (ADOXA) 100 MG tablet; Take 1 tablet (100 mg total) by mouth 2 (two) times a day for 7 days  Dispense: 14 tablet; Refill: 0  - fluconazole (DIFLUCAN) 150 mg tablet; Take 1 tablet (150 mg total) by mouth once for 1 dose  Dispense: 1 tablet; Refill: 0           There are no diagnoses linked to this encounter.      Subjective:       Chief Complaint   Patient presents with    Cough     Lasting for about 2 weeks      Patient ID: Mara Duarte is a 28 y.o. female.    Cough  This is a new problem. The current episode started 1 to 4 weeks ago. The problem has been rapidly worsening. The problem occurs every few minutes. The cough is Non-productive and productive of sputum. Associated symptoms include ear congestion, headaches, nasal congestion, postnasal drip and wheezing. Pertinent negatives include no chest pain, chills, ear pain, eye redness, fever, heartburn, hemoptysis, myalgias, rash, rhinorrhea, sore throat, shortness of breath, sweats or weight loss. The symptoms are aggravated by animals, dust and pollens. She has tried OTC cough suppressant for the symptoms. There is no history of environmental allergies.       Review of Systems   Constitutional:  Negative for activity change, chills, fatigue, fever and weight loss.   HENT:  Positive for postnasal drip. Negative for congestion, ear pain, rhinorrhea, sinus  pressure and sore throat.    Eyes:  Negative for redness, itching and visual disturbance.   Respiratory:  Positive for cough and wheezing. Negative for hemoptysis and shortness of breath.    Cardiovascular:  Negative for chest pain and palpitations.   Gastrointestinal:  Negative for abdominal pain, diarrhea, heartburn and nausea.   Endocrine: Negative for cold intolerance and heat intolerance.   Genitourinary:  Negative for dysuria, flank pain and frequency.   Musculoskeletal:  Negative for arthralgias, back pain, gait problem and myalgias.   Skin:  Negative for color change and rash.   Allergic/Immunologic: Negative for environmental allergies.   Neurological:  Positive for headaches. Negative for dizziness and numbness.   Psychiatric/Behavioral:  Negative for behavioral problems and sleep disturbance.        The following portions of the patient's history were reviewed and updated as appropriate : past family history, past medical history, past social history and past surgical history.    Current Outpatient Medications:     albuterol (Ventolin HFA) 90 mcg/act inhaler, Inhale 2 puffs every 6 (six) hours as needed for wheezing, Disp: 18 g, Rfl: 0    aspirin-acetaminophen-caffeine (EXCEDRIN MIGRAINE) 250-250-65 MG per tablet, Take 1 tablet by mouth every 6 (six) hours as needed, Disp: , Rfl:     Cyanocobalamin (VITAMIN B-12 PO), Take by mouth, Disp: , Rfl:     desogestrel-ethinyl estradiol (KARIVA) 0.15-0.02/0.01 MG (21/5) per tablet, Take 1 tablet by mouth daily, Disp: 84 tablet, Rfl: 1    ibuprofen (MOTRIN) 800 mg tablet, , Disp: , Rfl:     levocetirizine (XYZAL) 5 MG tablet, Take 5 mg by mouth every evening, Disp: , Rfl:     levothyroxine 112 mcg tablet, Take 1 tablet (112 mcg total) by mouth daily, Disp: 90 tablet, Rfl: 3    Prenat w/o A-FeCbGl-DSS-FA-DHA (PNV OB+DHA PO), Take by mouth, Disp: , Rfl:     rimegepant sulfate (Nurtec) 75 mg TBDP, Take 1 tablet (75 mg total) by mouth once as needed (migraine), Disp: 9  "tablet, Rfl: 0         Objective:         Vitals:    05/03/24 0927   BP: 120/72   BP Location: Left arm   Patient Position: Sitting   Cuff Size: Large   Pulse: (!) 116   Temp: 97.8 °F (36.6 °C)   TempSrc: Temporal   SpO2: 98%   Weight: 86.3 kg (190 lb 3.2 oz)   Height: 5' 5\" (1.651 m)     Physical Exam  Vitals reviewed.   Constitutional:       General: She is not in acute distress.     Appearance: Normal appearance. She is well-developed.   HENT:      Head: Normocephalic and atraumatic.      Right Ear: Tympanic membrane, ear canal and external ear normal. There is no impacted cerumen.      Left Ear: Tympanic membrane, ear canal and external ear normal. There is no impacted cerumen.      Nose: Nose normal. No congestion or rhinorrhea.      Mouth/Throat:      Mouth: Mucous membranes are moist.      Pharynx: No oropharyngeal exudate or posterior oropharyngeal erythema.   Eyes:      General: No scleral icterus.        Right eye: No discharge.         Left eye: No discharge.      Extraocular Movements: Extraocular movements intact.      Conjunctiva/sclera: Conjunctivae normal.      Pupils: Pupils are equal, round, and reactive to light.   Neck:      Trachea: No tracheal deviation.   Cardiovascular:      Rate and Rhythm: Normal rate and regular rhythm.      Pulses: Normal pulses.           Dorsalis pedis pulses are 2+ on the right side and 2+ on the left side.        Posterior tibial pulses are 2+ on the right side and 2+ on the left side.      Heart sounds: Normal heart sounds. No murmur heard.     No friction rub. No gallop.   Pulmonary:      Effort: Pulmonary effort is normal. No respiratory distress.      Breath sounds: Normal breath sounds. No wheezing, rhonchi or rales.   Abdominal:      General: Bowel sounds are normal. There is no distension.      Palpations: Abdomen is soft.      Tenderness: There is no abdominal tenderness. There is no guarding or rebound.   Musculoskeletal:         General: Normal range of " motion.      Cervical back: Normal range of motion and neck supple.      Right lower leg: No edema.      Left lower leg: No edema.   Lymphadenopathy:      Head:      Right side of head: No submental or submandibular adenopathy.      Left side of head: No submental or submandibular adenopathy.      Cervical: No cervical adenopathy.      Right cervical: No superficial, deep or posterior cervical adenopathy.     Left cervical: No superficial, deep or posterior cervical adenopathy.   Skin:     General: Skin is warm and dry.      Findings: No erythema.   Neurological:      General: No focal deficit present.      Mental Status: She is alert and oriented to person, place, and time.      Cranial Nerves: No cranial nerve deficit.      Sensory: Sensation is intact. No sensory deficit.      Motor: Motor function is intact.   Psychiatric:         Attention and Perception: Attention and perception normal.         Mood and Affect: Mood is not anxious or depressed.         Speech: Speech normal.         Behavior: Behavior normal.         Thought Content: Thought content normal.         Judgment: Judgment normal.

## 2024-05-09 DIAGNOSIS — E03.8 HYPOTHYROIDISM DUE TO HASHIMOTO'S THYROIDITIS: ICD-10-CM

## 2024-05-09 DIAGNOSIS — E06.3 HYPOTHYROIDISM DUE TO HASHIMOTO'S THYROIDITIS: ICD-10-CM

## 2024-05-09 RX ORDER — LEVOTHYROXINE SODIUM 112 UG/1
112 TABLET ORAL DAILY
Qty: 90 TABLET | Refills: 1 | Status: SHIPPED | OUTPATIENT
Start: 2024-05-09 | End: 2024-05-17 | Stop reason: DRUGHIGH

## 2024-05-15 ENCOUNTER — TELEPHONE (OUTPATIENT)
Dept: FAMILY MEDICINE CLINIC | Facility: CLINIC | Age: 29
End: 2024-05-15

## 2024-05-16 ENCOUNTER — OFFICE VISIT (OUTPATIENT)
Dept: FAMILY MEDICINE CLINIC | Facility: CLINIC | Age: 29
End: 2024-05-16
Payer: COMMERCIAL

## 2024-05-16 ENCOUNTER — LAB (OUTPATIENT)
Dept: LAB | Facility: MEDICAL CENTER | Age: 29
End: 2024-05-16
Payer: COMMERCIAL

## 2024-05-16 VITALS
HEART RATE: 96 BPM | DIASTOLIC BLOOD PRESSURE: 88 MMHG | BODY MASS INDEX: 31.28 KG/M2 | WEIGHT: 188 LBS | SYSTOLIC BLOOD PRESSURE: 120 MMHG | TEMPERATURE: 98.3 F | OXYGEN SATURATION: 98 %

## 2024-05-16 DIAGNOSIS — E03.8 HYPOTHYROIDISM DUE TO HASHIMOTO'S THYROIDITIS: ICD-10-CM

## 2024-05-16 DIAGNOSIS — Z00.00 ANNUAL PHYSICAL EXAM: Primary | ICD-10-CM

## 2024-05-16 DIAGNOSIS — D50.9 IRON DEFICIENCY ANEMIA, UNSPECIFIED IRON DEFICIENCY ANEMIA TYPE: ICD-10-CM

## 2024-05-16 DIAGNOSIS — Z13.220 SCREENING, LIPID: ICD-10-CM

## 2024-05-16 DIAGNOSIS — Z11.59 NEED FOR HEPATITIS C SCREENING TEST: ICD-10-CM

## 2024-05-16 DIAGNOSIS — E06.3 HYPOTHYROIDISM DUE TO HASHIMOTO'S THYROIDITIS: ICD-10-CM

## 2024-05-16 DIAGNOSIS — G43.909 MIGRAINE WITHOUT STATUS MIGRAINOSUS, NOT INTRACTABLE, UNSPECIFIED MIGRAINE TYPE: ICD-10-CM

## 2024-05-16 DIAGNOSIS — E61.1 IRON DEFICIENCY: ICD-10-CM

## 2024-05-16 LAB
BASOPHILS # BLD AUTO: 0.03 THOUSANDS/ÂΜL (ref 0–0.1)
BASOPHILS NFR BLD AUTO: 1 % (ref 0–1)
EOSINOPHIL # BLD AUTO: 0.09 THOUSAND/ÂΜL (ref 0–0.61)
EOSINOPHIL NFR BLD AUTO: 2 % (ref 0–6)
ERYTHROCYTE [DISTWIDTH] IN BLOOD BY AUTOMATED COUNT: 11.8 % (ref 11.6–15.1)
FERRITIN SERPL-MCNC: 218 NG/ML (ref 11–307)
HCT VFR BLD AUTO: 43.6 % (ref 34.8–46.1)
HGB BLD-MCNC: 14.1 G/DL (ref 11.5–15.4)
IMM GRANULOCYTES # BLD AUTO: 0.01 THOUSAND/UL (ref 0–0.2)
IMM GRANULOCYTES NFR BLD AUTO: 0 % (ref 0–2)
IRON SATN MFR SERPL: 13 % (ref 15–50)
IRON SERPL-MCNC: 37 UG/DL (ref 50–212)
LYMPHOCYTES # BLD AUTO: 1.98 THOUSANDS/ÂΜL (ref 0.6–4.47)
LYMPHOCYTES NFR BLD AUTO: 38 % (ref 14–44)
MCH RBC QN AUTO: 30.2 PG (ref 26.8–34.3)
MCHC RBC AUTO-ENTMCNC: 32.3 G/DL (ref 31.4–37.4)
MCV RBC AUTO: 93 FL (ref 82–98)
MONOCYTES # BLD AUTO: 0.46 THOUSAND/ÂΜL (ref 0.17–1.22)
MONOCYTES NFR BLD AUTO: 9 % (ref 4–12)
NEUTROPHILS # BLD AUTO: 2.62 THOUSANDS/ÂΜL (ref 1.85–7.62)
NEUTS SEG NFR BLD AUTO: 50 % (ref 43–75)
NRBC BLD AUTO-RTO: 0 /100 WBCS
PLATELET # BLD AUTO: 212 THOUSANDS/UL (ref 149–390)
PMV BLD AUTO: 11.2 FL (ref 8.9–12.7)
RBC # BLD AUTO: 4.67 MILLION/UL (ref 3.81–5.12)
T4 FREE SERPL-MCNC: 1.29 NG/DL (ref 0.61–1.12)
TIBC SERPL-MCNC: 288 UG/DL (ref 250–450)
TSH SERPL DL<=0.05 MIU/L-ACNC: 0.08 UIU/ML (ref 0.45–4.5)
UIBC SERPL-MCNC: 251 UG/DL (ref 155–355)
WBC # BLD AUTO: 5.19 THOUSAND/UL (ref 4.31–10.16)

## 2024-05-16 PROCEDURE — 84443 ASSAY THYROID STIM HORMONE: CPT

## 2024-05-16 PROCEDURE — 99395 PREV VISIT EST AGE 18-39: CPT | Performed by: NURSE PRACTITIONER

## 2024-05-16 PROCEDURE — 84439 ASSAY OF FREE THYROXINE: CPT

## 2024-05-16 PROCEDURE — 82728 ASSAY OF FERRITIN: CPT

## 2024-05-16 PROCEDURE — 36415 COLL VENOUS BLD VENIPUNCTURE: CPT

## 2024-05-16 PROCEDURE — 83550 IRON BINDING TEST: CPT

## 2024-05-16 PROCEDURE — 86803 HEPATITIS C AB TEST: CPT

## 2024-05-16 PROCEDURE — 83540 ASSAY OF IRON: CPT

## 2024-05-16 PROCEDURE — 85025 COMPLETE CBC W/AUTO DIFF WBC: CPT

## 2024-05-16 NOTE — ASSESSMENT & PLAN NOTE
Discussed possible increasing frequency is due to allergies.  She will trial new treatment: Stop Xyzal; add back Zyrtec.  Continue Flonase-but space sprays: 1 spray each nostril morning and night.  Advised moisturization-as her turbinates are inflamed.  She will follow-up if her symptoms persist, and we will look at alternate abortive treatment    Note MRI December 2023: Unremarkable

## 2024-05-16 NOTE — PROGRESS NOTES
Adult Annual Physical  Name: Mara Duarte      : 1995      MRN: [id number]  Encounter Provider: EVA Ann  Encounter Date: 2024   Encounter department: [facility]    Assessment & Plan   1. Annual physical exam  2. Migraine without status migrainosus, not intractable, unspecified migraine type  Assessment & Plan:  Discussed possible increasing frequency is due to allergies.  She will trial new treatment: Stop Xyzal; add back Zyrtec.  Continue Flonase-but space sprays: 1 spray each nostril morning and night.  Advised moisturization-as her turbinates are inflamed.  She will follow-up if her symptoms persist, and we will look at alternate abortive treatment    Note MRI 2023: Unremarkable  3. Hypothyroidism due to Hashimoto's thyroiditis  Assessment & Plan:  Managed by endocrinology  Last lab work stable  Levothyroxine 112 mcg  Orders:  -     TSH, 3rd generation with Free T4 reflex; Future  4. Iron deficiency anemia, unspecified iron deficiency anemia type  -     Iron Panel (Includes Ferritin, Iron Sat%, Iron, and TIBC); Future  5. Screening, lipid  -     Lipid panel; Future  6. Need for hepatitis C screening test  -     Hepatitis C Antibody; Future  7. Iron deficiency  Assessment & Plan:  Has been doing well since infusions  Will recheck iron panel today    Immunizations and preventive care screenings were discussed with patient today. Appropriate education was printed on patient's after visit summary.    Counseling:  Alcohol/drug use: discussed moderation in alcohol intake, the recommendations for healthy alcohol use, and avoidance of illicit drug use.  Dental Health: discussed importance of regular tooth brushing, flossing, and dental visits.  Injury prevention: discussed safety/seat belts, safety helmets, smoke detectors, carbon dioxide detectors, and smoking near bedding or upholstery.  Sexual health: discussed sexually transmitted diseases, partner  selection, use of condoms, avoidance of unintended pregnancy, and contraceptive alternatives.  Exercise: the importance of regular exercise/physical activity was discussed. Recommend exercise 3-5 times per week for at least 30 minutes.          History of Present Illness     Adult Annual Physical:  Patient presents for annual physical. Annual physical  Recent increase in migraines.  Characteristic same.  Notes increased frequency.  Nurtec less effective  Worsening allergies: Current treatment; Xyzal, Flonase  Dizzy episode last night during the night-woke to attend to her baby.  Reports feeling hot, dizzy-resolved after sitting for few minutes.  Went back to bed without further issue.  Has had no issues today. .     Diet and Physical Activity:  - Diet/Nutrition: well balanced diet.  - Exercise: walking. Increase exercise frequency/duration: Goal 5 to 7 days a week; 20-30 minutes cardiovascular/strengthening    General Health:  - Sleep: sleeps well.  - Hearing: normal hearing bilateral ears.  - Vision: no vision problems and goes for regular eye exams.  - Dental: regular dental visits.    /GYN Health:  - Follows with GYN: yes.   - Menopause: premenopausal.   - History of STDs: no  - Contraception: barrier methods. Recently stopped birth control due to intermittent spotting.  Has been on since birth of her child 14 months ago.  Has follow-up with GYN scheduled for July.  Plans to currently stay off birth control, and is okay if she were to get pregnant again      Advanced Care Planning:  - Has an advanced directive?: no    - Has a durable medical POA?: no    - ACP document given to patient?: no      Review of Systems   Constitutional: Negative.    HENT: Negative.     Eyes: Negative.    Respiratory: Negative.     Cardiovascular: Negative.    Gastrointestinal: Negative.    Genitourinary: Negative.    Musculoskeletal: Negative.    Skin: Negative.    Neurological: Negative.    Psychiatric/Behavioral: Negative.        Pertinent Medical History   Migraines; allergies      Medical History Reviewed by provider this encounter:       Current Outpatient Medications on File Prior to Visit   Medication Sig Dispense Refill    albuterol (Ventolin HFA) 90 mcg/act inhaler Inhale 2 puffs every 6 (six) hours as needed for wheezing 18 g 0    aspirin-acetaminophen-caffeine (EXCEDRIN MIGRAINE) 250-250-65 MG per tablet Take 1 tablet by mouth every 6 (six) hours as needed      Cyanocobalamin (VITAMIN B-12 PO) Take by mouth      desogestrel-ethinyl estradiol (KARIVA) 0.15-0.02/0.01 MG (21/5) per tablet Take 1 tablet by mouth daily 84 tablet 1    fluticasone (FLONASE) 50 mcg/act nasal spray 2 sprays into each nostril daily 16 g 0    ibuprofen (MOTRIN) 800 mg tablet       levocetirizine (XYZAL) 5 MG tablet Take 5 mg by mouth every evening      levothyroxine 112 mcg tablet Take 1 tablet (112 mcg total) by mouth daily 90 tablet 1    Prenat w/o A-FeCbGl-DSS-FA-DHA (PNV OB+DHA PO) Take by mouth      rimegepant sulfate (Nurtec) 75 mg TBDP Take 1 tablet (75 mg total) by mouth once as needed (migraine) 9 tablet 0     No current facility-administered medications on file prior to visit.        Objective     /88 (BP Location: Left arm, Patient Position: Sitting, Cuff Size: Standard)   Pulse 96   Temp 98.3 °F (36.8 °C) (Temporal)   Wt 85.3 kg (188 lb)   SpO2 98%   BMI 31.28 kg/m²     Physical Exam  Vitals and nursing note reviewed.   Constitutional:       General: She is not in acute distress.     Appearance: Normal appearance. She is well-developed and well-groomed. She is not ill-appearing.   HENT:      Head: Normocephalic.      Right Ear: Tympanic membrane, ear canal and external ear normal.      Left Ear: Tympanic membrane, ear canal and external ear normal.      Nose: Nose normal.      Mouth/Throat:      Mouth: Mucous membranes are moist.      Pharynx: Oropharynx is clear.   Eyes:      Conjunctiva/sclera: Conjunctivae normal.      Pupils:  Pupils are equal, round, and reactive to light.   Neck:      Thyroid: No thyromegaly.      Vascular: No carotid bruit.   Cardiovascular:      Rate and Rhythm: Normal rate and regular rhythm.      Pulses:           Posterior tibial pulses are 2+ on the right side and 2+ on the left side.      Heart sounds: Normal heart sounds.   Pulmonary:      Effort: Pulmonary effort is normal. No respiratory distress.      Breath sounds: Normal breath sounds and air entry.   Abdominal:      General: Bowel sounds are normal.      Palpations: Abdomen is soft.      Tenderness: There is no abdominal tenderness.   Musculoskeletal:      Right lower leg: No edema.      Left lower leg: No edema.   Lymphadenopathy:      Cervical: No cervical adenopathy.   Skin:     General: Skin is warm and dry.   Neurological:      General: No focal deficit present.      Mental Status: She is alert and oriented to person, place, and time.   Psychiatric:         Attention and Perception: Attention normal.         Mood and Affect: Mood normal.         Behavior: Behavior normal.         Thought Content: Thought content normal.         Judgment: Judgment normal.       Administrative Statements   I have spent a total time of 40 minutes on 05/16/24 In caring for this patient including Patient and family education, Risk factor reductions, Counseling / Coordination of care, Documenting in the medical record, Reviewing / ordering tests, medicine, procedures  , and Obtaining or reviewing history  .

## 2024-05-17 DIAGNOSIS — E06.3 HYPOTHYROIDISM DUE TO HASHIMOTO'S THYROIDITIS: Primary | ICD-10-CM

## 2024-05-17 DIAGNOSIS — E03.8 HYPOTHYROIDISM DUE TO HASHIMOTO'S THYROIDITIS: Primary | ICD-10-CM

## 2024-05-17 LAB — HCV AB SER QL: NORMAL

## 2024-05-17 RX ORDER — LEVOTHYROXINE SODIUM 0.1 MG/1
100 TABLET ORAL DAILY
Qty: 90 TABLET | Refills: 1 | Status: SHIPPED | OUTPATIENT
Start: 2024-05-17

## 2024-06-02 DIAGNOSIS — J01.90 ACUTE SINUSITIS, RECURRENCE NOT SPECIFIED, UNSPECIFIED LOCATION: ICD-10-CM

## 2024-06-03 RX ORDER — FLUTICASONE PROPIONATE 50 MCG
2 SPRAY, SUSPENSION (ML) NASAL DAILY
Qty: 16 G | Refills: 0 | Status: SHIPPED | OUTPATIENT
Start: 2024-06-03

## 2024-06-05 ENCOUNTER — TELEPHONE (OUTPATIENT)
Age: 29
End: 2024-06-05

## 2024-06-05 DIAGNOSIS — E03.8 HYPOTHYROIDISM DUE TO HASHIMOTO'S THYROIDITIS: Primary | ICD-10-CM

## 2024-06-05 DIAGNOSIS — N91.1 AMENORRHEA, SECONDARY: ICD-10-CM

## 2024-06-05 DIAGNOSIS — E06.3 HYPOTHYROIDISM DUE TO HASHIMOTO'S THYROIDITIS: Primary | ICD-10-CM

## 2024-06-05 NOTE — TELEPHONE ENCOUNTER
Patient calling to see if Dr. Sanchez can place lab order to check her levels. She stated she is just not feeling well, and would like to go for them this week or early next week? She also wanted to know if a HCG or hCG blood test can be ordered in addition to the TSH, 3rd generation, and T4 free to check for pregnancy? Please call patient back with an update.

## 2024-06-06 ENCOUNTER — APPOINTMENT (OUTPATIENT)
Dept: LAB | Facility: CLINIC | Age: 29
End: 2024-06-06
Payer: COMMERCIAL

## 2024-06-06 DIAGNOSIS — E03.8 TOXIC DIFFUSE GOITER WITH PRETIBIAL MYXEDEMA: Primary | ICD-10-CM

## 2024-06-06 DIAGNOSIS — N91.1 AMENORRHEA, SECONDARY: ICD-10-CM

## 2024-06-06 DIAGNOSIS — E03.8 HYPOTHYROIDISM DUE TO HASHIMOTO'S THYROIDITIS: ICD-10-CM

## 2024-06-06 DIAGNOSIS — N91.1 PERSISTENT POSTPARTUM AMENORRHEA-GALACTORRHEA SYNDROME: ICD-10-CM

## 2024-06-06 DIAGNOSIS — O92.6 PERSISTENT POSTPARTUM AMENORRHEA-GALACTORRHEA SYNDROME: ICD-10-CM

## 2024-06-06 DIAGNOSIS — Z13.220 SCREENING, LIPID: ICD-10-CM

## 2024-06-06 DIAGNOSIS — E05.00 TOXIC DIFFUSE GOITER WITH PRETIBIAL MYXEDEMA: Primary | ICD-10-CM

## 2024-06-06 DIAGNOSIS — E06.3 HYPOTHYROIDISM DUE TO HASHIMOTO'S THYROIDITIS: ICD-10-CM

## 2024-06-06 DIAGNOSIS — E06.3 CHRONIC LYMPHOCYTIC THYROIDITIS: ICD-10-CM

## 2024-06-06 LAB
B-HCG SERPL-ACNC: <0.6 MIU/ML (ref 0–5)
CHOLEST SERPL-MCNC: 150 MG/DL
HDLC SERPL-MCNC: 48 MG/DL
LDLC SERPL CALC-MCNC: 86 MG/DL (ref 0–100)
NONHDLC SERPL-MCNC: 102 MG/DL
T3FREE SERPL-MCNC: 3.61 PG/ML (ref 2.5–3.9)
T4 FREE SERPL-MCNC: 0.98 NG/DL (ref 0.61–1.12)
TRIGL SERPL-MCNC: 80 MG/DL
TSH SERPL DL<=0.05 MIU/L-ACNC: 0.12 UIU/ML (ref 0.45–4.5)

## 2024-06-06 PROCEDURE — 80061 LIPID PANEL: CPT

## 2024-06-06 PROCEDURE — 84439 ASSAY OF FREE THYROXINE: CPT

## 2024-06-06 PROCEDURE — 84481 FREE ASSAY (FT-3): CPT | Performed by: INTERNAL MEDICINE

## 2024-06-06 PROCEDURE — 36415 COLL VENOUS BLD VENIPUNCTURE: CPT

## 2024-06-06 PROCEDURE — 84702 CHORIONIC GONADOTROPIN TEST: CPT

## 2024-06-06 PROCEDURE — 84443 ASSAY THYROID STIM HORMONE: CPT

## 2024-06-19 ENCOUNTER — NURSE TRIAGE (OUTPATIENT)
Age: 29
End: 2024-06-19

## 2024-06-19 ENCOUNTER — TELEPHONE (OUTPATIENT)
Age: 29
End: 2024-06-19

## 2024-06-19 ENCOUNTER — OFFICE VISIT (OUTPATIENT)
Dept: FAMILY MEDICINE CLINIC | Facility: CLINIC | Age: 29
End: 2024-06-19
Payer: COMMERCIAL

## 2024-06-19 VITALS
TEMPERATURE: 97.7 F | WEIGHT: 189.6 LBS | BODY MASS INDEX: 31.59 KG/M2 | HEIGHT: 65 IN | HEART RATE: 97 BPM | DIASTOLIC BLOOD PRESSURE: 74 MMHG | OXYGEN SATURATION: 97 % | SYSTOLIC BLOOD PRESSURE: 120 MMHG

## 2024-06-19 DIAGNOSIS — R35.0 URINARY FREQUENCY: Primary | ICD-10-CM

## 2024-06-19 DIAGNOSIS — N39.0 URINARY TRACT INFECTION WITHOUT HEMATURIA, SITE UNSPECIFIED: ICD-10-CM

## 2024-06-19 LAB
SL AMB  POCT GLUCOSE, UA: ABNORMAL
SL AMB LEUKOCYTE ESTERASE,UA: ABNORMAL
SL AMB POCT BILIRUBIN,UA: NEGATIVE
SL AMB POCT BLOOD,UA: ABNORMAL
SL AMB POCT CLARITY,UA: CLEAR
SL AMB POCT COLOR,UA: YELLOW
SL AMB POCT KETONES,UA: NEGATIVE
SL AMB POCT NITRITE,UA: POSITIVE
SL AMB POCT PH,UA: 5
SL AMB POCT SPECIFIC GRAVITY,UA: 1
SL AMB POCT URINE PROTEIN: ABNORMAL
SL AMB POCT UROBILINOGEN: 1

## 2024-06-19 PROCEDURE — 99214 OFFICE O/P EST MOD 30 MIN: CPT | Performed by: FAMILY MEDICINE

## 2024-06-19 PROCEDURE — 81003 URINALYSIS AUTO W/O SCOPE: CPT | Performed by: FAMILY MEDICINE

## 2024-06-19 PROCEDURE — 87086 URINE CULTURE/COLONY COUNT: CPT | Performed by: FAMILY MEDICINE

## 2024-06-19 RX ORDER — PHENAZOPYRIDINE HYDROCHLORIDE 200 MG/1
200 TABLET, FILM COATED ORAL
Qty: 20 TABLET | Refills: 0 | Status: SHIPPED | OUTPATIENT
Start: 2024-06-19

## 2024-06-19 RX ORDER — SULFAMETHOXAZOLE AND TRIMETHOPRIM 800; 160 MG/1; MG/1
1 TABLET ORAL EVERY 12 HOURS SCHEDULED
Qty: 14 TABLET | Refills: 0 | Status: SHIPPED | OUTPATIENT
Start: 2024-06-19 | End: 2024-06-26

## 2024-06-19 NOTE — TELEPHONE ENCOUNTER
"Pt called in stating that she was able to urinate at 7am but patient is reporting that she is now unable to urinate. Pt reporting that she has chills that come and go, pt denies lower back pain at this time. Pt reporting burning sensation when trying to urinate 5/10. Pt reporting taking azo, without relief.   Pt reporting urgency, pt denies blood in the urine, vaginal discharge, flank pain, pt denies pregnancy at this time, LMP 6/12/24      Pt was advised to go to the emergency room as per protocol. Pt reporting that she is unable to go at this time. Pt was notified that she is advised to go to the nearest emergency room as per protocol. Patient refused and stated that she is scheduled for an appointment with her PCP today and pt stated she will go to her appointment and if the provider recommends the emergency department as well pt states then she will go. Patient was advised that it is recommended to go to the nearest emergency room, pt verbalized understanding.           Reason for Disposition   Unable to urinate (or only a few drops) and bladder feels very full    Answer Assessment - Initial Assessment Questions  1. SEVERITY: \"How bad is the pain?\"  (e.g., Scale 1-10; mild, moderate, or severe)    - MILD (1-3): complains slightly about urination hurting    - MODERATE (4-7): interferes with normal activities      - SEVERE (8-10): excruciating, unwilling or unable to urinate because of the pain       5/10 when urinating   2. FREQUENCY: \"How many times have you had painful urination today?\"       Pt reports 1 time and now is unable to urinate   3. PATTERN: \"Is pain present every time you urinate or just sometimes?\"       Pt reporting pain when she isnt urinating   4. ONSET: \"When did the painful urination start?\"       Pt denies painful urination   5. FEVER: \"Do you have a fever?\" If Yes, ask: \"What is your temperature, how was it measured, and when did it start?\"      98.2F  6. PAST UTI: \"Have you had a urine " "infection before?\" If Yes, ask: \"When was the last time?\" and \"What happened that time?\"       Pt reporting once before had a UTI   7. CAUSE: \"What do you think is causing the painful urination?\"  (e.g., UTI, scratch, Herpes sore)      Pt reporting UTI symptoms   8. OTHER SYMPTOMS: \"Do you have any other symptoms?\" (e.g., flank pain, vaginal discharge, genital sores, urgency, blood in urine)      Pt reporting urgency, pt denies blood in the urine, vaginal discharge, flank pain   9. PREGNANCY: \"Is there any chance you are pregnant?\" \"When was your last menstrual period?\"      6/12/24    Protocols used: Urination Pain - Female-ADULT-[place]    "

## 2024-06-19 NOTE — PROGRESS NOTES
Assessment/Plan:   1. Urinary tract infection without hematuria, site unspecified  Symptoms appear secondary to an acute UTI.  Urinalysis was positive.  Will send urine for culture.  Maintain proper hydration.  She may take Pyridium as needed for symptom relief.  Will start her with Bactrim.  Will call her with results.  - POCT urine dip auto non-scope  - Urine culture  - phenazopyridine (PYRIDIUM) 200 mg tablet; Take 1 tablet (200 mg total) by mouth 3 (three) times a day with meals  Dispense: 20 tablet; Refill: 0  - sulfamethoxazole-trimethoprim (BACTRIM DS) 800-160 mg per tablet; Take 1 tablet by mouth every 12 (twelve) hours for 7 days  Dispense: 14 tablet; Refill: 0           Diagnoses and all orders for this visit:    Urinary frequency  -     POCT urine dip auto non-scope  -     Urine culture          Subjective:       Chief Complaint   Patient presents with    Urinary Tract Infection     Frequency      Patient ID: Mara Duarte is a 28 y.o. female.    Urinary Tract Infection   This is a new problem. The current episode started today. The problem occurs every urination. The problem has been unchanged. The quality of the pain is described as aching. Associated symptoms include frequency and urgency. Pertinent negatives include no chills, flank pain, hematuria or nausea. Treatments tried: AZO. The treatment provided no relief.       Review of Systems   Constitutional:  Negative for activity change, chills, fatigue and fever.   HENT:  Negative for congestion, ear pain, sinus pressure and sore throat.    Eyes:  Negative for redness, itching and visual disturbance.   Respiratory:  Negative for cough and shortness of breath.    Cardiovascular:  Negative for chest pain and palpitations.   Gastrointestinal:  Negative for abdominal pain, diarrhea and nausea.   Endocrine: Negative for cold intolerance and heat intolerance.   Genitourinary:  Positive for frequency and urgency. Negative for dysuria, flank pain and  "hematuria.   Musculoskeletal:  Negative for arthralgias, back pain, gait problem and myalgias.   Skin:  Negative for color change.   Allergic/Immunologic: Negative for environmental allergies.   Neurological:  Negative for dizziness, numbness and headaches.   Psychiatric/Behavioral:  Negative for behavioral problems and sleep disturbance.        The following portions of the patient's history were reviewed and updated as appropriate : past family history, past medical history, past social history and past surgical history.    Current Outpatient Medications:     albuterol (Ventolin HFA) 90 mcg/act inhaler, Inhale 2 puffs every 6 (six) hours as needed for wheezing, Disp: 18 g, Rfl: 0    aspirin-acetaminophen-caffeine (EXCEDRIN MIGRAINE) 250-250-65 MG per tablet, Take 1 tablet by mouth every 6 (six) hours as needed, Disp: , Rfl:     Cyanocobalamin (VITAMIN B-12 PO), Take by mouth, Disp: , Rfl:     fluticasone (FLONASE) 50 mcg/act nasal spray, 2 sprays into each nostril daily, Disp: 16 g, Rfl: 0    levocetirizine (XYZAL) 5 MG tablet, Take 5 mg by mouth every evening, Disp: , Rfl:     levothyroxine 100 mcg tablet, Take 1 tablet (100 mcg total) by mouth daily, Disp: 90 tablet, Rfl: 1    Prenat w/o A-FeCbGl-DSS-FA-DHA (PNV OB+DHA PO), Take by mouth, Disp: , Rfl:     rimegepant sulfate (Nurtec) 75 mg TBDP, Take 1 tablet (75 mg total) by mouth once as needed (migraine), Disp: 9 tablet, Rfl: 0    desogestrel-ethinyl estradiol (KARIVA) 0.15-0.02/0.01 MG (21/5) per tablet, Take 1 tablet by mouth daily, Disp: 84 tablet, Rfl: 1    ibuprofen (MOTRIN) 800 mg tablet, , Disp: , Rfl:          Objective:         Vitals:    06/19/24 1057   BP: 120/74   BP Location: Left arm   Patient Position: Sitting   Cuff Size: Large   Pulse: 97   Temp: 97.7 °F (36.5 °C)   TempSrc: Temporal   SpO2: 97%   Weight: 86 kg (189 lb 9.6 oz)   Height: 5' 5\" (1.651 m)     Physical Exam  Vitals reviewed.   Constitutional:       General: She is not in acute " distress.     Appearance: Normal appearance. She is well-developed.   HENT:      Head: Normocephalic and atraumatic.      Right Ear: Tympanic membrane, ear canal and external ear normal. There is no impacted cerumen.      Left Ear: Tympanic membrane, ear canal and external ear normal. There is no impacted cerumen.      Nose: Nose normal. No congestion or rhinorrhea.      Mouth/Throat:      Mouth: Mucous membranes are moist.      Pharynx: No oropharyngeal exudate or posterior oropharyngeal erythema.   Eyes:      General: No scleral icterus.        Right eye: No discharge.         Left eye: No discharge.      Extraocular Movements: Extraocular movements intact.      Conjunctiva/sclera: Conjunctivae normal.      Pupils: Pupils are equal, round, and reactive to light.   Neck:      Trachea: No tracheal deviation.   Cardiovascular:      Rate and Rhythm: Normal rate and regular rhythm.      Pulses: Normal pulses.           Dorsalis pedis pulses are 2+ on the right side and 2+ on the left side.        Posterior tibial pulses are 2+ on the right side and 2+ on the left side.      Heart sounds: Normal heart sounds. No murmur heard.     No friction rub. No gallop.   Pulmonary:      Effort: Pulmonary effort is normal. No respiratory distress.      Breath sounds: Normal breath sounds. No wheezing, rhonchi or rales.   Abdominal:      General: Bowel sounds are normal. There is no distension.      Palpations: Abdomen is soft.      Tenderness: There is no abdominal tenderness. There is no guarding or rebound.   Musculoskeletal:         General: Normal range of motion.      Cervical back: Normal range of motion and neck supple.      Right lower leg: No edema.      Left lower leg: No edema.   Lymphadenopathy:      Head:      Right side of head: No submental or submandibular adenopathy.      Left side of head: No submental or submandibular adenopathy.      Cervical: No cervical adenopathy.      Right cervical: No superficial, deep or  posterior cervical adenopathy.     Left cervical: No superficial, deep or posterior cervical adenopathy.   Skin:     General: Skin is warm and dry.      Findings: No erythema.   Neurological:      General: No focal deficit present.      Mental Status: She is alert and oriented to person, place, and time.      Cranial Nerves: No cranial nerve deficit.      Sensory: Sensation is intact. No sensory deficit.      Motor: Motor function is intact.   Psychiatric:         Attention and Perception: Attention and perception normal.         Mood and Affect: Mood is not anxious or depressed.         Speech: Speech normal.         Behavior: Behavior normal.         Thought Content: Thought content normal.         Judgment: Judgment normal.

## 2024-06-20 ENCOUNTER — TELEPHONE (OUTPATIENT)
Dept: FAMILY MEDICINE CLINIC | Facility: CLINIC | Age: 29
End: 2024-06-20

## 2024-06-20 LAB — BACTERIA UR CULT: ABNORMAL

## 2024-07-10 ENCOUNTER — APPOINTMENT (OUTPATIENT)
Dept: LAB | Facility: CLINIC | Age: 29
End: 2024-07-10
Payer: COMMERCIAL

## 2024-07-10 DIAGNOSIS — E06.3 HYPOTHYROIDISM DUE TO HASHIMOTO'S THYROIDITIS: ICD-10-CM

## 2024-07-10 DIAGNOSIS — E03.8 HYPOTHYROIDISM DUE TO HASHIMOTO'S THYROIDITIS: ICD-10-CM

## 2024-07-10 LAB
T4 FREE SERPL-MCNC: 0.97 NG/DL (ref 0.61–1.12)
TSH SERPL DL<=0.05 MIU/L-ACNC: 0.32 UIU/ML (ref 0.45–4.5)

## 2024-07-10 PROCEDURE — 84443 ASSAY THYROID STIM HORMONE: CPT

## 2024-07-10 PROCEDURE — 36415 COLL VENOUS BLD VENIPUNCTURE: CPT

## 2024-07-10 PROCEDURE — 84439 ASSAY OF FREE THYROXINE: CPT

## 2024-07-11 ENCOUNTER — TELEPHONE (OUTPATIENT)
Age: 29
End: 2024-07-11

## 2024-07-11 NOTE — TELEPHONE ENCOUNTER
Patient calling for lab results. States she has been feeling very tired and has low energy. Please advise, thank you.

## 2024-07-11 NOTE — TELEPHONE ENCOUNTER
The thyroid blood work is improved and almost normal but still slightly overactive. Slightly decrease the levothyroxine 100 mcg to 1 tablet 6 days a week and 1/2 tablet 1 day a week.

## 2024-07-12 DIAGNOSIS — E06.3 HYPOTHYROIDISM DUE TO HASHIMOTO'S THYROIDITIS: Primary | ICD-10-CM

## 2024-07-12 DIAGNOSIS — E03.8 HYPOTHYROIDISM DUE TO HASHIMOTO'S THYROIDITIS: Primary | ICD-10-CM

## 2024-07-12 DIAGNOSIS — E06.3 HYPOTHYROIDISM DUE TO HASHIMOTO'S THYROIDITIS: ICD-10-CM

## 2024-07-12 DIAGNOSIS — E03.8 HYPOTHYROIDISM DUE TO HASHIMOTO'S THYROIDITIS: ICD-10-CM

## 2024-07-12 RX ORDER — LEVOTHYROXINE SODIUM 0.1 MG/1
TABLET ORAL
Qty: 90 TABLET | Refills: 1 | Status: SHIPPED | OUTPATIENT
Start: 2024-07-12

## 2024-07-17 NOTE — PROGRESS NOTES
Assessment & Plan   Problem List Items Addressed This Visit    None  Visit Diagnoses     Well woman exam    -  Primary            Discussion    All questions have been answered to her satisfaction  RTO for APE or sooner if needed  Pap deferred, will plan f/u next year.       Subjective     HPI   Mara Duarte is a 29 y.o. female who presents for annual well woman exam.     LMP - 24  ; Periods have been irregular lately. She has been working to control her thyroid levels and has been adjusting her meds to get in the appropriate range.     No vulvar itch/burn; No vaginal itch/burn; No abn discharge or odor; No urinary sx - burning/pain/frequency/hematuria  H/o UTI last month-seen in ED when abx were not working. Changed abx and sx improved.     No concerning breast masses, asymmetry, nipple discharge or bleeding, changes in skin of breast, or breast tenderness bilaterally    No abd/pelvic pain or HAs;     Pt is sexually active in a mutually monog/ sexual relationship; No issues with intercourse; She declines sti/hiv/hep testing; Feels safe at home  Current contraception: none-ok if a pregnancy were to occur, h/o infertility. Likely plans to try for pregnancy once her thyroid levels are better controlled.     (+) PCP for routine Bw/care;    Last Pap : 22 WNL   History of abnormal Pap smear: denies       Review of Systems   Constitutional: Negative.    Respiratory: Negative.     Gastrointestinal: Negative.    Endocrine: Negative.    Genitourinary: Negative.        The following portions of the patient's history were reviewed and updated as appropriate: allergies, current medications, past family history, past medical history, past social history, past surgical history, and problem list.         OB History        2    Para   1    Term   1       0    AB   1    Living   1       SAB   1    IAB   0    Ectopic   0    Multiple   0    Live Births   1           Obstetric Comments   : 2022  SAB naturally;  primary LTCS F 39w   Hgb electrophoresis WNL             Past Medical History:   Diagnosis Date   • Acne    • ADHD    • Allergic    • Allergic asthma    • Cluster headache    • Disease of thyroid gland    • Eczema     as a child   • Hypertension    • Migraines    • Subclinical hypothyroidism    • Vision loss        Past Surgical History:   Procedure Laterality Date   •  SECTION  2023   • FINGER SURGERY Left     pin placed, middle   • NM  DELIVERY ONLY N/A 2023    Procedure:  SECTION ();  Surgeon: Jane Awad MD;  Location: [facility] ;  Service: Obstetrics   • SHOULDER SURGERY Right     2019   • SKIN BIOPSY     • WISDOM TOOTH EXTRACTION         Family History   Problem Relation Age of Onset   • BRCA1 Negative Mother    • Breast cancer Mother 58        negative genetic testing   • Aortic aneurysm Father    • No Known Problems Sister    • No Known Problems Daughter    • Thyroid disease unspecified Maternal Grandmother         thyroid goiter/nodules   • Breast cancer Maternal Grandmother 75   • Heart disease Paternal Grandmother    • Lung cancer Paternal Grandfather        Social History     Socioeconomic History   • Marital status: /Civil Union     Spouse name: Not on file   • Number of children: Not on file   • Years of education: Not on file   • Highest education level: Not on file   Occupational History   • Not on file   Tobacco Use   • Smoking status: Never     Passive exposure: Never   • Smokeless tobacco: Never   Vaping Use   • Vaping status: Never Used   Substance and Sexual Activity   • Alcohol use: Yes     Alcohol/week: 1.0 standard drink of alcohol     Types: 1 Glasses of wine per week     Comment: Socially   • Drug use: Never   • Sexual activity: Yes     Partners: Male     Birth control/protection: None   Other Topics Concern   • Not on file   Social History Narrative   • Not on file     Social Determinants of Health     Financial  Resource Strain: Low Risk  (12/29/2023)    [facility], [facility]    Overall Financial Resource Strain (CARDIA)    • Difficulty of Paying Living Expenses: Not hard at all   Food Insecurity: No Food Insecurity (12/29/2023)    [facility]    Hunger Vital Sign    • Worried About Running Out of Food in the Last Year: Never true    • Ran Out of Food in the Last Year: Never true   Transportation Needs: No Transportation Needs (12/29/2023)    Received from [facility], [facility]    PRAPARE - Transportation    • Lack of Transportation (Medical): No    • Lack of Transportation (Non-Medical): No   Physical Activity: Not on file   Stress: Not on file   Social Connections: Not on file   Intimate Partner Violence: Not At Risk (12/29/2023)    Received from [facility], [facility]    Humiliation, Afraid, Rape, and Kick questionnaire    • Fear of Current or Ex-Partner: No    • Emotionally Abused: No    • Physically Abused: No    • Sexually Abused: No   Housing Stability: Low Risk  (12/29/2023)    Received from [facility]    Housing Stability Vital Sign    • Unable to Pay for Housing in the Last Year: No    • Number of Places Lived in the Last Year: 1    • Unstable Housing in the Last Year: No         Current Outpatient Medications:   •  albuterol (Ventolin HFA) 90 mcg/act inhaler, Inhale 2 puffs every 6 (six) hours as needed for wheezing, Disp: 18 g, Rfl: 0  •  aspirin-acetaminophen-caffeine (EXCEDRIN MIGRAINE) 250-250-65 MG per tablet, Take 1 tablet by mouth every 6 (six) hours as needed, Disp: , Rfl:   •  Cyanocobalamin (VITAMIN B-12 PO), Take by mouth, Disp: , Rfl:   •  fluticasone (FLONASE) 50 mcg/act nasal spray, 2 sprays into each nostril daily, Disp: 16 g, Rfl: 0  •  levocetirizine (XYZAL) 5 MG  "tablet, Take 5 mg by mouth every evening, Disp: , Rfl:   •  levothyroxine 100 mcg tablet, Take 1 tablet 6 days of the week and 1/2 tablet on Sundays, Disp: 90 tablet, Rfl: 1  •  Prenat w/o A-FeCbGl-DSS-FA-DHA (PNV OB+DHA PO), Take by mouth, Disp: , Rfl:   •  rimegepant sulfate (Nurtec) 75 mg TBDP, Take 1 tablet (75 mg total) by mouth once as needed (migraine), Disp: 9 tablet, Rfl: 0  •  Azelaic Acid 15 % cream, , Disp: , Rfl:   •  ibuprofen (MOTRIN) 800 mg tablet, , Disp: , Rfl:   •  phenazopyridine (PYRIDIUM) 200 mg tablet, Take 1 tablet (200 mg total) by mouth 3 (three) times a day with meals, Disp: 20 tablet, Rfl: 0  •  tretinoin (RETIN-A) 0.025 % cream, , Disp: , Rfl:     Allergies   Allergen Reactions   • Clindamycin Rash     headache  headache     • Tramadol GI Intolerance, Hives, Itching and Rash       Objective   Vitals:    07/22/24 0759   BP: 118/72   BP Location: Left arm   Patient Position: Sitting   Cuff Size: Standard   Weight: 88.8 kg (195 lb 12.8 oz)   Height: 5' 5\" (1.651 m)     Physical Exam  Vitals reviewed.   HENT:      Head: Normocephalic and atraumatic.   Neck:      Comments: Diffuse thyromegaly  Cardiovascular:      Rate and Rhythm: Normal rate and regular rhythm.   Pulmonary:      Effort: Pulmonary effort is normal.      Breath sounds: Normal breath sounds.   Chest:   Breasts:     Breasts are symmetrical.      Right: No swelling, bleeding, inverted nipple, mass, nipple discharge, skin change or tenderness.      Left: No swelling, bleeding, inverted nipple, mass, nipple discharge, skin change or tenderness.   Abdominal:      General: Abdomen is flat. Bowel sounds are normal.      Palpations: Abdomen is soft.      Tenderness: There is no abdominal tenderness. There is no right CVA tenderness, left CVA tenderness or guarding.   Genitourinary:     General: Normal vulva.      Pubic Area: No rash.       Labia:         Right: No rash, tenderness, lesion or injury.         Left: No rash, tenderness, " lesion or injury.       Urethra: No prolapse, urethral pain, urethral swelling or urethral lesion.      Vagina: Normal. No signs of injury and foreign body. No vaginal discharge or erythema.      Cervix: Normal.      Uterus: Normal.       Adnexa: Right adnexa normal and left adnexa normal.   Musculoskeletal:      Cervical back: Neck supple.   Lymphadenopathy:      Upper Body:      Right upper body: No axillary adenopathy.      Left upper body: No axillary adenopathy.   Skin:     General: Skin is warm and dry.   Neurological:      Mental Status: She is alert and oriented to person, place, and time.   Psychiatric:         Mood and Affect: Mood normal.         Behavior: Behavior normal.         Thought Content: Thought content normal.         Judgment: Judgment normal.         There are no Patient Instructions on file for this visit.

## 2024-07-22 ENCOUNTER — ANNUAL EXAM (OUTPATIENT)
Dept: OBGYN CLINIC | Facility: CLINIC | Age: 29
End: 2024-07-22
Payer: COMMERCIAL

## 2024-07-22 VITALS
HEIGHT: 65 IN | DIASTOLIC BLOOD PRESSURE: 72 MMHG | BODY MASS INDEX: 32.62 KG/M2 | SYSTOLIC BLOOD PRESSURE: 118 MMHG | WEIGHT: 195.8 LBS

## 2024-07-22 DIAGNOSIS — Z01.419 WELL WOMAN EXAM: Primary | ICD-10-CM

## 2024-07-22 PROCEDURE — 99395 PREV VISIT EST AGE 18-39: CPT | Performed by: PHYSICIAN ASSISTANT

## 2024-07-22 RX ORDER — AZELAIC ACID 0.15 G/G
GEL TOPICAL
COMMUNITY
Start: 2024-07-11

## 2024-07-26 ENCOUNTER — TELEPHONE (OUTPATIENT)
Age: 29
End: 2024-07-26

## 2024-07-26 ENCOUNTER — OFFICE VISIT (OUTPATIENT)
Dept: URGENT CARE | Facility: CLINIC | Age: 29
End: 2024-07-26
Payer: COMMERCIAL

## 2024-07-26 VITALS
HEART RATE: 98 BPM | DIASTOLIC BLOOD PRESSURE: 88 MMHG | SYSTOLIC BLOOD PRESSURE: 124 MMHG | RESPIRATION RATE: 15 BRPM | TEMPERATURE: 97.3 F | OXYGEN SATURATION: 99 %

## 2024-07-26 DIAGNOSIS — J03.90 EXUDATIVE TONSILLITIS: Primary | ICD-10-CM

## 2024-07-26 LAB — S PYO AG THROAT QL: NEGATIVE

## 2024-07-26 PROCEDURE — 87880 STREP A ASSAY W/OPTIC: CPT | Performed by: PHYSICIAN ASSISTANT

## 2024-07-26 PROCEDURE — 87070 CULTURE OTHR SPECIMN AEROBIC: CPT | Performed by: PHYSICIAN ASSISTANT

## 2024-07-26 PROCEDURE — G0381 LEV 2 HOSP TYPE B ED VISIT: HCPCS | Performed by: PHYSICIAN ASSISTANT

## 2024-07-26 RX ORDER — AMOXICILLIN 500 MG/1
500 CAPSULE ORAL EVERY 12 HOURS SCHEDULED
Qty: 20 CAPSULE | Refills: 0 | Status: SHIPPED | OUTPATIENT
Start: 2024-07-26 | End: 2024-08-05

## 2024-07-26 NOTE — TELEPHONE ENCOUNTER
Patient called in and stated that she has a sore throat and has spots on t he back of her tonsils. I didn't see any appointments. If someone cancels can you please call  the patient back at .    Thank you.

## 2024-07-26 NOTE — PROGRESS NOTES
[facility]    NAME: Mara Duarte is a 29 y.o. female  : 1995    MRN: [id number]  DATE: 2024  TIME: 1:02 PM    Assessment and Plan   Exudative tonsillitis [J03.90]  1. Exudative tonsillitis  POCT rapid ANTIGEN strepA    Throat culture    Throat culture    amoxicillin (AMOXIL) 500 mg capsule          Patient Instructions     Patient Instructions   Take antibiotic as instructed.    Warm salt water gargles, throat lozenges, Chloraseptic spray, Tylenol and/or ibuprofen (if not contraindicated) per bottle instructions for comfort as needed.  Warm tea with honey may also be soothing.    Follow-up with primary care if not resolving over the next 7 to 10 days.  May need more evaluation then can be done in the care in our office.    If you would develop severe worsening of throat pain, hot potato voice, inability to swallow saliva to the point of drooling due to throat swelling please proceed immediately to emergency room for further evaluation.       Chief Complaint     Chief Complaint   Patient presents with    Sore Throat     Sore throat starting last night & swollen tonsils. Pt reports sick contacts. Pt would like to rule out strep        History of Present Illness   Mara Duarte presents to the clinic c/o  29-year-old female comes in with complaint of sore throat.    Started: Last night.  Associated signs and symptoms: No runny nose nasal congestion drainage cough or ear pain.  No rashes.  No fever or chills.  Some fatigue.  Modifying factors: Called her doctor's office after seeing that tonsils had white spots on them.    Known Exposures: 16-month-old daughter had fever last week but it went away.  No other symptoms that she is aware of.      Sore Throat   Associated symptoms include trouble swallowing. Pertinent negatives include no congestion, ear discharge, ear pain, headaches or neck pain.       Review of Systems   Review of Systems   Constitutional:  Positive for fatigue. Negative for  activity change, appetite change, chills, diaphoresis and fever.   HENT:  Positive for sore throat and trouble swallowing. Negative for congestion, ear discharge, ear pain, facial swelling, postnasal drip, rhinorrhea, sinus pressure, sinus pain, sneezing and voice change.    Eyes: Negative.    Respiratory: Negative.     Cardiovascular: Negative.    Gastrointestinal: Negative.    Genitourinary: Negative.    Musculoskeletal:  Negative for neck pain.   Neurological:  Negative for dizziness, light-headedness and headaches.   Hematological:  Negative for adenopathy.       Current Medications     Long-Term Medications   Medication Sig Dispense Refill    Azelaic Acid 15 % cream  (Patient not taking: Reported on 2024)      fluticasone (FLONASE) 50 mcg/act nasal spray 2 sprays into each nostril daily 16 g 0    levothyroxine 100 mcg tablet Take 1 tablet 6 days of the week and 1/2 tablet on Sundays 90 tablet 1    tretinoin (RETIN-A) 0.025 % cream  (Patient not taking: Reported on 2024)         Current Allergies     Allergies as of 2024 - Reviewed 2024   Allergen Reaction Noted    Clindamycin Rash 2017    Tramadol GI Intolerance, Hives, Itching, and Rash 03/10/2018          The following portions of the patient's history were reviewed and updated as appropriate: allergies, current medications, past family history, past medical history, past social history, past surgical history and problem list.  Past Medical History:   Diagnosis Date    Acne     ADHD     Allergic     Allergic asthma     Cluster headache     Disease of thyroid gland     Eczema     as a child    Hypertension     Migraines     Subclinical hypothyroidism     Vision loss      Past Surgical History:   Procedure Laterality Date     SECTION  2023    FINGER SURGERY Left     pin placed, middle    [place]  DELIVERY ONLY N/A 2023    Procedure:  SECTION ();  Surgeon: Jane Awad MD;  Location: [facility] ;   Service: Obstetrics    SHOULDER SURGERY Right     2019    SKIN BIOPSY      WISDOM TOOTH EXTRACTION       Family History   Problem Relation Age of Onset    BRCA1 Negative Mother     Breast cancer Mother 58        negative genetic testing    Aortic aneurysm Father     No Known Problems Sister     No Known Problems Daughter     Thyroid disease unspecified Maternal Grandmother         thyroid goiter/nodules    Breast cancer Maternal Grandmother 75    Heart disease Paternal Grandmother     Lung cancer Paternal Grandfather        Objective   /88   Pulse 98   Temp (!) 97.3 °F (36.3 °C) (Tympanic)   Resp 15   LMP 07/11/2024 (Exact Date)   SpO2 99%   Patient's last menstrual period was 07/11/2024 (exact date).       Physical Exam     Physical Exam  Vitals and nursing note reviewed.   Constitutional:       General: She is not in acute distress.     Appearance: She is well-developed. She is ill-appearing. She is not toxic-appearing or diaphoretic.      Comments: Well-developed well-nourished.  Appears mildly ill.  No conversational dyspnea or trismus.   HENT:      Head: Normocephalic and atraumatic.      Right Ear: Tympanic membrane, ear canal and external ear normal. No drainage, swelling or tenderness. No middle ear effusion. No mastoid tenderness. Tympanic membrane is not injected, erythematous, retracted or bulging.      Left Ear: Tympanic membrane, ear canal and external ear normal. No drainage, swelling or tenderness.  No middle ear effusion. No mastoid tenderness. Tympanic membrane is not injected, erythematous, retracted or bulging.      Nose: Nose normal. No congestion or rhinorrhea.      Mouth/Throat:      Lips: No lesions.      Mouth: Mucous membranes are moist. No oral lesions.      Pharynx: Uvula midline. Pharyngeal swelling, oropharyngeal exudate and posterior oropharyngeal erythema present. No uvula swelling.      Tonsils: Tonsillar exudate present. No tonsillar abscesses. 1+ on the right. 1+ on the  left.   Eyes:      General: No scleral icterus.        Right eye: No discharge.         Left eye: No discharge.      Extraocular Movements: Extraocular movements intact.      Conjunctiva/sclera: Conjunctivae normal.      Pupils: Pupils are equal, round, and reactive to light.   Cardiovascular:      Rate and Rhythm: Regular rhythm.      Heart sounds: No murmur heard.     No friction rub. No gallop.   Pulmonary:      Effort: Pulmonary effort is normal. No respiratory distress.      Breath sounds: Normal breath sounds. No stridor. No wheezing, rhonchi or rales.   Musculoskeletal:      Cervical back: Normal range of motion and neck supple. Tenderness present. No rigidity.   Lymphadenopathy:      Cervical: No cervical adenopathy.   Skin:     General: Skin is warm and dry.      Findings: No erythema or rash.   Neurological:      General: No focal deficit present.      Mental Status: She is alert and oriented to person, place, and time.   Psychiatric:         Mood and Affect: Mood normal.         Behavior: Behavior normal.

## 2024-07-26 NOTE — PATIENT INSTRUCTIONS
Take antibiotic as instructed.    Warm salt water gargles, throat lozenges, Chloraseptic spray, Tylenol and/or ibuprofen (if not contraindicated) per bottle instructions for comfort as needed.  Warm tea with honey may also be soothing.    Follow-up with primary care if not resolving over the next 7 to 10 days.  May need more evaluation then can be done in the care in our office.    If you would develop severe worsening of throat pain, hot potato voice, inability to swallow saliva to the point of drooling due to throat swelling please proceed immediately to emergency room for further evaluation.

## 2024-07-28 LAB — BACTERIA THROAT CULT: NORMAL

## 2024-08-08 ENCOUNTER — OFFICE VISIT (OUTPATIENT)
Dept: FAMILY MEDICINE CLINIC | Facility: CLINIC | Age: 29
End: 2024-08-08
Payer: COMMERCIAL

## 2024-08-08 VITALS
WEIGHT: 193 LBS | OXYGEN SATURATION: 98 % | BODY MASS INDEX: 32.15 KG/M2 | DIASTOLIC BLOOD PRESSURE: 60 MMHG | TEMPERATURE: 97.2 F | HEIGHT: 65 IN | SYSTOLIC BLOOD PRESSURE: 110 MMHG | HEART RATE: 80 BPM

## 2024-08-08 DIAGNOSIS — J03.90 EXUDATIVE TONSILLITIS: Primary | ICD-10-CM

## 2024-08-08 DIAGNOSIS — D50.8 IRON DEFICIENCY ANEMIA SECONDARY TO INADEQUATE DIETARY IRON INTAKE: ICD-10-CM

## 2024-08-08 DIAGNOSIS — J02.9 SORE THROAT: ICD-10-CM

## 2024-08-08 LAB — S PYO AG THROAT QL: NEGATIVE

## 2024-08-08 PROCEDURE — 87880 STREP A ASSAY W/OPTIC: CPT

## 2024-08-08 PROCEDURE — 99214 OFFICE O/P EST MOD 30 MIN: CPT

## 2024-08-08 RX ORDER — CEPHALEXIN 500 MG/1
500 CAPSULE ORAL 2 TIMES DAILY
Qty: 20 CAPSULE | Refills: 0 | Status: SHIPPED | OUTPATIENT
Start: 2024-08-08 | End: 2024-08-18

## 2024-08-08 RX ORDER — METHYLPREDNISOLONE 4 MG/1
TABLET ORAL
Qty: 21 EACH | Refills: 0 | Status: SHIPPED | OUTPATIENT
Start: 2024-08-08

## 2024-08-08 NOTE — PROGRESS NOTES
Ambulatory Visit  Name: Mara Duarte      : 1995      MRN: [id number]  Encounter Provider: Lydia Rangel PA-C  Encounter Date: 2024   Encounter department: [facility]    Assessment & Plan   1. Exudative tonsillitis  Assessment & Plan:  Patient presents today for evaluation of a recurrent sore throat.  She was treated for a strep throat infection about 2 weeks ago by urgent care.  She was treated with amoxicillin for 10 days which she finished the course she was feeling better when she was taking the antibiotic however symptoms improved after discontinuation.  POCT strep test completed today which was negative.  On exam she does have white ulcer like spots on her tonsils which makes me concerned for possible recurrent pharyngitis however there are several other etiologies possible.  Will treat with additional antibiotic, Keflex twice daily x 10 days which she has tolerated in the past without issue.  Will also give steroids to help calm down inflammation in the throat which may assist with symptom management.  Recommend conservative measures for sore throats, as was given to her by urgent care.  If her symptoms return after antibiotic is finished, highly recommend getting in with her ENT specialist for evaluation of her tonsils.  She is very agreeable to plan and will reach out with any concerns in the meantime.  Orders:  -     Ambulatory Referral to Otolaryngology; Future  -     methylPREDNISolone 4 MG tablet therapy pack; Use as directed on package  -     cephalexin (KEFLEX) 500 mg capsule; Take 1 capsule (500 mg total) by mouth 2 (two) times a day for 10 days  2. Sore throat  -     POCT rapid ANTIGEN strepA  3. Iron deficiency anemia secondary to inadequate dietary iron intake  -     CBC and differential; Future; Expected date: 2024  -     Iron Panel (Includes Ferritin, Iron Sat%, Iron, and TIBC); Future; Expected date: 2024     Requests iron panel today for  "evaluation of iron deficiency anemia, which she has a history of.   History of Present Illness     Patient presents today for urgent care follow-up.  Patient was treated for tonsillitis on 7/26/2024 by urgent care.  She was treated with amoxicillin twice daily x 10 days.  Her sore throat improved with amoxicillin course however symptoms returned after discontinuation.  She was tested for strep at that time which was negative she also had a throat culture completed which was negative for group B strep.  She presents today with a sore throat 4 out of 10 on the pain scale.  No fever, chills, muffled voice, shortness of breath, cough or trouble swallowing.        Review of Systems   Constitutional:  Negative for chills, fatigue and fever.   HENT:  Positive for sore throat. Negative for congestion, ear pain, postnasal drip, sinus pressure, sinus pain, trouble swallowing and voice change.    Respiratory:  Negative for cough, chest tightness and shortness of breath.    Cardiovascular:  Negative for chest pain, palpitations and leg swelling.   Neurological:  Negative for dizziness, light-headedness and headaches.       Objective     /60   Pulse 80   Temp (!) 97.2 °F (36.2 °C)   Ht 5' 4.96\" (1.65 m)   Wt 87.5 kg (193 lb)   LMP 07/11/2024 (Exact Date)   SpO2 98%   BMI 32.16 kg/m²     Physical Exam  Vitals and nursing note reviewed.   Constitutional:       General: She is not in acute distress.     Appearance: Normal appearance. She is normal weight. She is not ill-appearing.   HENT:      Head: Normocephalic and atraumatic.      Right Ear: Tympanic membrane normal.      Left Ear: Tympanic membrane normal.      Nose: Nose normal.      Mouth/Throat:      Mouth: Mucous membranes are moist.      Pharynx: No pharyngeal swelling, oropharyngeal exudate or posterior oropharyngeal erythema.      Tonsils: Tonsillar exudate present. No tonsillar abscesses. 1+ on the right. 1+ on the left.      Comments: Tonsils appear to be " cryptic bilaterally.   Cardiovascular:      Rate and Rhythm: Normal rate and regular rhythm.   Pulmonary:      Effort: Pulmonary effort is normal. No respiratory distress.      Breath sounds: Normal breath sounds.   Musculoskeletal:      Right lower leg: No edema.      Left lower leg: No edema.   Skin:     General: Skin is warm and dry.      Coloration: Skin is not cyanotic or pale.   Neurological:      General: No focal deficit present.      Mental Status: She is alert and oriented to person, place, and time. Mental status is at baseline.   Psychiatric:         Mood and Affect: Mood normal.         Behavior: Behavior normal.         Judgment: Judgment normal.       Administrative Statements     Lydia Rangel PA-C  [facility]

## 2024-08-08 NOTE — ASSESSMENT & PLAN NOTE
Patient presents today for evaluation of a recurrent sore throat.  She was treated for a strep throat infection about 2 weeks ago by urgent care.  She was treated with amoxicillin for 10 days which she finished the course she was feeling better when she was taking the antibiotic however symptoms improved after discontinuation.  POCT strep test completed today which was negative.  On exam she does have white ulcer like spots on her tonsils which makes me concerned for possible recurrent pharyngitis however there are several other etiologies possible.  Will treat with additional antibiotic, Keflex twice daily x 10 days which she has tolerated in the past without issue.  Will also give steroids to help calm down inflammation in the throat which may assist with symptom management.  Recommend conservative measures for sore throats, as was given to her by urgent care.  If her symptoms return after antibiotic is finished, highly recommend getting in with her ENT specialist for evaluation of her tonsils.  She is very agreeable to plan and will reach out with any concerns in the meantime.

## 2024-08-30 ENCOUNTER — APPOINTMENT (OUTPATIENT)
Dept: LAB | Facility: CLINIC | Age: 29
End: 2024-08-30
Payer: COMMERCIAL

## 2024-08-30 DIAGNOSIS — E06.3 HYPOTHYROIDISM DUE TO HASHIMOTO'S THYROIDITIS: ICD-10-CM

## 2024-08-30 DIAGNOSIS — E03.8 HYPOTHYROIDISM DUE TO HASHIMOTO'S THYROIDITIS: ICD-10-CM

## 2024-08-30 DIAGNOSIS — E01.0 THYROMEGALY: ICD-10-CM

## 2024-08-30 DIAGNOSIS — D50.8 IRON DEFICIENCY ANEMIA SECONDARY TO INADEQUATE DIETARY IRON INTAKE: ICD-10-CM

## 2024-08-30 LAB
BASOPHILS # BLD AUTO: 0.04 THOUSANDS/ÂΜL (ref 0–0.1)
BASOPHILS NFR BLD AUTO: 1 % (ref 0–1)
EOSINOPHIL # BLD AUTO: 0.12 THOUSAND/ÂΜL (ref 0–0.61)
EOSINOPHIL NFR BLD AUTO: 2 % (ref 0–6)
ERYTHROCYTE [DISTWIDTH] IN BLOOD BY AUTOMATED COUNT: 12.4 % (ref 11.6–15.1)
FERRITIN SERPL-MCNC: 114 NG/ML (ref 11–307)
HCT VFR BLD AUTO: 40.4 % (ref 34.8–46.1)
HGB BLD-MCNC: 13.6 G/DL (ref 11.5–15.4)
IMM GRANULOCYTES # BLD AUTO: 0.02 THOUSAND/UL (ref 0–0.2)
IMM GRANULOCYTES NFR BLD AUTO: 0 % (ref 0–2)
IRON SATN MFR SERPL: 42 % (ref 15–50)
IRON SERPL-MCNC: 110 UG/DL (ref 50–212)
LYMPHOCYTES # BLD AUTO: 1.56 THOUSANDS/ÂΜL (ref 0.6–4.47)
LYMPHOCYTES NFR BLD AUTO: 24 % (ref 14–44)
MCH RBC QN AUTO: 30.6 PG (ref 26.8–34.3)
MCHC RBC AUTO-ENTMCNC: 33.7 G/DL (ref 31.4–37.4)
MCV RBC AUTO: 91 FL (ref 82–98)
MONOCYTES # BLD AUTO: 0.47 THOUSAND/ÂΜL (ref 0.17–1.22)
MONOCYTES NFR BLD AUTO: 7 % (ref 4–12)
NEUTROPHILS # BLD AUTO: 4.33 THOUSANDS/ÂΜL (ref 1.85–7.62)
NEUTS SEG NFR BLD AUTO: 66 % (ref 43–75)
NRBC BLD AUTO-RTO: 0 /100 WBCS
PLATELET # BLD AUTO: 190 THOUSANDS/UL (ref 149–390)
PMV BLD AUTO: 10.7 FL (ref 8.9–12.7)
RBC # BLD AUTO: 4.45 MILLION/UL (ref 3.81–5.12)
T4 FREE SERPL-MCNC: 1.1 NG/DL (ref 0.61–1.12)
TIBC SERPL-MCNC: 259 UG/DL (ref 250–450)
TSH SERPL DL<=0.05 MIU/L-ACNC: 0.26 UIU/ML (ref 0.45–4.5)
UIBC SERPL-MCNC: 149 UG/DL (ref 155–355)
WBC # BLD AUTO: 6.54 THOUSAND/UL (ref 4.31–10.16)

## 2024-08-30 PROCEDURE — 85025 COMPLETE CBC W/AUTO DIFF WBC: CPT

## 2024-08-30 PROCEDURE — 83540 ASSAY OF IRON: CPT

## 2024-08-30 PROCEDURE — 82728 ASSAY OF FERRITIN: CPT

## 2024-08-30 PROCEDURE — 84439 ASSAY OF FREE THYROXINE: CPT

## 2024-08-30 PROCEDURE — 83550 IRON BINDING TEST: CPT

## 2024-08-30 PROCEDURE — 84443 ASSAY THYROID STIM HORMONE: CPT

## 2024-08-30 PROCEDURE — 36415 COLL VENOUS BLD VENIPUNCTURE: CPT

## 2024-09-05 DIAGNOSIS — D50.9 IRON DEFICIENCY ANEMIA, UNSPECIFIED IRON DEFICIENCY ANEMIA TYPE: Primary | ICD-10-CM

## 2024-09-07 PROBLEM — J03.90 EXUDATIVE TONSILLITIS: Status: RESOLVED | Noted: 2024-08-08 | Resolved: 2024-09-07

## 2024-11-04 ENCOUNTER — TELEPHONE (OUTPATIENT)
Age: 29
End: 2024-11-04

## 2024-11-04 NOTE — TELEPHONE ENCOUNTER
Patient states she has been feeling light headed and having headaches more frequently over the last few weeks. She had thyroid labs done through her fertility doctor, she will send the results via [employer] for Dr. Sanchez's review. She currently takes Levothyroxine 100 mcg 1 tablet 6 days a week and 1/2 tablet on Sundays, she thinks her medication may have to be adjusted.  Please advise

## 2024-11-05 ENCOUNTER — OFFICE VISIT (OUTPATIENT)
Dept: FAMILY MEDICINE CLINIC | Facility: CLINIC | Age: 29
End: 2024-11-05
Payer: COMMERCIAL

## 2024-11-05 ENCOUNTER — APPOINTMENT (OUTPATIENT)
Dept: LAB | Facility: MEDICAL CENTER | Age: 29
End: 2024-11-05
Payer: COMMERCIAL

## 2024-11-05 VITALS
OXYGEN SATURATION: 98 % | BODY MASS INDEX: 31.66 KG/M2 | TEMPERATURE: 98.7 F | SYSTOLIC BLOOD PRESSURE: 110 MMHG | HEIGHT: 66 IN | HEART RATE: 88 BPM | WEIGHT: 197 LBS | DIASTOLIC BLOOD PRESSURE: 78 MMHG

## 2024-11-05 DIAGNOSIS — E55.9 VITAMIN D DEFICIENCY: ICD-10-CM

## 2024-11-05 DIAGNOSIS — D50.9 IRON DEFICIENCY ANEMIA, UNSPECIFIED IRON DEFICIENCY ANEMIA TYPE: ICD-10-CM

## 2024-11-05 DIAGNOSIS — R42 DIZZINESS: Primary | ICD-10-CM

## 2024-11-05 LAB
25(OH)D3 SERPL-MCNC: 25.6 NG/ML (ref 30–100)
BASOPHILS # BLD AUTO: 0.06 THOUSANDS/ΜL (ref 0–0.1)
BASOPHILS NFR BLD AUTO: 1 % (ref 0–1)
EOSINOPHIL # BLD AUTO: 0.11 THOUSAND/ΜL (ref 0–0.61)
EOSINOPHIL NFR BLD AUTO: 2 % (ref 0–6)
ERYTHROCYTE [DISTWIDTH] IN BLOOD BY AUTOMATED COUNT: 12.5 % (ref 11.6–15.1)
FERRITIN SERPL-MCNC: 76 NG/ML (ref 11–307)
HCT VFR BLD AUTO: 40.5 % (ref 34.8–46.1)
HGB BLD-MCNC: 13.3 G/DL (ref 11.5–15.4)
IMM GRANULOCYTES # BLD AUTO: 0.02 THOUSAND/UL (ref 0–0.2)
IMM GRANULOCYTES NFR BLD AUTO: 0 % (ref 0–2)
IRON SATN MFR SERPL: 16 % (ref 15–50)
IRON SERPL-MCNC: 43 UG/DL (ref 50–212)
LYMPHOCYTES # BLD AUTO: 1.95 THOUSANDS/ΜL (ref 0.6–4.47)
LYMPHOCYTES NFR BLD AUTO: 29 % (ref 14–44)
MCH RBC QN AUTO: 30 PG (ref 26.8–34.3)
MCHC RBC AUTO-ENTMCNC: 32.8 G/DL (ref 31.4–37.4)
MCV RBC AUTO: 91 FL (ref 82–98)
MONOCYTES # BLD AUTO: 0.54 THOUSAND/ΜL (ref 0.17–1.22)
MONOCYTES NFR BLD AUTO: 8 % (ref 4–12)
NEUTROPHILS # BLD AUTO: 4.13 THOUSANDS/ΜL (ref 1.85–7.62)
NEUTS SEG NFR BLD AUTO: 60 % (ref 43–75)
NRBC BLD AUTO-RTO: 0 /100 WBCS
PLATELET # BLD AUTO: 208 THOUSANDS/UL (ref 149–390)
PMV BLD AUTO: 11 FL (ref 8.9–12.7)
RBC # BLD AUTO: 4.43 MILLION/UL (ref 3.81–5.12)
TIBC SERPL-MCNC: 275 UG/DL (ref 250–450)
UIBC SERPL-MCNC: 232 UG/DL (ref 155–355)
VIT B12 SERPL-MCNC: 313 PG/ML (ref 180–914)
WBC # BLD AUTO: 6.81 THOUSAND/UL (ref 4.31–10.16)

## 2024-11-05 PROCEDURE — 85025 COMPLETE CBC W/AUTO DIFF WBC: CPT

## 2024-11-05 PROCEDURE — 82306 VITAMIN D 25 HYDROXY: CPT

## 2024-11-05 PROCEDURE — 83540 ASSAY OF IRON: CPT

## 2024-11-05 PROCEDURE — 83550 IRON BINDING TEST: CPT

## 2024-11-05 PROCEDURE — 36415 COLL VENOUS BLD VENIPUNCTURE: CPT

## 2024-11-05 PROCEDURE — 82728 ASSAY OF FERRITIN: CPT

## 2024-11-05 PROCEDURE — 93000 ELECTROCARDIOGRAM COMPLETE: CPT

## 2024-11-05 PROCEDURE — 82607 VITAMIN B-12: CPT

## 2024-11-05 PROCEDURE — 99214 OFFICE O/P EST MOD 30 MIN: CPT

## 2024-11-05 NOTE — PROGRESS NOTES
Ambulatory Visit  Name: Mara Duarte      : 1995      MRN: [id number]  Encounter Provider: Lydia Rangel PA-C  Encounter Date: 2024   Encounter department: [facility]    Assessment & Plan  Dizziness  Patient with concerns of fatigue and lightheadedness/dizziness for the past two weeks. She does have bilateral ear effusions on exam today, which may be causing some dizziness, will trial Flonase and continue antihistamine. ECG was completed today, NSR with no abnormalities concerning for cause of dizziness. No nystagmus on exam today, can rule out vertigo cause of symptoms. Discussed etiology of symptoms is unknown at this time, however warrants updated lab work including checking in on vitamin levels and iron status. Patient has history of iron deficiency that has caused dizziness and fatigue in the past. Patient's BP is WNL on today's exam and pulses are WNL, will continue to monitor her BP and increase fluids until we know more from blood work. Patient counseled with any Bps less than 90/50, she should go to the ER immediately for evaluation. Patient is agreeable with plan as above and workup, I will be in touch with lab results once they are available.   Orders:    POCT ECG    Vitamin D deficiency  Patient has history of mild vitamin D deficiency, with her current fatigue levels will recheck this level. Also will check B12 levels.   Orders:    Vitamin D 25 hydroxy; Future    Iron deficiency anemia, unspecified iron deficiency anemia type  Suspect fatigue could be related to iron levels, will repeat iron panel and reconsult hematology if needed. Patient has history of needing iron infusions.   Orders:    CBC and differential; Future    Iron Panel (Includes Ferritin, Iron Sat%, Iron, and TIBC); Future    Vitamin B12; Future       History of Present Illness     Patient presents today with concerns of 2 week long fatigue with no particular cause that she is able to identify.  "Also reports episodes of BP dropping to 110s/60s, which is lower than her normal. Reports lightheadedness that comes and goes, typically occurs in the afternoon. Does not wake up with dizziness and rarely happens in the morning. Reports no ear pain or fullness. Reports a fluttering in her chest as well when she lays down - no chest pain or SOB when these episodes happen - only 2 episodes in the past few days. Concerned about her iron levels, as she has a history of needing infusions due to low iron.           Review of Systems   Constitutional:  Positive for fatigue. Negative for chills and fever.   HENT:  Negative for ear discharge, ear pain, hearing loss, sinus pressure and sinus pain.    Respiratory:  Negative for cough, chest tightness and shortness of breath.    Cardiovascular:  Negative for chest pain, palpitations and leg swelling.   Neurological:  Positive for dizziness. Negative for light-headedness and headaches.           Objective     /78 (BP Location: Left arm, Patient Position: Sitting, Cuff Size: Standard)   Pulse 88   Temp 98.7 °F (37.1 °C) (Temporal)   Ht 5' 5.5\" (1.664 m)   Wt 89.4 kg (197 lb)   SpO2 98%   BMI 32.28 kg/m²     Physical Exam  Vitals and nursing note reviewed.   Constitutional:       General: She is not in acute distress.     Appearance: Normal appearance. She is normal weight. She is not ill-appearing.   HENT:      Head: Normocephalic and atraumatic.      Right Ear: A middle ear effusion is present.      Left Ear: A middle ear effusion is present.      Nose: Nose normal.      Mouth/Throat:      Mouth: Mucous membranes are moist.      Pharynx: Oropharynx is clear. No oropharyngeal exudate or posterior oropharyngeal erythema.   Eyes:      Extraocular Movements: Extraocular movements intact.      Right eye: Normal extraocular motion and no nystagmus.      Left eye: Normal extraocular motion and no nystagmus.      Pupils: Pupils are equal, round, and reactive to light. "   Cardiovascular:      Rate and Rhythm: Normal rate and regular rhythm.   Pulmonary:      Effort: Pulmonary effort is normal. No respiratory distress.      Breath sounds: Normal breath sounds.   Musculoskeletal:      Cervical back: Normal range of motion.      Right lower leg: No edema.      Left lower leg: No edema.   Lymphadenopathy:      Cervical: No cervical adenopathy.   Skin:     General: Skin is warm and dry.      Coloration: Skin is not cyanotic or pale.   Neurological:      General: No focal deficit present.      Mental Status: She is alert and oriented to person, place, and time. Mental status is at baseline.   Psychiatric:         Mood and Affect: Mood normal.         Behavior: Behavior normal.         Judgment: Judgment normal.

## 2024-11-05 NOTE — ASSESSMENT & PLAN NOTE
Suspect fatigue could be related to iron levels, will repeat iron panel and reconsult hematology if needed. Patient has history of needing iron infusions.   Orders:    CBC and differential; Future    Iron Panel (Includes Ferritin, Iron Sat%, Iron, and TIBC); Future    Vitamin B12; Future

## 2024-11-06 DIAGNOSIS — E55.9 VITAMIN D INSUFFICIENCY: Primary | ICD-10-CM

## 2024-11-06 RX ORDER — ERGOCALCIFEROL 1.25 MG/1
50000 CAPSULE, LIQUID FILLED ORAL WEEKLY
Qty: 12 CAPSULE | Refills: 0 | Status: SHIPPED | OUTPATIENT
Start: 2024-11-06

## 2024-11-11 ENCOUNTER — TELEPHONE (OUTPATIENT)
Dept: HEMATOLOGY ONCOLOGY | Facility: CLINIC | Age: 29
End: 2024-11-11

## 2024-11-11 ENCOUNTER — OFFICE VISIT (OUTPATIENT)
Dept: HEMATOLOGY ONCOLOGY | Facility: CLINIC | Age: 29
End: 2024-11-11
Payer: COMMERCIAL

## 2024-11-11 VITALS
HEIGHT: 66 IN | RESPIRATION RATE: 18 BRPM | OXYGEN SATURATION: 97 % | HEART RATE: 85 BPM | TEMPERATURE: 97.3 F | WEIGHT: 195.5 LBS | BODY MASS INDEX: 31.42 KG/M2 | DIASTOLIC BLOOD PRESSURE: 78 MMHG | SYSTOLIC BLOOD PRESSURE: 116 MMHG

## 2024-11-11 DIAGNOSIS — E61.1 IRON DEFICIENCY: Primary | ICD-10-CM

## 2024-11-11 PROCEDURE — 99213 OFFICE O/P EST LOW 20 MIN: CPT | Performed by: PHYSICIAN ASSISTANT

## 2024-11-11 RX ORDER — SODIUM CHLORIDE 9 MG/ML
20 INJECTION, SOLUTION INTRAVENOUS ONCE
OUTPATIENT
Start: 2024-11-25

## 2024-11-11 NOTE — PROGRESS NOTES
Hematology/Oncology Outpatient Follow-up  Mara Duarte 29 y.o. female 1995 [id number]    Date:  2024      Assessment and Plan:  1. Iron deficiency  History of iron deficiency diagnosed in 2023 following giving birth in 2023  She completed loading dose of iron  She had symptoms of recurrent fatigue and PCP checked iron panel, levels are borderline decreased, suggest 1 dose of Venofer 200 mg x 1 dose  We also reviewed her B12 is not at goal, deficiency is defined by B12 less than 300  Start B12 2000 mcg daily gummy times next 4 months, PCP to continue monitoring B12, CBC, iron every 3 months with thyroid studies    Should there be questions in the future, an E consult could be placed    Follow up PRN      HPI:  29 year-old female presents for follow up visit regarding low ferritin.     Patient had a miscarriage in 2022.  She states that she does not have significant bleeding with this.     She then was pregnant and delivered baby in 2023.     She had emergent  secondary to gestational hypertension/preeclampsia.     She did have blood loss with this requiring Venofer x 1 dose inpatient.     She did not have prolonged postpartum bleeding.     She had been off her birth control approximately 1 year prior to 2022.  She did not have menorrhagia.     She took prenatal with iron.  Additionally she states during pregnancy she did have a lot of energy.  She did have diagnosis also during pregnancy and postpartum of Hashimoto's thyroiditis with hypothyroidism.  She developed a goiter.  She is following with endocrinology at this time.     After delivery, she went to her PCP secondary to having arthralgias, myalgias, significant fatigue.  Her baby is sleeping 12 hours and patient is also sleeping well.  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     She was seen in  consult in Dec. Her iron stores were low. She completed venofer 200 mg IV weekly x 6.    Interval history: noticed more tiredness, had labs checked by PCP, presents back to review     ROS: Review of Systems   Constitutional:  Positive for fatigue. Negative for activity change, appetite change, chills, fever and unexpected weight change.   HENT:  Negative for mouth sores and nosebleeds.    Respiratory:  Negative for cough and shortness of breath.    Cardiovascular:  Negative for chest pain, palpitations and leg swelling.   Gastrointestinal:  Negative for abdominal pain, blood in stool, constipation, diarrhea, nausea and vomiting.   Genitourinary:  Negative for difficulty urinating, dysuria, hematuria and menstrual problem.   Musculoskeletal:  Negative for arthralgias.   Skin: Negative.    Neurological:  Negative for dizziness, weakness, light-headedness, numbness and headaches.   Hematological: Negative.    Psychiatric/Behavioral: Negative.         Past Medical History:   Diagnosis Date    Acne     ADHD     Allergic     Allergic asthma     Cluster headache     Disease of thyroid gland     Eczema     as a child    Hypertension     Migraines     Subclinical hypothyroidism     Vision loss        Past Surgical History:   Procedure Laterality Date     SECTION  2023    FINGER SURGERY Left     pin placed, middle    [place]  DELIVERY ONLY N/A 2023    Procedure:  SECTION ();  Surgeon: Jane Awad MD;  Location: [facility] ;  Service: Obstetrics    SHOULDER SURGERY Right     2019    SKIN BIOPSY      WISDOM TOOTH EXTRACTION         Social History     Socioeconomic History    Marital status: /Civil Union     Spouse name: Not on file    Number of children: Not on file    Years of education: Not on file    Highest education level: Not on file   Occupational History    Not on file   Tobacco Use    Smoking status: Never     Passive exposure: Never    Smokeless tobacco: Never   Vaping Use     Vaping status: Never Used   Substance and Sexual Activity    Alcohol use: Yes     Alcohol/week: 1.0 standard drink of alcohol     Types: 1 Glasses of wine per week     Comment: Socially    Drug use: Never    Sexual activity: Yes     Partners: Male     Birth control/protection: None   Other Topics Concern    Not on file   Social History Narrative    Not on file     Social Determinants of Health     Financial Resource Strain: Low Risk  (12/29/2023)    [facility]    Overall Financial Resource Strain (CARDIA)     Difficulty of Paying Living Expenses: Not hard at all   Food Insecurity: No Food Insecurity (12/29/2023)    [facility]    Hunger Vital Sign     Worried About Running Out of Food in the Last Year: Never true     Ran Out of Food in the Last Year: Never true   Transportation Needs: No Transportation Needs (12/29/2023)    [facility]    PRAPARE - Transportation     Lack of Transportation (Medical): No     Lack of Transportation (Non-Medical): No   Physical Activity: Not on file   Stress: Not on file   Social Connections: Not on file   Intimate Partner Violence: Not At Risk (12/29/2023)    [facility]    Humiliation, Afraid, Rape, and Kick questionnaire     Fear of Current or Ex-Partner: No     Emotionally Abused: No     Physically Abused: No     Sexually Abused: No   Housing Stability: Low Risk  (12/29/2023)    [facility]    Housing Stability Vital Sign     Unable to Pay for Housing in the Last Year: No     Number of Places Lived in the Last Year: 1     Unstable Housing in the Last Year: No       Family History   Problem Relation Age of Onset    BRCA1 Negative Mother     Breast cancer Mother 58        negative  genetic testing    Aortic aneurysm Father     No Known Problems Sister     No Known Problems Daughter     Thyroid disease unspecified Maternal Grandmother         thyroid goiter/nodules    Breast cancer Maternal Grandmother 75    Heart disease Paternal Grandmother     Lung cancer Paternal Grandfather        Allergies   Allergen Reactions    Clindamycin Rash     headache  headache      Tramadol GI Intolerance, Hives, Itching and Rash         Current Outpatient Medications:     albuterol (Ventolin HFA) 90 mcg/act inhaler, Inhale 2 puffs every 6 (six) hours as needed for wheezing, Disp: 18 g, Rfl: 0    Cyanocobalamin (VITAMIN B-12 PO), Take by mouth, Disp: , Rfl:     ergocalciferol (VITAMIN D2) 50,000 units, Take 1 capsule (50,000 Units total) by mouth once a week, Disp: 12 capsule, Rfl: 0    fluticasone (FLONASE) 50 mcg/act nasal spray, 2 sprays into each nostril daily, Disp: 16 g, Rfl: 0    levocetirizine (XYZAL) 5 MG tablet, Take 5 mg by mouth every evening, Disp: , Rfl:     levothyroxine 100 mcg tablet, Take 1 tablet 6 days of the week and 1/2 tablet on Sundays, Disp: 90 tablet, Rfl: 1    Prenat w/o A-FeCbGl-DSS-FA-DHA (PNV OB+DHA PO), Take by mouth, Disp: , Rfl:     rimegepant sulfate (Nurtec) 75 mg TBDP, Take 1 tablet (75 mg total) by mouth once as needed (migraine), Disp: 9 tablet, Rfl: 0    aspirin-acetaminophen-caffeine (EXCEDRIN MIGRAINE) 250-250-65 MG per tablet, Take 1 tablet by mouth every 6 (six) hours as needed (Patient not taking: Reported on 8/8/2024), Disp: , Rfl:     Azelaic Acid 15 % cream, , Disp: , Rfl:     ibuprofen (MOTRIN) 800 mg tablet, , Disp: , Rfl:     methylPREDNISolone 4 MG tablet therapy pack, Use as directed on package, Disp: 21 each, Rfl: 0    phenazopyridine (PYRIDIUM) 200 mg tablet, Take 1 tablet (200 mg total) by mouth 3 (three) times a day with meals, Disp: 20 tablet, Rfl: 0    tretinoin (RETIN-A) 0.025 % cream, , Disp: , Rfl:     Physical Exam:  /78 (BP Location: Left arm,  "Patient Position: Sitting, Cuff Size: Adult)   Pulse 85   Temp (!) 97.3 °F (36.3 °C) (Temporal)   Resp 18   Ht 5' 5.5\" (1.664 m)   Wt 88.7 kg (195 lb 8 oz)   SpO2 97%   BMI 32.04 kg/m²     Physical Exam  Vitals reviewed.   Constitutional:       General: She is not in acute distress.     Appearance: She is well-developed. She is not ill-appearing.   HENT:      Head: Normocephalic and atraumatic.   Eyes:      General: No scleral icterus.     Conjunctiva/sclera: Conjunctivae normal.   Cardiovascular:      Rate and Rhythm: Normal rate and regular rhythm.      Heart sounds: Normal heart sounds. No murmur heard.  Pulmonary:      Effort: Pulmonary effort is normal. No respiratory distress.      Breath sounds: Normal breath sounds.   Abdominal:      Palpations: Abdomen is soft.      Tenderness: There is no abdominal tenderness.   Musculoskeletal:         General: No tenderness. Normal range of motion.      Cervical back: Normal range of motion and neck supple.      Right lower leg: No edema.      Left lower leg: No edema.   Lymphadenopathy:      Cervical: No cervical adenopathy.   Skin:     General: Skin is warm and dry.   Neurological:      Mental Status: She is alert and oriented to person, place, and time.      Cranial Nerves: No cranial nerve deficit.   Psychiatric:         Mood and Affect: Mood normal.         Behavior: Behavior normal.       Labs:  Lab Results   Component Value Date    WBC 6.81 11/05/2024    HGB 13.3 11/05/2024    HCT 40.5 11/05/2024    MCV 91 11/05/2024     11/05/2024     I have spent 20 minutes with Patient  today in which greater than 50% of this time was spent in counseling/coordination of care regarding Diagnostic results, Risks and benefits of tx options, Instructions for management, Impressions, Documenting in the medical record, Reviewing / ordering tests, medicine, procedures  , and Obtaining or reviewing history    .     Patient voiced understanding and agreement in the above " discussion. Aware to contact our office with questions/symptoms in the interim.     This note has been generated by voice recognition software system.  Therefore, there may be spelling, grammar, and or syntax errors. Please contact if questions arise.

## 2024-11-11 NOTE — PATIENT INSTRUCTIONS
Ferrous gluconate 240 mg   Blood builder - multi vitamin     Increase B12 gummy 2 per day, 2000 mcg -- 4 months, then decrease back to 1000 mcg daily

## 2024-11-25 ENCOUNTER — HOSPITAL ENCOUNTER (OUTPATIENT)
Dept: INFUSION CENTER | Facility: CLINIC | Age: 29
Discharge: HOME | End: 2024-11-25
Payer: COMMERCIAL

## 2024-11-25 VITALS
RESPIRATION RATE: 18 BRPM | HEART RATE: 76 BPM | TEMPERATURE: 97.8 F | DIASTOLIC BLOOD PRESSURE: 84 MMHG | SYSTOLIC BLOOD PRESSURE: 121 MMHG

## 2024-11-25 DIAGNOSIS — E61.1 IRON DEFICIENCY: Primary | ICD-10-CM

## 2024-11-25 PROCEDURE — 96365 THER/PROPH/DIAG IV INF INIT: CPT

## 2024-11-25 RX ORDER — SODIUM CHLORIDE 9 MG/ML
20 INJECTION, SOLUTION INTRAVENOUS ONCE
Status: CANCELLED | OUTPATIENT
Start: 2024-11-25

## 2024-11-25 RX ORDER — SODIUM CHLORIDE 9 MG/ML
20 INJECTION, SOLUTION INTRAVENOUS ONCE
Status: COMPLETED | OUTPATIENT
Start: 2024-11-25 | End: 2024-11-25

## 2024-11-25 RX ADMIN — IRON SUCROSE 200 MG: 20 INJECTION, SOLUTION INTRAVENOUS at 08:18

## 2024-11-25 RX ADMIN — SODIUM CHLORIDE 20 ML/HR: 0.9 INJECTION, SOLUTION INTRAVENOUS at 08:17

## 2024-11-25 NOTE — PROGRESS NOTES
Mara Duarte  tolerated Venofer x1 well with no complications.      Mara Duarte is aware she has no future appointments at this time.     AVS declined by Mara Duarte.

## 2024-11-26 ENCOUNTER — TELEPHONE (OUTPATIENT)
Age: 29
End: 2024-11-26

## 2024-11-26 ENCOUNTER — APPOINTMENT (OUTPATIENT)
Dept: LAB | Facility: CLINIC | Age: 29
End: 2024-11-26
Payer: COMMERCIAL

## 2024-11-26 ENCOUNTER — OFFICE VISIT (OUTPATIENT)
Dept: ENDOCRINOLOGY | Facility: HOSPITAL | Age: 29
End: 2024-11-26
Payer: COMMERCIAL

## 2024-11-26 VITALS
SYSTOLIC BLOOD PRESSURE: 126 MMHG | OXYGEN SATURATION: 97 % | BODY MASS INDEX: 31.02 KG/M2 | HEIGHT: 66 IN | DIASTOLIC BLOOD PRESSURE: 70 MMHG | HEART RATE: 92 BPM | WEIGHT: 193 LBS

## 2024-11-26 DIAGNOSIS — E61.1 IRON DEFICIENCY: ICD-10-CM

## 2024-11-26 DIAGNOSIS — E06.3 HYPOTHYROIDISM DUE TO HASHIMOTO'S THYROIDITIS: Primary | ICD-10-CM

## 2024-11-26 DIAGNOSIS — E01.0 THYROMEGALY: ICD-10-CM

## 2024-11-26 LAB
CRP SERPL QL: 2.5 MG/L
ERYTHROCYTE [SEDIMENTATION RATE] IN BLOOD: 6 MM/HOUR (ref 0–19)
GLIADIN PEPTIDE IGA SER-ACNC: 2.4 U/ML
GLIADIN PEPTIDE IGA SER-ACNC: NEGATIVE
GLIADIN PEPTIDE IGG SER-ACNC: 9 U/ML
GLIADIN PEPTIDE IGG SER-ACNC: NEGATIVE
IGA SERPL-MCNC: 124 MG/DL (ref 66–433)
TTG IGA SER-ACNC: <0.5 U/ML
TTG IGA SER-ACNC: NEGATIVE
TTG IGG SER-ACNC: <0.8 U/ML
TTG IGG SER-ACNC: NEGATIVE

## 2024-11-26 PROCEDURE — 86140 C-REACTIVE PROTEIN: CPT | Performed by: INTERNAL MEDICINE

## 2024-11-26 PROCEDURE — 82784 ASSAY IGA/IGD/IGG/IGM EACH: CPT | Performed by: INTERNAL MEDICINE

## 2024-11-26 PROCEDURE — 86258 DGP ANTIBODY EACH IG CLASS: CPT | Performed by: INTERNAL MEDICINE

## 2024-11-26 PROCEDURE — 36415 COLL VENOUS BLD VENIPUNCTURE: CPT | Performed by: INTERNAL MEDICINE

## 2024-11-26 PROCEDURE — 99214 OFFICE O/P EST MOD 30 MIN: CPT | Performed by: INTERNAL MEDICINE

## 2024-11-26 PROCEDURE — 86364 TISS TRNSGLTMNASE EA IG CLAS: CPT | Performed by: INTERNAL MEDICINE

## 2024-11-26 PROCEDURE — 85652 RBC SED RATE AUTOMATED: CPT | Performed by: INTERNAL MEDICINE

## 2024-11-26 RX ORDER — LEVOTHYROXINE SODIUM 100 UG/1
TABLET ORAL
Qty: 90 TABLET | Refills: 1 | Status: SHIPPED | OUTPATIENT
Start: 2024-11-26

## 2024-11-26 NOTE — TELEPHONE ENCOUNTER
Had not thought iron def was from thyroid; Was attributed to pregnancy/giving birth.   She can make appt with GI if she would like   Please review with her iron def is not a primary hematological process. Causes of iron deficiency to include:   Blood loss (GI bleed, menses, frequent blood donation, frequent epistaxis)  Malabsorption (Med-en Y or sleeve gastrectomy, active or recent H. pylori, chronic prolonged PPI use)  Insufficient dietary intake      In regards to oral iron, ferrous gluconate 27 mg (any brand) or Blood Builder, brand is megafood

## 2024-11-26 NOTE — PROGRESS NOTES
11/27/2024    Assessment & Plan      Diagnoses and all orders for this visit:    Hypothyroidism due to Hashimoto's thyroiditis  -     T4, free; Future  -     TSH, 3rd generation; Future  -     levothyroxine 100 mcg tablet; Take 1 tablet 6 days of the week    Thyromegaly  -     T4, free; Future  -     TSH, 3rd generation; Future    Iron deficiency  -     C-reactive protein  -     Celiac Disease Panel  -     Sedimentation rate, automated        Assessment & Plan  1. Hypothyroidism due to Hashimoto's thyroiditis.  Most recent thyroid function studies done through her fertility doctor showed a normal TSH at 1.75. She is biochemically euthyroid. She will continue the same levothyroxine 100 mcg 1 tablet 6 days a week and none on Sunday. It has been reassured that her hypothyroidism with Hashimoto's thyroiditis is not affecting the absorption of vitamins and minerals contributing to her vitamin B12, vitamin D, and iron deficiency. There is another underlying issue affecting these deficiencies.    2. Thyromegaly.  An ultrasound done in April 2024 demonstrated a normal-sized thyroid. She no longer has compressive thyroid symptoms since starting thyroid hormone. Her thyroid remains top normal in size but not enlarged. This will be followed over time symptomatically.    3. Iron deficiency anemia.  She has been experiencing fatigue, cold intolerance, and constipation, likely due to iron deficiency. She is currently receiving iron infusions and occasionally takes iron pills.  Previous blood work did show a markedly elevated see reactive protein and an elevated sed rate.  A repeat C-reactive protein and sed rate will be done to check for inflammation, and a celiac panel will be conducted to rule out celiac disease. If these tests are negative, a GI evaluation will be recommended.    4. Vitamin D deficiency.  She is on a prescription vitamin D supplement. Vitamin D deficiency can cause hair loss and fatigue. This will be monitored  along with her other deficiencies.    5. Vitamin B12 deficiency.  She has restarted vitamin B12 supplementation, possibly 2000 mcg. Vitamin B12 deficiency can cause fatigue. This will be monitored along with her other deficiencies.    Follow-up  Return in 6 months for follow-up with preceding TSH and free T4.        CC: Hypothyroid, thyromegaly follow-up    History of Present Illness    HPI: Mara Duarte is a 29-year-old female with a history of hypothyroidism due to Hashimoto's thyroiditis, here for a follow-up visit.    She had been trying to get pregnant after having a miscarriage and had been on low-dose of levothyroxine to help her get pregnant in the past. She was able to get pregnant in 2022. After delivery, she noticed a swelling in her neck and was found to have hypothyroidism with positive thyroid antibodies. She is on thyroid hormone on a daily basis for replacement purposes and to see if it will shrink her thyroid due to compressive symptoms.     She is currently on a regimen of levothyroxine 100 mcg, taken six days a week. She reports experiencing episodes of feeling both hot and cold, which she attributes to her iron levels. She has been informed that her Hashimoto's disease could be causing issues with iron absorption. She underwent a full round of treatment for this last year. She also takes prescription vitamin D. Her sleep is satisfactory.    She has been receiving iron infusions and taking vitamin B12 supplements, which have helped alleviate her fatigue. She experiences stomach pain when not taking iron supplements. She experiences constipation, which she believes is due to the iron supplements.    She has experienced a few episodes of heart palpitations while lying on her side, but an EKG performed by her primary care physician showed no abnormalities. She reports no tremors, shaky hands, or dry skin. She reports hair loss but no bald spots. Her menstrual cycles are regular, lasting five days  with heavy flow for the first three days.    She uses cream for acne. She is not using a steroid pack or anything for a urine infection. She uses Motrin or ibuprofen occasionally for migraines.        Historical Information   Past Medical History:   Diagnosis Date    Acne     ADHD     Allergic     Allergic asthma     Cluster headache     Disease of thyroid gland     Eczema     as a child    Hypertension     Migraines     Subclinical hypothyroidism     Vision loss      Past Surgical History:   Procedure Laterality Date     SECTION  2023    FINGER SURGERY Left     pin placed, middle    [place]  DELIVERY ONLY N/A 2023    Procedure:  SECTION ();  Surgeon: Jane Awad MD;  Location: [facility] ;  Service: Obstetrics    SHOULDER SURGERY Right     2019    SKIN BIOPSY      WISDOM TOOTH EXTRACTION       Social History   Social History     Substance and Sexual Activity   Alcohol Use Yes    Alcohol/week: 1.0 standard drink of alcohol    Types: 1 Glasses of wine per week    Comment: Socially     Social History     Substance and Sexual Activity   Drug Use Never     Social History     Tobacco Use   Smoking Status Never    Passive exposure: Never   Smokeless Tobacco Never     Family History:   Family History   Problem Relation Age of Onset    BRCA1 Negative Mother     Breast cancer Mother 58        negative genetic testing    Aortic aneurysm Father     No Known Problems Sister     No Known Problems Daughter     Thyroid disease unspecified Maternal Grandmother         thyroid goiter/nodules    Breast cancer Maternal Grandmother 75    Heart disease Paternal Grandmother     Lung cancer Paternal Grandfather        Meds/Allergies   Current Outpatient Medications   Medication Sig Dispense Refill    albuterol (Ventolin HFA) 90 mcg/act inhaler Inhale 2 puffs every 6 (six) hours as needed for wheezing 18 g 0    aspirin-acetaminophen-caffeine (EXCEDRIN MIGRAINE) 250-250-65 MG per tablet Take 1 tablet by  "mouth every 6 (six) hours as needed      Cyanocobalamin (VITAMIN B-12 PO) Take by mouth 2000 mcg daily      ergocalciferol (VITAMIN D2) 50,000 units Take 1 capsule (50,000 Units total) by mouth once a week 12 capsule 0    fluticasone (FLONASE) 50 mcg/act nasal spray 2 sprays into each nostril daily (Patient taking differently: 2 sprays into each nostril as needed) 16 g 0    levocetirizine (XYZAL) 5 MG tablet Take 5 mg by mouth every evening      levothyroxine 100 mcg tablet Take 1 tablet 6 days of the week 90 tablet 1    Prenat w/o A-FeCbGl-DSS-FA-DHA (PNV OB+DHA PO) Take by mouth      rimegepant sulfate (Nurtec) 75 mg TBDP Take 1 tablet (75 mg total) by mouth once as needed (migraine) 9 tablet 0    ibuprofen (MOTRIN) 800 mg tablet        No current facility-administered medications for this visit.     Allergies   Allergen Reactions    Clindamycin Rash     headache  headache      Tramadol GI Intolerance, Hives, Itching and Rash       Objective   Vitals: Blood pressure 126/70, pulse 92, height 5' 5.5\" (1.664 m), weight 87.5 kg (193 lb), SpO2 97%, not currently breastfeeding.  Invasive Devices       None                   Physical Exam    No lid lag, stare, proptosis or periorbital edema in the eyes.  Thyroid remains full, but not enlarged. No palpable nodules. No lymphadenopathy in the neck.  Lungs are clear to auscultation.  Heart has a regular rate and rhythm. No murmurs.  No tremor of the outstretched hands. Patella deep tendon reflexes are normal.      The history was obtained from the review of the chart and from the patient.    Lab Results:      TSH performed on 11/1/2024 by her fertility physician was 1.75.    Lab Results   Component Value Date    CREATININE 0.79 12/29/2023    CREATININE 0.90 12/15/2023    CREATININE 0.94 08/25/2023    BUN 13 12/29/2023    K 3.5 12/29/2023     12/29/2023    CO2 25 12/29/2023     eGFRcr   Date Value Ref Range Status   12/29/2023 103 >59 Final         Lab Results "   Component Value Date    HDL 48 (L) 06/06/2024    TRIG 80 06/06/2024       Lab Results   Component Value Date    ALT 12 12/29/2023    AST 16 12/29/2023    ALKPHOS 62 12/29/2023       Lab Results   Component Value Date    TSH 2.00 09/23/2022    FREET4 1.10 08/30/2024             Future Appointments   Date Time Provider Department Center   6/2/2025  8:20 AM Sandy Sanchez MD [facility]

## 2024-11-26 NOTE — PATIENT INSTRUCTIONS
The most recent thyroid blood work was normal.     Continue the same levothyroxine dosage.     We'll do blood work to look at inflammation and if you have celiac antibodies. If positive and celiac negative, recommend GI evaluation.     Follow up in 6 months with blood work.

## 2024-11-26 NOTE — TELEPHONE ENCOUNTER
Received a phone call from patient.  Patient was seen by endocrinologist and was informed that patient's history of hypothyroidism d/t Hashimoto's thyroiditis is not the cause of her iron deficiency and vitamin deficiencies.  Patient is inquiring what is the cause of anemia, any tests that would need to be done to determine, and what are the brand names of the iron supplements that [facility] recommended.  Please call patient with updates.

## 2024-11-26 NOTE — TELEPHONE ENCOUNTER
Called and spoke with patient.  Reviewed note from Marcos.  Patient verbalized understanding.  Sent patient message, as per patient request.

## 2025-01-13 ENCOUNTER — PATIENT MESSAGE (OUTPATIENT)
Dept: FAMILY MEDICINE CLINIC | Facility: CLINIC | Age: 30
End: 2025-01-13

## 2025-01-14 NOTE — PATIENT COMMUNICATION
I called patient and advised she be evaluated at Urgent care, and could have x-ray done at same time if warranted.  Patient agreeable.

## 2025-01-28 ENCOUNTER — TELEPHONE (OUTPATIENT)
Age: 30
End: 2025-01-28

## 2025-01-28 DIAGNOSIS — Z31.69 ENCOUNTER FOR PRECONCEPTION CONSULTATION: Primary | ICD-10-CM

## 2025-01-29 DIAGNOSIS — E06.3 HYPOTHYROIDISM DUE TO HASHIMOTO'S THYROIDITIS: ICD-10-CM

## 2025-01-29 RX ORDER — LEVOTHYROXINE SODIUM 100 UG/1
TABLET ORAL
Qty: 90 TABLET | Refills: 1 | Status: SHIPPED | OUTPATIENT
Start: 2025-01-29 | End: 2025-02-04 | Stop reason: ALTCHOICE

## 2025-01-31 ENCOUNTER — TELEPHONE (OUTPATIENT)
Age: 30
End: 2025-01-31

## 2025-01-31 NOTE — TELEPHONE ENCOUNTER
Pt calling she has a letter for recall on her levothyroxine . She still has pills left she is asking if she should continue them . She stated that her pills were recalled from November but they just gave her notice.     Pt asking for a call back on recall situation. She does not want to be with out medication.

## 2025-01-31 NOTE — TELEPHONE ENCOUNTER
[facility]  Please schedule a virtual visit or face-to-face preconception visit with [facility] physician or me (JOSE ANTONIO VELASQUEZ)  Thanks,  Lotus

## 2025-02-03 NOTE — TELEPHONE ENCOUNTER
Patient is aware. She has received her medication by another . She would like brand ordered for next refill which will be in 3 months.

## 2025-02-04 DIAGNOSIS — E06.3 HYPOTHYROIDISM DUE TO HASHIMOTO'S THYROIDITIS: Primary | ICD-10-CM

## 2025-02-04 RX ORDER — LEVOTHYROXINE SODIUM 100 MCG
TABLET ORAL
Qty: 90 TABLET | Refills: 1 | Status: SHIPPED | OUTPATIENT
Start: 2025-02-04

## 2025-02-24 ENCOUNTER — OFFICE VISIT (OUTPATIENT)
Dept: FAMILY MEDICINE CLINIC | Facility: CLINIC | Age: 30
End: 2025-02-24
Payer: COMMERCIAL

## 2025-02-24 VITALS
HEART RATE: 85 BPM | SYSTOLIC BLOOD PRESSURE: 112 MMHG | OXYGEN SATURATION: 98 % | BODY MASS INDEX: 32.55 KG/M2 | TEMPERATURE: 98.2 F | DIASTOLIC BLOOD PRESSURE: 78 MMHG | WEIGHT: 198.6 LBS

## 2025-02-24 DIAGNOSIS — E55.9 VITAMIN D INSUFFICIENCY: ICD-10-CM

## 2025-02-24 DIAGNOSIS — G89.29 ACUTE ON CHRONIC LOW BACK PAIN: Primary | ICD-10-CM

## 2025-02-24 DIAGNOSIS — M54.50 ACUTE ON CHRONIC LOW BACK PAIN: Primary | ICD-10-CM

## 2025-02-24 DIAGNOSIS — F41.9 ANXIETY: ICD-10-CM

## 2025-02-24 DIAGNOSIS — E53.8 B12 DEFICIENCY: ICD-10-CM

## 2025-02-24 DIAGNOSIS — D50.8 IRON DEFICIENCY ANEMIA SECONDARY TO INADEQUATE DIETARY IRON INTAKE: ICD-10-CM

## 2025-02-24 DIAGNOSIS — M54.16 LUMBAR RADICULOPATHY: ICD-10-CM

## 2025-02-24 DIAGNOSIS — G43.909 MIGRAINE WITHOUT STATUS MIGRAINOSUS, NOT INTRACTABLE, UNSPECIFIED MIGRAINE TYPE: ICD-10-CM

## 2025-02-24 PROBLEM — J45.909 ASTHMA: Status: RESOLVED | Noted: 2022-01-06 | Resolved: 2025-02-24

## 2025-02-24 PROCEDURE — 99214 OFFICE O/P EST MOD 30 MIN: CPT

## 2025-02-24 RX ORDER — RIMEGEPANT SULFATE 75 MG/75MG
TABLET, ORALLY DISINTEGRATING ORAL
Qty: 18 TABLET | Refills: 0 | Status: SHIPPED | OUTPATIENT
Start: 2025-02-24

## 2025-02-24 RX ORDER — PREDNISONE 10 MG/1
TABLET ORAL
Qty: 20 TABLET | Refills: 0 | Status: SHIPPED | OUTPATIENT
Start: 2025-02-24 | End: 2025-03-03 | Stop reason: SDUPTHER

## 2025-02-24 RX ORDER — CYCLOBENZAPRINE HCL 10 MG
10 TABLET ORAL 3 TIMES DAILY PRN
Qty: 30 TABLET | Refills: 0 | Status: SHIPPED | OUTPATIENT
Start: 2025-02-24

## 2025-02-24 RX ORDER — ALPRAZOLAM 0.25 MG
TABLET ORAL
Qty: 2 TABLET | Refills: 0 | Status: SHIPPED | OUTPATIENT
Start: 2025-02-24

## 2025-02-24 NOTE — PROGRESS NOTES
Name: Mara Duarte      : 1995      MRN: [id number]  Encounter Provider: Lydia Rangel PA-C  Encounter Date: 2025   Encounter department: [facility]  :  Assessment & Plan  Acute on chronic low back pain  Reports onset of low back pain which is causing radiation into the right flank since last Thursday after shoveling snow.  Has history of low back pain after herniated disc that occurred in her teenager mcdaniel.  She was seen for similar back pain about a year ago and completed several courses of steroids and a few sessions of PT.  With patient's recurrence of back pain, would recommend getting an updated MRI to evaluate if any other abnormalities are causing her recurrences of pain.  She does have some radicular symptoms down the right leg with this current episode.  Will start treatment with prednisone taper, reviewed dosing instructions and side effects.  Will also provide patient with muscle relaxer to use as needed for pain.  Will be in touch with MRI results.  Patient agreeable with plan today, will follow-up as needed.  Orders:  •  predniSONE 10 mg tablet; Take 4 tablets (40 mg total) by mouth daily for 2 days, THEN 3 tablets (30 mg total) daily for 2 days, THEN 2 tablets (20 mg total) daily for 2 days, THEN 1 tablet (10 mg total) daily for 2 days.  •  cyclobenzaprine (FLEXERIL) 10 mg tablet; Take 1 tablet (10 mg total) by mouth 3 (three) times a day as needed for muscle spasms  •  MRI lumbar spine wo contrast; Future    Lumbar radiculopathy  Recommend MRI to evaluate radicular symptoms of her chronic lumbar pain.  Orders:  •  MRI lumbar spine wo contrast; Future    Anxiety  With MRI planned, patient reports significant anxiety.  Will provide patient with Xanax dose to use as needed prior to MRI procedure.  Orders:  •  ALPRAZolam (XANAX) 0.25 mg tablet; Take one tablet 2 hours prior to procedure, can repeat one tablet 30 minutes prior to procedure if still  anxious.    Migraine without status migrainosus, not intractable, unspecified migraine type  Sent to pharmacy with updated instructions to assist with insurance coverage.  Orders:  •  rimegepant sulfate (Nurtec) 75 mg TBDP; Take 1 tablet every other day as needed for prevention of migraines.    Vitamin D insufficiency  Will recheck vitamin D level after completing prescription strength supplementation.  Orders:  •  Vitamin D 25 hydroxy; Future    Iron deficiency anemia secondary to inadequate dietary iron intake  Will recheck iron level after restarting iron supplements.  No longer following with hematology as they reported only needing to follow-up with her as needed.  She is likely looking for a second opinion through [facility].  Orders:  •  Iron Panel (Includes Ferritin, Iron Sat%, Iron, and TIBC); Future    B12 deficiency  Recheck B12 level after restarting B12 supplement.  Orders:  •  Vitamin B12; Future           History of Present Illness   Patient presents today with concerns of low back pain which started Thursday after shoveling snow.  Reports a longstanding history of chronic low back pain which at times worsens with certain activities.  Reports radiation of pain and mild numbness into right buttock down into upper right thigh.  She has tried Tylenol, heat, ibuprofen and rest.  She reports continued pain despite supportive care.  She would also like an updated prescription for Nurtec.  She would also like repeat lab work for vitamin levels.      Review of Systems   Constitutional:  Positive for fatigue. Negative for chills and fever.   Respiratory:  Negative for cough, chest tightness and shortness of breath.    Cardiovascular:  Negative for chest pain, palpitations and leg swelling.   Musculoskeletal:  Positive for back pain. Negative for arthralgias, joint swelling, neck pain and neck stiffness.   Neurological:  Negative for dizziness, light-headedness and headaches.       Objective   /78 (BP Location:  Left arm, Patient Position: Sitting, Cuff Size: Standard)   Pulse 85   Temp 98.2 °F (36.8 °C) (Temporal)   Wt 90.1 kg (198 lb 9.6 oz)   SpO2 98%   BMI 32.55 kg/m²      Physical Exam  Vitals and nursing note reviewed.   Constitutional:       General: She is not in acute distress.     Appearance: Normal appearance. She is normal weight. She is not ill-appearing.   HENT:      Head: Normocephalic and atraumatic.   Cardiovascular:      Rate and Rhythm: Normal rate and regular rhythm.   Pulmonary:      Effort: Pulmonary effort is normal. No respiratory distress.      Breath sounds: Normal breath sounds.   Musculoskeletal:      Lumbar back: Tenderness present. No deformity, spasms or bony tenderness. Decreased range of motion.        Back:       Right lower leg: No edema.      Left lower leg: No edema.      Comments: Main location of pain noted in box above in diagram.  She is tender to palpation of the this area.  Radiation of symptoms down into right buttock as shown.   Skin:     General: Skin is warm and dry.      Coloration: Skin is not cyanotic or pale.   Neurological:      General: No focal deficit present.      Mental Status: She is alert and oriented to person, place, and time. Mental status is at baseline.   Psychiatric:         Mood and Affect: Mood normal.         Behavior: Behavior normal.         Judgment: Judgment normal.

## 2025-02-24 NOTE — ASSESSMENT & PLAN NOTE
Sent to pharmacy with updated instructions to assist with insurance coverage.  Orders:  •  rimegepant sulfate (Nurtec) 75 mg TBDP; Take 1 tablet every other day as needed for prevention of migraines.

## 2025-02-24 NOTE — ASSESSMENT & PLAN NOTE
Will recheck iron level after restarting iron supplements.  No longer following with hematology as they reported only needing to follow-up with her as needed.  She is likely looking for a second opinion through [facility].  Orders:  •  Iron Panel (Includes Ferritin, Iron Sat%, Iron, and TIBC); Future

## 2025-02-27 ENCOUNTER — TELEPHONE (OUTPATIENT)
Age: 30
End: 2025-02-27

## 2025-02-27 NOTE — TELEPHONE ENCOUNTER
Patient not noticing any relief of her back pain on the prednisone. Asking for a stronger steroid.

## 2025-02-28 DIAGNOSIS — G89.29 ACUTE ON CHRONIC LOW BACK PAIN: Primary | ICD-10-CM

## 2025-02-28 DIAGNOSIS — M54.50 ACUTE ON CHRONIC LOW BACK PAIN: Primary | ICD-10-CM

## 2025-02-28 RX ORDER — METHOCARBAMOL 500 MG/1
500 TABLET, FILM COATED ORAL 4 TIMES DAILY
Qty: 60 TABLET | Refills: 0 | Status: SHIPPED | OUTPATIENT
Start: 2025-02-28

## 2025-02-28 NOTE — TELEPHONE ENCOUNTER
Would recommend finishing out entire steroid course as she may not have experienced the full benefits from the taper yet. We can switch her muscle relaxer to a different kind if she wants, we can try Robaxin or Zanaflex?

## 2025-02-28 NOTE — TELEPHONE ENCOUNTER
Spoke with pt she is agreeable to try switching her muscle relaxer. She stated she has not been on either of those so which ever one you would prefer she is okay with. She also had a question if she was still in pain after this course of steroids if you would prescribe more to get her through her upcoming [place] vacation that she will be taking in the next week.

## 2025-02-28 NOTE — TELEPHONE ENCOUNTER
Sent Robaxin to pharmacy.  Depending on her pain level after this course of steroids, I may consider sending in another steroid if needed.

## 2025-03-03 ENCOUNTER — TELEPHONE (OUTPATIENT)
Age: 30
End: 2025-03-03

## 2025-03-03 DIAGNOSIS — G89.29 ACUTE ON CHRONIC LOW BACK PAIN: Primary | ICD-10-CM

## 2025-03-03 DIAGNOSIS — M54.16 LUMBAR RADICULOPATHY: ICD-10-CM

## 2025-03-03 DIAGNOSIS — M54.50 ACUTE ON CHRONIC LOW BACK PAIN: Primary | ICD-10-CM

## 2025-03-03 DIAGNOSIS — G89.29 ACUTE ON CHRONIC LOW BACK PAIN: ICD-10-CM

## 2025-03-03 DIAGNOSIS — M54.50 ACUTE ON CHRONIC LOW BACK PAIN: ICD-10-CM

## 2025-03-03 RX ORDER — PREDNISONE 10 MG/1
TABLET ORAL
Qty: 30 TABLET | Refills: 0 | Status: SHIPPED | OUTPATIENT
Start: 2025-03-03 | End: 2025-03-15

## 2025-03-03 NOTE — TELEPHONE ENCOUNTER
Patient reports in order for her MRI to get approved for tomorrow, she needs Desirae (ERIC) to call [phone] and do a peer to peer on the physician line stating that patient is taking prescribed medication for her injury and has a bulging disc that is getting worse. She was evaluated last year for the same issue and was doing PT but could not continue because of the pain. Patient attempted home exercise from PT and it was unsuccessful. She also had an XR that showed degenerative changes and an MRI is necessary for additional imaging. Please call patient to follow up. They are open until 1900 tonight. Please do not cancel MRI.    Statement Selected

## 2025-03-03 NOTE — TELEPHONE ENCOUNTER
Patient contacted the office this morning in regards to MRI and medication request. She wanted to know if she is able to take both the ALPRAZolam (XANAX) 0.25 mg tablet and   cyclobenzaprine (FLEXERIL) 10 mg tablet at the same time prior to MRI. She is no longer taking the methocarbamol (ROBAXIN) 500 mg tablet as she had a reaction, experienced a red face and took benadryl which helped, she wanted office to make note of that. Is requesting as she is still in a lot of pain and results of mri wont be back in time prior to leaving for [place] on Wednesday if another round of Prednisone can be prescribed as sent to [facility] in [place] to take in case it is needed. Asking if message can please be forwarded to Lydia to address, please contact patient back with an update, thank you.

## 2025-03-03 NOTE — TELEPHONE ENCOUNTER
Tried to call patient but went directly to [employer] x 2.  Per communication consent, it was okay to leave a voicemail.  Discussed that her MRI was denied by insurance and they are requiring 12 weeks of physical therapy to be completed before they would consider covering this.  Discussed with her that her that the physical therapy appointments from last year do not document that she discontinued PT due to pain so these will not be helpful with getting her MRI approved.  I did provide her with an expedited spine and pain management referral as well as a physical therapy referral, both of which are in her chart.  If our [facility] providers cannot get her in soon enough, she can certainly look at outside locations.  I also let her know that she can pay for this out-of-pocket if she wishes.  I again made it clear that a peer to peer will not be completed due to no supporting documentation being available to help this get approved.  I also sent in a longer course of steroids for her to help with her pain level and urged her to continue supportive care for her back pain.  I apologized for any confusion.

## 2025-03-03 NOTE — TELEPHONE ENCOUNTER
I will send in another round of prednisone for her.  Unfortunately I got a denial of the MRI from her insurance company so this MRI will need to be canceled.  Please notify patient as soon as possible.  When she is back from her trip, she should start treatment with physical therapy as this is what insurance requested before an MRI will be approved.

## 2025-03-03 NOTE — TELEPHONE ENCOUNTER
It is not appropriate to do a peer to peer. She can discuss her pain further with spine and pain management to discuss next steps, they may recommend a steroid injection for pain or may be able to help her get her MRI approved. Her PT notes from a year ago do not show that she stopped due to her pain levels, therefore we do not have enough supporting documentation for an MRI or peer to peer. She can certainly pay out of pocket for the MRI if she wishes to still receive it tomorrow.

## 2025-03-03 NOTE — TELEPHONE ENCOUNTER
I can refer her to spine and pain management but I am not sure that her insurance will count this towards the MRI, but she can certainly discuss this with spine and pain management further.

## 2025-03-03 NOTE — TELEPHONE ENCOUNTER
They are requesting that she complete at least 12 weeks of physical therapy.  She only completed 2 sessions of physical therapy last year so therefore she needs to restart her physical therapy course.

## 2025-03-03 NOTE — TELEPHONE ENCOUNTER
Received call from patient regarding MRI peer to peer denial from provider.  Patient requesting call to speak with provider directly.

## 2025-03-03 NOTE — TELEPHONE ENCOUNTER
Spoke with pt she stated this is the same injury as last year and she did complete PT last year and wanted to know if she has to go again or if there was a way to send things to the insurance company stating its the same injury and that she went through this last year to get MRI. She also wanted to know how many sessions it would take for insurance to approve the mri but I'm assuming at PT they would see how many sessions she needs once evaluated but let her know I would still ask you as she had questions regarding that.

## 2025-03-03 NOTE — TELEPHONE ENCOUNTER
"Patient called in stating she received voicemail from provider she states \"my phone didn't ring\"     Patient upset that she didn't received call, she states she is disappointed and will not be returning to the practice, she states the provider can call the insurance and states that she is on steroids, has a hx of back pain before 7 pm and the MRI will get approved.   Attempted to call [facility] office no answer   She is requesting another call back.  "

## 2025-03-03 NOTE — TELEPHONE ENCOUNTER
Spoke to patient. She stated the last time she went to PT she could not tolerate the pain which is why she stopped going. She asked if there was anyway around having to go to PT.   I let patient know I would ask but in the meantime I told her to contact her insurance company to see if they can give her any advice.

## 2025-03-04 NOTE — TELEPHONE ENCOUNTER
Please see patient's concerns. This is an ongoing issue, I have tried to call twice and she did not answer so I left a voicemail. I have also sent several messages regarding this matter. The insurance company denied her MRI knowing that she is on steroids as well as her history of back pain, as this is all documented in my note that was sent to insurance. Unfortunately the patient must complete PT per insurance's recommendations, which I find to be appropriate at this time.

## 2025-03-11 NOTE — TELEPHONE ENCOUNTER
I called Mara to discuss her concerns about the MRI that was ordered. I reached her voicemail, so I left a message asking her to return my call at [phone].

## 2025-03-18 ENCOUNTER — TELEMEDICINE (OUTPATIENT)
Dept: FAMILY MEDICINE CLINIC | Facility: CLINIC | Age: 30
End: 2025-03-18
Payer: COMMERCIAL

## 2025-03-18 DIAGNOSIS — J06.9 VIRAL URI WITH COUGH: Primary | ICD-10-CM

## 2025-03-18 PROCEDURE — 99214 OFFICE O/P EST MOD 30 MIN: CPT | Performed by: FAMILY MEDICINE

## 2025-03-18 RX ORDER — PREDNISONE 20 MG/1
40 TABLET ORAL DAILY
Qty: 6 TABLET | Refills: 0 | Status: SHIPPED | OUTPATIENT
Start: 2025-03-18 | End: 2025-03-21

## 2025-03-18 RX ORDER — LOPERAMIDE HYDROCHLORIDE 2 MG/1
2 CAPSULE ORAL 4 TIMES DAILY PRN
Qty: 30 CAPSULE | Refills: 0 | Status: SHIPPED | OUTPATIENT
Start: 2025-03-18

## 2025-03-18 RX ORDER — ONDANSETRON 4 MG/1
4 TABLET, FILM COATED ORAL EVERY 8 HOURS PRN
Qty: 20 TABLET | Refills: 0 | Status: SHIPPED | OUTPATIENT
Start: 2025-03-18

## 2025-03-18 RX ORDER — DEXTROMETHORPHAN HYDROBROMIDE AND PROMETHAZINE HYDROCHLORIDE 15; 6.25 MG/5ML; MG/5ML
5 SYRUP ORAL 4 TIMES DAILY PRN
Qty: 180 ML | Refills: 0 | Status: SHIPPED | OUTPATIENT
Start: 2025-03-18

## 2025-03-19 PROBLEM — J06.9 VIRAL URI WITH COUGH: Status: ACTIVE | Noted: 2025-03-19

## 2025-03-19 NOTE — ASSESSMENT & PLAN NOTE
Patient's  child was positive for flu.  Is having symptoms for approximately 4 to 5 days now, congestion, sore throat was initially started when she was over in [place] as well.  Congestion with dry cough.  Overall feeling fatigue has had off-and-on fevers with chills.  -She is outside the window for Tamiflu even if she is flu positive  -Continue over-the-counter care with proper hydration  -Sent in Phenergan for cough, steroid for anti-inflammatory effect, Zofran for nausea if needed and for diarrhea she can use Imodium as needed.  Orders:  •  promethazine-dextromethorphan (PHENERGAN-DM) 6.25-15 mg/5 mL oral syrup; Take 5 mL by mouth 4 (four) times a day as needed for cough  •  predniSONE 20 mg tablet; Take 2 tablets (40 mg total) by mouth daily for 3 days  •  ondansetron (ZOFRAN) 4 mg tablet; Take 1 tablet (4 mg total) by mouth every 8 (eight) hours as needed for nausea or vomiting  •  loperamide (IMODIUM) 2 mg capsule; Take 1 capsule (2 mg total) by mouth 4 (four) times a day as needed for diarrhea

## 2025-03-20 ENCOUNTER — OFFICE VISIT (OUTPATIENT)
Dept: FAMILY MEDICINE CLINIC | Facility: CLINIC | Age: 30
End: 2025-03-20
Payer: COMMERCIAL

## 2025-03-20 ENCOUNTER — LAB (OUTPATIENT)
Dept: LAB | Facility: CLINIC | Age: 30
End: 2025-03-20
Payer: COMMERCIAL

## 2025-03-20 VITALS
BODY MASS INDEX: 31.6 KG/M2 | HEIGHT: 66 IN | OXYGEN SATURATION: 99 % | TEMPERATURE: 98.2 F | WEIGHT: 196.6 LBS | DIASTOLIC BLOOD PRESSURE: 70 MMHG | SYSTOLIC BLOOD PRESSURE: 110 MMHG | HEART RATE: 93 BPM

## 2025-03-20 DIAGNOSIS — E53.8 B12 DEFICIENCY: ICD-10-CM

## 2025-03-20 DIAGNOSIS — N92.6 MENSTRUAL IRREGULARITY: Primary | ICD-10-CM

## 2025-03-20 DIAGNOSIS — R19.7 DIARRHEA, UNSPECIFIED TYPE: ICD-10-CM

## 2025-03-20 DIAGNOSIS — N92.6 MENSTRUAL IRREGULARITY: ICD-10-CM

## 2025-03-20 DIAGNOSIS — D50.8 IRON DEFICIENCY ANEMIA SECONDARY TO INADEQUATE DIETARY IRON INTAKE: ICD-10-CM

## 2025-03-20 DIAGNOSIS — E55.9 VITAMIN D INSUFFICIENCY: ICD-10-CM

## 2025-03-20 LAB
25(OH)D3 SERPL-MCNC: 30 NG/ML (ref 30–100)
ALBUMIN SERPL BCG-MCNC: 4.1 G/DL (ref 3.5–5)
ALP SERPL-CCNC: 52 U/L (ref 34–104)
ALT SERPL W P-5'-P-CCNC: 14 U/L (ref 7–52)
ANION GAP SERPL CALCULATED.3IONS-SCNC: 6 MMOL/L (ref 4–13)
AST SERPL W P-5'-P-CCNC: 19 U/L (ref 13–39)
BASOPHILS # BLD AUTO: 0.02 THOUSANDS/ÂΜL (ref 0–0.1)
BASOPHILS NFR BLD AUTO: 1 % (ref 0–1)
BILIRUB SERPL-MCNC: 0.3 MG/DL (ref 0.2–1)
BUN SERPL-MCNC: 6 MG/DL (ref 5–25)
CALCIUM SERPL-MCNC: 8.8 MG/DL (ref 8.4–10.2)
CHLORIDE SERPL-SCNC: 102 MMOL/L (ref 96–108)
CO2 SERPL-SCNC: 32 MMOL/L (ref 21–32)
CREAT SERPL-MCNC: 0.65 MG/DL (ref 0.6–1.3)
EOSINOPHIL # BLD AUTO: 0.05 THOUSAND/ÂΜL (ref 0–0.61)
EOSINOPHIL NFR BLD AUTO: 2 % (ref 0–6)
ERYTHROCYTE [DISTWIDTH] IN BLOOD BY AUTOMATED COUNT: 12.4 % (ref 11.6–15.1)
FERRITIN SERPL-MCNC: 282 NG/ML (ref 11–307)
GFR SERPL CREATININE-BSD FRML MDRD: 119 ML/MIN/1.73SQ M
GLUCOSE P FAST SERPL-MCNC: 86 MG/DL (ref 65–99)
HCG SERPL QL: POSITIVE
HCT VFR BLD AUTO: 40.7 % (ref 34.8–46.1)
HGB BLD-MCNC: 13.9 G/DL (ref 11.5–15.4)
IMM GRANULOCYTES # BLD AUTO: 0.01 THOUSAND/UL (ref 0–0.2)
IMM GRANULOCYTES NFR BLD AUTO: 0 % (ref 0–2)
LYMPHOCYTES # BLD AUTO: 1.4 THOUSANDS/ÂΜL (ref 0.6–4.47)
LYMPHOCYTES NFR BLD AUTO: 41 % (ref 14–44)
MCH RBC QN AUTO: 30.3 PG (ref 26.8–34.3)
MCHC RBC AUTO-ENTMCNC: 34.2 G/DL (ref 31.4–37.4)
MCV RBC AUTO: 89 FL (ref 82–98)
MONOCYTES # BLD AUTO: 0.61 THOUSAND/ÂΜL (ref 0.17–1.22)
MONOCYTES NFR BLD AUTO: 18 % (ref 4–12)
NEUTROPHILS # BLD AUTO: 1.3 THOUSANDS/ÂΜL (ref 1.85–7.62)
NEUTS SEG NFR BLD AUTO: 38 % (ref 43–75)
NRBC BLD AUTO-RTO: 0 /100 WBCS
PLATELET # BLD AUTO: 192 THOUSANDS/UL (ref 149–390)
PMV BLD AUTO: 10.2 FL (ref 8.9–12.7)
POTASSIUM SERPL-SCNC: 3.7 MMOL/L (ref 3.5–5.3)
PROT SERPL-MCNC: 6.6 G/DL (ref 6.4–8.4)
RBC # BLD AUTO: 4.58 MILLION/UL (ref 3.81–5.12)
SODIUM SERPL-SCNC: 140 MMOL/L (ref 135–147)
VIT B12 SERPL-MCNC: 742 PG/ML (ref 180–914)
WBC # BLD AUTO: 3.39 THOUSAND/UL (ref 4.31–10.16)

## 2025-03-20 PROCEDURE — 82607 VITAMIN B-12: CPT

## 2025-03-20 PROCEDURE — 82306 VITAMIN D 25 HYDROXY: CPT

## 2025-03-20 PROCEDURE — 82728 ASSAY OF FERRITIN: CPT

## 2025-03-20 PROCEDURE — 99213 OFFICE O/P EST LOW 20 MIN: CPT | Performed by: FAMILY MEDICINE

## 2025-03-20 PROCEDURE — 84703 CHORIONIC GONADOTROPIN ASSAY: CPT

## 2025-03-20 PROCEDURE — 83540 ASSAY OF IRON: CPT

## 2025-03-20 PROCEDURE — 83550 IRON BINDING TEST: CPT

## 2025-03-20 PROCEDURE — 85025 COMPLETE CBC W/AUTO DIFF WBC: CPT

## 2025-03-20 PROCEDURE — 36415 COLL VENOUS BLD VENIPUNCTURE: CPT

## 2025-03-20 PROCEDURE — 80053 COMPREHEN METABOLIC PANEL: CPT

## 2025-03-20 NOTE — PROGRESS NOTES
"Name: Mara Duarte      : 1995      MRN: [id number]  Encounter Provider: Susana Leblanc DO  Encounter Date: 3/20/2025   Encounter department: [facility]  :  Assessment & Plan  Menstrual irregularity    Orders:  •  Pregnancy Test (HCG Qualitative); Future    Diarrhea, unspecified type    Orders:  •  Stool Enteric Bacterial Panel by PCR; Future  •  Ova and parasite examination; Future  •  CBC and differential; Future  •  Comprehensive metabolic panel; Future           History of Present Illness   Patient with diarrhea - three days of loose stools - had taken three Imodium - went to ER. Eating crackers/rice  Was in [place] last week.      Review of Systems   Constitutional:  Negative for chills and fever.   HENT:  Negative for congestion and sore throat.    Respiratory:  Negative for chest tightness.    Cardiovascular:  Negative for chest pain and palpitations.   Gastrointestinal:  Positive for diarrhea. Negative for abdominal pain, constipation and nausea.   Genitourinary:  Positive for menstrual problem. Negative for difficulty urinating.   Skin: Negative.    Neurological:  Negative for dizziness and headaches.   Psychiatric/Behavioral: Negative.         Objective   /70 (BP Location: Left arm, Patient Position: Sitting, Cuff Size: Large)   Pulse 93   Temp 98.2 °F (36.8 °C) (Temporal)   Ht 5' 5.5\" (1.664 m)   Wt 89.2 kg (196 lb 9.6 oz)   SpO2 99%   BMI 32.22 kg/m²      Physical Exam  Vitals and nursing note reviewed.   Constitutional:       General: She is not in acute distress.  HENT:      Head: Normocephalic.      Right Ear: Tympanic membrane normal.      Left Ear: Tympanic membrane normal.   Eyes:      General: No scleral icterus.  Neck:      Thyroid: No thyromegaly.   Cardiovascular:      Rate and Rhythm: Normal rate and regular rhythm.      Heart sounds: Normal heart sounds.   Pulmonary:      Effort: Pulmonary effort is normal.      Breath sounds: Normal breath sounds. "   Musculoskeletal:      Right lower leg: No edema.      Left lower leg: No edema.   Lymphadenopathy:      Cervical: No cervical adenopathy.   Skin:     General: Skin is warm and dry.   Neurological:      Mental Status: She is alert and oriented to person, place, and time.   Psychiatric:         Mood and Affect: Mood normal.

## 2025-03-21 ENCOUNTER — APPOINTMENT (OUTPATIENT)
Dept: LAB | Facility: CLINIC | Age: 30
End: 2025-03-21
Payer: COMMERCIAL

## 2025-03-21 ENCOUNTER — TELEPHONE (OUTPATIENT)
Age: 30
End: 2025-03-21

## 2025-03-21 ENCOUNTER — RESULTS FOLLOW-UP (OUTPATIENT)
Dept: FAMILY MEDICINE CLINIC | Facility: CLINIC | Age: 30
End: 2025-03-21

## 2025-03-21 ENCOUNTER — TELEPHONE (OUTPATIENT)
Dept: OTHER | Facility: OTHER | Age: 30
End: 2025-03-21

## 2025-03-21 ENCOUNTER — NURSE TRIAGE (OUTPATIENT)
Dept: OTHER | Facility: OTHER | Age: 30
End: 2025-03-21

## 2025-03-21 DIAGNOSIS — O13.1 PREGNANCY-INDUCED HYPERTENSION IN FIRST TRIMESTER: ICD-10-CM

## 2025-03-21 DIAGNOSIS — O09.299 HISTORY OF MISCARRIAGE, CURRENTLY PREGNANT: ICD-10-CM

## 2025-03-21 DIAGNOSIS — E01.0 THYROMEGALY: Primary | ICD-10-CM

## 2025-03-21 DIAGNOSIS — E01.0 THYROMEGALY: ICD-10-CM

## 2025-03-21 LAB
B-HCG SERPL-ACNC: 57.3 MIU/ML (ref 0–5)
T4 FREE SERPL-MCNC: 1.16 NG/DL (ref 0.61–1.12)
TSH SERPL DL<=0.05 MIU/L-ACNC: 2.73 UIU/ML (ref 0.45–4.5)

## 2025-03-21 PROCEDURE — 84702 CHORIONIC GONADOTROPIN TEST: CPT

## 2025-03-21 PROCEDURE — 84439 ASSAY OF FREE THYROXINE: CPT

## 2025-03-21 PROCEDURE — 84443 ASSAY THYROID STIM HORMONE: CPT

## 2025-03-21 PROCEDURE — 36415 COLL VENOUS BLD VENIPUNCTURE: CPT

## 2025-03-21 NOTE — TELEPHONE ENCOUNTER
"Reason for Disposition  • Nursing judgment or information in reference    Answer Assessment - Initial Assessment Questions  1. REASON FOR CALL: \"What is your main concern right now?\"    Patient has Hashimoto's and fertility issues; wants to know how to adjust medications based on lab results. Has history of miscarriage possibly related to Thyroid issues and wants to avoid any complications.    2. PREGNANCY: \"Is there any chance you are pregnant?\" \"When was your last menstrual period?\"    Patient thinks she is about 4 weeks pregnant. Doesn't currently having any questions or concerns    Protocols used: No Guideline Available-Adult-    "

## 2025-03-21 NOTE — TELEPHONE ENCOUNTER
Yes - I will order serial HCG for patient. Please have her do at least 2 blood draws, 72 hours apart so I can make sure she is increasing.    Her endocrinologist has placed blood work for her thyroid and it is already in the chart.  It looks like her thyroid has been slightly overactive, but stable since May 2024.I can order repeat TSH blood work, but she needs to tell the lab to have the thyroid blood work from me drawn and not endocrine.

## 2025-03-21 NOTE — TELEPHONE ENCOUNTER
Patient called, +HPT with LMP 2/17/25; scheduled d&v 4/16/25.   Pt advised last pregnancy was monitored with serial hCg labs d/t auto-immune disease, thyroid issues & hx of loss. Pt also mentioned they were at [facility] ED 3/18 for excessive diarrhea caused by a virus.   Some labs available in chart. Pt requesting hCg, thyroid labs & any other provider may recommend.

## 2025-03-21 NOTE — TELEPHONE ENCOUNTER
Patient called to let office know that in [facility] her Synthroid medication states she takes a full tablet 6 days a week and a half tablet one day a week. She wants the office to know her fertility clinic changed her dosage to a full tablet 7 days per week.

## 2025-03-21 NOTE — TELEPHONE ENCOUNTER
FOLLOW UP: Per on-call provider, patient will need follow up testing in 4-6 weeks.    REASON FOR CONVERSATION: Labs Only    SYMPTOMS: None    OTHER: Patient had labs drawn today; suffered a miscarriage possibly due to Hashimoto's disease and wanted to know how to adjust medications at this time. On-call provider advised she should continue current dose at this time; TSH is a little high and T4 is also elevated at this time.    DISPOSITION: Call PCP Now

## 2025-03-21 NOTE — TELEPHONE ENCOUNTER
"Regardin Weeks Pregnant, HX of Hashimoto's Auto Immune Disease, HX of Mis Carriages, Medication Advise.  ----- Message from Paulina GILL sent at 3/21/2025  6:57 PM EDT -----  \" I am 4 weeks Pregnant, I got my results back for Thyroid Panel, I have a history of Hashimoto's Auto Immune Disease, I also have a hx of Mis-Carriage I need to know  how to adjust my medications. \"    "

## 2025-03-21 NOTE — TELEPHONE ENCOUNTER
Patient called in regards to her lab results and would like provider to review and give her a call back as she is concerned with abnormal results.

## 2025-03-24 ENCOUNTER — APPOINTMENT (OUTPATIENT)
Dept: LAB | Facility: CLINIC | Age: 30
End: 2025-03-24
Payer: COMMERCIAL

## 2025-03-24 ENCOUNTER — RESULTS FOLLOW-UP (OUTPATIENT)
Dept: OBGYN CLINIC | Facility: CLINIC | Age: 30
End: 2025-03-24

## 2025-03-24 DIAGNOSIS — O09.299 HISTORY OF MISCARRIAGE, CURRENTLY PREGNANT: ICD-10-CM

## 2025-03-24 DIAGNOSIS — O13.1 PREGNANCY-INDUCED HYPERTENSION IN FIRST TRIMESTER: ICD-10-CM

## 2025-03-24 DIAGNOSIS — E06.3 HYPOTHYROIDISM DUE TO HASHIMOTO'S THYROIDITIS: ICD-10-CM

## 2025-03-24 LAB
B-HCG SERPL-ACNC: 202.3 MIU/ML (ref 0–5)
IRON SATN MFR SERPL: 8 % (ref 15–50)
IRON SERPL-MCNC: 22 UG/DL (ref 50–212)
TIBC SERPL-MCNC: 260.4 UG/DL (ref 250–450)
TRANSFERRIN SERPL-MCNC: 186 MG/DL (ref 203–362)
UIBC SERPL-MCNC: 238 UG/DL (ref 155–355)

## 2025-03-24 PROCEDURE — 36415 COLL VENOUS BLD VENIPUNCTURE: CPT

## 2025-03-24 PROCEDURE — 84702 CHORIONIC GONADOTROPIN TEST: CPT

## 2025-03-24 RX ORDER — LEVOTHYROXINE SODIUM 112 UG/1
112 TABLET ORAL DAILY
Qty: 90 TABLET | Refills: 0 | Status: SHIPPED | OUTPATIENT
Start: 2025-03-24

## 2025-03-24 NOTE — TELEPHONE ENCOUNTER
Patient called requesting that she get an earlier D&V appt due to her auto immune disease. Patient is scheduled for 4/16/25. I looked to see if there was anything available in any location but I did not see anything.

## 2025-03-27 ENCOUNTER — TELEPHONE (OUTPATIENT)
Age: 30
End: 2025-03-27

## 2025-03-27 ENCOUNTER — APPOINTMENT (OUTPATIENT)
Dept: LAB | Facility: CLINIC | Age: 30
End: 2025-03-27
Payer: COMMERCIAL

## 2025-03-27 DIAGNOSIS — O13.1 PREGNANCY-INDUCED HYPERTENSION IN FIRST TRIMESTER: ICD-10-CM

## 2025-03-27 DIAGNOSIS — D50.9 IRON DEFICIENCY ANEMIA, UNSPECIFIED IRON DEFICIENCY ANEMIA TYPE: Primary | ICD-10-CM

## 2025-03-27 DIAGNOSIS — O09.299 HISTORY OF MISCARRIAGE, CURRENTLY PREGNANT: ICD-10-CM

## 2025-03-27 LAB — B-HCG SERPL-ACNC: 1032.6 MIU/ML (ref 0–5)

## 2025-03-27 PROCEDURE — 36415 COLL VENOUS BLD VENIPUNCTURE: CPT

## 2025-03-27 PROCEDURE — 84702 CHORIONIC GONADOTROPIN TEST: CPT

## 2025-03-27 NOTE — TELEPHONE ENCOUNTER
Patient calling in, stated that she is newly pregnant and has low iron as per her last lab results, she is concerned because of that. She wanted to know if Lalita would need to see her for a f/u or if she could order the infusion, best # [phone]

## 2025-03-28 ENCOUNTER — APPOINTMENT (EMERGENCY)
Dept: ULTRASOUND IMAGING | Facility: HOSPITAL | Age: 30
End: 2025-03-28
Payer: COMMERCIAL

## 2025-03-28 ENCOUNTER — NURSE TRIAGE (OUTPATIENT)
Age: 30
End: 2025-03-28

## 2025-03-28 ENCOUNTER — HOSPITAL ENCOUNTER (EMERGENCY)
Facility: HOSPITAL | Age: 30
Discharge: HOME | End: 2025-03-28
Attending: EMERGENCY MEDICINE
Payer: COMMERCIAL

## 2025-03-28 ENCOUNTER — TELEPHONE (OUTPATIENT)
Age: 30
End: 2025-03-28

## 2025-03-28 VITALS
DIASTOLIC BLOOD PRESSURE: 91 MMHG | RESPIRATION RATE: 18 BRPM | TEMPERATURE: 97.8 F | HEART RATE: 105 BPM | OXYGEN SATURATION: 99 % | SYSTOLIC BLOOD PRESSURE: 134 MMHG

## 2025-03-28 DIAGNOSIS — O26.899 ABDOMINAL PAIN DURING PREGNANCY: Primary | ICD-10-CM

## 2025-03-28 DIAGNOSIS — Z34.90 EARLY STAGE OF PREGNANCY: ICD-10-CM

## 2025-03-28 DIAGNOSIS — R10.9 ABDOMINAL PAIN DURING PREGNANCY: Primary | ICD-10-CM

## 2025-03-28 LAB
ALBUMIN SERPL BCG-MCNC: 4.4 G/DL (ref 3.5–5)
ALP SERPL-CCNC: 53 U/L (ref 34–104)
ALT SERPL W P-5'-P-CCNC: 19 U/L (ref 7–52)
ANION GAP SERPL CALCULATED.3IONS-SCNC: 6 MMOL/L (ref 4–13)
AST SERPL W P-5'-P-CCNC: 15 U/L (ref 13–39)
B-HCG SERPL-ACNC: 2206.5 MIU/ML (ref 0–5)
BASOPHILS # BLD AUTO: 0.07 THOUSANDS/ÂΜL (ref 0–0.1)
BASOPHILS NFR BLD AUTO: 1 % (ref 0–1)
BILIRUB SERPL-MCNC: 0.64 MG/DL (ref 0.2–1)
BILIRUB UR QL STRIP: NEGATIVE
BUN SERPL-MCNC: 9 MG/DL (ref 5–25)
CALCIUM SERPL-MCNC: 9.2 MG/DL (ref 8.4–10.2)
CHLORIDE SERPL-SCNC: 106 MMOL/L (ref 96–108)
CLARITY UR: CLEAR
CO2 SERPL-SCNC: 25 MMOL/L (ref 21–32)
COLOR UR: NORMAL
CREAT SERPL-MCNC: 0.77 MG/DL (ref 0.6–1.3)
EOSINOPHIL # BLD AUTO: 0.05 THOUSAND/ÂΜL (ref 0–0.61)
EOSINOPHIL NFR BLD AUTO: 1 % (ref 0–6)
ERYTHROCYTE [DISTWIDTH] IN BLOOD BY AUTOMATED COUNT: 12.8 % (ref 11.6–15.1)
GFR SERPL CREATININE-BSD FRML MDRD: 103 ML/MIN/1.73SQ M
GLUCOSE SERPL-MCNC: 107 MG/DL (ref 65–140)
GLUCOSE SERPL-MCNC: 64 MG/DL (ref 65–140)
GLUCOSE UR STRIP-MCNC: NEGATIVE MG/DL
HCT VFR BLD AUTO: 41.5 % (ref 34.8–46.1)
HGB BLD-MCNC: 13.9 G/DL (ref 11.5–15.4)
HGB UR QL STRIP.AUTO: NEGATIVE
IMM GRANULOCYTES # BLD AUTO: 0.02 THOUSAND/UL (ref 0–0.2)
IMM GRANULOCYTES NFR BLD AUTO: 0 % (ref 0–2)
KETONES UR STRIP-MCNC: NEGATIVE MG/DL
LEUKOCYTE ESTERASE UR QL STRIP: NEGATIVE
LYMPHOCYTES # BLD AUTO: 1.57 THOUSANDS/ÂΜL (ref 0.6–4.47)
LYMPHOCYTES NFR BLD AUTO: 22 % (ref 14–44)
MCH RBC QN AUTO: 30 PG (ref 26.8–34.3)
MCHC RBC AUTO-ENTMCNC: 33.5 G/DL (ref 31.4–37.4)
MCV RBC AUTO: 89 FL (ref 82–98)
MONOCYTES # BLD AUTO: 0.71 THOUSAND/ÂΜL (ref 0.17–1.22)
MONOCYTES NFR BLD AUTO: 10 % (ref 4–12)
NEUTROPHILS # BLD AUTO: 4.87 THOUSANDS/ÂΜL (ref 1.85–7.62)
NEUTS SEG NFR BLD AUTO: 66 % (ref 43–75)
NITRITE UR QL STRIP: NEGATIVE
NRBC BLD AUTO-RTO: 0 /100 WBCS
PH UR STRIP.AUTO: 6.5 [PH]
PLATELET # BLD AUTO: 269 THOUSANDS/UL (ref 149–390)
PMV BLD AUTO: 9.2 FL (ref 8.9–12.7)
POTASSIUM SERPL-SCNC: 3.9 MMOL/L (ref 3.5–5.3)
PROT SERPL-MCNC: 6.9 G/DL (ref 6.4–8.4)
PROT UR STRIP-MCNC: NEGATIVE MG/DL
RBC # BLD AUTO: 4.64 MILLION/UL (ref 3.81–5.12)
SODIUM SERPL-SCNC: 137 MMOL/L (ref 135–147)
SP GR UR STRIP.AUTO: 1.01 (ref 1–1.03)
UROBILINOGEN UR STRIP-ACNC: <2 MG/DL
WBC # BLD AUTO: 7.29 THOUSAND/UL (ref 4.31–10.16)

## 2025-03-28 PROCEDURE — 80053 COMPREHEN METABOLIC PANEL: CPT

## 2025-03-28 PROCEDURE — 85025 COMPLETE CBC W/AUTO DIFF WBC: CPT

## 2025-03-28 PROCEDURE — 76815 OB US LIMITED FETUS(S): CPT

## 2025-03-28 PROCEDURE — 82948 REAGENT STRIP/BLOOD GLUCOSE: CPT

## 2025-03-28 PROCEDURE — 36415 COLL VENOUS BLD VENIPUNCTURE: CPT

## 2025-03-28 PROCEDURE — 84702 CHORIONIC GONADOTROPIN TEST: CPT

## 2025-03-28 PROCEDURE — 99284 EMERGENCY DEPT VISIT MOD MDM: CPT

## 2025-03-28 PROCEDURE — 81003 URINALYSIS AUTO W/O SCOPE: CPT

## 2025-03-28 PROCEDURE — 99284 EMERGENCY DEPT VISIT MOD MDM: CPT | Performed by: EMERGENCY MEDICINE

## 2025-03-28 NOTE — TELEPHONE ENCOUNTER
Mara called back asking what she can do for pelvic pain. Pain has increased since exam. Advised warm shower, tylenol. Avoid heavy lifting, pelvic rest, hydrate.    If develops heavy bleeding or severe pelvic pain return to ED. Patient verbalized understanding and agreement to plan.

## 2025-03-28 NOTE — TELEPHONE ENCOUNTER
Patient called in & is still waiting for a call back from Lalita Cast.  She said it is important because her OB wants to know if she needs iron infusions being that she is newly pregnant.

## 2025-03-28 NOTE — Clinical Note
Mara Duarte was seen and treated in our emergency department on 3/28/2025.                Diagnosis:     Mara  may return to work on return date.    She may return on this date: 03/31/2025         If you have any questions or concerns, please don't hesitate to call.      Demi Pina, DO    ______________________________           _______________          _______________  Hospital Representative                              Date                                Time

## 2025-03-28 NOTE — ED PROVIDER NOTES
ED Disposition       None          Assessment & Plan       Medical Decision Making  Amount and/or Complexity of Data Reviewed  Labs: ordered. Decision-making details documented in ED Course.  Radiology: ordered.      30 yo F presented to ED for RLQ abd pain approx 5 weeks preg. Associated symptoms: lower back pain. Exam findings: diffuse lower abd ttp, worse on LLQ otherwise normal exam.      Differentials diagnoses considered: ectopic pregnancy vs UTI vs round ligament pain vs implantation pain vs other.     See ED course for more details on lab and imaging interpretation, as well as pertinent information that occurred during patient's ED stay.  Based on these results and H&P, too early to confirm intrauterine pregnancy but reassuring HGC increased and sac seen on pelvic US. Results and clinical impressions were discussed with patient and family. They expressed understanding. Plan: d/c home with instructions to f/up with OB, PCP as needed, instructed to RTE for any new or worsening symptoms, instructed to stay hydrated and take tylenol as needed for pain. This plan was also discussed with patient, who was agreeable with this plan. Patient was given the opportunity to ask questions in ED. All questions and concerns were addressed in ED.     This document was created using speech voice recognition software.   Grammatical errors, random word insertions, pronoun errors, and incomplete sentences are an occasional consequence of this system due to software limitations, ambient noise, and hardware issues.   Any formal questions or concerns about content, text, or information contained within the body of this dictation should be directly addressed to the provider for clarification.       ED Course as of 03/28/25 1549   Fri Mar 28, 2025   1401 Comprehensive metabolic panel(!)  WNL   1401 POC Glucose: 107   1401 UA (URINE) with reflex to Scope  WNL   1401 CBC and differential  No leukocytosis or leukopenia.  Hemoglobin  hematocrit within normal limits.  Platelets within normal limits.  Reassuring differential.    1423 Yesterday beta Hcg approx 1000   1501 HCG QUANTITATIVE(!): 2,206.5  1 day ago 1k   1540 US OB pregnancy limited with transvaginal  IMPRESSION:     1.  There is a single intrauterine gestational sac/saclike structure, without a visible embryo , or yolk sac. The mean sac diameter is too small to generate an LUCIO. The age by dates is 5 weeks and 4 days. Recommend continued close clinical follow-up and   serial serum beta hCG levels, with repeat ultrasound in 7 days or earlier if clinically indicated.         Medications - No data to display    ED Risk Strat Scores                                                History of Present Illness       Chief Complaint   Patient presents with    Abdominal Pain Pregnant     Patient sent by OB for RLQ abdominal cramping/pain and Low iron. Patient is chronically low and gets infusions every 4months. Currently 5wks pregnant.        Past Medical History:   Diagnosis Date    Acne     ADHD     Allergic     Allergic asthma     Cluster headache     Disease of thyroid gland     Eczema     as a child    Hypertension     Migraines     Subclinical hypothyroidism     Vision loss       Past Surgical History:   Procedure Laterality Date     SECTION  2023    FINGER SURGERY Left     pin placed, middle    [place]  DELIVERY ONLY N/A 2023    Procedure:  SECTION ();  Surgeon: Jane Awad MD;  Location: [facility] ;  Service: Obstetrics    SHOULDER SURGERY Right     2019    SKIN BIOPSY      WISDOM TOOTH EXTRACTION        Family History   Problem Relation Age of Onset    BRCA1 Negative Mother     Breast cancer Mother 58        negative genetic testing    Aortic aneurysm Father     No Known Problems Sister     No Known Problems Daughter     Thyroid disease unspecified Maternal Grandmother         thyroid goiter/nodules    Breast cancer Maternal Grandmother 75    Heart  disease Paternal Grandmother     Lung cancer Paternal Grandfather       Social History     Tobacco Use    Smoking status: Never     Passive exposure: Never    Smokeless tobacco: Never   Vaping Use    Vaping status: Never Used   Substance Use Topics    Alcohol use: Not Currently     Alcohol/week: 1.0 standard drink of alcohol     Types: 1 Glasses of wine per week     Comment: Socially    Drug use: Never      E-Cigarette/Vaping    E-Cigarette Use Never User       E-Cigarette/Vaping Substances    Nicotine No     THC No     CBD No     Flavoring No     Other No     Unknown No       I have reviewed and agree with the history as documented.     HPI  29-year-old F with h/o ADHD, subclinical hypothyroidism, HTN, migraines, allergies presenting to ED with RLQ abd pain for few days. Called her OB who told her to come to ED for evaluation. Was also called by her hematologist who she sees for low iron. She cannot get iron transfusion during first trimester preg. Patient is  A1. Denies fever, chills, CP, SOB, NV, bowel/bladder changes, and any other sxs.      Review of Systems  ROS otherwise negative unless stated above.       Objective       ED Triage Vitals [25 1201]   Temperature Pulse Blood Pressure Respirations SpO2 Patient Position - Orthostatic VS   97.8 °F (36.6 °C) 105 134/91 18 99 % Sitting      Temp Source Heart Rate Source BP Location FiO2 (%) Pain Score    Oral Monitor Right arm -- --      Vitals      Date and Time Temp Pulse SpO2 Resp BP Pain Score FACES Pain Rating User   25 1201 97.8 °F (36.6 °C) 105 99 % 18 134/91 -- -- HR            Physical Exam  General appearance: resting comfortably, no acute distress   HENT: Normocephalic, atraumatic, hearing grossly intact, and mucous membranes moist   Eyes: Conjunctiva normal, PERRL, EOM intact   Lungs/Chest: Respirations even and unlabored, breath sounds normal  Cardiovascular: Normal rate, regular rhythm  Abdomen: soft, non-distended, tenderness  diffusely to lower abd worse in LLQ  Musculoskeletal: extremities normal, atraumatic, no cyanosis or edema   Pulses: radial / dorsalis pedis pulses palpable  Skin: warm and dry   Neurologic: Awake and alert, no apparent focal deficit  Psych: Mood consistent with affect       Results Reviewed       Procedure Component Value Units Date/Time    hCG, quantitative [[id number]] Collected: 03/28/25 1206    Lab Status: In process Specimen: Blood from Arm, Left Updated: 03/28/25 1407    Fingerstick Glucose (POCT) [[id number]]  (Normal) Collected: 03/28/25 1350    Lab Status: Final result Specimen: Blood Updated: 03/28/25 1351     POC Glucose 107 mg/dl     UA (URINE) with reflex to Scope [[id number]] Collected: 03/28/25 1212    Lab Status: Final result Specimen: Urine, Clean Catch Updated: 03/28/25 1241     Color, UA Light Yellow     Clarity, UA Clear     Specific Gravity, UA 1.011     pH, UA 6.5     Leukocytes, UA Negative     Nitrite, UA Negative     Protein, UA Negative mg/dl      Glucose, UA Negative mg/dl      Ketones, UA Negative mg/dl      Urobilinogen, UA <2.0 mg/dl      Bilirubin, UA Negative     Occult Blood, UA Negative    Comprehensive metabolic panel [[id number]]  (Abnormal) Collected: 03/28/25 1206    Lab Status: Final result Specimen: Blood from Arm, Left Updated: 03/28/25 1231     Sodium 137 mmol/L      Potassium 3.9 mmol/L      Chloride 106 mmol/L      CO2 25 mmol/L      ANION GAP 6 mmol/L      BUN 9 mg/dL      Creatinine 0.77 mg/dL      Glucose 64 mg/dL      Calcium 9.2 mg/dL      AST 15 U/L      ALT 19 U/L      Alkaline Phosphatase 53 U/L      Total Protein 6.9 g/dL      Albumin 4.4 g/dL      Total Bilirubin 0.64 mg/dL      eGFR 103 ml/min/1.73sq m     Narrative:      National Kid[employer] Disease Foundation guidelines for Chronic Kidney Disease (CKD):     Stage 1 with normal or high GFR (GFR > 90 mL/min/1.73 square meters)    Stage 2 Mild CKD (GFR = 60-89 mL/min/1.73 square meters)    Stage 3A Moderate CKD (GFR =  45-59 mL/min/1.73 square meters)    Stage 3B Moderate CKD (GFR = 30-44 mL/min/1.73 square meters)    Stage 4 Severe CKD (GFR = 15-29 mL/min/1.73 square meters)    Stage 5 End Stage CKD (GFR <15 mL/min/1.73 square meters)  Note: GFR calculation is accurate only with a steady state creatinine    CBC and differential [[id number]] Collected: 03/28/25 1206    Lab Status: Final result Specimen: Blood from Arm, Left Updated: 03/28/25 1218     WBC 7.29 Thousand/uL      RBC 4.64 Million/uL      Hemoglobin 13.9 g/dL      Hematocrit 41.5 %      MCV 89 fL      MCH 30.0 pg      MCHC 33.5 g/dL      RDW 12.8 %      MPV 9.2 fL      Platelets 269 Thousands/uL      nRBC 0 /100 WBCs      Segmented % 66 %      Immature Grans % 0 %      Lymphocytes % 22 %      Monocytes % 10 %      Eosinophils Relative 1 %      Basophils Relative 1 %      Absolute Neutrophils 4.87 Thousands/µL      Absolute Immature Grans 0.02 Thousand/uL      Absolute Lymphocytes 1.57 Thousands/µL      Absolute Monocytes 0.71 Thousand/µL      Eosinophils Absolute 0.05 Thousand/µL      Basophils Absolute 0.07 Thousands/µL             US OB pregnancy limited with transvaginal    (Results Pending)       POC Pelvic US    Date/Time: 3/28/2025 2:21 PM    Performed by: Demi Pina DO  Authorized by: Demi Pina DO    Patient location:  ED  Procedure details:     Indications: evaluate for IUP and pregnant with abdominal pain      Assessment for comment:  Evaluated for IUP    Technique:  Transabdominal obstetric (HCG+) exam    Views obtained: left adnexa, right adnexa, uterus (transverse and sagittal) and pouch of Liang      Image quality: diagnostic      Image availability:  Images available in PACS  Uterine findings:     Endometrial stripe: identified      Intrauterine pregnancy: not identified      Single gestation: not identified      Multiple gestation: not identified      Gestational sac: not identified      Yolk sac: not identified      Fetal pole:  not identified      Fetal heart rate: not identified    Right adnexa findings:     Right ovary:  Visualized    Right ovarian cyst: not identified      Right tube patency:  Normal  Left adnexa findings:     Left ovary:  Visualized    Left ovarian cyst: not identified    Interpretation:     Ultrasound impressions: normal      Pregnancy findings: intrauterine pregnancy (IUP)        ED Medication and Procedure Management   Prior to Admission Medications   Prescriptions Last Dose Informant Patient Reported? Taking?   Cyanocobalamin (VITAMIN B-12 PO)  Self Yes No   Sig: Take by mouth 2000 mcg daily   Prenat w/o A-FeCbGl-DSS-FA-DHA (PNV OB+DHA PO)  Self Yes No   Sig: Take by mouth   albuterol (Ventolin HFA) 90 mcg/act inhaler   No No   Sig: Inhale 2 puffs every 6 (six) hours as needed for wheezing   aspirin-acetaminophen-caffeine (EXCEDRIN MIGRAINE) 250-250-65 MG per tablet  Self Yes No   Sig: Take 1 tablet by mouth every 6 (six) hours as needed   ergocalciferol (VITAMIN D2) 50,000 units   No No   Sig: Take 1 capsule (50,000 Units total) by mouth once a week   fluticasone (FLONASE) 50 mcg/act nasal spray   No No   Si sprays into each nostril daily   ibuprofen (MOTRIN) 800 mg tablet  Self Yes No   levocetirizine (XYZAL) 5 MG tablet  Self Yes No   Sig: Take 5 mg by mouth every evening   levothyroxine (Synthroid) 112 mcg tablet   No No   Sig: Take 1 tablet (112 mcg total) by mouth daily   loperamide (IMODIUM) 2 mg capsule   No No   Sig: Take 1 capsule (2 mg total) by mouth 4 (four) times a day as needed for diarrhea   ondansetron (ZOFRAN) 4 mg tablet   No No   Sig: Take 1 tablet (4 mg total) by mouth every 8 (eight) hours as needed for nausea or vomiting   promethazine-dextromethorphan (PHENERGAN-DM) 6.25-15 mg/5 mL oral syrup   No No   Sig: Take 5 mL by mouth 4 (four) times a day as needed for cough   rimegepant sulfate (Nurtec) 75 mg TBDP   No No   Sig: Take 1 tablet every other day as needed for prevention of  migraines.      Facility-Administered Medications: None     Patient's Medications   Discharge Prescriptions    No medications on file     No discharge procedures on file.  ED SEPSIS DOCUMENTATION            Demi Pina,   03/28/25 5924

## 2025-03-28 NOTE — PROGRESS NOTES
E-Consult unable to be completed.   Patient was seen here within the last 6 months and contacted me directly via [employer]. Recommendations provided there.  Do not want to bill her for an e-consult. Please refer to [employer] for recs. Thanks!

## 2025-03-28 NOTE — TELEPHONE ENCOUNTER
"FOLLOW UP: ESC message to Dr. David ( per Dr. David, patient to present to [facility] ED at this time)     REASON FOR CONVERSATION: Abdominal Pain Pregnant    SYMPTOMS: right lower abdominal pain,     OTHER: This patient is 5 weeks 4 days according to LMP of 2/17. She called today c/o constant abdominal pain on right side of lower abdomen, started suddenly 4 days ago. She states it does go away sometimes, but is fairly constant. Gets worse with movement. Warm shower helps some, but doesn't go away. She rates the pain a 3. Lower back pain started a couple days ago as well that rates a 2. She denies vaginal bleeding, diarrhea, vomiting, fever, pain with urination, discharge, dizziness, lightheadedness, shoulder pain or any other symptoms to note at this time.     Called patient back and advised she go to [facility] ED at this time, as soon as possible. She verbalized understanding.     DISPOSITION: Go to ED/UCC Now (Or to Office with PCP Approval)                Reason for Disposition   MILD constant abdominal pain (e.g., doesn't interfere with normal activities) and present > 2 hours    Answer Assessment - Initial Assessment Questions  1. LOCATION: \"Where does it hurt?\"       Lower abdomen   2. RADIATION: \"Does the pain shoot anywhere else?\" (e.g., chest, back, shoulder)      No   3. ONSET: \"When did the pain begin?\" (e.g., minutes, hours or days ago)       4 days ago   4. ONSET: \"Gradual or sudden onset?\"      Sudden   5. PATTERN \"Does the pain come and go, or has it been constant since it started?\"       Pain constant , goes away sometimes.   6. SEVERITY: \"How bad is the pain?\" \"What does it keep you from doing?\"  (e.g., Scale 1-10; mild, moderate, or severe)      3   7. RECURRENT SYMPTOM: \"Have you ever had this type of stomach pain before?\" If Yes, ask: \"When was the last time?\" and \"What happened that time?\"       No    8. CAUSE: \"What do you think is causing the stomach pain?\"      Unknown   9. " "RELIEVING/AGGRAVATING FACTORS: \"What makes it better or worse?\" (e.g., antacids, bowel movement, movement)      Warm shower helps a little   10. OTHER SYMPTOMS: \"Do you have any other symptoms?\" (e.g., back pain, diarrhea, fever, urination pain, vaginal bleeding, vaginal discharge, vomiting)        Lower back pain - started a couple days ago ( rates pain a 2),   11. LUCIO: \"What date are you expecting to deliver?\"        LMP: 2/17/25    Protocols used: Pregnancy - Abdominal Pain Less Than 20 Weeks EGA-Adult-[place]    "

## 2025-03-28 NOTE — DISCHARGE INSTRUCTIONS
US showed a sac-like structure in the uterus but will need close follow with series HCGs with OB and repeat US in 7 days.     Take tylenol as needed for pain    Please return to the ER for any vaginal bleeding, worsening pain, or any new or worsening symptoms

## 2025-03-28 NOTE — ED ATTENDING ATTESTATION
3/28/2025  IDavid DO, saw and evaluated the patient. I have discussed the patient with the resident/non-physician practitioner and agree with the resident's/non-physician practitioner's findings, Plan of Care, and MDM as documented in the resident's/non-physician practitioner's note, except where noted. All available labs and Radiology studies were reviewed.  I was present for key portions of any procedure(s) performed by the resident/non-physician practitioner and I was immediately available to provide assistance.       At this point I agree with the current assessment done in the Emergency Department.  I have conducted an independent evaluation of this patient a history and physical is as follows:    29-year-old female, past medical history per chart including 5 weeks pregnant, presenting to the emergency department with lower abdominal cramping into the back.  Patient denies fever, chills, change in bowel or bladder, nausea, vomiting, diarrhea.  Sent to ER via OB.    Vital sign stable.  Patient resting comfortably.  Interactive and pleasant without any signs of distress or illness.  Lungs clear to auscultation.  No increased work of breathing.  Abdomen soft nontender.     MDM: 29-year-old female presenting to the emergency department with likely suspected implantation pain.  Ultrasound without acute pathology.  Likely too early for IUP has not identified exist exam.  Bili continues to trend upwards.  CBC, CMP, UA without acute pathology.  Supportive care discussed.  Patient to follow-up with OB. Reviewed all findings both relevant and incidental with the patient at bedside. Pt verbalized understanding of findings, neccesary follow up, return to ED precautions. Pt agreed to review today's findings with their primary care provider. Pt non-toxic appearing upon discharge.       ED Course  ED Course as of 03/28/25 1558   Fri Mar 28, 2025   1407 Yesterday, beta approx 1k.            Critical Care  Time  Procedures

## 2025-03-28 NOTE — TELEPHONE ENCOUNTER
Pt seen in ED for abdominal pain on 3/28. Advised to follow up with OB within 7 days for an US. Please follow up with pt for scheduling.

## 2025-03-31 ENCOUNTER — APPOINTMENT (OUTPATIENT)
Dept: LAB | Facility: CLINIC | Age: 30
End: 2025-03-31
Payer: COMMERCIAL

## 2025-03-31 DIAGNOSIS — O09.299 HISTORY OF MISCARRIAGE, CURRENTLY PREGNANT: ICD-10-CM

## 2025-03-31 DIAGNOSIS — O09.299 HISTORY OF MISCARRIAGE, CURRENTLY PREGNANT: Primary | ICD-10-CM

## 2025-03-31 DIAGNOSIS — O13.1 PREGNANCY-INDUCED HYPERTENSION IN FIRST TRIMESTER: ICD-10-CM

## 2025-03-31 LAB — B-HCG SERPL-ACNC: 3092 MIU/ML (ref 0–5)

## 2025-03-31 PROCEDURE — 36415 COLL VENOUS BLD VENIPUNCTURE: CPT

## 2025-03-31 PROCEDURE — 84702 CHORIONIC GONADOTROPIN TEST: CPT

## 2025-03-31 NOTE — TELEPHONE ENCOUNTER
Patient called to follow up on HCG level, patient states at ED they were not able to give her much information. Has US scheduled 4/1/25.  Patient asking if she still needs this appointment based on blood work.   Not cramping now, has never had bleeding.  HP sent to Dr. David for next steps.

## 2025-03-31 NOTE — TELEPHONE ENCOUNTER
Patient calling in stating her routine provider sent her a mychart msg with results and recommendations. Patient states Dr. David does not need to advise. She has no further questions.

## 2025-04-01 ENCOUNTER — ULTRASOUND (OUTPATIENT)
Dept: OBGYN CLINIC | Facility: CLINIC | Age: 30
End: 2025-04-01
Payer: COMMERCIAL

## 2025-04-01 VITALS
SYSTOLIC BLOOD PRESSURE: 108 MMHG | DIASTOLIC BLOOD PRESSURE: 74 MMHG | HEIGHT: 66 IN | BODY MASS INDEX: 31.82 KG/M2 | WEIGHT: 198 LBS

## 2025-04-01 DIAGNOSIS — Z32.01 POSITIVE PREGNANCY TEST: Primary | ICD-10-CM

## 2025-04-01 PROCEDURE — 99213 OFFICE O/P EST LOW 20 MIN: CPT

## 2025-04-01 PROCEDURE — 76817 TRANSVAGINAL US OBSTETRIC: CPT

## 2025-04-01 NOTE — PROGRESS NOTES
"S: 29 y.o.  who presents for viability scan with LMP of 25. She would be 6w1d by LMP. However, patient believes she conceived , which would make her 3w6d. She went to ED for RLQ pain on 3/28; noted on TVUS was GS but not able to measured. No yolk sac or fetal pole.   HCG have had a recent slow rise:    3/21: 57.3  3/24: 202  3/27: 1,032  3/28: 2,206  3/31: 3,092     She reports mild cramping. No bleeding.      Past Medical History:   Diagnosis Date    Acne     ADHD     Allergic     Allergic asthma     Cluster headache     Disease of thyroid gland     Eczema     as a child    Hypertension     Migraines     Subclinical hypothyroidism     Vision loss        OB History    Para Term  AB Living   3 1 1 0 1 1   SAB IAB Ectopic Multiple Live Births   1 0 0 0 1      # Outcome Date GA Lbr Jacob/2nd Weight Sex Type Anes PTL Lv   3 Current            2 Term 23 39w0d  3575 g (7 lb 14.1 oz) F CS-LTranv EPI, Gen  DENG   1 SAB 2022              Obstetric Comments   : 2022 SAB naturally;  primary LTCS F 39w    Hgb electrophoresis WNL        O:  Vitals:    25 1253   BP: 108/74   BP Location: Left arm   Patient Position: Sitting   Cuff Size: Standard   Weight: 89.8 kg (198 lb)   Height: 5' 5.5\" (1.664 m)       TVUS indicates small GS, measuring 0.26 cm, or approximately 5 weeks. A possible yolk sac noted, but difficult to visualize. No fetal pole or cardiac activity seen.                A/P:  1. Advised to continue HCG levels tomorrow.  2. Reviewed early pregnancy vs possible miscarriage or nonviable pregnancy.   3. Discussed recent HCG levels, with inappropriate rise and concern for nonviable pregnancy.   4. Provided emotional support.   5. She has f/u appt on  for next US.   6. Bleeding and cramping precautions provided.    Problem List Items Addressed This Visit    None  Visit Diagnoses         Positive pregnancy test    -  Primary    Relevant Orders    AMB US OB < 14 " weeks single or first gestation level 1 (Completed)

## 2025-04-02 ENCOUNTER — APPOINTMENT (OUTPATIENT)
Dept: LAB | Facility: CLINIC | Age: 30
End: 2025-04-02
Payer: COMMERCIAL

## 2025-04-02 ENCOUNTER — TELEPHONE (OUTPATIENT)
Dept: OBGYN CLINIC | Facility: CLINIC | Age: 30
End: 2025-04-02

## 2025-04-02 DIAGNOSIS — O13.1 PREGNANCY-INDUCED HYPERTENSION IN FIRST TRIMESTER: ICD-10-CM

## 2025-04-02 DIAGNOSIS — O09.299 HISTORY OF MISCARRIAGE, CURRENTLY PREGNANT: ICD-10-CM

## 2025-04-02 LAB — B-HCG SERPL-ACNC: 3569 MIU/ML (ref 0–5)

## 2025-04-02 PROCEDURE — 84702 CHORIONIC GONADOTROPIN TEST: CPT

## 2025-04-02 PROCEDURE — 36415 COLL VENOUS BLD VENIPUNCTURE: CPT

## 2025-04-02 NOTE — TELEPHONE ENCOUNTER
Slow rise of HCG discussed with Dr. Kovacs.  Plan is for US in 2 weeks from ER visit on the 28 th.    Call to pt. I spoke with pt.  Discussed concerns given low risk in HCG in 48 hours. Reviewed that would expect 33% rise after 3000 mIU HCG. Rise is lower than 33%, but had not decreased.  Yesterday, there was no yolk sac or heart beat noted in US.  Discussed with patient that at this point, she may have a non viable pregnancy.  However, she may also have a viable pregnancy.  If her numbers were decreasing, then pregnancy would likely be nonviable.  If her numbers were normally increasing, than likely viable pregnancy. However, she has a slow rise.  She is only 6.2 weeks by LMP.  Very possible that she is early to see development of yolk sac/heart rate on pregnancy.  Pt advised to keep US appointment on the 04/11/2025.    Discussed to continue to take prenatal vitamin, eat 3 meals a day, continue normal exercise.    Miscarriage precautions reviewed. If severe bleeding/hemorrhage with soaking > 1 pad per hour, then go to ER. If spotting or light bleeding, then call office.    Pt agrees with plan.

## 2025-04-02 NOTE — TELEPHONE ENCOUNTER
Patient states she reviewed HCG lab via yonatan Shoemaker for provider follow up for next steps.   Had US in office yesterday with Jerilyn Mccullough.   No bleeding, continues to have mild cramping same as yesterday.   Patient okay with [facility] msg, leodanm or phone call back.   HP sent to Jyoti Mccullough

## 2025-04-07 NOTE — PROGRESS NOTES
"S: 29 y.o.  who presents for viability scan with LMP of 25. She would be 6w1d by LMP. However, patient believes she conceived , which would make her 3w6d. She went to ED for RLQ pain on 3/28; noted on TVUS was GS but not able to measured. No yolk sac or fetal pole.   HCG have had a recent slow rise:    3/21: 57.3  3/24: 202  3/27: 1,032  3/28: 2,206  3/31: 3,092   : 3,569        Past Medical History:   Diagnosis Date    Acne     ADHD     Allergic     Allergic asthma     Cluster headache     Disease of thyroid gland     Eczema     as a child    Hypertension     Migraines     Subclinical hypothyroidism     Vision loss        OB History    Para Term  AB Living   3 1 1 0 1 1   SAB IAB Ectopic Multiple Live Births   1 0 0 0 1      # Outcome Date GA Lbr Jacob/2nd Weight Sex Type Anes PTL Lv   3 Current            2 Term 23 39w0d  3575 g (7 lb 14.1 oz) F CS-LTranv EPI, Gen  DENG   1 SAB 2022              Obstetric Comments   : 2022 SAB naturally;  primary LTCS F 39w    Hgb electrophoresis WNL        O:  Vitals:    25 1150   BP: 120/76   BP Location: Left arm   Patient Position: Sitting   Cuff Size: Standard   Weight: 89.8 kg (198 lb)   Height: 5' 5.5\" (1.664 m)     TVUS indicates small GS, measuring 0.91cm, correlating with 5w5d. Yolk sac apparent. Fetal pole notable with possbile cardiac activity. Difficulty visualizing and measuring fetal pole.                   A/P:    HCG ordered; patient request.  Repeat US in 11 days for continued evaluation of pregnancy.      Problem List Items Addressed This Visit    None  Visit Diagnoses         Missed menses    -  Primary    Relevant Orders    hCG, quantitative              "

## 2025-04-08 ENCOUNTER — ULTRASOUND (OUTPATIENT)
Dept: OBGYN CLINIC | Facility: CLINIC | Age: 30
End: 2025-04-08
Payer: COMMERCIAL

## 2025-04-08 ENCOUNTER — APPOINTMENT (OUTPATIENT)
Dept: LAB | Facility: CLINIC | Age: 30
End: 2025-04-08
Payer: COMMERCIAL

## 2025-04-08 VITALS
WEIGHT: 198 LBS | BODY MASS INDEX: 31.82 KG/M2 | DIASTOLIC BLOOD PRESSURE: 76 MMHG | SYSTOLIC BLOOD PRESSURE: 120 MMHG | HEIGHT: 66 IN

## 2025-04-08 DIAGNOSIS — N92.6 MISSED MENSES: Primary | ICD-10-CM

## 2025-04-08 DIAGNOSIS — N92.6 MISSED MENSES: ICD-10-CM

## 2025-04-08 LAB — B-HCG SERPL-ACNC: 4294 MIU/ML (ref 0–5)

## 2025-04-08 PROCEDURE — 99213 OFFICE O/P EST LOW 20 MIN: CPT

## 2025-04-08 PROCEDURE — 84702 CHORIONIC GONADOTROPIN TEST: CPT

## 2025-04-08 PROCEDURE — 36415 COLL VENOUS BLD VENIPUNCTURE: CPT

## 2025-04-08 PROCEDURE — 76817 TRANSVAGINAL US OBSTETRIC: CPT

## 2025-04-09 ENCOUNTER — RESULTS FOLLOW-UP (OUTPATIENT)
Dept: OBGYN CLINIC | Facility: CLINIC | Age: 30
End: 2025-04-09

## 2025-04-14 ENCOUNTER — NURSE TRIAGE (OUTPATIENT)
Age: 30
End: 2025-04-14

## 2025-04-14 ENCOUNTER — PATIENT MESSAGE (OUTPATIENT)
Age: 30
End: 2025-04-14

## 2025-04-14 ENCOUNTER — LAB (OUTPATIENT)
Dept: LAB | Facility: CLINIC | Age: 30
End: 2025-04-14
Payer: COMMERCIAL

## 2025-04-14 DIAGNOSIS — O46.90 VAGINAL BLEEDING IN PREGNANCY: ICD-10-CM

## 2025-04-14 DIAGNOSIS — E01.0 THYROMEGALY: ICD-10-CM

## 2025-04-14 DIAGNOSIS — D50.9 IRON DEFICIENCY ANEMIA, UNSPECIFIED IRON DEFICIENCY ANEMIA TYPE: ICD-10-CM

## 2025-04-14 DIAGNOSIS — O46.90 VAGINAL BLEEDING IN PREGNANCY: Primary | ICD-10-CM

## 2025-04-14 DIAGNOSIS — E06.3 HYPOTHYROIDISM DUE TO HASHIMOTO'S THYROIDITIS: ICD-10-CM

## 2025-04-14 DIAGNOSIS — O09.299 HISTORY OF MISCARRIAGE, CURRENTLY PREGNANT: ICD-10-CM

## 2025-04-14 DIAGNOSIS — R19.7 DIARRHEA, UNSPECIFIED TYPE: ICD-10-CM

## 2025-04-14 LAB — B-HCG SERPL-ACNC: 5172 MIU/ML (ref 0–5)

## 2025-04-14 PROCEDURE — 36415 COLL VENOUS BLD VENIPUNCTURE: CPT

## 2025-04-14 PROCEDURE — 84702 CHORIONIC GONADOTROPIN TEST: CPT

## 2025-04-14 NOTE — TELEPHONE ENCOUNTER
Hi thank you reaching out. I do want her to monitor the bleeding. If it gets heavier, please have her call back. She is scheduled with me for a repeat ultrasound on Monday. I will keep checking my schedule for any openings. Would she want another hcg?

## 2025-04-14 NOTE — TELEPHONE ENCOUNTER
"FOLLOW UP: discuss with provider and call back    REASON FOR CONVERSATION: Vaginal Bleeding    SYMPTOMS: light pink/red spotting starting a few minutes ago. Mild cramping that has been ongoing. Denies intercourse in the last 48 hours.    OTHER: Patient with unconfirmed viable pregnancy--had u/s done 4/8 which correlated with pregnancy of 5w5d. LMP 2/17    DISPOSITION: Home Care    Reason for Disposition   SPOTTING (single or brief episode)    Answer Assessment - Initial Assessment Questions  1. ONSET: \"When did this bleeding start?\"        This morning  2. BLEEDING SEVERITY: \"Describe the bleeding that you are having.\" \"How much bleeding is there?\"       Red/pink spotting  3. ABDOMEN PAIN: \"Do you have any pain?\" \"How bad is the pain?\"  (e.g., Scale 0-10; none, mild, moderate, or severe)      Mild 1/10  4. PREGNANCY: \"Do you know how many weeks or months pregnant you are?\" \"When was the first day of your last normal menstrual period?\"      unsure  5. ULTRASOUND: \"Have you had an ultrasound during this pregnancy?\"  Note: To confirm intrauterine pregnancy, placenta location.      yes  6. HEMODYNAMIC STATUS: \"Are you weak or feeling lightheaded?\" If Yes, ask: \"Can you stand and walk normally?\"       denies  7. OTHER SYMPTOMS: \"What other symptoms are you having with the bleeding?\" (e.g., passed tissue, vaginal discharge, fever, menstrual-type cramps)      Slight cramping    Protocols used: Pregnancy - Vaginal Bleeding Less Than 20 Weeks EGA-Adult-[place]    "

## 2025-04-15 ENCOUNTER — HOSPITAL ENCOUNTER (OUTPATIENT)
Dept: ULTRASOUND IMAGING | Facility: HOSPITAL | Age: 30
Discharge: HOME | End: 2025-04-15
Payer: COMMERCIAL

## 2025-04-15 ENCOUNTER — RESULTS FOLLOW-UP (OUTPATIENT)
Dept: OBGYN CLINIC | Facility: CLINIC | Age: 30
End: 2025-04-15

## 2025-04-15 DIAGNOSIS — O20.9 BLEEDING IN EARLY PREGNANCY: ICD-10-CM

## 2025-04-15 DIAGNOSIS — O20.9 BLEEDING IN EARLY PREGNANCY: Primary | ICD-10-CM

## 2025-04-15 PROCEDURE — 76801 OB US < 14 WKS SINGLE FETUS: CPT

## 2025-04-15 NOTE — TELEPHONE ENCOUNTER
Hi- it doesn't look like I have any current openings but I could offer her a repeat US with our radiology department. Would she want to do that?

## 2025-04-15 NOTE — PATIENT COMMUNICATION
Patient calling back to update. States bleeding had stopped, but just restarted. States bleeding is light on panty liner and brown/red. States mild cramping. Will route to provider.

## 2025-04-16 ENCOUNTER — RESULTS FOLLOW-UP (OUTPATIENT)
Dept: LABOR AND DELIVERY | Facility: HOSPITAL | Age: 30
End: 2025-04-16

## 2025-04-16 NOTE — TELEPHONE ENCOUNTER
Called Mara to review ultrasound. She reported that bleeding was mucousy and pink with spotting, not heavy or mimicking a menses. She reports mild cramping with bleeding and moderate cramping after the ultrasound. Reviewed the ultrasound was similar to last week's US with fetal pole and yolk sac apparent. No NIYAH. Discussed that at this time, I would still recommend her repeat US on Monday, April 21 for more definitive diagnosis. We can not currently rule out viable pregnancy vs miscarriage. Strict bleeding precautions reviewed.

## 2025-04-17 ENCOUNTER — NURSE TRIAGE (OUTPATIENT)
Age: 30
End: 2025-04-17

## 2025-04-17 NOTE — TELEPHONE ENCOUNTER
"FOLLOW UP: review with provider and return call with additional recommendations. Patient already scheduled for follow up ultrasound 4/21.  Reviewed with patient to continue to monitor at home, rest, do not use tampons or anything in vagina. Call back with any increased bleeding or abdominal pain, go to ER for heavy bleeding or severe abdominal pain.     [facility] Dr Kovacs     - no additional recommendations    Called patient to review, patient reporting bleeding has been about the same. Saw some very small clots with wiping but still not noticing much on pads. Reviewed call back precautions, and follow up visit in office on Monday.  Patient verbalized understanding.       REASON FOR CONVERSATION: Vaginal Bleeding - Pregnant    SYMPTOMS: vaginal bleeding    OTHER: Patient 8w3d by LMP, 6w3d by US performed 4/15, called in with vaginal bleeding. She had been having pink tinged discharge previously, which is why ultrasound was completed 4/15. No cardiac activity noted on that ultrasound, follow up ultrasound scheduled for 4/21. She has heavier dark red bleeding. She has mostly noticed this with wiping, and has only had to wear a pantiliner so far. She has not passed any clots or tissue. Patient denies any abdominal pain or cramping, no back pain, no urinary symptoms.     DISPOSITION: Callback by PCP Today      Reason for Disposition   MILD vaginal bleeding (e.g., less than 1 pad / hour; less than patient's usual menstrual bleeding; not just spotting)    Answer Assessment - Initial Assessment Questions  1. ONSET: \"When did this bleeding start?\"        Heavier bleeding started in the last hour, darker red   2. BLEEDING SEVERITY: \"Describe the bleeding that you are having.\" \"How much bleeding is there?\"       With wiping and has managed with pantiliner so far.   3. ABDOMEN PAIN: \"Do you have any pain?\" \"How bad is the pain?\"  (e.g., Scale 0-10; none, mild, moderate, or severe)      Denies   4. PREGNANCY: \"Do you know how many " "weeks or months pregnant you are?\" \"When was the first day of your last normal menstrual period?\"      LMP 2/17 8w3d by lmp  5. ULTRASOUND: \"Have you had an ultrasound during this pregnancy?\"  Note: To confirm intrauterine pregnancy, placenta location.      Yes, us showing 6wd on 4/15  6. HEMODYNAMIC STATUS: \"Are you weak or feeling lightheaded?\" If Yes, ask: \"Can you stand and walk normally?\"       Denies   7. OTHER SYMPTOMS: \"What other symptoms are you having with the bleeding?\" (e.g., passed tissue, vaginal discharge, fever, menstrual-type cramps)      Denies    Protocols used: Pregnancy - Vaginal Bleeding Less Than 20 Weeks EGA-Adult-[place]    "

## 2025-04-17 NOTE — PROGRESS NOTES
"S: 29 y.o.  who presents for viability scan with LMP of 25. She would be 6w1d by LMP. However, patient believes she conceived , which would make her 3w6d. She went to ED for RLQ pain on 3/28; noted on TVUS was GS but not able to measured. No yolk sac or fetal pole.   HCG have had a recent slow rise:    3/21: 57.3  3/24: 202  3/27: 1,032  3/28: 2,206  3/31: 3,092   : 3,569    Office US on : TVUS indicates small GS, measuring 0.91cm, correlating with 5w5d. Yolk sac apparent. Fetal pole notable with possbile cardiac activity. Difficulty visualizing and measuring heart rate.    HC/08: 4,294  : 5,172     She had spotting and cramping on .   TVUS: showed 6w1d gestation with absent cardiac activity.   She reports  started with heavier bleeding and passing clots/tissue.   Bleeding has now continued with cramping.   She took tylenol and motrin for pain relief.           Past Medical History:   Diagnosis Date    Acne     ADHD     Allergic     Allergic asthma     Cluster headache     Disease of thyroid gland     Eczema     as a child    Hypertension     Migraines     Subclinical hypothyroidism     Vision loss        OB History    Para Term  AB Living   3 1 1 0 1 1   SAB IAB Ectopic Multiple Live Births   1 0 0 0 1      # Outcome Date GA Lbr Jacob/2nd Weight Sex Type Anes PTL Lv   3 Current            2 Term 23 39w0d  3575 g (7 lb 14.1 oz) F CS-LTranv EPI, Gen  DENG   1 SAB 2022              Obstetric Comments   : 2022 SAB naturally;  primary LTCS F 39w    Hgb electrophoresis WNL        O:  Vitals:    25 0832   BP: 118/72   BP Location: Left arm   Patient Position: Sitting   Cuff Size: Standard   Weight: 92.5 kg (204 lb)   Height: 5' 5.5\" (1.664 m)            A/P:    Reviewed ultrasound finding indicative of pregnancy loss.  Provided emotional reassurance to Mara and her .  HCG, CBC, and iron panel ordered for evaluation. She has history " of iron deficiency anemia; may require IV Venofer.       Problem List Items Addressed This Visit    None  Visit Diagnoses         Miscarriage    -  Primary    Relevant Orders    hCG, quantitative    CBC and differential    Iron Panel (Includes Ferritin, Iron Sat%, Iron, and TIBC)

## 2025-04-18 ENCOUNTER — NURSE TRIAGE (OUTPATIENT)
Age: 30
End: 2025-04-18

## 2025-04-18 PROBLEM — J06.9 VIRAL URI WITH COUGH: Status: RESOLVED | Noted: 2025-03-19 | Resolved: 2025-04-18

## 2025-04-18 NOTE — TELEPHONE ENCOUNTER
Phone call back to patient to review that her provider confirmed/agrees to keep appointment 4/21 as we dicussed. Reviewed again pelvic rest and s/s to seek ED care. Patient verbalzied understanding and thankful for call back.

## 2025-04-18 NOTE — TELEPHONE ENCOUNTER
"FOLLOW UP: has US Monday 4/21 scheduled with Jyoti Sadia advised ED if soaking pad in less than 1 hour, large clots, severe pain or feeling dizzy or lightheaded, patient verbalzied understanding.  HP sent to Jyoti Sadia to review    REASON FOR CONVERSATION: Vaginal Bleeding - Pregnant    SYMPTOMS: patient states she woke with no bleeding this morning, no pain. About 1 hour ago she started bleeding heavier, not soaking pads, thinks she passed quarter size blood clot. Did not pass tissue. Cramping comes and goes, pain just started. Denies fever, no other symptoms, asking if US needed Monday?      OTHER: LMP 2/17/25 8 weeks 4 days   US 4/15 with Jerilyn Mccullough and repeat scheduled Monday 4/21     DISPOSITION: No disposition on file.  Answer Assessment - Initial Assessment Questions  1. ONSET: \"When did this bleeding start?\"         1 hour ago   2. BLEEDING SEVERITY: \"Describe the bleeding that you are having.\" \"How much bleeding is there?\"       Bright red   3. ABDOMEN PAIN: \"Do you have any pain?\" \"How bad is the pain?\"  (e.g., Scale 0-10; none, mild, moderate, or severe)       Comes and goes   4. PREGNANCY: \"Do you know how many weeks or months pregnant you are?\" \"When was the first day of your last normal menstrual period?\"      x  5. ULTRASOUND: \"Have you had an ultrasound during this pregnancy?\"  Note: To confirm intrauterine pregnancy, placenta location.      x  6. HEMODYNAMIC STATUS: \"Are you weak or feeling lightheaded?\" If Yes, ask: \"Can you stand and walk normally?\"       Denies   7. OTHER SYMPTOMS: \"What other symptoms are you having with the bleeding?\" (e.g., passed tissue, vaginal discharge, fever, menstrual-type cramps)      Not passing tissue, no fever    Protocols used: Pregnancy - Vaginal Bleeding Less Than 20 Weeks EGA-Adult-[place]    "

## 2025-04-18 NOTE — TELEPHONE ENCOUNTER
Yes, please keep appointment. Unfortunately, it does sound like a possible loss. Please have her continue to monitor bleeding and again, provide ED precautions.

## 2025-04-18 NOTE — TELEPHONE ENCOUNTER
Regarding: bleeding in early pregnancy  ----- Message from Blanca PARKS sent at 4/18/2025  2:09 PM EDT -----  Pt sent a myc message and is having dark stringy blood. She is early pregnant and worried it may be a loss. She has an apt on Monday for an US.

## 2025-04-21 ENCOUNTER — NURSE TRIAGE (OUTPATIENT)
Age: 30
End: 2025-04-21

## 2025-04-21 ENCOUNTER — RESULTS FOLLOW-UP (OUTPATIENT)
Dept: OBGYN CLINIC | Facility: CLINIC | Age: 30
End: 2025-04-21

## 2025-04-21 ENCOUNTER — APPOINTMENT (OUTPATIENT)
Dept: LAB | Facility: CLINIC | Age: 30
End: 2025-04-21
Payer: COMMERCIAL

## 2025-04-21 ENCOUNTER — ULTRASOUND (OUTPATIENT)
Dept: OBGYN CLINIC | Facility: CLINIC | Age: 30
End: 2025-04-21
Payer: COMMERCIAL

## 2025-04-21 VITALS
SYSTOLIC BLOOD PRESSURE: 118 MMHG | BODY MASS INDEX: 32.78 KG/M2 | WEIGHT: 204 LBS | DIASTOLIC BLOOD PRESSURE: 72 MMHG | HEIGHT: 66 IN

## 2025-04-21 DIAGNOSIS — O03.9 MISCARRIAGE: Primary | ICD-10-CM

## 2025-04-21 DIAGNOSIS — O03.9 MISCARRIAGE: ICD-10-CM

## 2025-04-21 LAB
B-HCG SERPL-ACNC: 438.2 MIU/ML (ref 0–5)
BASOPHILS # BLD AUTO: 0.05 THOUSANDS/ÂΜL (ref 0–0.1)
BASOPHILS NFR BLD AUTO: 1 % (ref 0–1)
EOSINOPHIL # BLD AUTO: 0.11 THOUSAND/ÂΜL (ref 0–0.61)
EOSINOPHIL NFR BLD AUTO: 2 % (ref 0–6)
ERYTHROCYTE [DISTWIDTH] IN BLOOD BY AUTOMATED COUNT: 13.1 % (ref 11.6–15.1)
FERRITIN SERPL-MCNC: 96 NG/ML (ref 30–307)
HCT VFR BLD AUTO: 40 % (ref 34.8–46.1)
HGB BLD-MCNC: 12.8 G/DL (ref 11.5–15.4)
IMM GRANULOCYTES # BLD AUTO: 0.01 THOUSAND/UL (ref 0–0.2)
IMM GRANULOCYTES NFR BLD AUTO: 0 % (ref 0–2)
LYMPHOCYTES # BLD AUTO: 1.88 THOUSANDS/ÂΜL (ref 0.6–4.47)
LYMPHOCYTES NFR BLD AUTO: 33 % (ref 14–44)
MCH RBC QN AUTO: 29.8 PG (ref 26.8–34.3)
MCHC RBC AUTO-ENTMCNC: 32 G/DL (ref 31.4–37.4)
MCV RBC AUTO: 93 FL (ref 82–98)
MONOCYTES # BLD AUTO: 0.5 THOUSAND/ÂΜL (ref 0.17–1.22)
MONOCYTES NFR BLD AUTO: 9 % (ref 4–12)
NEUTROPHILS # BLD AUTO: 3.21 THOUSANDS/ÂΜL (ref 1.85–7.62)
NEUTS SEG NFR BLD AUTO: 55 % (ref 43–75)
NRBC BLD AUTO-RTO: 0 /100 WBCS
PLATELET # BLD AUTO: 216 THOUSANDS/UL (ref 149–390)
PMV BLD AUTO: 10.3 FL (ref 8.9–12.7)
RBC # BLD AUTO: 4.3 MILLION/UL (ref 3.81–5.12)
TSH SERPL DL<=0.05 MIU/L-ACNC: 1.89 UIU/ML (ref 0.45–4.5)
WBC # BLD AUTO: 5.76 THOUSAND/UL (ref 4.31–10.16)

## 2025-04-21 PROCEDURE — 83540 ASSAY OF IRON: CPT

## 2025-04-21 PROCEDURE — 84702 CHORIONIC GONADOTROPIN TEST: CPT

## 2025-04-21 PROCEDURE — 99213 OFFICE O/P EST LOW 20 MIN: CPT

## 2025-04-21 PROCEDURE — 83550 IRON BINDING TEST: CPT

## 2025-04-21 PROCEDURE — 76817 TRANSVAGINAL US OBSTETRIC: CPT

## 2025-04-21 PROCEDURE — 82728 ASSAY OF FERRITIN: CPT

## 2025-04-21 PROCEDURE — 36415 COLL VENOUS BLD VENIPUNCTURE: CPT

## 2025-04-21 PROCEDURE — 85025 COMPLETE CBC W/AUTO DIFF WBC: CPT

## 2025-04-21 PROCEDURE — 84443 ASSAY THYROID STIM HORMONE: CPT

## 2025-04-21 RX ORDER — ACETAMINOPHEN 325 MG/1
650 TABLET ORAL EVERY 6 HOURS PRN
COMMUNITY

## 2025-04-21 RX ORDER — IBUPROFEN 400 MG/1
TABLET, FILM COATED ORAL EVERY 6 HOURS PRN
COMMUNITY

## 2025-04-21 NOTE — TELEPHONE ENCOUNTER
"FOLLOW UP: Review with provider/ continue to monitor at home. Reviewed expected course of bleeding, taking motrin and tylenol for pain relief, warm showers/heating pad for cramping, rest and hydration.  Reviewed if bleeding increases, she passes more clots, severe pain that is not relieved, or lightheadedness that feels as though she will pass out she should call back or go to ER.     REASON FOR CONVERSATION: vaginal bleeding    SYMPTOMS: increased vaginal bleeding, cramping    OTHER: Patient reports that she was seen in office today for US which confirmed pregnancy loss. After leaving appointment she began to have 7/10 abdominal cramps, and passed a palm sized clot when she went to the bathroom. After clot passed her pain level improved. She also took 600mg ibuprofen which has helped. She has noticed her bleeding is heavier than it has been over the last few days, she is going to the bathroom every 30 minutes to an hour, and is changing her pad for comfort. She states they are full, but not saturated with these changes. She has noticed some lightheadedness, not as though she will pass out. She is unsure if this is related to feeling unwell or blood loss. Is drinking gaterade for hydration to see if this improves.     DISPOSITION: Go to [facility]gent [facility]      Reason for Disposition   MODERATE vaginal bleeding (e.g., soaking 1 pad or tampon per hour and present > 6 hours; 1 menstrual cup every 6 hours)    Answer Assessment - Initial Assessment Questions  1. ONSET: \"When did this bleeding start?\"        Clot size of palm of hand, heavy but not saturating a pad an hour started about an hour ago  2. BLEEDING SEVERITY: \"Describe the bleeding that you are having.\" \"How much bleeding is there?\"       Going to bathroom every 30 minutes to an hour and changing pad, not saturated but pretty full   3. ABDOMEN PAIN: \"Do you have any pain?\" \"How bad is the pain?\"  (e.g., Scale 0-10; none, mild, moderate, or severe)      " "7/10 before passing clot, less since  4. PREGNANCY: \"Do you know how many weeks or months pregnant you are?\" \"When was the first day of your last normal menstrual period?\"      Pregnancy loss confirmed on us today  5. ULTRASOUND: \"Have you had an ultrasound during this pregnancy?\"  Note: To confirm intrauterine pregnancy, placenta location.      Us today confirmed pregnancy loss  6. HEMODYNAMIC STATUS: \"Are you weak or feeling lightheaded?\" If Yes, ask: \"Can you stand and walk normally?\"       Feeling a little lightheaded   7. OTHER SYMPTOMS: \"What other symptoms are you having with the bleeding?\" (e.g., passed tissue, vaginal discharge, fever, menstrual-type cramps)      Palm sized clot    Protocols used: Pregnancy - Vaginal Bleeding Less Than 20 Weeks EGA-Adult-[place]    "

## 2025-04-21 NOTE — TELEPHONE ENCOUNTER
Thank you for the review, no further recommendations at this time; just reiterating when to go to ED.

## 2025-04-22 LAB
IRON SATN MFR SERPL: 18 % (ref 15–50)
IRON SERPL-MCNC: 49 UG/DL (ref 50–212)
TIBC SERPL-MCNC: 274.4 UG/DL (ref 250–450)
TRANSFERRIN SERPL-MCNC: 196 MG/DL (ref 203–362)
UIBC SERPL-MCNC: 225 UG/DL (ref 155–355)

## 2025-05-15 ENCOUNTER — NURSE TRIAGE (OUTPATIENT)
Age: 30
End: 2025-05-15

## 2025-05-15 NOTE — TELEPHONE ENCOUNTER
"Received call from patient   Stating she was at the Ed yesterday due to an anaphylactic reaction to an alcoholic beverage   She states she was given IV steroids and epi pen injection today she states that he face is flushed and red.   She states she has no additional symptoms but unsure if this is related to her reaction or the steroids she was given.   Advised UC as we have no open apts     Reason for Disposition  • Nursing judgment    Answer Assessment - Initial Assessment Questions  1. REASON FOR CALL: \"What is your main concern right now?\"      Facial flushing   2. ONSET: \"When did the symptoms  start?\"      Today   3. SEVERITY: \"How bad is the flushing?\"      Mild- moderate   4. FEVER: \"Do you have a fever?\"      no  5. OTHER SYMPTOMS: \"Do you have any other new symptoms?\"      no  6. TREATMENTS AND RESPONSE: \"What have you done so far to try to make this better? What medicines have you used?\"      Nothing   7. PREGNANCY: \"Is there any chance you are pregnant?\" \"When was your last menstrual period?\"      no    Protocols used: No Protocol Available-Adult-[place]    "

## 2025-05-20 ENCOUNTER — TELEPHONE (OUTPATIENT)
Dept: FAMILY MEDICINE CLINIC | Facility: CLINIC | Age: 30
End: 2025-05-20

## 2025-05-20 NOTE — TELEPHONE ENCOUNTER
Called pt and lvm in regards to verifying address as there are two in pt's chart, one in pt demographics and one in pt guarantor. Please verify and update.

## 2025-05-24 DIAGNOSIS — G43.909 MIGRAINE WITHOUT STATUS MIGRAINOSUS, NOT INTRACTABLE, UNSPECIFIED MIGRAINE TYPE: Primary | ICD-10-CM

## 2025-05-27 RX ORDER — RIMEGEPANT SULFATE 75 MG/75MG
TABLET, ORALLY DISINTEGRATING ORAL
Qty: 9 TABLET | Refills: 0 | Status: SHIPPED | OUTPATIENT
Start: 2025-05-27

## 2025-06-02 ENCOUNTER — OFFICE VISIT (OUTPATIENT)
Dept: ENDOCRINOLOGY | Facility: HOSPITAL | Age: 30
End: 2025-06-02
Payer: COMMERCIAL

## 2025-06-02 ENCOUNTER — APPOINTMENT (OUTPATIENT)
Dept: LAB | Facility: CLINIC | Age: 30
End: 2025-06-02
Payer: COMMERCIAL

## 2025-06-02 VITALS
BODY MASS INDEX: 33.11 KG/M2 | SYSTOLIC BLOOD PRESSURE: 110 MMHG | HEART RATE: 76 BPM | OXYGEN SATURATION: 100 % | DIASTOLIC BLOOD PRESSURE: 70 MMHG | HEIGHT: 66 IN | WEIGHT: 206 LBS

## 2025-06-02 DIAGNOSIS — E61.1 IRON DEFICIENCY: ICD-10-CM

## 2025-06-02 DIAGNOSIS — E06.3 HYPOTHYROIDISM DUE TO HASHIMOTO'S THYROIDITIS: Primary | ICD-10-CM

## 2025-06-02 DIAGNOSIS — E53.8 VITAMIN B12 DEFICIENCY: ICD-10-CM

## 2025-06-02 DIAGNOSIS — E55.9 VITAMIN D DEFICIENCY: ICD-10-CM

## 2025-06-02 LAB
FERRITIN SERPL-MCNC: 67 NG/ML (ref 30–307)
IRON SATN MFR SERPL: 29 % (ref 15–50)
IRON SERPL-MCNC: 87 UG/DL (ref 50–212)
T4 FREE SERPL-MCNC: 0.95 NG/DL (ref 0.61–1.12)
TIBC SERPL-MCNC: 295.4 UG/DL (ref 250–450)
TRANSFERRIN SERPL-MCNC: 211 MG/DL (ref 203–362)
TSH SERPL DL<=0.05 MIU/L-ACNC: 0.67 UIU/ML (ref 0.45–4.5)
UIBC SERPL-MCNC: 208 UG/DL (ref 155–355)

## 2025-06-02 PROCEDURE — 83540 ASSAY OF IRON: CPT | Performed by: INTERNAL MEDICINE

## 2025-06-02 PROCEDURE — 84443 ASSAY THYROID STIM HORMONE: CPT | Performed by: INTERNAL MEDICINE

## 2025-06-02 PROCEDURE — 82728 ASSAY OF FERRITIN: CPT | Performed by: INTERNAL MEDICINE

## 2025-06-02 PROCEDURE — 84439 ASSAY OF FREE THYROXINE: CPT

## 2025-06-02 PROCEDURE — 36415 COLL VENOUS BLD VENIPUNCTURE: CPT | Performed by: INTERNAL MEDICINE

## 2025-06-02 PROCEDURE — 99214 OFFICE O/P EST MOD 30 MIN: CPT | Performed by: INTERNAL MEDICINE

## 2025-06-02 PROCEDURE — 83550 IRON BINDING TEST: CPT | Performed by: INTERNAL MEDICINE

## 2025-06-02 RX ORDER — LEVOTHYROXINE SODIUM 112 UG/1
112 TABLET ORAL DAILY
Qty: 90 TABLET | Refills: 1 | Status: SHIPPED | OUTPATIENT
Start: 2025-06-02

## 2025-06-02 RX ORDER — FAMOTIDINE 20 MG
TABLET ORAL DAILY
COMMUNITY

## 2025-06-02 RX ORDER — EPINEPHRINE 0.3 MG/.3ML
0.3 INJECTION SUBCUTANEOUS ONCE
COMMUNITY
Start: 2025-05-14

## 2025-06-02 NOTE — PROGRESS NOTES
Name: Mara Duarte      : 1995      MRN: [id number]  Encounter Provider: Sandy Sanchez MD  Encounter Date: 2025   Encounter department: [facility]    No chief complaint on file.  :  Assessment & Plan  Hypothyroidism due to Hashimoto's thyroiditis    Orders:    TSH, 3rd generation    TIBC Panel (incl. Iron, TIBC, % Iron Saturation)    Ferritin    levothyroxine (Synthroid) 112 mcg tablet; Take 1 tablet (112 mcg total) by mouth daily    Vitamin D deficiency    Orders:    TSH, 3rd generation    TIBC Panel (incl. Iron, TIBC, % Iron Saturation)    Ferritin    Vitamin B12 deficiency    Orders:    TSH, 3rd generation    TIBC Panel (incl. Iron, TIBC, % Iron Saturation)    Ferritin    Iron deficiency    Orders:    TSH, 3rd generation    TIBC Panel (incl. Iron, TIBC, % Iron Saturation)    Ferritin      Assessment & Plan  1. Hypothyroidism due to Hashimoto's thyroiditis.  - Reports no symptoms such as hot flashes, palpitations, tremors, diarrhea, constipation, dry skin, or brittle nails.  - Physical exam shows thyroid normal in size but full, no palpable nodules.  - Discussion about optimizing thyroid levels for pregnancy; TSH levels were normal in 2025 but not optimal for pregnancy.  - Continue current levothyroxine dosage of 112 mcg daily; blood work ordered to monitor thyroid levels post-miscarriage; medication refill provided.    2. Vitamin B12 deficiency.  - No symptoms reported related to B12 deficiency.  - B12 levels were satisfactory at 700 in 2025.  - Continue current regimen of vitamin B12 2000 mcg daily.  - No changes in treatment plan discussed.    3. Vitamin D deficiency.  - No symptoms reported related to vitamin D deficiency.  - Vitamin D levels were within the low normal range in 2025.  - Continue current regimen of vitamin D 1000 IU daily and prenatal vitamins.  - Confirm exact dosage of vitamin D being taken.    4. Iron deficiency.  -  Reports tiredness and heavy bleeding post-miscarriage; no GI symptoms.  - Iron levels remain low despite two rounds of infusions; hemoglobin levels have not changed significantly.  - Follow-up with a gastroenterologist recommended to investigate potential causes of iron deficiency.  - Blood work ordered to monitor iron levels; continue current iron supplementation and dietary modifications.    5. Recent anaphylaxis.  - Experienced a recent anaphylactic reaction of unknown etiology.  - Prescribed an EpiPen; plans to undergo allergy testing before attempting to conceive again.  - No further symptoms or complications reported.  - Allergy testing and referral discussed.    I have asked her to follow-up in 6 months with blood work to be determined.    She will get blood work now consisting of a TSH and free T4 and I will repeat her ferritin level and iron levels for completeness since they have not been done recently.        Pertinent Medical History   Mara Duarte is a 29-year-old female with a history of hypothyroidism due to Hashimoto's thyroiditis, vitamin D deficiency, vitamin B12 deficiency, and a history of iron deficiency, here for a follow-up visit.    She has been trying to get pregnant after having had a miscarriage and had been on low-dose levothyroxine to help her get pregnant in the past. She was able to get pregnant in 2022. After delivery, she noticed swelling in her neck and was found to have hypothyroidism with positive thyroid antibodies. She is on thyroid hormone to shrink the thyroid due to compressive symptoms and for replacement purposes. She was found to have difficulty with iron absorption and was found to have both vitamin B12 and vitamin D deficiency.         History of Present Illness   History of Present Illness  Mara Duarte is a 29-year-old female with a history of hypothyroidism due to Hashimoto's thyroiditis, vitamin D deficiency, vitamin B12 deficiency, and a history of iron  deficiency, here for a follow-up visit.    She has been trying to get pregnant after having had a miscarriage and had been on low-dose levothyroxine to help her get pregnant in the past. She was able to get pregnant in 2022. After delivery, she noticed swelling in her neck and was found to have hypothyroidism with positive thyroid antibodies. She is on thyroid hormone to shrink the thyroid due to compressive symptoms and for replacement purposes. She was found to have difficulty with iron absorption and was found to have both vitamin B12 and vitamin D deficiency.     She is currently on a daily regimen of levothyroxine 112 mcg, vitamin B12 2000 mcg, prenatal vitamins, and iron supplements. Her levothyroxine dosage was increased by her OB-GYN following a recent miscarriage, which resulted in satisfactory thyroid levels as of her last lab test in 04/2025. She reports feeling well on the increased dose of levothyroxine. She does not experience excessive heat or cold sensations, heart palpitations, tremors, diarrhea, constipation, dry skin, brittle nails, or numbness or tingling. She reports mild hair loss and fatigue. She is not experiencing any difficulty swallowing or sensation of food getting stuck in her throat. She is requesting a refill for her thyroid medication.    She experienced heavy bleeding during her miscarriage, followed by a menstrual period, and another menstrual period since then. Prior to her pregnancy, she had regular menstrual cycles without heavy bleeding. She takes her iron supplements every other day and maintains a gap of 3 to 4 hours between her iron and Synthroid intake. She has incorporated more iron-rich foods into her diet and plans to consult a gastroenterologist regarding her iron levels. She does not have any gastrointestinal symptoms. Her hematologist, Lalita Torres, has recommended a GI consultation. Her hemoglobin levels have remained stable throughout this process. She has undergone  "2 rounds of iron infusions, which she found beneficial.    She has been prescribed an EpiPen following a recent episode of anaphylaxis, the cause of which remains undetermined. She plans to undergo allergy testing before attempting to conceive again.    She is also on a daily dose of vitamin D but is unsure of the exact dosage. She was previously on a weekly prescription of vitamin D 50,000 IU.      Review of Systems as per HPI  Medical History Reviewed by provider this encounter:  Tobacco  Allergies  Meds  Problems  Med Hx  Surg Hx  Fam Hx     .  Medications Ordered Prior to Encounter[1]   Social History[2]     Medical History Reviewed by provider this encounter:  Tobacco  Allergies  Meds  Problems  Med Hx  Surg Hx  Fam Hx     .    Objective   /70   Pulse 76   Ht 5' 5.5\" (1.664 m)   Wt 93.4 kg (206 lb)   LMP 02/17/2025 (Exact Date)   SpO2 100%   BMI 33.76 kg/m²      Body mass index is 33.76 kg/m².  Wt Readings from Last 3 Encounters:   06/02/25 93.4 kg (206 lb)   04/21/25 92.5 kg (204 lb)   04/08/25 89.8 kg (198 lb)     Physical Exam  Physical Exam  Head and Neck: No lid lag, stare, proptosis or periorbital edema. Thyroid is normal in size, but full. No palpable nodules.  Cardiovascular: Heart has a regular rate and rhythm. No murmurs.  Respiratory: Lungs are clear to auscultation.  Neurological: No tremor of the outstretched hands. Patellar deep tendon reflexes are normal.    Results    Labs: I have reviewed pertinent labs including:     I have no recent blood work studies.    Lab Results   Component Value Date    UAX2OHATOMLK 0.673 06/02/2025      Lab Results   Component Value Date    FREET4 0.95 06/02/2025    T3FREE 3.61 06/06/2024      Lab Results   Component Value Date    DSMR64SSIZRX 30.0 03/20/2025    KHAE21XXEDKN 25.6 (L) 11/05/2024    FDDI71RSOSEK 37.0 12/15/2023      Radiology Results Review : No pertinent imaging studies reviewed.  Patient Instructions   For now, continue the " same levothyroxine.     Let's get blood work now.     Continue the vitamin B 12, vitamin D,  and prenatals.     Follow up in 6 months with blood work.     Discussed with the patient and all questioned fully answered. She will call me if any problems arise.           [1]   Current Outpatient Medications on File Prior to Visit   Medication Sig Dispense Refill    albuterol (Ventolin HFA) 90 mcg/act inhaler Inhale 2 puffs every 6 (six) hours as needed for wheezing 18 g 0    Cyanocobalamin (VITAMIN B-12 PO) Take by mouth 2000 mcg daily      EPINEPHrine (EPIPEN) 0.3 mg/0.3 mL SOAJ Inject 0.3 mg into a muscle once (Patient taking differently: Inject 0.3 mg into a muscle as needed)      Ferrous Sulfate (IRON PO) Take by mouth      Nurtec 75 MG TBDP DISSOLVE ONE TABLET BY MOUTH DAILY, AS NEEDED. (Patient taking differently: as needed) 9 tablet 0    Prenat w/o A-FeCbGl-DSS-FA-DHA (PNV OB+DHA PO) Take by mouth      Vitamin D, Cholecalciferol, 25 MCG (1000 UT) CAPS Take by mouth in the morning      [DISCONTINUED] levothyroxine (Synthroid) 112 mcg tablet Take 1 tablet (112 mcg total) by mouth daily 90 tablet 0    [DISCONTINUED] acetaminophen (TYLENOL) 325 mg tablet Take 650 mg by mouth every 6 (six) hours as needed for mild pain      [DISCONTINUED] ibuprofen (MOTRIN) 400 mg tablet Take by mouth every 6 (six) hours as needed for mild pain      [DISCONTINUED] ondansetron (ZOFRAN) 4 mg tablet Take 1 tablet (4 mg total) by mouth every 8 (eight) hours as needed for nausea or vomiting 20 tablet 0     No current facility-administered medications on file prior to visit.   [2]   Social History  Tobacco Use    Smoking status: Never     Passive exposure: Never    Smokeless tobacco: Never   Vaping Use    Vaping status: Never Used   Substance and Sexual Activity    Alcohol use: Not Currently     Alcohol/week: 1.0 standard drink of alcohol     Types: 1 Glasses of wine per week     Comment: Socially    Drug use: Never    Sexual activity: Yes      Partners: Male     Birth control/protection: Other

## 2025-06-02 NOTE — PATIENT INSTRUCTIONS
For now, continue the same levothyroxine.     Let's get blood work now.     Continue the vitamin B 12, vitamin D,  and prenatals.     Follow up in 6 months with blood work.

## 2025-06-02 NOTE — ASSESSMENT & PLAN NOTE
Orders:    TSH, 3rd generation    TIBC Panel (incl. Iron, TIBC, % Iron Saturation)    Ferritin    levothyroxine (Synthroid) 112 mcg tablet; Take 1 tablet (112 mcg total) by mouth daily

## 2025-06-02 NOTE — ASSESSMENT & PLAN NOTE
Orders:    TSH, 3rd generation    TIBC Panel (incl. Iron, TIBC, % Iron Saturation)    Ferritin

## 2025-06-04 ENCOUNTER — OFFICE VISIT (OUTPATIENT)
Dept: GASTROENTEROLOGY | Facility: MEDICAL CENTER | Age: 30
End: 2025-06-04
Payer: COMMERCIAL

## 2025-06-04 ENCOUNTER — TELEPHONE (OUTPATIENT)
Dept: GASTROENTEROLOGY | Facility: MEDICAL CENTER | Age: 30
End: 2025-06-04

## 2025-06-04 VITALS
WEIGHT: 205.2 LBS | SYSTOLIC BLOOD PRESSURE: 110 MMHG | TEMPERATURE: 98.8 F | DIASTOLIC BLOOD PRESSURE: 75 MMHG | HEART RATE: 88 BPM | HEIGHT: 66 IN | BODY MASS INDEX: 32.98 KG/M2 | OXYGEN SATURATION: 99 %

## 2025-06-04 DIAGNOSIS — R11.0 NAUSEA: ICD-10-CM

## 2025-06-04 DIAGNOSIS — E61.1 IRON DEFICIENCY: Primary | ICD-10-CM

## 2025-06-04 PROCEDURE — 99203 OFFICE O/P NEW LOW 30 MIN: CPT | Performed by: STUDENT IN AN ORGANIZED HEALTH CARE EDUCATION/TRAINING PROGRAM

## 2025-06-04 RX ORDER — SODIUM CHLORIDE, SODIUM LACTATE, POTASSIUM CHLORIDE, CALCIUM CHLORIDE 600; 310; 30; 20 MG/100ML; MG/100ML; MG/100ML; MG/100ML
125 INJECTION, SOLUTION INTRAVENOUS CONTINUOUS
OUTPATIENT
Start: 2025-06-04

## 2025-06-04 RX ORDER — ONDANSETRON 4 MG/1
4 TABLET, FILM COATED ORAL EVERY 6 HOURS PRN
Qty: 10 TABLET | Refills: 0 | Status: SHIPPED | OUTPATIENT
Start: 2025-06-04

## 2025-06-04 NOTE — ASSESSMENT & PLAN NOTE
Reports ongoing iron deficiency since 2023.  Reports periods are normal, no other overt bleeding. Does not consume much iron in diet and struggles with side effects from oral iron.  Will plan for bidirectional endoscopic evaluation to assess for PUD, gastritis, HP, celiac, malignancy, etc.    Orders:    Colonoscopy; Future    EGD; Future

## 2025-06-04 NOTE — TELEPHONE ENCOUNTER
Procedure: Colon/EGD  Date: 6/30/25  Physician performing: Dr. Boyd  Location of procedure:    Instructions given to patient: Miralax  Diabetic: No  Clearances: N/A

## 2025-06-04 NOTE — PROGRESS NOTES
Name: Mara Duarte      : 1995      MRN: [id number]  Encounter Provider: Erika Boyd MD  Encounter Date: 2025   Encounter department: [facility]  :  Assessment & Plan  Iron deficiency  Reports ongoing iron deficiency since .  Reports periods are normal, no other overt bleeding. Does not consume much iron in diet and struggles with side effects from oral iron.  Will plan for bidirectional endoscopic evaluation to assess for PUD, gastritis, HP, celiac, malignancy, etc.    Orders:    Colonoscopy; Future    EGD; Future    Nausea  Reports having a sensitive stomach and doesn't think she can tolerate GoLytely.  Will do Miralax based prep with zofran for nausea ppx.  Orders:    ondansetron (ZOFRAN) 4 mg tablet; Take 1 tablet (4 mg total) by mouth every 6 (six) hours as needed for nausea or vomiting      Assessment & Plan  1. Iron deficiency:  - Iron levels have been unstable since an emergency  in 2023, despite multiple iron infusions and dietary changes.  - Experiences constipation with iron supplements, taken every other day or at least once a week.  - No evidence of gastrointestinal bleeding, heartburn, reflux, or upper abdominal pain.  - Menstrual periods are generally normal, though had a heavy period following a recent miscarriage.  - Given family history of polyps and father's experience with black vomit, recommend endoscopy and colonoscopy to rule out gastrointestinal causes of iron deficiency.  - Procedures will include biopsies of the small intestine to check for celiac disease and the stomach for H. pylori infection.  - Prescription for Zofran provided to manage potential nausea during the preparation phase.  - Discontinue iron supplements one week prior to the procedure and use over-the-counter MiraLAX mixed with Gatorade and Dulcolax for bowel preparation.  - Inform immediately if pregnancy occurs before the scheduled procedures to  postpone the scopes.    Follow-up: 6 months.      History of Present Illness   History of Present Illness  The patient is a 29-year-old female who presents for evaluation of iron deficiency.    She reports that her iron levels have been unstable since the emergency  she underwent in 2023, during which she experienced significant blood loss. Her hematologist referred her to our clinic. She has received three iron infusions post-delivery, with the most recent one administered six months ago. Despite these interventions, her iron levels continue to fluctuate. She has increased her meat intake, a deviation from her usual diet, and takes iron supplements as tolerated, although they cause constipation. She attempts to take the supplements every other day, but at a minimum, she ensures one dose per week.    She reports no presence of blood in her stool, black tarry stools, or diarrhea. She experiences heartburn and reflux only when consuming large quantities of onions, which is not a regular part of her diet. She reports no epigastric pain. She also reports no constipation or diarrhea when not taking the iron supplements. Her menstrual cycles are regular, with the exception of a recent heavy period following a miscarriage in 2025. She typically avoids red meat and includes green vegetables in her diet. She expresses concern about potential internal bleeding or malabsorption issues. Her hemoglobin levels have remained stable. She recalls undergoing a celiac antibody panel in 2024.    She notes that her bowel movements can be irregular, sometimes more loose than others, and occasionally she does not have a bowel movement for several days. She has a history of Hashimoto's disease and wonders if this could be contributing to her symptoms. She describes her stomach as sensitive.           Review of Systems   Constitutional:  Negative for chills and fever.   HENT:  Negative for ear pain and sore throat.   "  Eyes:  Negative for pain and visual disturbance.   Respiratory:  Negative for cough and shortness of breath.    Cardiovascular:  Negative for chest pain and palpitations.   Gastrointestinal:  Negative for abdominal pain and vomiting.   Genitourinary:  Negative for dysuria and hematuria.   Musculoskeletal:  Negative for arthralgias and back pain.   Skin:  Negative for color change and rash.   Neurological:  Negative for seizures and syncope.   All other systems reviewed and are negative.         Objective   /75 (BP Location: Right arm)   Pulse 88   Temp 98.8 °F (37.1 °C)   Ht 5' 5.5\" (1.664 m)   Wt 93.1 kg (205 lb 3.2 oz)   LMP 02/17/2025 (Exact Date)   SpO2 99%   BMI 33.63 kg/m²      Physical Exam  Vitals and nursing note reviewed.   Constitutional:       General: She is not in acute distress.     Appearance: She is well-developed.   HENT:      Head: Normocephalic and atraumatic.     Eyes:      Conjunctiva/sclera: Conjunctivae normal.       Cardiovascular:      Rate and Rhythm: Normal rate and regular rhythm.      Heart sounds: No murmur heard.  Pulmonary:      Effort: Pulmonary effort is normal. No respiratory distress.      Breath sounds: Normal breath sounds.   Abdominal:      Palpations: Abdomen is soft.      Tenderness: There is no abdominal tenderness.     Musculoskeletal:         General: No swelling.      Cervical back: Neck supple.     Skin:     General: Skin is warm and dry.      Capillary Refill: Capillary refill takes less than 2 seconds.     Neurological:      Mental Status: She is alert.     Psychiatric:         Mood and Affect: Mood normal.       Results  Labs   - Iron panel: 06/02/2025, Ferritin 67, TIBC 232, iron saturation 29%   - Iron panel: 03/20/2025, Iron saturation 8%   - TSH: 06/02/2025, Normal   - CBC: 05/14/2025, Unremarkable   - Celiac antibody panel: 11/2024, Negative    "

## 2025-06-16 ENCOUNTER — TELEPHONE (OUTPATIENT)
Age: 30
End: 2025-06-16

## 2025-06-16 NOTE — TELEPHONE ENCOUNTER
Pt requesting to reschedule EGD/Colon at [facility] location. Pt was asked ASC Screening today and answers are here on this encounter. Pt was pregnant and miscarried sometime in April and pt is no longer pregnant. Requesting to clear and reschedule at out patient facility [facility].

## 2025-06-17 ENCOUNTER — TELEPHONE (OUTPATIENT)
Age: 30
End: 2025-06-17

## 2025-06-17 NOTE — TELEPHONE ENCOUNTER
I received message from Rosemarie that ok for pt to rs at [facility]. I called pt and left her a voice message stating that we can rs at [facility]. That she can call us back to rs.

## 2025-06-17 NOTE — TELEPHONE ENCOUNTER
Left voicemail apologized for this inconvenience but unfortunately we cannot schedule there is no override option for us.   She has a plan in her chart that needs to be updated to reflect no longer pregnant. Once this plan has been updated we will be able to schedule.    This is a built in safety measure due to the anesthesia required for procedures    I recommended that she call her PCP office and ask them to update this plan to reflect no longer pregnant

## 2025-06-17 NOTE — TELEPHONE ENCOUNTER
Patients GI provider:  Rosemarie Christiansen MD     Number to return call: [phone]    Reason for call: Pt returning a call from Yennifer Kelley MA , as she has received approval to book her 6/30 appt at [facility].     Scheduled procedure/appointment date if applicable: EGD/Colon at [facility] location     Pt said it is likely that you will have to leave a message again so she would like to see if you could schedule this appt on 6/30 or 7/1, 7/2 or 7/3 at the [facility] since she has received approval.    I tried changing it for her but it keeps denying me to do so.   She said it is ok to leave a detailed message on her vm

## 2025-06-18 ENCOUNTER — TELEPHONE (OUTPATIENT)
Age: 30
End: 2025-06-18

## 2025-06-18 NOTE — TELEPHONE ENCOUNTER
Patient called because she is currently seeing Gastroenterology and needs to be scheduled for services. Certain services can not be scheduled due to her pregnancy status stating she is still pregnant. Patient is no longer pregnant and requests this to be removed, Please call patient or send message to let her know this has been completed so that she may schedule the necessary gastro appointments.

## 2025-06-19 NOTE — PROGRESS NOTES
[id number] [address]: Ms Robert Roy was seen today for fetal growth assessment ultrasound  See ultrasound report under "OB Procedures" tab  The time spent on this established patient on the encounter date included 4 minutes previsit service time reviewing records and precharting, 6 minutes face-to-face service time counseling regarding results and coordinating care, and  5 minutes charting, totalling 15 minutes    Please don't hesitate to contact our office with any concerns or questions   -Naima Gleason MD UTI w/ bilateral nephrostomy tubes UTI w/ bilateral nephrostomy tubes UTI w/ bilateral nephrostomy tubes

## 2025-06-30 DIAGNOSIS — G43.909 MIGRAINE WITHOUT STATUS MIGRAINOSUS, NOT INTRACTABLE, UNSPECIFIED MIGRAINE TYPE: ICD-10-CM

## 2025-07-02 NOTE — TELEPHONE ENCOUNTER
Called pt. and LVM asking if can she get back to us on her dosing and the way she is taking her prescription. Ok per CC.

## 2025-07-02 NOTE — TELEPHONE ENCOUNTER
Please call patient and clarify the dosing of this medication.  Is she taking this only as needed for migraines or if she taking it every other day for migraine prevention?

## 2025-07-07 NOTE — TELEPHONE ENCOUNTER
Please call patient again. We need to know how she is dosing the medication.   Is she taking this only as needed for migraines or if she taking it every other day for migraine prevention?

## 2025-07-08 NOTE — TELEPHONE ENCOUNTER
Spoke with patient and patient is still taking the same dosage as the prescription states. Patient did mention to me she does take it daily some times.

## 2025-07-09 RX ORDER — RIMEGEPANT SULFATE 75 MG/75MG
TABLET, ORALLY DISINTEGRATING ORAL
Qty: 9 TABLET | Refills: 2 | Status: SHIPPED | OUTPATIENT
Start: 2025-07-09 | End: 2025-07-13 | Stop reason: SDUPTHER

## 2025-07-13 DIAGNOSIS — G43.909 MIGRAINE WITHOUT STATUS MIGRAINOSUS, NOT INTRACTABLE, UNSPECIFIED MIGRAINE TYPE: ICD-10-CM

## 2025-07-14 ENCOUNTER — ANESTHESIA (OUTPATIENT)
Dept: ANESTHESIOLOGY | Facility: HOSPITAL | Age: 30
End: 2025-07-14

## 2025-07-14 ENCOUNTER — ANESTHESIA EVENT (OUTPATIENT)
Dept: ANESTHESIOLOGY | Facility: HOSPITAL | Age: 30
End: 2025-07-14

## 2025-07-14 RX ORDER — RIMEGEPANT SULFATE 75 MG/75MG
75 TABLET, ORALLY DISINTEGRATING ORAL DAILY PRN
Qty: 9 TABLET | Refills: 5 | Status: SHIPPED | OUTPATIENT
Start: 2025-07-14

## 2025-07-24 ENCOUNTER — TELEPHONE (OUTPATIENT)
Age: 30
End: 2025-07-24

## 2025-07-24 NOTE — TELEPHONE ENCOUNTER
Patients GI provider:  Dr. Boyd     Number to return call: [phone]    Reason for call: Pt is scheduled for egd and colonoscopy on Friday. She wants to know if she can take her levothyroxine that morning and the day before. She also ate popcorn on Tuesday. She wants to know if that's okay?     Patient is actually scheduled on Monday, which I didn't realize until after I was off the phone with her. I called her back and let her know. She said we can disregard the popcorn question but would need to know about the levothyroxine. .    Scheduled procedure/appointment date if applicable: Procedure 7/28/25

## 2025-07-28 ENCOUNTER — ANESTHESIA (OUTPATIENT)
Dept: GASTROENTEROLOGY | Facility: MEDICAL CENTER | Age: 30
End: 2025-07-28
Payer: COMMERCIAL

## 2025-07-28 ENCOUNTER — ANESTHESIA EVENT (OUTPATIENT)
Dept: GASTROENTEROLOGY | Facility: MEDICAL CENTER | Age: 30
End: 2025-07-28
Payer: COMMERCIAL

## 2025-07-28 ENCOUNTER — HOSPITAL ENCOUNTER (OUTPATIENT)
Dept: GASTROENTEROLOGY | Facility: MEDICAL CENTER | Age: 30
Setting detail: OUTPATIENT SURGERY
Discharge: HOME | End: 2025-07-28
Attending: STUDENT IN AN ORGANIZED HEALTH CARE EDUCATION/TRAINING PROGRAM
Payer: COMMERCIAL

## 2025-07-28 VITALS
BODY MASS INDEX: 32.95 KG/M2 | RESPIRATION RATE: 20 BRPM | OXYGEN SATURATION: 100 % | WEIGHT: 205 LBS | DIASTOLIC BLOOD PRESSURE: 69 MMHG | SYSTOLIC BLOOD PRESSURE: 114 MMHG | HEART RATE: 78 BPM | HEIGHT: 66 IN

## 2025-07-28 DIAGNOSIS — E61.1 IRON DEFICIENCY: ICD-10-CM

## 2025-07-28 LAB
EXT PREGNANCY TEST URINE: NEGATIVE
EXT. CONTROL: NORMAL

## 2025-07-28 PROCEDURE — 88305 TISSUE EXAM BY PATHOLOGIST: CPT | Performed by: PATHOLOGY

## 2025-07-28 PROCEDURE — 45378 DIAGNOSTIC COLONOSCOPY: CPT | Performed by: STUDENT IN AN ORGANIZED HEALTH CARE EDUCATION/TRAINING PROGRAM

## 2025-07-28 PROCEDURE — 43239 EGD BIOPSY SINGLE/MULTIPLE: CPT | Performed by: STUDENT IN AN ORGANIZED HEALTH CARE EDUCATION/TRAINING PROGRAM

## 2025-07-28 PROCEDURE — 81025 URINE PREGNANCY TEST: CPT | Performed by: ANESTHESIOLOGY

## 2025-07-28 RX ORDER — LIDOCAINE HYDROCHLORIDE 10 MG/ML
0.5 INJECTION, SOLUTION EPIDURAL; INFILTRATION; INTRACAUDAL; PERINEURAL ONCE AS NEEDED
Status: DISCONTINUED | OUTPATIENT
Start: 2025-07-28 | End: 2025-08-01 | Stop reason: HOSPADM

## 2025-07-28 RX ORDER — ONDANSETRON 2 MG/ML
4 INJECTION INTRAMUSCULAR; INTRAVENOUS ONCE AS NEEDED
Status: DISCONTINUED | OUTPATIENT
Start: 2025-07-28 | End: 2025-08-01 | Stop reason: HOSPADM

## 2025-07-28 RX ORDER — PROMETHAZINE HYDROCHLORIDE 25 MG/ML
12.5 INJECTION, SOLUTION INTRAMUSCULAR; INTRAVENOUS ONCE AS NEEDED
Status: DISCONTINUED | OUTPATIENT
Start: 2025-07-28 | End: 2025-08-01 | Stop reason: HOSPADM

## 2025-07-28 RX ORDER — ALBUTEROL SULFATE 0.83 MG/ML
2.5 SOLUTION RESPIRATORY (INHALATION) ONCE AS NEEDED
Status: DISCONTINUED | OUTPATIENT
Start: 2025-07-28 | End: 2025-08-01 | Stop reason: HOSPADM

## 2025-07-28 RX ORDER — PROPOFOL 10 MG/ML
INJECTION, EMULSION INTRAVENOUS AS NEEDED
Status: DISCONTINUED | OUTPATIENT
Start: 2025-07-28 | End: 2025-07-28

## 2025-07-28 RX ORDER — HYDROMORPHONE HCL/PF 1 MG/ML
0.5 SYRINGE (ML) INJECTION
Status: DISCONTINUED | OUTPATIENT
Start: 2025-07-28 | End: 2025-08-01 | Stop reason: HOSPADM

## 2025-07-28 RX ORDER — LIDOCAINE HYDROCHLORIDE 20 MG/ML
INJECTION, SOLUTION EPIDURAL; INFILTRATION; INTRACAUDAL; PERINEURAL AS NEEDED
Status: DISCONTINUED | OUTPATIENT
Start: 2025-07-28 | End: 2025-07-28

## 2025-07-28 RX ORDER — ONDANSETRON 2 MG/ML
INJECTION INTRAMUSCULAR; INTRAVENOUS AS NEEDED
Status: DISCONTINUED | OUTPATIENT
Start: 2025-07-28 | End: 2025-07-28

## 2025-07-28 RX ORDER — LABETALOL HYDROCHLORIDE 5 MG/ML
5 INJECTION, SOLUTION INTRAVENOUS
Status: DISCONTINUED | OUTPATIENT
Start: 2025-07-28 | End: 2025-08-01 | Stop reason: HOSPADM

## 2025-07-28 RX ORDER — SODIUM CHLORIDE, SODIUM LACTATE, POTASSIUM CHLORIDE, CALCIUM CHLORIDE 600; 310; 30; 20 MG/100ML; MG/100ML; MG/100ML; MG/100ML
125 INJECTION, SOLUTION INTRAVENOUS CONTINUOUS
Status: DISCONTINUED | OUTPATIENT
Start: 2025-07-28 | End: 2025-08-01 | Stop reason: HOSPADM

## 2025-07-28 RX ORDER — FENTANYL CITRATE/PF 50 MCG/ML
25 SYRINGE (ML) INJECTION
Status: DISCONTINUED | OUTPATIENT
Start: 2025-07-28 | End: 2025-08-01 | Stop reason: HOSPADM

## 2025-07-28 RX ADMIN — PROPOFOL 200 MCG/KG/MIN: 10 INJECTION, EMULSION INTRAVENOUS at 11:24

## 2025-07-28 RX ADMIN — PROPOFOL 200 MG: 10 INJECTION, EMULSION INTRAVENOUS at 11:22

## 2025-07-28 RX ADMIN — LIDOCAINE HYDROCHLORIDE 100 MG: 20 INJECTION, SOLUTION EPIDURAL; INFILTRATION; INTRACAUDAL at 11:22

## 2025-07-28 RX ADMIN — SODIUM CHLORIDE, SODIUM LACTATE, POTASSIUM CHLORIDE, AND CALCIUM CHLORIDE 125 ML/HR: .6; .31; .03; .02 INJECTION, SOLUTION INTRAVENOUS at 11:16

## 2025-07-28 RX ADMIN — PROPOFOL 150 MCG/KG/MIN: 10 INJECTION, EMULSION INTRAVENOUS at 11:36

## 2025-07-28 RX ADMIN — ONDANSETRON 4 MG: 2 INJECTION INTRAMUSCULAR; INTRAVENOUS at 11:20

## 2025-08-08 ENCOUNTER — TELEPHONE (OUTPATIENT)
Dept: GASTROENTEROLOGY | Facility: MEDICAL CENTER | Age: 30
End: 2025-08-08

## (undated) DEVICE — ABDOMINAL PAD: Brand: DERMACEA

## (undated) DEVICE — ADHESIVE SKIN HIGH VISCOSITY EXOFIN 1ML

## (undated) DEVICE — PACK C-SECTION PBDS

## (undated) DEVICE — SUT MONOCRYL 0 CTX 36 IN Y398H

## (undated) DEVICE — GLOVE PI ULTRA TOUCH SZ.7.0

## (undated) DEVICE — GAUZE SPONGES,16 PLY: Brand: CURITY

## (undated) DEVICE — LARGE, DISPOSABLE ALEXIS O C-SECTION PROTECTOR - RETRACTOR: Brand: ALEXIS ® O C-SECTION PROTECTOR - RETRACTOR

## (undated) DEVICE — GLOVE INDICATOR PI UNDERGLOVE SZ 7 BLUE

## (undated) DEVICE — SUT VICRYL 0 CT-1 27 IN J260H

## (undated) DEVICE — TELFA NON-ADHERENT ABSORBENT DRESSING: Brand: TELFA

## (undated) DEVICE — SKIN MARKER DUAL TIP WITH RULER CAP, FLEXIBLE RULER AND LABELS: Brand: DEVON

## (undated) DEVICE — SUT VICRYL 4-0 PS-2 27 IN J426H

## (undated) DEVICE — Device

## (undated) DEVICE — CHLORAPREP HI-LITE 26ML ORANGE